# Patient Record
Sex: FEMALE | Race: WHITE | NOT HISPANIC OR LATINO | Employment: FULL TIME | URBAN - METROPOLITAN AREA
[De-identification: names, ages, dates, MRNs, and addresses within clinical notes are randomized per-mention and may not be internally consistent; named-entity substitution may affect disease eponyms.]

---

## 2018-02-15 ENCOUNTER — HOSPITAL ENCOUNTER (OUTPATIENT)
Dept: RADIOLOGY | Facility: HOSPITAL | Age: 58
Discharge: HOME | End: 2018-02-15
Attending: INTERNAL MEDICINE
Payer: COMMERCIAL

## 2018-02-15 ENCOUNTER — OFFICE VISIT (OUTPATIENT)
Dept: INTERNAL MEDICINE | Facility: CLINIC | Age: 58
End: 2018-02-15
Payer: COMMERCIAL

## 2018-02-15 ENCOUNTER — TELEPHONE (OUTPATIENT)
Dept: INTERNAL MEDICINE | Facility: CLINIC | Age: 58
End: 2018-02-15

## 2018-02-15 VITALS
WEIGHT: 202.19 LBS | OXYGEN SATURATION: 98 % | DIASTOLIC BLOOD PRESSURE: 76 MMHG | HEIGHT: 70 IN | SYSTOLIC BLOOD PRESSURE: 122 MMHG | BODY MASS INDEX: 28.95 KG/M2 | HEART RATE: 77 BPM | TEMPERATURE: 98 F

## 2018-02-15 DIAGNOSIS — R51.9 LEFT TEMPORAL HEADACHE: ICD-10-CM

## 2018-02-15 DIAGNOSIS — R51.9 LEFT TEMPORAL HEADACHE: Primary | ICD-10-CM

## 2018-02-15 PROCEDURE — 3008F BODY MASS INDEX DOCD: CPT | Mod: S$GLB,,, | Performed by: INTERNAL MEDICINE

## 2018-02-15 PROCEDURE — 70553 MRI BRAIN STEM W/O & W/DYE: CPT | Mod: 26,,, | Performed by: RADIOLOGY

## 2018-02-15 PROCEDURE — 70553 MRI BRAIN STEM W/O & W/DYE: CPT | Mod: TC

## 2018-02-15 PROCEDURE — 99214 OFFICE O/P EST MOD 30 MIN: CPT | Mod: S$GLB,,, | Performed by: INTERNAL MEDICINE

## 2018-02-15 PROCEDURE — 99999 PR PBB SHADOW E&M-NEW PATIENT-LVL IV: CPT | Mod: PBBFAC,,, | Performed by: INTERNAL MEDICINE

## 2018-02-15 PROCEDURE — A9585 GADOBUTROL INJECTION: HCPCS | Performed by: INTERNAL MEDICINE

## 2018-02-15 PROCEDURE — 25500020 PHARM REV CODE 255: Performed by: INTERNAL MEDICINE

## 2018-02-15 RX ORDER — GADOBUTROL 604.72 MG/ML
10 INJECTION INTRAVENOUS
Status: COMPLETED | OUTPATIENT
Start: 2018-02-15 | End: 2018-02-15

## 2018-02-15 RX ADMIN — GADOBUTROL 10 ML: 604.72 INJECTION INTRAVENOUS at 04:02

## 2018-02-15 NOTE — PROGRESS NOTES
Subjective:       Patient ID: Jenifer Burnett is a 58 y.o. female.    Chief Complaint: Headache    Headache    This is a new problem. The current episode started 1 to 4 weeks ago. Episode frequency: several times a day. The problem has been unchanged. The pain is located in the left unilateral and temporal region. The pain does not radiate. The pain quality is not similar to prior headaches. The quality of the pain is described as shooting and sharp. The pain is at a severity of 7/10. Pertinent negatives include no abdominal pain, coughing, dizziness, ear pain, fever, nausea, sinus pressure, sore throat, vomiting or weakness. Associated symptoms comments: Temporal tenderness. Exacerbated by: touching lateral left eyebrow. She has tried acetaminophen and NSAIDs for the symptoms. The treatment provided no relief. Her past medical history is significant for migraine headaches. (This is not like her migraines)     Review of Systems   Constitutional: Negative for activity change, chills, fatigue and fever.   HENT: Negative for congestion, ear pain, nosebleeds, postnasal drip, sinus pressure and sore throat.    Eyes: Negative.  Negative for visual disturbance.   Respiratory: Negative for cough, chest tightness, shortness of breath and wheezing.    Cardiovascular: Negative for chest pain.   Gastrointestinal: Negative for abdominal pain, diarrhea, nausea and vomiting.   Genitourinary: Negative for difficulty urinating, dysuria, frequency and urgency.   Musculoskeletal: Negative for arthralgias and neck stiffness.   Skin: Negative for rash.   Neurological: Positive for headaches. Negative for dizziness and weakness.   Psychiatric/Behavioral: Negative for sleep disturbance. The patient is not nervous/anxious.        Objective:      Physical Exam   Constitutional: She is oriented to person, place, and time. She appears well-developed and well-nourished.  Non-toxic appearance. No distress.   HENT:   Head: Normocephalic and  atraumatic.       Right Ear: Tympanic membrane, external ear and ear canal normal.   Left Ear: Tympanic membrane, external ear and ear canal normal.   Eyes: EOM are normal. Pupils are equal, round, and reactive to light. No scleral icterus.   Neck: Normal range of motion. Neck supple. No thyromegaly present.   Cardiovascular: Normal rate, regular rhythm and normal heart sounds.    Pulmonary/Chest: Effort normal and breath sounds normal.   Abdominal: Soft. Bowel sounds are normal. She exhibits no mass. There is no tenderness. There is no rebound.   Musculoskeletal: Normal range of motion.   Lymphadenopathy:     She has no cervical adenopathy.   Neurological: She is alert and oriented to person, place, and time. She has normal reflexes. She displays normal reflexes. No cranial nerve deficit. She exhibits normal muscle tone. Coordination normal.   Skin: Skin is warm and dry.   Psychiatric: She has a normal mood and affect. Her behavior is normal.       Assessment:       1. Left temporal headache        Plan:   Jenifer was seen today for headache.    Diagnoses and all orders for this visit:    Left temporal headache  -     CBC auto differential; Future  -     Sedimentation rate, manual; Future  -     C-reactive protein; Future  -     MRI Brain W WO Contrast; Future  -     Ambulatory consult to Neurology

## 2018-03-05 ENCOUNTER — OFFICE VISIT (OUTPATIENT)
Dept: NEUROLOGY | Facility: CLINIC | Age: 58
End: 2018-03-05
Payer: COMMERCIAL

## 2018-03-05 VITALS
BODY MASS INDEX: 29.29 KG/M2 | SYSTOLIC BLOOD PRESSURE: 109 MMHG | HEART RATE: 94 BPM | WEIGHT: 204.13 LBS | DIASTOLIC BLOOD PRESSURE: 71 MMHG

## 2018-03-05 DIAGNOSIS — G50.0 TRIGEMINAL NEURALGIA OF LEFT SIDE OF FACE: Primary | ICD-10-CM

## 2018-03-05 PROCEDURE — 3078F DIAST BP <80 MM HG: CPT | Mod: S$GLB,,, | Performed by: PSYCHIATRY & NEUROLOGY

## 2018-03-05 PROCEDURE — 99999 PR PBB SHADOW E&M-EST. PATIENT-LVL III: CPT | Mod: PBBFAC,,, | Performed by: PSYCHIATRY & NEUROLOGY

## 2018-03-05 PROCEDURE — 99205 OFFICE O/P NEW HI 60 MIN: CPT | Mod: S$GLB,,, | Performed by: PSYCHIATRY & NEUROLOGY

## 2018-03-05 PROCEDURE — 3074F SYST BP LT 130 MM HG: CPT | Mod: S$GLB,,, | Performed by: PSYCHIATRY & NEUROLOGY

## 2018-03-05 RX ORDER — CARBAMAZEPINE 200 MG/1
TABLET, EXTENDED RELEASE ORAL
Qty: 180 TABLET | Refills: 1 | Status: SHIPPED | OUTPATIENT
Start: 2018-03-05 | End: 2019-10-04

## 2018-03-05 NOTE — PROGRESS NOTES
[facility] - NEUROLOGY  [facility] [place]    Date: March 5, 2018   Patient Name: Jenifer Burnett   MRN: [id number]   PCP: Fab Suero  Referring Provider: Yaneli Landa MD    Assessment:      This is Jenifer Burnett, 58 y.o. female with left trigeminal neuralgia, images of MRI brain reviewed T2 signal abnormality which is not in location which would explain symptoms - suspect this is incidental.  We discussed referral for trigeminal nn block but she preferred medication trial.     Plan:      -  Tegretol 200mg bid, will check CMP on follow up    Return 3 months, sooner if needed       I discussed side effects of the medications. I asked the patient to stop the medication if she notices serious adverse effects as we discussed and to seek immediate medical attention at an ER.     Raymond Snyder MD  [facility]   Department of Neurology    Subjective:      HPI:   Ms. Jenifer Burnett is a 58 y.o. female who presents with a chief complaint of facial pain    Starting January 2018 she began to have burning/electric pain which would run along the left eyebrow with significant allodynia to touch.  This will develop without clear trigger and last for several hours, no associated autonomic symptoms.  No history of stroke or TIA.    PAST MEDICAL HISTORY:  Past Medical History:   Diagnosis Date    Encounter for blood transfusion     GERD (gastroesophageal reflux disease)     Hypertension        PAST SURGICAL HISTORY:  Past Surgical History:   Procedure Laterality Date    CHOLECYSTECTOMY      HYSTERECTOMY         CURRENT MEDS:  Current Outpatient Prescriptions   Medication Sig Dispense Refill    citalopram (CELEXA) 10 MG tablet Take 20 mg by mouth Daily. 1 Tablet Oral Every day      docusate sodium 100 mg capsule Take 1 tablet by mouth Three times daily as needed. 1 Tablet Oral Three times a day.  take 1 pill three time a day as needed for constipation      hydrOXYzine (ATARAX)  25 MG tablet Take 1 tablet (25 mg total) by mouth every 6 (six) hours. 12 tablet 0    lisinopril-hydrochlorothiazide (PRINZIDE,ZESTORETIC) 10-12.5 mg per tablet Take 1 tablet by mouth once daily.      omeprazole (PRILOSEC) 40 MG capsule Take 40 mg by mouth Daily.  Capsule, Delayed Release(E.C.) Oral Every day      oxycodone-acetaminophen (PERCOCET) 5-325 mg per tablet Take 1-2 tablets by mouth every 4 (four) hours as needed for Pain. 1 - 2 Tablet Oral Every 6 hours.  take 1-2 pills by mouth every 6 hours as needed for pain 40 tablet 0    promethazine (PHENERGAN) 25 MG suppository Place 1 suppository rectally every 6 to 8 hours as needed. 1 Suppository Rectal Every 6-8 hours      carBAMazepine (TEGRETOL XR) 200 MG 12 hr tablet Take 1 tab at bed time for 1 week then take 1 tab twice daily 180 tablet 1     No current facility-administered medications for this visit.        ALLERGIES:  Review of patient's allergies indicates:  No Known Allergies    FAMILY HISTORY:  No family history on file.    SOCIAL HISTORY:  Social History   Substance Use Topics    Smoking status: Never Smoker    Smokeless tobacco: Never Used    Alcohol use No       Review of Systems:  12 review of systems is negative except for the symptoms mentioned in HPI.        Objective:     Vitals:    03/05/18 1351   BP: 109/71   Pulse: 94   Weight: 92.6 kg (204 lb 2.3 oz)       General: NAD, well nourished   Eyes: no tearing, discharge, no erythema   ENT: moist mucous membranes of the oral cavity, nares patent    Neck: Supple, full range of motion  Cardiovascular: Warm and well perfused, pulses equal and symmetrical  Lungs: Normal work of breathing, normal chest wall excursions  Skin: No rash, lesions, or breakdown on exposed skin  Psychiatry: Mood and affect are appropriate   Abdomen: soft, non tender, non distended  Extremeties: No cyanosis, clubbing or edema.    Neurological   MENTAL STATUS: Alert and oriented to person, place, and time. Attention  and concentration within normal limits. Speech without dysarthria, able to name and repeat without difficulty. Recent and remote memory within normal limits   CRANIAL NERVES: Visual fields intact. PERRL. EOMI. Facial sensation intact. Face symmetrical. Hearing grossly intact. Full shoulder shrug bilaterally. Tongue protrudes midline   SENSORY: Sensation is intact to light touch throughout.  Negative Romberg.   MOTOR: Normal bulk and tone. No pronator drift.  5/5 deltoid, biceps, triceps, interosseous, hand  bilaterally. 5/5 iliopsoas, knee extension/flexion, foot dorsi/plantarflexion bilaterally.    REFLEXES: Symmetric and 2+ throughout.    CEREBELLAR/COORDINATION/GAIT: Gait steady with normal arm swing and stride length.  Heel to shin intact. Finger to nose intact. Normal rapid alternating movements.

## 2018-03-05 NOTE — LETTER
March 5, 2018      Yaneli Landa MD  [address]  [facility] [address]           [facility] - Neurology  [address]  [facility] [address]  Phone: [phone]  Fax: [phone]          Patient: Jenifer Burnett   MR Number: [id number]   YOB: 1960   Date of Visit: 3/5/2018       Dear Dr. Yaneli Landa:    Thank you for referring Jenifer Burnett to me for evaluation. Attached you will find relevant portions of my assessment and plan of care.    If you have questions, please do not hesitate to call me. I look forward to following Jenifer Burnett along with you.    Sincerely,    Raymond Snyder MD    Enclosure  CC:  No Recipients    If you would like to receive this communication electronically, please contact [email or web address] or [phone] to request more information on [employer] access.    For providers and/or their staff who would like to refer a patient to [facility], please contact us through our one-stop-shop provider referral line, [facility] , at [phone].    If you feel you have received this communication in error or would no longer like to receive these types of communications, please e-mail [email or web address]

## 2018-03-05 NOTE — PATIENT INSTRUCTIONS
Trigeminal Neuralgia  You have trigeminal neuralgia. This is pain caused by irritation of the trigeminal nerve on your face. Symptoms include sudden, sharp pain in your head or face. It may feel like an electric shock. It can last for several seconds or minutes. It usually happens on only 1 side of your face. Pain may be triggered by things like moving your jaw or a touch on the skin of your face. The pain may be caused by something irritating the trigeminal nerve, such as a blood vessel pressing against it. But the exact cause of this problem often isnt known. Although it can be quite painful, the condition isnt dangerous.  Trigeminal neuralgia is often treated with medicines. These include anti-seizure medicines or antidepressants. Certain other treatments may also help. In some cases, you may need surgery.    Home care  Your healthcare provider may prescribe medicines to help relieve and prevent pain. Take all medicines as directed. Please note that it may take several changes in dose and medicines before the right combination is found that controls the pain.  General care:  · Plan to rest at home today.  · Avoid any specific activities that seem to trigger the pain.  · Over the next few weeks, keep a pain diary. Write down when your symptoms happen and how they feel. Certain activities such as touching your face, chewing, talking, or brushing your teeth may bring on the pain. Cold air can also trigger the pain. Make sure you write down any triggers and discuss these with your healthcare provider. This will help guide treatment.  Follow-up care  Follow up with your healthcare provider, or as advised. If you were referred to a neurologist, be sure to make an appointment.  For more information on your condition, visit:  · [employer] www.fpa-support.org  When to seek medical advice  Call your healthcare provider right away if any of these occur:  · Fever of 100.4°F (38°C) or higher, or as  advised  · Headache with very stiff neck  · You arent able to keep liquids down (repeated vomiting)  · Extreme drowsiness or confusion  · Dizziness or fainting  · A new feeling of weakness or numbness or tingling in your arm, leg, or face  · Difficulty speaking or seeing  Date Last Reviewed: 8/1/2016  © 3356-3257 [employer]. [address] [place] [address], [place] [address]. All rights reserved. This information is not intended as a substitute for professional medical care. Always follow your healthcare professional's instructions.

## 2018-12-11 ENCOUNTER — OFFICE VISIT (OUTPATIENT)
Dept: INTERNAL MEDICINE | Facility: CLINIC | Age: 58
End: 2018-12-11
Payer: COMMERCIAL

## 2018-12-11 VITALS
OXYGEN SATURATION: 98 % | HEIGHT: 69 IN | HEART RATE: 80 BPM | SYSTOLIC BLOOD PRESSURE: 132 MMHG | BODY MASS INDEX: 30.4 KG/M2 | DIASTOLIC BLOOD PRESSURE: 84 MMHG | WEIGHT: 205.25 LBS

## 2018-12-11 DIAGNOSIS — G43.009 MIGRAINE WITHOUT AURA AND WITHOUT STATUS MIGRAINOSUS, NOT INTRACTABLE: Primary | ICD-10-CM

## 2018-12-11 PROBLEM — G43.909 MIGRAINE: Status: ACTIVE | Noted: 2018-12-11

## 2018-12-11 PROCEDURE — 99999 PR PBB SHADOW E&M-EST. PATIENT-LVL III: CPT | Mod: PBBFAC,,, | Performed by: PHYSICIAN ASSISTANT

## 2018-12-11 PROCEDURE — 3008F BODY MASS INDEX DOCD: CPT | Mod: CPTII,S$GLB,, | Performed by: PHYSICIAN ASSISTANT

## 2018-12-11 PROCEDURE — 99213 OFFICE O/P EST LOW 20 MIN: CPT | Mod: S$GLB,,, | Performed by: PHYSICIAN ASSISTANT

## 2018-12-11 RX ORDER — TRAMADOL HYDROCHLORIDE 50 MG/1
50 TABLET ORAL EVERY 6 HOURS
COMMUNITY
End: 2019-10-04

## 2018-12-11 RX ORDER — BUTALBITAL, ASPIRIN, AND CAFFEINE 325; 50; 40 MG/1; MG/1; MG/1
1 CAPSULE ORAL EVERY 4 HOURS PRN
COMMUNITY
End: 2019-10-04

## 2018-12-11 RX ORDER — NAPROXEN 500 MG/1
500 TABLET ORAL 2 TIMES DAILY
COMMUNITY
End: 2019-10-04

## 2018-12-11 RX ORDER — ESOMEPRAZOLE MAGNESIUM 40 MG/1
40 CAPSULE, DELAYED RELEASE ORAL
COMMUNITY
End: 2019-10-04

## 2018-12-11 RX ORDER — SUMATRIPTAN 50 MG/1
50 TABLET, FILM COATED ORAL
Qty: 10 TABLET | Refills: 1 | Status: SHIPPED | OUTPATIENT
Start: 2018-12-11 | End: 2020-07-01

## 2018-12-11 NOTE — PATIENT INSTRUCTIONS
What Are Migraine and Tension Headaches?    Although there are several types of headaches, migraine and tension headaches affect the most people. When you have a headache, it isn't your brain that's hurting. Your head aches because nerves in the bones, blood vessels, meninges, and muscles of your head are irritated. These irritated nerves send pain signals to the brain, which identifies where you hurt and how bad the pain is.  Talk with your healthcare provider about a treatment plan that may help relieve pain and prevent future headaches.   What causes your headache?  The actual headache process is not yet understood. Only rarely are headaches a sign of a serious medical problem. Headache pain may be caused by abnormal interaction between the brain and the nerves and blood vessels in the head. Environmental stresses or certain foods and drinks may trigger headache pain.  What is referred pain?  Headache pain can be referred pain, which is pain that has its source in one place but is felt in another. For example, pain behind the eyes may actually be caused by tense muscles in the neck and shoulders. This means that the place that hurts may not be the part of the body that needs treatment.  Is it a migraine?  Migraine is a vascular headache that causes throbbing pain felt on one or both sides of the head. You may feel nauseated or vomit. This headache may also be preceded or associated with changes in sight (like seeing spots or flashes of light), ability to speak, or sensation (aura). There are a wide variety of environmental and food-related triggers for migraines. The pain may last for 4 to 72 hours. Afterward, you may feel shaky for a day or so. If this is the first time you experience these symptoms, you should immediately seek medical attention because you could be having a stroke.  Is it a tension headache?  This type of headache is usually a dull ache or a sensation of pressure on both sides of the head. It  "may be associated with pain or tension in the neck and shoulders. Depression, anxiety, and stress can cause a tension headache. The pain may not have a definite beginning or end. It may come and go, or seem never to go away.  When to call the healthcare provider  Call your healthcare provider for headaches that happen along with any of these symptoms:  · Sudden, severe headache that is different from your usual headache pain  · High fever along with a stiff neck  · Recurring headache in children   · Ongoing numbness or muscle weakness  · Loss of vision  · Pain following a head injury  · Convulsions, or a change in mental awareness  · A headache you would call "the worst headache you've ever had"   Date Last Reviewed: 9/14/2015  © 9521-0914 [employer]. [address] [place] [address], [place] [address]. All rights reserved. This information is not intended as a substitute for professional medical care. Always follow your healthcare professional's instructions.        "

## 2018-12-11 NOTE — PROGRESS NOTES
"Subjective:       Patient ID: Jenifer Burnett is a 58 y.o. female.    Chief Complaint: Migraine (3 days)    HPI      Pt with PMH of chronic migraines and hx of trigeminal neuralgia. Today c/o migraine onset about 3 days ago, starts at back of neck and wraps around bilaterally with associated nausea, photophobia and phonophobia. She tried Fioricet at home without relief. Then tried Tramadol with mild improvement. Had MRI in 2/2018 (see results below). Seen by Neurology as well but lost to follow up. Never tried Carbamezapine prescribed by Neurology    Past Medical History:   Diagnosis Date    Encounter for blood transfusion     GERD (gastroesophageal reflux disease)     Hypertension      Review of patient's allergies indicates:  No Known Allergies    Social History     Tobacco Use    Smoking status: Never Smoker    Smokeless tobacco: Never Used   Substance Use Topics    Alcohol use: No    Drug use: No         Review of Systems   Constitutional: Negative for chills, diaphoresis, fatigue, fever and unexpected weight change.   Eyes: Positive for photophobia. Negative for visual disturbance.   Respiratory: Negative for cough and shortness of breath.    Cardiovascular: Negative for chest pain, palpitations and leg swelling.   Gastrointestinal: Positive for nausea. Negative for abdominal pain and vomiting.   Endocrine: Negative for polydipsia, polyphagia and polyuria.   Skin: Negative for rash.   Neurological: Positive for headaches. Negative for dizziness, facial asymmetry, speech difficulty, weakness and light-headedness.       Objective: /84   Pulse 80   Ht 5' 9" (1.753 m)   Wt 93.1 kg (205 lb 4 oz)   SpO2 98%   BMI 30.31 kg/m²         Physical Exam   Constitutional: She is oriented to person, place, and time. She appears well-developed and well-nourished. No distress.   HENT:   Head: Normocephalic and atraumatic.   Mouth/Throat: Oropharynx is clear and moist.   Eyes: Pupils are equal, round, and " reactive to light.   Cardiovascular: Normal rate and regular rhythm. Exam reveals no friction rub.   No murmur heard.  Pulmonary/Chest: Effort normal and breath sounds normal. She has no wheezes. She has no rales.   Abdominal: Soft. Bowel sounds are normal. There is no tenderness.   Neurological: She is alert and oriented to person, place, and time. No cranial nerve deficit. Coordination normal.   Skin: Skin is warm and dry. Capillary refill takes less than 2 seconds. No rash noted.   Psychiatric: She has a normal mood and affect.   Vitals reviewed.        MRI 2/2018  Impression      Small signal abnormality in the left paramedian deisi most compatible with remote infarction.    Few punctate foci of T2 flair hyperintensity in the supratentorial subcortical white matter which are nonspecific differential to include mild chronic ischemic change.    No evidence for acute infarction or enhancing lesion.       Assessment:       1. Migraine without aura and without status migrainosus, not intractable        Plan:         Jenifer was seen today for migraine.    Diagnoses and all orders for this visit:    Migraine without aura and without status migrainosus, not intractable  -     sumatriptan (IMITREX) 50 MG tablet; Take 1 tablet (50 mg total) by mouth as needed for Migraine (May take additional dose after 2 hours. Do not exceed 2 doses in 24 hrs).    Neuro exam benign  VSS  Trial of Imitrex for abortive therapy  ED Precautions discussed  Neurology follow up     Marielos Aguirre PA-C

## 2019-10-04 ENCOUNTER — OFFICE VISIT (OUTPATIENT)
Dept: INTERNAL MEDICINE | Facility: CLINIC | Age: 59
End: 2019-10-04
Payer: COMMERCIAL

## 2019-10-04 VITALS
BODY MASS INDEX: 30.36 KG/M2 | RESPIRATION RATE: 18 BRPM | SYSTOLIC BLOOD PRESSURE: 112 MMHG | HEIGHT: 69 IN | DIASTOLIC BLOOD PRESSURE: 68 MMHG | WEIGHT: 205 LBS

## 2019-10-04 DIAGNOSIS — R19.04 LLQ ABDOMINAL MASS: ICD-10-CM

## 2019-10-04 DIAGNOSIS — M62.830 MUSCLE SPASM OF BACK: ICD-10-CM

## 2019-10-04 DIAGNOSIS — R68.81 EARLY SATIETY: ICD-10-CM

## 2019-10-04 DIAGNOSIS — R10.9 FLANK PAIN: Primary | ICD-10-CM

## 2019-10-04 DIAGNOSIS — R14.0 ABDOMINAL DISTENSION: ICD-10-CM

## 2019-10-04 LAB
BILIRUB SERPL-MCNC: 1 MG/DL
BLOOD URINE, POC: ABNORMAL
COLOR, POC UA: YELLOW
GLUCOSE UR QL STRIP: NORMAL
KETONES UR QL STRIP: ABNORMAL
LEUKOCYTE ESTERASE URINE, POC: ABNORMAL
NITRITE, POC UA: ABNORMAL
PH, POC UA: 5
PROTEIN, POC: ABNORMAL
SPECIFIC GRAVITY, POC UA: 1.02
UROBILINOGEN, POC UA: NORMAL

## 2019-10-04 PROCEDURE — 99999 PR PBB SHADOW E&M-EST. PATIENT-LVL IV: CPT | Mod: PBBFAC,,, | Performed by: INTERNAL MEDICINE

## 2019-10-04 PROCEDURE — 99999 PR PBB SHADOW E&M-EST. PATIENT-LVL IV: ICD-10-PCS | Mod: PBBFAC,,, | Performed by: INTERNAL MEDICINE

## 2019-10-04 PROCEDURE — 3008F PR BODY MASS INDEX (BMI) DOCUMENTED: ICD-10-PCS | Mod: CPTII,S$GLB,, | Performed by: INTERNAL MEDICINE

## 2019-10-04 PROCEDURE — 3008F BODY MASS INDEX DOCD: CPT | Mod: CPTII,S$GLB,, | Performed by: INTERNAL MEDICINE

## 2019-10-04 PROCEDURE — 81002 POCT URINE DIPSTICK WITHOUT MICROSCOPE: ICD-10-PCS | Mod: S$GLB,,, | Performed by: INTERNAL MEDICINE

## 2019-10-04 PROCEDURE — 99214 OFFICE O/P EST MOD 30 MIN: CPT | Mod: 25,S$GLB,, | Performed by: INTERNAL MEDICINE

## 2019-10-04 PROCEDURE — 81002 URINALYSIS NONAUTO W/O SCOPE: CPT | Mod: S$GLB,,, | Performed by: INTERNAL MEDICINE

## 2019-10-04 PROCEDURE — 99214 PR OFFICE/OUTPT VISIT, EST, LEVL IV, 30-39 MIN: ICD-10-PCS | Mod: 25,S$GLB,, | Performed by: INTERNAL MEDICINE

## 2019-10-04 RX ORDER — MELOXICAM 15 MG/1
15 TABLET ORAL DAILY
Qty: 30 TABLET | Refills: 0 | Status: SHIPPED | OUTPATIENT
Start: 2019-10-04 | End: 2020-07-01

## 2019-10-04 RX ORDER — TIZANIDINE 2 MG/1
4 TABLET ORAL EVERY 6 HOURS PRN
Qty: 40 TABLET | Refills: 1 | Status: SHIPPED | OUTPATIENT
Start: 2019-10-04 | End: 2019-10-14

## 2019-10-04 NOTE — PROGRESS NOTES
Subjective:       Patient ID: Jenifer Burnett is a 59 y.o. female.    Chief Complaint: Flank Pain (bilateral 2 wks) and Mass (lower pelvic area, groin, 3 weeks)    59 year old lady with multiple complaints.  1- Has bilateral back pain especially with movement  No known injury no urinary symptoms.  2 has felt lump in LLQ of abd when she stands.  3 stomach feels very full after small amounts of food.  NO vomiting or diarrhea, no fever or chills    Review of Systems   Constitutional: Negative for activity change, chills, fatigue and fever.   HENT: Negative for congestion, ear pain, nosebleeds, postnasal drip, sinus pressure and sore throat.    Eyes: Negative.  Negative for visual disturbance.   Respiratory: Negative for cough, chest tightness, shortness of breath and wheezing.    Cardiovascular: Negative for chest pain.   Gastrointestinal: Negative for abdominal pain, diarrhea, nausea and vomiting.   Genitourinary: Negative for difficulty urinating, dysuria, frequency and urgency.   Musculoskeletal: Negative for arthralgias and neck stiffness.   Skin: Negative for rash.   Neurological: Negative for dizziness, weakness and headaches.   Psychiatric/Behavioral: Negative for sleep disturbance. The patient is not nervous/anxious.        Objective:      Physical Exam   Constitutional: She is oriented to person, place, and time. She appears well-developed and well-nourished.  Non-toxic appearance. No distress.   HENT:   Head: Normocephalic and atraumatic.   Right Ear: Tympanic membrane, external ear and ear canal normal.   Left Ear: Tympanic membrane, external ear and ear canal normal.   Eyes: Pupils are equal, round, and reactive to light. EOM are normal. No scleral icterus.   Neck: Normal range of motion. Neck supple. No thyromegaly present.   Cardiovascular: Normal rate, regular rhythm and normal heart sounds.   Pulmonary/Chest: Effort normal and breath sounds normal.   Abdominal: Soft. Bowel sounds are normal. She exhibits  no mass. There is no tenderness. There is no rebound.       Musculoskeletal: Normal range of motion.        Arms:  Lymphadenopathy:     She has no cervical adenopathy.   Neurological: She is alert and oriented to person, place, and time. She has normal reflexes. She displays normal reflexes. No cranial nerve deficit. She exhibits normal muscle tone. Coordination normal.   Skin: Skin is warm and dry.   Psychiatric: She has a normal mood and affect. Her behavior is normal.       Assessment:       1. Flank pain    2. Muscle spasm of back    3. Abdominal distension    4. LLQ abdominal mass        Plan:   Jenifer was seen today for flank pain and mass.    Diagnoses and all orders for this visit:    Flank pain  -     POCT urine dipstick without microscope  -     Ambulatory consult to Physical Therapy    Muscle spasm of back  -     Ambulatory consult to Physical Therapy    Abdominal distension  -     CT Abdomen Pelvis With Contrast; Future    LLQ abdominal mass  -     US Abdomen Limited_Hernia; Future    Other orders  -     tiZANidine (ZANAFLEX) 2 MG tablet; Take 2 tablets (4 mg total) by mouth every 6 (six) hours as needed.  -     meloxicam (MOBIC) 15 MG tablet; Take 1 tablet (15 mg total) by mouth once daily.

## 2019-10-10 ENCOUNTER — HOSPITAL ENCOUNTER (OUTPATIENT)
Dept: RADIOLOGY | Facility: HOSPITAL | Age: 59
Discharge: HOME | End: 2019-10-10
Attending: INTERNAL MEDICINE
Payer: COMMERCIAL

## 2019-10-10 ENCOUNTER — TELEPHONE (OUTPATIENT)
Dept: INTERNAL MEDICINE | Facility: CLINIC | Age: 59
End: 2019-10-10

## 2019-10-10 ENCOUNTER — OFFICE VISIT (OUTPATIENT)
Dept: SURGERY | Facility: CLINIC | Age: 59
End: 2019-10-10
Payer: COMMERCIAL

## 2019-10-10 VITALS
BODY MASS INDEX: 30.36 KG/M2 | WEIGHT: 205 LBS | SYSTOLIC BLOOD PRESSURE: 126 MMHG | DIASTOLIC BLOOD PRESSURE: 75 MMHG | TEMPERATURE: 98 F | HEIGHT: 69 IN | HEART RATE: 82 BPM

## 2019-10-10 DIAGNOSIS — K44.9 HERNIA, HIATAL: ICD-10-CM

## 2019-10-10 DIAGNOSIS — K43.9 HERNIA OF ABDOMINAL WALL: Primary | ICD-10-CM

## 2019-10-10 DIAGNOSIS — R19.04 LLQ ABDOMINAL MASS: ICD-10-CM

## 2019-10-10 DIAGNOSIS — R14.0 ABDOMINAL DISTENSION: ICD-10-CM

## 2019-10-10 PROCEDURE — 25500020 PHARM REV CODE 255: Performed by: INTERNAL MEDICINE

## 2019-10-10 PROCEDURE — 3008F PR BODY MASS INDEX (BMI) DOCUMENTED: ICD-10-PCS | Mod: CPTII,S$GLB,, | Performed by: SURGERY

## 2019-10-10 PROCEDURE — 76705 ECHO EXAM OF ABDOMEN: CPT | Mod: 26,,, | Performed by: RADIOLOGY

## 2019-10-10 PROCEDURE — 74177 CT ABD & PELVIS W/CONTRAST: CPT | Mod: 26,,, | Performed by: RADIOLOGY

## 2019-10-10 PROCEDURE — 99204 OFFICE O/P NEW MOD 45 MIN: CPT | Mod: S$GLB,,, | Performed by: SURGERY

## 2019-10-10 PROCEDURE — 99204 PR OFFICE/OUTPT VISIT, NEW, LEVL IV, 45-59 MIN: ICD-10-PCS | Mod: S$GLB,,, | Performed by: SURGERY

## 2019-10-10 PROCEDURE — 76705 US ABDOMEN LIMITED_HERNIA: ICD-10-PCS | Mod: 26,,, | Performed by: RADIOLOGY

## 2019-10-10 PROCEDURE — 76705 ECHO EXAM OF ABDOMEN: CPT | Mod: TC

## 2019-10-10 PROCEDURE — 74177 CT ABDOMEN PELVIS WITH CONTRAST: ICD-10-PCS | Mod: 26,,, | Performed by: RADIOLOGY

## 2019-10-10 PROCEDURE — 3008F BODY MASS INDEX DOCD: CPT | Mod: CPTII,S$GLB,, | Performed by: SURGERY

## 2019-10-10 PROCEDURE — 99999 PR PBB SHADOW E&M-EST. PATIENT-LVL III: ICD-10-PCS | Mod: PBBFAC,,, | Performed by: SURGERY

## 2019-10-10 PROCEDURE — 99999 PR PBB SHADOW E&M-EST. PATIENT-LVL III: CPT | Mod: PBBFAC,,, | Performed by: SURGERY

## 2019-10-10 PROCEDURE — 74177 CT ABD & PELVIS W/CONTRAST: CPT | Mod: TC

## 2019-10-10 RX ADMIN — IOHEXOL 100 ML: 350 INJECTION, SOLUTION INTRAVENOUS at 11:10

## 2019-10-10 RX ADMIN — IOHEXOL 15 ML: 350 INJECTION, SOLUTION INTRAVENOUS at 10:10

## 2019-10-10 RX ADMIN — IOHEXOL 15 ML: 350 INJECTION, SOLUTION INTRAVENOUS at 09:10

## 2019-10-10 NOTE — TELEPHONE ENCOUNTER
----- Message from Laurie Gill sent at 10/10/2019  4:23 PM CDT -----  Contact: Patient [phone]  Patient is returning a phone call.  Who left a message for the patient: Dr. Landa  Does patient know what this is regarding:  Lab results  Comments:

## 2019-10-10 NOTE — TELEPHONE ENCOUNTER
"Please tell partient that she does have a small hernia in the area of her "lump"  I have placed a referral to general surgery for her to follow up  "

## 2019-10-10 NOTE — Clinical Note
October 10, 2019      Yaneli Landa MD  [address]  [place] [address]           [facility]  [address]  [facility] [address]  Phone: [phone]          Patient: Jenifer Burnett   MR Number: [id number]   YOB: 1960   Date of Visit: 10/10/2019       Dear Dr. Yaneli Landa:    Thank you for referring Jenifer Burnett to me for evaluation. Attached you will find relevant portions of my assessment and plan of care.    If you have questions, please do not hesitate to call me. I look forward to following Jenifer Burnett along with you.    Sincerely,    Adriano Godoy MD    Enclosure  CC:  No Recipients    If you would like to receive this communication electronically, please contact [email or web address] or [phone] to request more information on [employer] Link access.    For providers and/or their staff who would like to refer a patient to [facility], please contact us through our one-stop-shop provider referral line, [facility] , at [phone].    If you feel you have received this communication in error or would no longer like to receive these types of communications, please e-mail [email or web address]

## 2019-10-10 NOTE — PROGRESS NOTES
History & Physical  General Surgery Clinic    SUBJECTIVE:     Chief complaint: LLQ pain/lump and epigastric pain w/ early satiety w/ bloating    History of Present Illness:  Patient is a 59 y.o. female w/ a PMHx of GERD and HTN who complains of:  1) LLQ pain and lump that has been present from approx. 3 weeks. The pain presents approximately once a day out of the blue as a sharp stabbing pain and goes away on its own after 30-45 minutes.  2) She also endorses epigastric pain/pressure associated with early satiety and bloating after meals that has been present for approximately the same time frame. She denies changes in bowel function, n/v and dysphagia or regurgitation of food.   She continues to have reflux and takes her medication daily without fail. She notes she will undoubtedly have heartburn if she misses a dose.     Recent CT scan notable for hypogastric hernia and moderate size hiatal hernia.     Review of Systems   Constitutional: Negative for diaphoresis, fever and weight loss.   HENT: Negative for congestion and hearing loss.    Eyes: Negative for photophobia.   Respiratory: Negative for cough and shortness of breath.    Cardiovascular: Negative for chest pain, claudication and leg swelling.   Gastrointestinal: Negative for abdominal pain, nausea and vomiting.   Genitourinary: Negative for dysuria and frequency.   Musculoskeletal: Negative for joint pain and myalgias.   Skin: Negative for rash.   Neurological: Negative for dizziness and headaches.   Endo/Heme/Allergies: Does not bruise/bleed easily.   Psychiatric/Behavioral: Negative for depression.        Past Medical History:  Past Medical History:   Diagnosis Date    Encounter for blood transfusion     GERD (gastroesophageal reflux disease)     Hypertension        Past Surgical History:  Past Surgical History:   Procedure Laterality Date    CHOLECYSTECTOMY      HYSTERECTOMY         Family History:  No family history on file.    Social  History:  Social History     Socioeconomic History    Marital status: Single     Spouse name: Not on file    Number of children: Not on file    Years of education: Not on file    Highest education level: Not on file   Occupational History    Not on file   Social Needs    Financial resource strain: Not on file    Food insecurity:     Worry: Not on file     Inability: Not on file    Transportation needs:     Medical: Not on file     Non-medical: Not on file   Tobacco Use    Smoking status: Never Smoker    Smokeless tobacco: Never Used   Substance and Sexual Activity    Alcohol use: No    Drug use: No    Sexual activity: Yes     Partners: Male   Lifestyle    Physical activity:     Days per week: Not on file     Minutes per session: Not on file    Stress: Not on file   Relationships    Social connections:     Talks on phone: Not on file     Gets together: Not on file     Attends Gnosticist service: Not on file     Active member of club or organization: Not on file     Attends meetings of clubs or organizations: Not on file     Relationship status: Not on file   Other Topics Concern    Not on file   Social History Narrative    Not on file       Medications:  Current Outpatient Medications   Medication Sig Dispense Refill    citalopram (CELEXA) 10 MG tablet Take 20 mg by mouth Daily. 1 Tablet Oral Every day      lisinopril-hydrochlorothiazide (PRINZIDE,ZESTORETIC) 10-12.5 mg per tablet Take 1 tablet by mouth once daily.      meloxicam (MOBIC) 15 MG tablet Take 1 tablet (15 mg total) by mouth once daily. 30 tablet 0    omeprazole (PRILOSEC) 40 MG capsule Take 40 mg by mouth Daily.  Capsule, Delayed Release(E.C.) Oral Every day      sumatriptan (IMITREX) 50 MG tablet Take 1 tablet (50 mg total) by mouth as needed for Migraine (May take additional dose after 2 hours. Do not exceed 2 doses in 24 hrs). 10 tablet 1    tiZANidine (ZANAFLEX) 2 MG tablet Take 2 tablets (4 mg total) by mouth every 6 (six)  "hours as needed. 40 tablet 1     No current facility-administered medications for this visit.        Allergies:  Review of patient's allergies indicates:  No Known Allergies    OBJECTIVE:     /75   Pulse 82   Temp 98.1 °F (36.7 °C)   Ht 5' 9" (1.753 m)   Wt 93 kg (205 lb)   BMI 30.27 kg/m²     Physical Exam   Constitutional: She is oriented to person, place, and time and well-developed, well-nourished, and in no distress.   HENT:   Head: Normocephalic and atraumatic.   Eyes: EOM are normal.   Neck: Neck supple.   Cardiovascular: Normal rate.   Pulmonary/Chest: Effort normal. No respiratory distress.   Abdominal: Soft.   No distention. LLQ hernia noted that is pronounced on valsalva and coughing.   Epigastric tenderness to palpation. No masses noted.   Abdomen soft and non-distended otherwise.    Musculoskeletal: Normal range of motion. She exhibits no edema.   Neurological: She is alert and oriented to person, place, and time.   Skin: Skin is warm and dry.         ASSESSMENT/PLAN:     59 y.o. female with LLQ lump and epigastric pain and pressure.    - EGD and GES to determine surgical intervention.   - Can proceed with LLQ hernia repair at the same time as HHR.    "

## 2019-10-10 NOTE — LETTER
[facility] - General Surgery  [address]  [facility] [address]  Phone: [phone] October 10, 2019      Yaneli Landa MD  [address]  [facility] [address]    Patient: Jenifer Burnett   MR Number: [id number]   YOB: 1960   Date of Visit: 10/10/2019     Dear Dr. Landa:    Thank you for referring Jenifer Burnett to me for evaluation. Attached you will find relevant portions of my assessment and plan of care.    Ms. Burnett presents with severe bloating with no weight loss but symptoms are worsened by both liquids and solids.  The CT shows moderate to large type 3 hiatal hernia.  We will obtain EGD and GES.    If you have questions, please do not hesitate to call me. I look forward to following Jenifer Burnett along with you.    Sincerely,      Adriano Godoy MD  Professor, [employer]  Section Head, General Surgery  [facility]    WSR/afw    CC  Fab Suero MD

## 2019-10-11 NOTE — H&P (VIEW-ONLY)
I have seen the patient, reviewed the Resident's history and physical, assessment and plan. I have personally interviewed and examined the patient at bedside and: agree with the findings.     She has severe bloating with no weight loss but symptoms are worsened by both liquids and solids.  On PE there is also an incisional hernia to the left of the lower part of her lower midline incision.  Ct shows moderate to large type 3 hiatal hernia.  Will obtain egd and ges. Possible hh repair and incisional hernia repair.

## 2019-10-14 ENCOUNTER — ANESTHESIA (OUTPATIENT)
Dept: ENDOSCOPY | Facility: HOSPITAL | Age: 59
End: 2019-10-14
Payer: COMMERCIAL

## 2019-10-14 ENCOUNTER — ANESTHESIA EVENT (OUTPATIENT)
Dept: ENDOSCOPY | Facility: HOSPITAL | Age: 59
End: 2019-10-14
Payer: COMMERCIAL

## 2019-10-14 ENCOUNTER — HOSPITAL ENCOUNTER (OUTPATIENT)
Facility: HOSPITAL | Age: 59
Discharge: HOME | End: 2019-10-14
Attending: INTERNAL MEDICINE | Admitting: INTERNAL MEDICINE
Payer: COMMERCIAL

## 2019-10-14 VITALS
WEIGHT: 201 LBS | OXYGEN SATURATION: 98 % | RESPIRATION RATE: 16 BRPM | SYSTOLIC BLOOD PRESSURE: 140 MMHG | DIASTOLIC BLOOD PRESSURE: 74 MMHG | TEMPERATURE: 98 F | HEART RATE: 77 BPM | HEIGHT: 67 IN | BODY MASS INDEX: 31.55 KG/M2

## 2019-10-14 DIAGNOSIS — K44.9 HERNIA, HIATAL: Primary | ICD-10-CM

## 2019-10-14 DIAGNOSIS — K21.9 GERD (GASTROESOPHAGEAL REFLUX DISEASE): ICD-10-CM

## 2019-10-14 LAB
CREAT SERPL-MCNC: 0.8 MG/DL (ref 0.5–1.4)
SAMPLE: NORMAL

## 2019-10-14 PROCEDURE — 43239 EGD BIOPSY SINGLE/MULTIPLE: CPT | Mod: ,,, | Performed by: INTERNAL MEDICINE

## 2019-10-14 PROCEDURE — 43239 PR EGD, FLEX, W/BIOPSY, SGL/MULTI: ICD-10-PCS | Mod: ,,, | Performed by: INTERNAL MEDICINE

## 2019-10-14 PROCEDURE — 88305 TISSUE EXAM BY PATHOLOGIST: CPT | Mod: 26,,, | Performed by: PATHOLOGY

## 2019-10-14 PROCEDURE — E9220 PRA ENDO ANESTHESIA: HCPCS | Mod: ,,, | Performed by: NURSE ANESTHETIST, CERTIFIED REGISTERED

## 2019-10-14 PROCEDURE — E9220 PRA ENDO ANESTHESIA: ICD-10-PCS | Mod: ,,, | Performed by: NURSE ANESTHETIST, CERTIFIED REGISTERED

## 2019-10-14 PROCEDURE — 37000008 HC ANESTHESIA 1ST 15 MINUTES: Performed by: INTERNAL MEDICINE

## 2019-10-14 PROCEDURE — 88305 TISSUE SPECIMEN TO PATHOLOGY - SURGERY: ICD-10-PCS | Mod: 26,,, | Performed by: PATHOLOGY

## 2019-10-14 PROCEDURE — 27201012 HC FORCEPS, HOT/COLD, DISP: Performed by: INTERNAL MEDICINE

## 2019-10-14 PROCEDURE — 63600175 PHARM REV CODE 636 W HCPCS: Performed by: NURSE ANESTHETIST, CERTIFIED REGISTERED

## 2019-10-14 PROCEDURE — 63600175 PHARM REV CODE 636 W HCPCS: Performed by: INTERNAL MEDICINE

## 2019-10-14 PROCEDURE — 43239 EGD BIOPSY SINGLE/MULTIPLE: CPT | Performed by: INTERNAL MEDICINE

## 2019-10-14 PROCEDURE — 25000003 PHARM REV CODE 250: Performed by: NURSE ANESTHETIST, CERTIFIED REGISTERED

## 2019-10-14 PROCEDURE — 37000009 HC ANESTHESIA EA ADD 15 MINS: Performed by: INTERNAL MEDICINE

## 2019-10-14 PROCEDURE — 88305 TISSUE EXAM BY PATHOLOGIST: CPT | Performed by: PATHOLOGY

## 2019-10-14 RX ORDER — SODIUM CHLORIDE 9 MG/ML
INJECTION, SOLUTION INTRAVENOUS CONTINUOUS
Status: DISCONTINUED | OUTPATIENT
Start: 2019-10-14 | End: 2019-10-14 | Stop reason: HOSPADM

## 2019-10-14 RX ORDER — LIDOCAINE HCL/PF 100 MG/5ML
SYRINGE (ML) INTRAVENOUS
Status: DISCONTINUED | OUTPATIENT
Start: 2019-10-14 | End: 2019-10-14

## 2019-10-14 RX ORDER — GLYCOPYRROLATE 0.2 MG/ML
INJECTION INTRAMUSCULAR; INTRAVENOUS
Status: DISCONTINUED | OUTPATIENT
Start: 2019-10-14 | End: 2019-10-14

## 2019-10-14 RX ORDER — PROPOFOL 10 MG/ML
VIAL (ML) INTRAVENOUS
Status: DISCONTINUED | OUTPATIENT
Start: 2019-10-14 | End: 2019-10-14

## 2019-10-14 RX ADMIN — PROPOFOL 80 MG: 10 INJECTION, EMULSION INTRAVENOUS at 09:10

## 2019-10-14 RX ADMIN — GLYCOPYRROLATE 0.2 MG: 0.2 INJECTION, SOLUTION INTRAMUSCULAR; INTRAVENOUS at 09:10

## 2019-10-14 RX ADMIN — PROPOFOL 20 MG: 10 INJECTION, EMULSION INTRAVENOUS at 09:10

## 2019-10-14 RX ADMIN — LIDOCAINE HYDROCHLORIDE 75 MG: 20 INJECTION, SOLUTION INTRAVENOUS at 09:10

## 2019-10-14 RX ADMIN — PROPOFOL 40 MG: 10 INJECTION, EMULSION INTRAVENOUS at 09:10

## 2019-10-14 RX ADMIN — SODIUM CHLORIDE: 0.9 INJECTION, SOLUTION INTRAVENOUS at 09:10

## 2019-10-14 NOTE — ANESTHESIA PREPROCEDURE EVALUATION
10/14/2019  Jenifer Burnett is a 59 y.o., female.    Anesthesia Evaluation    I have reviewed the Patient Summary Reports.     I have reviewed the Medications.     Review of Systems  Anesthesia Hx:  Denies Family Hx of Anesthesia complications.   Denies Personal Hx of Anesthesia complications.   Hematology/Oncology:  Hematology Normal   Oncology Normal     EENT/Dental:EENT/Dental Normal   Cardiovascular:   Exercise tolerance: good Hypertension    Pulmonary:  Pulmonary Normal    Renal/:  Renal/ Normal     Hepatic/GI:   Hiatal Hernia, GERD    Musculoskeletal:  Musculoskeletal Normal    Neurological:   Headaches    Endocrine:  Endocrine Normal    Dermatological:  Skin Normal        Physical Exam  General:  Well nourished    Airway/Jaw/Neck:       Eyes/Ears/Nose:  EYES/EARS/NOSE FINDINGS: Normal    Chest/Lungs:  Chest/Lungs Clear    Heart/Vascular:  Heart Findings: Normal Heart murmur: negative Vascular Findings: Normal    Abdomen:  Abdomen Findings: Normal    Musculoskeletal:  Musculoskeletal Findings: Normal   Skin:  Skin Findings: Normal    Mental Status:  Mental Status Findings:  Cooperative, Alert and Oriented         Anesthesia Plan  Type of Anesthesia, risks & benefits discussed:  Anesthesia Type:  general  Patient's Preference: General  Intra-op Monitoring Plan: standard ASA monitors  Intra-op Monitoring Plan Comments:   Post Op Pain Control Plan:   Post Op Pain Control Plan Comments:   Induction:   IV  Beta Blocker:  Patient is not currently on a Beta-Blocker (No further documentation required).       Informed Consent: Patient understands risks and agrees with Anesthesia plan.  Questions answered. Anesthesia consent signed with patient.  ASA Score: 2     Day of Surgery Review of History & Physical:    H&P update referred to the surgeon.         Ready For Surgery From Anesthesia Perspective.

## 2019-10-14 NOTE — DISCHARGE INSTRUCTIONS

## 2019-10-14 NOTE — ANESTHESIA POSTPROCEDURE EVALUATION
Anesthesia Post Evaluation    Patient: Jenifer Burnett    Procedure(s) Performed: Procedure(s) (LRB):  EGD (ESOPHAGOGASTRODUODENOSCOPY) (N/A)    Final Anesthesia Type: general  Patient location during evaluation: PACU  Patient participation: Yes- Able to Participate  Level of consciousness: awake and alert and oriented  Post-procedure vital signs: reviewed and stable  Pain management: adequate  Airway patency: patent  PONV status at discharge: No PONV  Anesthetic complications: no      Cardiovascular status: blood pressure returned to baseline, hemodynamically stable and stable  Respiratory status: unassisted, room air and spontaneous ventilation  Hydration status: euvolemic  Follow-up not needed.          Vitals Value Taken Time   /77 10/14/2019  9:40 AM   Temp 36.5 °C (97.7 °F) 10/14/2019  9:40 AM   Pulse 85 10/14/2019  9:40 AM   Resp 16 10/14/2019  9:40 AM   SpO2 95 % 10/14/2019  9:40 AM         No case tracking events are documented in the log.      Pain/Олег Score: Олег Score: 9 (10/14/2019  9:40 AM)

## 2019-10-14 NOTE — TRANSFER OF CARE
"Anesthesia Transfer of Care Note    Patient: Jenifer Burnett    Procedure(s) Performed: Procedure(s) (LRB):  EGD (ESOPHAGOGASTRODUODENOSCOPY) (N/A)    Patient location: PACU    Anesthesia Type: general    Transport from OR: Transported from OR on room air with adequate spontaneous ventilation    Post pain: adequate analgesia    Post assessment: no apparent anesthetic complications and tolerated procedure well    Post vital signs: stable    Level of consciousness: awake, alert and oriented    Nausea/Vomiting: no nausea/vomiting    Complications: none    Transfer of care protocol was followed      Last vitals:   Visit Vitals  /72 (BP Location: Left arm, Patient Position: Lying)   Pulse 82   Temp 36.6 °C (97.9 °F) (Temporal)   Resp 14   Ht 5' 7" (1.702 m)   Wt 91.2 kg (201 lb)   SpO2 97%   BMI 31.48 kg/m²     "

## 2019-10-14 NOTE — [employer] PATIENT INSTRUCTIONS
Discharge Summary/Instructions after an Endoscopic Procedure  Patient Name: Jenifer Burnett  Patient MRN: [id number]  Patient YOB: 1960 Monday, October 14, 2019  Sean Girard MD  RESTRICTIONS:  During your procedure today, you received medications for sedation.  These   medications may affect your judgment, balance and coordination.  Therefore,   for 24 hours, you have the following restrictions:   - DO NOT drive a car, operate machinery, make legal/financial decisions,   sign important papers or drink alcohol.    ACTIVITY:  Today: no heavy lifting, straining or running due to procedural   sedation/anesthesia.  The following day: return to full activity including work.  DIET:  Eat and drink normally unless instructed otherwise.     TREATMENT FOR COMMON SIDE EFFECTS:  - Mild abdominal pain, nausea, belching, bloating or excessive gas:  rest,   eat lightly and use a heating pad.  - Sore Throat: treat with throat lozenges and/or gargle with warm salt   water.  - Because air was used during the procedure, expelling large amounts of air   from your rectum or belching is normal.  - If a bowel prep was taken, you may not have a bowel movement for 1-3 days.    This is normal.  SYMPTOMS TO WATCH FOR AND REPORT TO YOUR PHYSICIAN:  1. Abdominal pain or bloating, other than gas cramps.  2. Chest pain.  3. Back pain.  4. Signs of infection such as: chills or fever occurring within 24 hours   after the procedure.  5. Rectal bleeding, which would show as bright red, maroon, or black stools.   (A tablespoon of blood from the rectum is not serious, especially if   hemorrhoids are present.)  6. Vomiting.  7. Weakness or dizziness.  GO DIRECTLY TO THE NEAREST EMERGENCY ROOM IF YOU HAVE ANY OF THE FOLLOWING:      Difficulty breathing              Chills and/or fever over 101 F   Persistent vomiting and/or vomiting blood   Severe abdominal pain   Severe chest pain   Black, tarry stools   Bleeding- more than one tablespoon   Any  other symptom or condition that you feel may need urgent attention  Your doctor recommends these additional instructions:  If any biopsies were taken, your doctors clinic will contact you in 1 to 2   weeks with any results.  - Patient has a contact number available for emergencies.  The signs and   symptoms of potential delayed complications were discussed with the   patient.  Return to normal activities tomorrow.  Written discharge   instructions were provided to the patient.   - Discharge patient to home.   - Await pathology results.   - The findings and recommendations were discussed with the designated   responsible adult.   - Return to referring physician as previously scheduled.  For questions, problems or results please call your physician - Sean Girard MD at Work:  [phone].  [facility], EMERGENCY ROOM PHONE NUMBER: [phone]  IF A COMPLICATION OR EMERGENCY SITUATION ARISES AND YOU ARE UNABLE TO REACH   YOUR PHYSICIAN - GO DIRECTLY TO THE EMERGENCY ROOM.  Sean Girard MD  10/14/2019 9:40:17 AM  This report has been verified and signed electronically.  [employer]

## 2019-10-14 NOTE — PLAN OF CARE
Plan of care discussed with patient. All questions and concerns addressed and answered. Free of pain or distress. PIV removed without complications. VS stable. Procedure report and discharge instructions gone over and patient aware of restrictions and when to resume medications. Patient in agreement.     1013 Dr. Girard at bedside

## 2019-10-16 ENCOUNTER — TELEPHONE (OUTPATIENT)
Dept: GASTROENTEROLOGY | Facility: CLINIC | Age: 59
End: 2019-10-16

## 2019-10-16 NOTE — TELEPHONE ENCOUNTER
MA contact pt to inform pt per Dr. Girard - Please let her know her esophagus biopsies just showed some reflux and no chronic, precancerous changes to the lining.    Pt didn't answer left detail message to return call       ----- Message from Sean Girard MD sent at 10/16/2019  1:35 PM CDT -----  Please let her know her esophagus biopsies just showed some reflux and no chronic, precancerous changes to the lining

## 2019-10-17 ENCOUNTER — PATIENT OUTREACH (OUTPATIENT)
Dept: ADMINISTRATIVE | Facility: HOSPITAL | Age: 59
End: 2019-10-17

## 2019-10-17 NOTE — PROGRESS NOTES
Spoke with pt, she has not had a recent Colonoscopy or MMG. She has had an UTD Tetanus and will provide the date. LINKS Updated.

## 2019-10-21 ENCOUNTER — TELEPHONE (OUTPATIENT)
Dept: ENDOSCOPY | Facility: HOSPITAL | Age: 59
End: 2019-10-21

## 2019-10-25 ENCOUNTER — TELEPHONE (OUTPATIENT)
Dept: SURGERY | Facility: CLINIC | Age: 59
End: 2019-10-25

## 2019-10-25 DIAGNOSIS — K44.9 HERNIA, HIATAL: Primary | ICD-10-CM

## 2019-10-25 NOTE — TELEPHONE ENCOUNTER
----- Message from Adriano Godoy MD sent at 10/14/2019  2:18 PM CDT -----  Obtain ugi and awaiting ges.  On this hh looks medium but on ct large.  I would like to see what it looks like on ugi to determine if it is causing symptoms.

## 2019-10-25 NOTE — TELEPHONE ENCOUNTER
Informed pt of ordered UGI.  Scheduled imaging for 10/31 @ 0800.  Pt ok with date/time. Pt to call with any additional questions or concerns.

## 2019-10-29 ENCOUNTER — OFFICE VISIT (OUTPATIENT)
Dept: INTERNAL MEDICINE | Facility: CLINIC | Age: 59
End: 2019-10-29
Payer: COMMERCIAL

## 2019-10-29 ENCOUNTER — HOSPITAL ENCOUNTER (OUTPATIENT)
Dept: RADIOLOGY | Facility: HOSPITAL | Age: 59
Discharge: HOME | End: 2019-10-29
Attending: SURGERY
Payer: COMMERCIAL

## 2019-10-29 VITALS
DIASTOLIC BLOOD PRESSURE: 84 MMHG | TEMPERATURE: 98 F | SYSTOLIC BLOOD PRESSURE: 128 MMHG | HEART RATE: 86 BPM | BODY MASS INDEX: 30.27 KG/M2 | WEIGHT: 204.38 LBS | OXYGEN SATURATION: 97 % | HEIGHT: 69 IN

## 2019-10-29 DIAGNOSIS — Z51.81 ENCOUNTER FOR MONITORING LONG-TERM PROTON PUMP INHIBITOR THERAPY: ICD-10-CM

## 2019-10-29 DIAGNOSIS — K44.9 HERNIA, HIATAL: ICD-10-CM

## 2019-10-29 DIAGNOSIS — Z12.31 ENCOUNTER FOR SCREENING MAMMOGRAM FOR MALIGNANT NEOPLASM OF BREAST: ICD-10-CM

## 2019-10-29 DIAGNOSIS — Z78.0 POSTMENOPAUSAL: ICD-10-CM

## 2019-10-29 DIAGNOSIS — Z79.899 ENCOUNTER FOR MONITORING LONG-TERM PROTON PUMP INHIBITOR THERAPY: ICD-10-CM

## 2019-10-29 DIAGNOSIS — Z00.00 ANNUAL PHYSICAL EXAM: Primary | ICD-10-CM

## 2019-10-29 DIAGNOSIS — Z12.83 SCREENING EXAM FOR SKIN CANCER: ICD-10-CM

## 2019-10-29 DIAGNOSIS — S39.012A BACK STRAIN, INITIAL ENCOUNTER: ICD-10-CM

## 2019-10-29 DIAGNOSIS — I10 ESSENTIAL HYPERTENSION: ICD-10-CM

## 2019-10-29 DIAGNOSIS — F41.9 ANXIETY: ICD-10-CM

## 2019-10-29 DIAGNOSIS — G43.809 OTHER MIGRAINE WITHOUT STATUS MIGRAINOSUS, NOT INTRACTABLE: ICD-10-CM

## 2019-10-29 DIAGNOSIS — K21.00 GASTROESOPHAGEAL REFLUX DISEASE WITH ESOPHAGITIS: ICD-10-CM

## 2019-10-29 PROCEDURE — 78264 GASTRIC EMPTYING IMG STUDY: CPT | Mod: 26,,, | Performed by: RADIOLOGY

## 2019-10-29 PROCEDURE — A9541 TC99M SULFUR COLLOID: HCPCS

## 2019-10-29 PROCEDURE — 99999 PR PBB SHADOW E&M-EST. PATIENT-LVL V: CPT | Mod: PBBFAC,,, | Performed by: INTERNAL MEDICINE

## 2019-10-29 PROCEDURE — 78264 GASTRIC EMPTYING IMG STUDY: CPT | Mod: TC

## 2019-10-29 PROCEDURE — 99396 PREV VISIT EST AGE 40-64: CPT | Mod: S$GLB,,, | Performed by: INTERNAL MEDICINE

## 2019-10-29 PROCEDURE — 78264 NM GASTRIC EMPTYING: ICD-10-PCS | Mod: 26,,, | Performed by: RADIOLOGY

## 2019-10-29 PROCEDURE — 99396 PR PREVENTIVE VISIT,EST,40-64: ICD-10-PCS | Mod: S$GLB,,, | Performed by: INTERNAL MEDICINE

## 2019-10-29 PROCEDURE — 99999 PR PBB SHADOW E&M-EST. PATIENT-LVL V: ICD-10-PCS | Mod: PBBFAC,,, | Performed by: INTERNAL MEDICINE

## 2019-10-29 RX ORDER — CHOLECALCIFEROL (VITAMIN D3) 25 MCG
2000 TABLET ORAL DAILY
COMMUNITY

## 2019-10-29 RX ORDER — TIZANIDINE HYDROCHLORIDE 2 MG/1
CAPSULE, GELATIN COATED ORAL
COMMUNITY
Start: 2019-10-03 | End: 2020-07-01

## 2019-10-29 RX ORDER — LOSARTAN POTASSIUM AND HYDROCHLOROTHIAZIDE 12.5; 5 MG/1; MG/1
1 TABLET ORAL DAILY
Qty: 90 TABLET | Refills: 1 | Status: SHIPPED | OUTPATIENT
Start: 2019-10-29 | End: 2020-04-28 | Stop reason: ALTCHOICE

## 2019-10-29 RX ORDER — OMEPRAZOLE 40 MG/1
40 CAPSULE, DELAYED RELEASE ORAL EVERY MORNING
Qty: 90 CAPSULE | Refills: 1 | Status: SHIPPED | OUTPATIENT
Start: 2019-10-29 | End: 2020-07-01

## 2019-10-29 RX ORDER — CITALOPRAM 40 MG/1
40 TABLET, FILM COATED ORAL DAILY
Qty: 90 TABLET | Refills: 1 | Status: SHIPPED | OUTPATIENT
Start: 2019-10-29 | End: 2020-04-28

## 2019-10-29 RX ORDER — CHOLECALCIFEROL (VITAMIN D3) 25 MCG
1 TABLET,CHEWABLE ORAL DAILY
COMMUNITY

## 2019-10-29 NOTE — PROGRESS NOTES
"Subjective:       Patient ID: Jenifer Burnett is a 59 y.o. female.    Chief Complaint: Establish Care    HPI  60 y/o woman with HTN, GERD, migraines here to establish care.   Previously followed with Dr Suero; also saw Dr Landa more recently here. Primary concerns are GERD / hiatal hernia, back pain.    HTN - on lisinopril-HCTZ.    GERD, hiatal hernia - recent EGD on 10/14 for this, noting grade A esophagitis, +hiatal hernia. Does have significant reflux, often feels bloated, +reflux.   On omeprazole x at least 15 yrs, always has symptoms if missing a dose.    Back pain - mid-back bilaterally, not midline. Radiates around flanks but not down legs. No difficulty walking, no numbness or weakness, no bowel/bladder incontinence.   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   First noted this about 3 weeks ago; about the same now as at onset  Brief intermittent "grabbing" / spasm-type pain, set off by particular movements (bending/lifting). Around 3-4 weeks ago was doing more bending / lifting / twisting work than her usual.   Has been taking tizanidine 2mg sometimes  Has been taking mobic, excedrin, tylenol PRN    H/o plantar fasciitis    Migraine, headaches - takes imitrex rarely, maybe 2 in 6 months  Takes excedrin sometimes for this as well    H/o trigeminal neuralgia - saw Dr Snyder for this 3/2018  Not currently taking tegretol    Due for mammogram; mother with h/o breast cancer  Hysterectomy about 25 yrs ago for heavy bleeding  Did take HRT for a short time after this  Colonoscopy done, normal, about 3 yrs ago at [facility]    Review of Systems   Constitutional: Negative for activity change, fever and unexpected weight change.   HENT: Negative.    Eyes: Negative for visual disturbance.   Respiratory: Negative for cough and shortness of breath.    Cardiovascular: Negative for chest pain, palpitations and leg swelling.   Gastrointestinal: Positive for abdominal pain (bloating, GERD). Negative " "for blood in stool, diarrhea, nausea and vomiting.   Endocrine: Negative.    Genitourinary: Negative.    Musculoskeletal: Positive for back pain. Negative for arthralgias and gait problem.   Skin: Negative.  Negative for rash.   Neurological: Positive for headaches. Negative for dizziness.   Psychiatric/Behavioral: Negative for dysphoric mood. The patient is not nervous/anxious.          Past Medical History:   Diagnosis Date    Encounter for blood transfusion     GERD (gastroesophageal reflux disease)     Hypertension     Migraine     Trigeminal neuralgia 03/2018     Past Surgical History:   Procedure Laterality Date    CHOLECYSTECTOMY  07/2012    ESOPHAGOGASTRODUODENOSCOPY N/A 10/14/2019    Procedure: EGD (ESOPHAGOGASTRODUODENOSCOPY);  Surgeon: Sean Girard MD;  Location: [address]);  Service: Endoscopy;  Laterality: N/A;    HYSTERECTOMY       Family History   Problem Relation Age of Onset    Breast cancer Mother        Social History     Tobacco Use    Smoking status: Never Smoker    Smokeless tobacco: Never Used   Substance Use Topics    Alcohol use: No    Drug use: No       Medications and allergies reviewed.     Objective:          Vitals:    10/29/19 0808   BP: 128/84   BP Location: Left arm   Patient Position: Sitting   BP Method: Large (Manual)   Pulse: 86   Temp: 98.1 °F (36.7 °C)   SpO2: 97%   Weight: 92.7 kg (204 lb 5.9 oz)   Height: 5' 9" (1.753 m)     Body mass index is 30.18 kg/m².  Physical Exam   Constitutional: She is oriented to person, place, and time. She appears well-developed and well-nourished. No distress.   HENT:   Head: Normocephalic and atraumatic.   Mouth/Throat: Oropharynx is clear and moist.   Eyes: Pupils are equal, round, and reactive to light. Conjunctivae and EOM are normal. No scleral icterus.   Neck: Neck supple. No thyromegaly present.   Cardiovascular: Normal rate, regular rhythm and normal heart sounds.   No murmur heard.  Pulmonary/Chest: Effort normal and " breath sounds normal. No respiratory distress.   Abdominal: Soft. Bowel sounds are normal. She exhibits no distension. There is tenderness (mild epigastric). There is no guarding.   Musculoskeletal: She exhibits tenderness (TTP and spasm in paraspinal muscles of mid back bilaterally). She exhibits no edema.   Lymphadenopathy:     She has no cervical adenopathy.   Neurological: She is alert and oriented to person, place, and time. No cranial nerve deficit. Gait normal.   Skin: Skin is warm and dry.   Psychiatric: She has a normal mood and affect.   Vitals reviewed.      Lab Results   Component Value Date    WBC 4.35 02/15/2018    HGB 12.4 02/15/2018    HCT 38.3 02/15/2018     02/15/2018    ALT 17 12/01/2014    AST 20 12/01/2014     12/01/2014    K 3.8 12/01/2014     12/01/2014    CREATININE 0.7 12/01/2014    BUN 14 12/01/2014    CO2 27 12/01/2014    INR 1.0 12/01/2014       Assessment:       1. Annual physical exam    2. Essential hypertension    3. Anxiety    4. Hernia, hiatal    5. Gastroesophageal reflux disease with esophagitis    6. Encounter for monitoring long-term proton pump inhibitor therapy    7. Other migraine without status migrainosus, not intractable    8. Postmenopausal    9. Encounter for screening mammogram for malignant neoplasm of breast    10. Screening exam for skin cancer        Plan:   Jenifer was seen today for establish care.    Diagnoses and all orders for this visit:    Annual physical exam - Overall stable, doing well. Reviewed chronic and preventive health concerns.  -     CBC Without Differential; Future  -     Comprehensive metabolic panel; Future  -     Lipid panel; Future  -     Vitamin D; Future  -     Vitamin B12; Future  -     Magnesium; Future  -     TSH; Future  -     Ambulatory Referral to Obstetrics / Gynecology  -     DXA Bone Density Spine And Hip; Future  -     Hepatitis C antibody; Future  -     Ambulatory Referral to Dermatology    Essential hypertension  - discussed recent study re: ACEi risk, she would like to switch to losartan. New rx sent in.  Monitor at home more closely for next 2-3 weeks, RTC if BP not well-controlled  -     losartan-hydrochlorothiazide 50-12.5 mg (HYZAAR) 50-12.5 mg per tablet; Take 1 tablet by mouth once daily.  -     CBC Without Differential; Future  -     Comprehensive metabolic panel; Future  -     Lipid panel; Future    Anxiety - continue celexa  -     citalopram (CELEXA) 40 MG tablet; Take 1 tablet (40 mg total) by mouth once daily. 1 Tablet Oral Every day  -     TSH; Future    Hernia, hiatal - continue PPI; follow up with Surgery and GI as noted  -     omeprazole (PRILOSEC) 40 MG capsule; Take 1 capsule (40 mg total) by mouth every morning.  Capsule, Delayed Release(E.C.) Oral Every day  Gastroesophageal reflux disease with esophagitis  -     omeprazole (PRILOSEC) 40 MG capsule; Take 1 capsule (40 mg total) by mouth every morning.  Capsule, Delayed Release(E.C.) Oral Every day  -     CBC Without Differential; Future  Encounter for monitoring long-term proton pump inhibitor therapy  -     Vitamin D; Future  -     Vitamin B12; Future  -     Magnesium; Future    Other migraine without status migrainosus, not intractable - reviewed, continue current therapies    Postmenopausal  -     DXA Bone Density Spine And Hip; Future    Encounter for screening mammogram for malignant neoplasm of breast  -     Mammo Digital Screening Bilat w/ Milad; Future    Screening exam for skin cancer  -     Ambulatory Referral to Dermatology    Back strain - discussed home care strategies, OTC medications, and role of physical therapy. # for her to call to schedule with PT given.    Health maintenance reviewed with patient.   [facility]  Labs today  Schedule for DEXA, mammogram  Clinic # for Gyn and Dermatology given for ongoing preventive health care.    Follow up in about 3 months (around 1/29/2020) for hypertension.    Babak  MD Rufino  Internal Medicine  [facility]  10/29/2019

## 2019-10-29 NOTE — PATIENT INSTRUCTIONS
For back pain:  Ice if you have been doing any lifting or anything that puts strain on the back  Heat for muscle spasm or tightness  Tylenol 1000mg twice a day for 1 week  Minimize taking excedrin, meloxicam, ibuprofen, naproxen as these can irritate your stomach  Topical medications: Biofreeze or Icy-Hot; Aspercreme or Salonpas; Two Old Goats (with methyl salicylate) or other topical with methyl salicylate or other anti-inflammatory.    Call to schedule with physical therapy!      Back Sprain or Strain    Injury to the muscles (strain) or ligaments (sprain) around the spine can be troubling. Injury may occur after a sudden forceful twisting or bending force such as in a car accident, after a simple awkward movement, or after lifting something heavy with poor body positioning. In any case, muscle spasm is often present and adds to the pain.  Thankfully, most people feel better in 1 to 2 weeks, and most of the rest in 1 to 2 months. Most people can remain active. Unless you had a forceful or traumatic physical injury such as a car accident or fall, X-rays may not be ordered for the first evaluation of a back sprain or strain. If pain continues and does not respond to medical treatment, your healthcare provider may then order X-rays and other tests.  Home care  The following guidelines will help you care for your injury at home:  · When in bed, try to find a comfortable position. A firm mattress is best. Try lying flat on your back with pillows under your knees. You can also try lying on your side with your knees bent up toward your chest and a pillow between your knees.  · Don't sit for long periods. Try not to take long car rides or take other trips that have you sitting for a long time. This puts more stress on the lower back than standing or walking.  · During the first 24 to 72 hours after an injury or flare-up, apply an ice pack to the painful area for 20 minutes. Then remove it for 20 minutes. Do this for 60 to  90 minutes, or several times a day. This will reduce swelling and pain. Be sure to wrap the ice pack in a thin towel or plastic to protect your skin.  · You can start with ice, then switch to heat. Heat from a hot shower, hot bath, or heating pad reduces pain and works well for muscle spasms. Put heat on the painful area for 20 minutes, then remove for 20 minutes. Do this for 60 to 90 minutes, or several times a day. Do not use a heating pad while sleeping. It can burn the skin.  · You can alternate the ice and heat. Talk with your healthcare provider to find out the best treatment or therapy for your back pain.  · Therapeutic massage will help relax the back muscles without stretching them.  · Be aware of safe lifting methods. Do not lift anything over 15 pounds until all of the pain is gone.  Medicines  Talk to your healthcare provider before using medicines, especially if you have other health problems or are taking other medicines.  · You may use acetaminophen or ibuprofen to control pain, unless another pain medicine was prescribed. If you have chronic conditions like diabetes, liver or kidney disease, stomach ulcers, or gastrointestinal bleeding, or are taking blood-thinner medicines, talk with your doctor before taking any medicines.  · Be careful if you are given prescription medicines, narcotics, or medicine for muscle spasm. They can cause drowsiness, and affect your coordination, reflexes, and judgment. Do not drive or operate heavy machinery when taking these types of medicines. Only take pain medicine as prescribed by your healthcare provider.  Follow-up care  Follow up with your healthcare provider, or as advised. You may need physical therapy or more tests if your symptoms get worse.  If you had X-rays your healthcare provider may be checking for any broken bones, breaks, or fractures. Bruises and sprains can sometimes hurt as much as a fracture. These injuries can take time to heal completely. If  your symptoms dont improve or they get worse, talk with your healthcare provider. You may need a repeat X-ray or other tests.  Call 911  Call for emergency care if any of the following occur:  · Trouble breathing  · Confused  · Very drowsy or trouble awakening  · Fainting or loss of consciousness  · Rapid or very slow heart rate  · Loss of bowel or bladder control  When to seek medical advice  Call your healthcare provider right away if any of the following occur:  · Pain gets worse or spreads to your arms or legs  · Weakness or numbness in one or both arms or legs  · Numbness in the groin or genital area  Date Last Reviewed: 6/1/2016  © 9821-2651 [employer]. [address] [place] [address]. All rights reserved. This information is not intended as a substitute for professional medical care. Always follow your healthcare professional's instructions.      Shoulder and Upper Back Stretch  To start, stand tall with your ears, shoulders, and hips in line. Your feet should be slightly apart, positioned just under your hips. Focus your eyes directly in front of you.  this position for a few seconds before starting your exercise. This helps increase your awareness of proper posture.          Reach overhead and slightly back with both arms. Keep your shoulders and neck aligned and your elbows behind your shoulders:  · With your palms facing the ceiling, turn your fingers inward.  · Take a deep breath. Breathe out, and lower your elbows toward your buttocks. Hold for 5 seconds, then return to starting position.  · Repeat 3 times.  Date Last Reviewed: 8/16/2015  © 3661-7286 [employer]. [address] [place] [address]. All rights reserved. This information is not intended as a substitute for professional medical care. Always follow your healthcare professional's instructions.      Back Exercises: Back Release  Do this exercise on your hands and knees. Keep your knees  under your hips and your hands under your shoulders.      · Relax your abdominal and buttocks muscles, lift your head, and let your back sag. Be sure to keep your weight evenly distributed. Dont sit back on your hips.   · Hold for 5 seconds.  · Return to starting position.  · Tuck your head and lift (arch) your back.  · Hold for 5 seconds  · Return to starting position.  · Repeat 5 times.  Date Last Reviewed: 8/16/2015 © 2000-2017 [employer]. [address] [place] [address]. All rights reserved. This information is not intended as a substitute for professional medical care. Always follow your healthcare professional's instructions.        Blood Pressure Measurement:    -- Please record your blood pressure 3-5 times per week. When checking, make sure you have been sitting for about 5 minutes, your legs are uncrossed, and the blood pressure cuff at the level of your heart.     -- Record your blood pressure with the date and time in a log and bring it with you to every doctor's visit. Make sure to bring in your home blood pressure cuff to a visit at least once a year so that it can be checked for accuracy.    -- Goal blood pressure is top number less than 130 and bottom number less than 80.    -- If your blood pressure is >160/>100 on two consecutive occasions, contact the office. If it is >180/>120 and you are having confusion, chest pain or back pain, shortness of breath, or blood in the urine, go to the emergency room immediately.

## 2019-10-30 ENCOUNTER — TELEPHONE (OUTPATIENT)
Dept: RADIOLOGY | Facility: HOSPITAL | Age: 59
End: 2019-10-30

## 2019-10-31 ENCOUNTER — HOSPITAL ENCOUNTER (OUTPATIENT)
Dept: RADIOLOGY | Facility: HOSPITAL | Age: 59
Discharge: HOME | End: 2019-10-31
Attending: SURGERY
Payer: COMMERCIAL

## 2019-10-31 DIAGNOSIS — K44.9 HERNIA, HIATAL: ICD-10-CM

## 2019-10-31 PROCEDURE — 74241 FL UPPER GI W KUB: ICD-10-PCS | Mod: 26,,, | Performed by: RADIOLOGY

## 2019-10-31 PROCEDURE — 74241 FL UPPER GI W KUB: CPT | Mod: TC,FY

## 2019-10-31 PROCEDURE — 74241 FL UPPER GI W KUB: CPT | Mod: 26,,, | Performed by: RADIOLOGY

## 2019-11-04 ENCOUNTER — HOSPITAL ENCOUNTER (OUTPATIENT)
Dept: RADIOLOGY | Facility: HOSPITAL | Age: 59
Discharge: HOME | End: 2019-11-04
Attending: INTERNAL MEDICINE
Payer: COMMERCIAL

## 2019-11-04 ENCOUNTER — TELEPHONE (OUTPATIENT)
Dept: SURGERY | Facility: CLINIC | Age: 59
End: 2019-11-04

## 2019-11-04 DIAGNOSIS — Z12.31 ENCOUNTER FOR SCREENING MAMMOGRAM FOR MALIGNANT NEOPLASM OF BREAST: ICD-10-CM

## 2019-11-04 PROCEDURE — 77063 MAMMO DIGITAL SCREENING BILAT WITH TOMOSYNTHESIS_CAD: ICD-10-PCS | Mod: 26,,, | Performed by: RADIOLOGY

## 2019-11-04 PROCEDURE — 77067 SCR MAMMO BI INCL CAD: CPT | Mod: TC

## 2019-11-04 PROCEDURE — 77067 MAMMO DIGITAL SCREENING BILAT WITH TOMOSYNTHESIS_CAD: ICD-10-PCS | Mod: 26,,, | Performed by: RADIOLOGY

## 2019-11-04 PROCEDURE — 77067 SCR MAMMO BI INCL CAD: CPT | Mod: 26,,, | Performed by: RADIOLOGY

## 2019-11-04 PROCEDURE — 77063 BREAST TOMOSYNTHESIS BI: CPT | Mod: 26,,, | Performed by: RADIOLOGY

## 2019-11-04 NOTE — TELEPHONE ENCOUNTER
Spoke with pt and scheduled her an appt to f/u and disucss test results and surgery details.  Pt ok with appt.

## 2019-11-04 NOTE — TELEPHONE ENCOUNTER
----- Message from Adriano Godoy MD sent at 11/1/2019  3:53 PM CDT -----  If she wants repair we should discuss.

## 2019-11-07 ENCOUNTER — OFFICE VISIT (OUTPATIENT)
Dept: SURGERY | Facility: CLINIC | Age: 59
End: 2019-11-07
Payer: COMMERCIAL

## 2019-11-07 VITALS
WEIGHT: 204 LBS | HEART RATE: 95 BPM | DIASTOLIC BLOOD PRESSURE: 85 MMHG | HEIGHT: 69 IN | BODY MASS INDEX: 30.21 KG/M2 | SYSTOLIC BLOOD PRESSURE: 154 MMHG

## 2019-11-07 DIAGNOSIS — K43.2 INCISIONAL HERNIA, WITHOUT OBSTRUCTION OR GANGRENE: ICD-10-CM

## 2019-11-07 DIAGNOSIS — K44.9 HERNIA, HIATAL: Primary | ICD-10-CM

## 2019-11-07 PROCEDURE — 3008F BODY MASS INDEX DOCD: CPT | Mod: CPTII,S$GLB,, | Performed by: SURGERY

## 2019-11-07 PROCEDURE — 99214 OFFICE O/P EST MOD 30 MIN: CPT | Mod: S$GLB,,, | Performed by: SURGERY

## 2019-11-07 PROCEDURE — 99999 PR PBB SHADOW E&M-EST. PATIENT-LVL III: CPT | Mod: PBBFAC,,, | Performed by: SURGERY

## 2019-11-07 PROCEDURE — 99214 PR OFFICE/OUTPT VISIT, EST, LEVL IV, 30-39 MIN: ICD-10-PCS | Mod: S$GLB,,, | Performed by: SURGERY

## 2019-11-07 PROCEDURE — 3008F PR BODY MASS INDEX (BMI) DOCUMENTED: ICD-10-PCS | Mod: CPTII,S$GLB,, | Performed by: SURGERY

## 2019-11-07 PROCEDURE — 99999 PR PBB SHADOW E&M-EST. PATIENT-LVL III: ICD-10-PCS | Mod: PBBFAC,,, | Performed by: SURGERY

## 2019-11-07 NOTE — H&P (VIEW-ONLY)
History & Physical  General Surgery Clinic    SUBJECTIVE:     Chief complaint: LLQ pain/lump and epigastric pain w/ early satiety w/ bloating    History of Present Illness (Office visit 10/10/2019):  Patient is a 59 y.o. female w/ a PMHx of GERD and HTN who complains of:  1) LLQ pain and lump that has been present from approx. 3 weeks. The pain presents approximately once a day out of the blue as a sharp stabbing pain and goes away on its own after 30-45 minutes.  2) She also endorses epigastric pain/pressure associated with early satiety and bloating after meals that has been present for approximately the same time frame. She denies changes in bowel function, n/v and dysphagia or regurgitation of food.   She continues to have reflux and takes her medication daily without fail. She notes she will undoubtedly have heartburn if she misses a dose.   Recent CT scan notable for hypogastric hernia and moderate size hiatal hernia.     Interval Update: Patient with stable symptoms. Hernia pain slightly more severe but still manageable. Patient revisits today having completed EGD and GES. GES showed 3% retained food at 4 hours. EGD showed 5 cm hiatal hernia and Grade A esophagitis. She comes today to discuss surgical planning. She is overall healthy and feeling well. Can climb a flight of stairs without difficulty.    Review of Systems   Constitutional: Negative for diaphoresis, fever and weight loss.   HENT: Negative for congestion and hearing loss.    Eyes: Negative for photophobia.   Respiratory: Negative for cough, shortness of breath and wheezing.    Cardiovascular: Negative for chest pain, claudication and leg swelling.   Gastrointestinal: Positive for abdominal pain and heartburn. Negative for constipation, diarrhea, nausea and vomiting.   Genitourinary: Negative for dysuria, frequency and urgency.   Musculoskeletal: Negative for joint pain and myalgias.   Skin: Negative for rash.   Neurological: Negative for  dizziness and headaches.   Endo/Heme/Allergies: Does not bruise/bleed easily.   Psychiatric/Behavioral: Negative for depression.        Past Medical History:  Past Medical History:   Diagnosis Date    Encounter for blood transfusion     GERD (gastroesophageal reflux disease)     Hypertension        Past Surgical History:  Past Surgical History:   Procedure Laterality Date    CHOLECYSTECTOMY      ESOPHAGOGASTRODUODENOSCOPY N/A 10/14/2019    Procedure: EGD (ESOPHAGOGASTRODUODENOSCOPY);  Surgeon: Sean Girard MD;  Location: [address];  Service: Endoscopy;  Laterality: N/A;    HYSTERECTOMY         Family History:  Family History   Problem Relation Age of Onset    Breast cancer Mother        Social History:  Social History     Socioeconomic History    Marital status: Single     Spouse name: Not on file    Number of children: Not on file    Years of education: Not on file    Highest education level: Not on file   Occupational History    Not on file   Social Needs    Financial resource strain: Not on file    Food insecurity:     Worry: Not on file     Inability: Not on file    Transportation needs:     Medical: Not on file     Non-medical: Not on file   Tobacco Use    Smoking status: Never Smoker    Smokeless tobacco: Never Used   Substance and Sexual Activity    Alcohol use: No    Drug use: No    Sexual activity: Yes     Partners: Male   Lifestyle    Physical activity:     Days per week: Not on file     Minutes per session: Not on file    Stress: Not on file   Relationships    Social connections:     Talks on phone: Not on file     Gets together: Not on file     Attends Muslim service: Not on file     Active member of club or organization: Not on file     Attends meetings of clubs or organizations: Not on file     Relationship status: Not on file   Other Topics Concern    Not on file   Social History Narrative    Not on file       Medications:  Current Outpatient Medications  "  Medication Sig Dispense Refill    citalopram (CELEXA) 40 MG tablet Take 1 tablet (40 mg total) by mouth once daily. 1 Tablet Oral Every day 90 tablet 1    cyanocobalamin, vitamin B-12, 2,500 mcg Tab Take 1 tablet by mouth once daily.      losartan-hydrochlorothiazide 50-12.5 mg (HYZAAR) 50-12.5 mg per tablet Take 1 tablet by mouth once daily. 90 tablet 1    meloxicam (MOBIC) 15 MG tablet Take 1 tablet (15 mg total) by mouth once daily. 30 tablet 0    omeprazole (PRILOSEC) 40 MG capsule Take 1 capsule (40 mg total) by mouth every morning.  Capsule, Delayed Release(E.C.) Oral Every day 90 capsule 1    sumatriptan (IMITREX) 50 MG tablet Take 1 tablet (50 mg total) by mouth as needed for Migraine (May take additional dose after 2 hours. Do not exceed 2 doses in 24 hrs). (Patient not taking: Reported on 10/29/2019) 10 tablet 1    tiZANidine 2 mg Cap Take by mouth.      vitamin D (VITAMIN D3) 1000 units Tab Take 2,000 Units by mouth once daily.       No current facility-administered medications for this visit.        Allergies:  Review of patient's allergies indicates:  No Known Allergies    OBJECTIVE:     BP (!) 154/85   Pulse 95   Ht 5' 9" (1.753 m)   Wt 92.5 kg (204 lb)   BMI 30.13 kg/m²     Physical Exam   Constitutional: She is oriented to person, place, and time and well-developed, well-nourished, and in no distress.   HENT:   Head: Normocephalic and atraumatic.   Eyes: EOM are normal.   Neck: Neck supple.   Cardiovascular: Normal rate and intact distal pulses.   Pulmonary/Chest: Effort normal. No respiratory distress.   Abdominal: Soft. She exhibits no distension. There is no tenderness.   No distention. LLQ hernia noted that is pronounced on valsalva and coughing.   Epigastric tenderness to palpation. No masses noted.   Abdomen soft and non-distended otherwise.    Musculoskeletal: Normal range of motion. She exhibits no edema or tenderness.   Neurological: She is alert and oriented to person, place, " and time.   Skin: Skin is warm and dry. No rash noted.   Nursing note and vitals reviewed.      ASSESSMENT/PLAN:     59 y.o. female with LLQ lump and epigastric pain and pressure.    - Discussed details of surgery - will be able to complete hiatal hernia and incisional hernia repair  - No need for PCP clearance  - Consent obtained today  - Will schedule for next available date

## 2019-11-07 NOTE — PROGRESS NOTES
I have seen the patient, reviewed the Resident's history and physical, assessment and plan. I have personally interviewed and examined the patient at bedside and: agree with the findings.     Large, symptomatic hh with bloating and gerd.  S/p egd and ges.  Now for lap repair with possible mesh, possible esophageal lengthening and Toupet fundoplication.  Also open incisional hernia repair with possible mesh (for 3 fb from the lowest extent of the lower midline incision and for 3 fb to the left).

## 2019-11-07 NOTE — LETTER
[facility] - General Surgery  [address]  [facility] [address]  Phone: [phone] November 7, 2019      Fab Suero MD  [address] [address]    Patient: Jenifer Burnett   MR Number: [id number]   YOB: 1960   Date of Visit: 11/7/2019     Dear Dr. Krishan Suero:    Thank you for referring Jenifer Burnett to me for evaluation. Attached you will find relevant portions of my assessment and plan of care.    Ms. Burnett presents with a large, symptomatic hiatal hernia with bloating and GERD.  She is status post EGD and GES.  Now here for laparoscopic repair with possible mesh, possible esophageal lengthening and Toupet fundoplication.  Also open incisional hernia repair with possible mesh (for 3 fb from the lowest extent of the lower midline incision and for 3 fb to the left).    If you have questions, please do not hesitate to call me. I look forward to following Jenifer Burnett along with you.    Sincerely,      Adriano Godoy M.D.  Professor, [employer]  Section Head, General Surgery  [facility]    WSR/lennox

## 2019-11-07 NOTE — PROGRESS NOTES
History & Physical  General Surgery Clinic    SUBJECTIVE:     Chief complaint: LLQ pain/lump and epigastric pain w/ early satiety w/ bloating    History of Present Illness (Office visit 10/10/2019):  Patient is a 59 y.o. female w/ a PMHx of GERD and HTN who complains of:  1) LLQ pain and lump that has been present from approx. 3 weeks. The pain presents approximately once a day out of the blue as a sharp stabbing pain and goes away on its own after 30-45 minutes.  2) She also endorses epigastric pain/pressure associated with early satiety and bloating after meals that has been present for approximately the same time frame. She denies changes in bowel function, n/v and dysphagia or regurgitation of food.   She continues to have reflux and takes her medication daily without fail. She notes she will undoubtedly have heartburn if she misses a dose.   Recent CT scan notable for hypogastric hernia and moderate size hiatal hernia.     Interval Update: Patient with stable symptoms. Hernia pain slightly more severe but still manageable. Patient revisits today having completed EGD and GES. GES showed 3% retained food at 4 hours. EGD showed 5 cm hiatal hernia and Grade A esophagitis. She comes today to discuss surgical planning. She is overall healthy and feeling well. Can climb a flight of stairs without difficulty.    Review of Systems   Constitutional: Negative for diaphoresis, fever and weight loss.   HENT: Negative for congestion and hearing loss.    Eyes: Negative for photophobia.   Respiratory: Negative for cough, shortness of breath and wheezing.    Cardiovascular: Negative for chest pain, claudication and leg swelling.   Gastrointestinal: Positive for abdominal pain and heartburn. Negative for constipation, diarrhea, nausea and vomiting.   Genitourinary: Negative for dysuria, frequency and urgency.   Musculoskeletal: Negative for joint pain and myalgias.   Skin: Negative for rash.   Neurological: Negative for  dizziness and headaches.   Endo/Heme/Allergies: Does not bruise/bleed easily.   Psychiatric/Behavioral: Negative for depression.        Past Medical History:  Past Medical History:   Diagnosis Date    Encounter for blood transfusion     GERD (gastroesophageal reflux disease)     Hypertension        Past Surgical History:  Past Surgical History:   Procedure Laterality Date    CHOLECYSTECTOMY      ESOPHAGOGASTRODUODENOSCOPY N/A 10/14/2019    Procedure: EGD (ESOPHAGOGASTRODUODENOSCOPY);  Surgeon: Sean Girard MD;  Location: [address];  Service: Endoscopy;  Laterality: N/A;    HYSTERECTOMY         Family History:  Family History   Problem Relation Age of Onset    Breast cancer Mother        Social History:  Social History     Socioeconomic History    Marital status: Single     Spouse name: Not on file    Number of children: Not on file    Years of education: Not on file    Highest education level: Not on file   Occupational History    Not on file   Social Needs    Financial resource strain: Not on file    Food insecurity:     Worry: Not on file     Inability: Not on file    Transportation needs:     Medical: Not on file     Non-medical: Not on file   Tobacco Use    Smoking status: Never Smoker    Smokeless tobacco: Never Used   Substance and Sexual Activity    Alcohol use: No    Drug use: No    Sexual activity: Yes     Partners: Male   Lifestyle    Physical activity:     Days per week: Not on file     Minutes per session: Not on file    Stress: Not on file   Relationships    Social connections:     Talks on phone: Not on file     Gets together: Not on file     Attends Amish service: Not on file     Active member of club or organization: Not on file     Attends meetings of clubs or organizations: Not on file     Relationship status: Not on file   Other Topics Concern    Not on file   Social History Narrative    Not on file       Medications:  Current Outpatient Medications  "  Medication Sig Dispense Refill    citalopram (CELEXA) 40 MG tablet Take 1 tablet (40 mg total) by mouth once daily. 1 Tablet Oral Every day 90 tablet 1    cyanocobalamin, vitamin B-12, 2,500 mcg Tab Take 1 tablet by mouth once daily.      losartan-hydrochlorothiazide 50-12.5 mg (HYZAAR) 50-12.5 mg per tablet Take 1 tablet by mouth once daily. 90 tablet 1    meloxicam (MOBIC) 15 MG tablet Take 1 tablet (15 mg total) by mouth once daily. 30 tablet 0    omeprazole (PRILOSEC) 40 MG capsule Take 1 capsule (40 mg total) by mouth every morning.  Capsule, Delayed Release(E.C.) Oral Every day 90 capsule 1    sumatriptan (IMITREX) 50 MG tablet Take 1 tablet (50 mg total) by mouth as needed for Migraine (May take additional dose after 2 hours. Do not exceed 2 doses in 24 hrs). (Patient not taking: Reported on 10/29/2019) 10 tablet 1    tiZANidine 2 mg Cap Take by mouth.      vitamin D (VITAMIN D3) 1000 units Tab Take 2,000 Units by mouth once daily.       No current facility-administered medications for this visit.        Allergies:  Review of patient's allergies indicates:  No Known Allergies    OBJECTIVE:     BP (!) 154/85   Pulse 95   Ht 5' 9" (1.753 m)   Wt 92.5 kg (204 lb)   BMI 30.13 kg/m²     Physical Exam   Constitutional: She is oriented to person, place, and time and well-developed, well-nourished, and in no distress.   HENT:   Head: Normocephalic and atraumatic.   Eyes: EOM are normal.   Neck: Neck supple.   Cardiovascular: Normal rate and intact distal pulses.   Pulmonary/Chest: Effort normal. No respiratory distress.   Abdominal: Soft. She exhibits no distension. There is no tenderness.   No distention. LLQ hernia noted that is pronounced on valsalva and coughing.   Epigastric tenderness to palpation. No masses noted.   Abdomen soft and non-distended otherwise.    Musculoskeletal: Normal range of motion. She exhibits no edema or tenderness.   Neurological: She is alert and oriented to person, place, " and time.   Skin: Skin is warm and dry. No rash noted.   Nursing note and vitals reviewed.      ASSESSMENT/PLAN:     59 y.o. female with LLQ lump and epigastric pain and pressure.    - Discussed details of surgery - will be able to complete hiatal hernia and incisional hernia repair  - No need for PCP clearance  - Consent obtained today  - Will schedule for next available date

## 2019-11-13 ENCOUNTER — HOSPITAL ENCOUNTER (OUTPATIENT)
Dept: CARDIOLOGY | Facility: CLINIC | Age: 59
Discharge: HOME | End: 2019-11-13
Payer: COMMERCIAL

## 2019-11-13 DIAGNOSIS — K44.9 HERNIA, HIATAL: ICD-10-CM

## 2019-11-13 PROCEDURE — 93010 EKG 12-LEAD: ICD-10-PCS | Mod: S$GLB,,, | Performed by: INTERNAL MEDICINE

## 2019-11-13 PROCEDURE — 93010 ELECTROCARDIOGRAM REPORT: CPT | Mod: S$GLB,,, | Performed by: INTERNAL MEDICINE

## 2019-11-13 PROCEDURE — 93005 ELECTROCARDIOGRAM TRACING: CPT | Mod: S$GLB,,, | Performed by: SURGERY

## 2019-11-13 PROCEDURE — 93005 EKG 12-LEAD: ICD-10-PCS | Mod: S$GLB,,, | Performed by: SURGERY

## 2019-11-20 ENCOUNTER — TELEPHONE (OUTPATIENT)
Dept: SURGERY | Facility: CLINIC | Age: 59
End: 2019-11-20

## 2019-11-20 NOTE — TELEPHONE ENCOUNTER
Contacted patient in response to message. Notified her that at this time her surgery is scheduled for 0700, but that she will receive call with arrival time tomorrow 11/21/19. Pt verbalized understanding.----- Message from Irma Monae sent at 11/20/2019  4:44 PM CST -----  Contact: pt @[phone]  Pt calling to speak with someone regarding surgery time. Please call

## 2019-11-21 ENCOUNTER — TELEPHONE (OUTPATIENT)
Dept: CARDIOTHORACIC SURGERY | Facility: CLINIC | Age: 59
End: 2019-11-21

## 2019-11-21 NOTE — PRE-PROCEDURE INSTRUCTIONS
Preop instructions:     NPO instructions: NO solids foods,non-clear liquids including milk, milk products, creamers, gum, mints, or hard candy after 2300 the night prior to your surgery.   We encourage a limited consumption of CLEAR LIQUIDS after midnight the night before your surgery up to 0500.     Acceptable Clear Liquids are listed below:    Water,  Gatorade/Powerade sports drinks, Apple juice (no pulp) Black coffee with without sugar/NO milk, cream or creamer or Jello.       If you have received specific instructions from your Surgeon/Surgeon's staff, please follow their instructions.     Showering instructions, directions, leave all valuables at home, medication instructions for PM prior & am of procedure explained. Patient stated an understanding.      Patient denies any side effects or issues with anesthesia or sedation.

## 2019-11-22 ENCOUNTER — ANESTHESIA EVENT (OUTPATIENT)
Dept: SURGERY | Facility: HOSPITAL | Age: 59
DRG: 328 | End: 2019-11-22
Payer: COMMERCIAL

## 2019-11-22 ENCOUNTER — HOSPITAL ENCOUNTER (INPATIENT)
Facility: HOSPITAL | Age: 59
LOS: 1 days | Discharge: HOME | DRG: 328 | End: 2019-11-23
Attending: SURGERY | Admitting: SURGERY
Payer: COMMERCIAL

## 2019-11-22 ENCOUNTER — ANESTHESIA (OUTPATIENT)
Dept: SURGERY | Facility: HOSPITAL | Age: 59
DRG: 328 | End: 2019-11-22
Payer: COMMERCIAL

## 2019-11-22 DIAGNOSIS — K44.9 HERNIA, HIATAL: Primary | ICD-10-CM

## 2019-11-22 DIAGNOSIS — K44.9 HIATAL HERNIA: ICD-10-CM

## 2019-11-22 PROCEDURE — 49560 PR REPAIR INCISIONAL HERNIA,REDUCIBLE: CPT | Mod: 51,,, | Performed by: SURGERY

## 2019-11-22 PROCEDURE — 88302 PR  SURG PATH,LEVEL II: ICD-10-PCS | Mod: 26,,, | Performed by: PATHOLOGY

## 2019-11-22 PROCEDURE — 25000003 PHARM REV CODE 250: Performed by: STUDENT IN AN ORGANIZED HEALTH CARE EDUCATION/TRAINING PROGRAM

## 2019-11-22 PROCEDURE — 27201423 OPTIME MED/SURG SUP & DEVICES STERILE SUPPLY: Performed by: SURGERY

## 2019-11-22 PROCEDURE — D9220A PRA ANESTHESIA: Mod: ANES,,, | Performed by: ANESTHESIOLOGY

## 2019-11-22 PROCEDURE — 96361 HYDRATE IV INFUSION ADD-ON: CPT

## 2019-11-22 PROCEDURE — D9220A PRA ANESTHESIA: Mod: CRNA,,, | Performed by: NURSE ANESTHETIST, CERTIFIED REGISTERED

## 2019-11-22 PROCEDURE — 11000001 HC ACUTE MED/SURG PRIVATE ROOM

## 2019-11-22 PROCEDURE — 88302 TISSUE EXAM BY PATHOLOGIST: CPT | Mod: 59 | Performed by: PATHOLOGY

## 2019-11-22 PROCEDURE — S0028 INJECTION, FAMOTIDINE, 20 MG: HCPCS | Performed by: STUDENT IN AN ORGANIZED HEALTH CARE EDUCATION/TRAINING PROGRAM

## 2019-11-22 PROCEDURE — D9220A PRA ANESTHESIA: ICD-10-PCS | Mod: CRNA,,, | Performed by: NURSE ANESTHETIST, CERTIFIED REGISTERED

## 2019-11-22 PROCEDURE — 63600175 PHARM REV CODE 636 W HCPCS: Performed by: NURSE ANESTHETIST, CERTIFIED REGISTERED

## 2019-11-22 PROCEDURE — S0028 INJECTION, FAMOTIDINE, 20 MG: HCPCS | Performed by: NURSE ANESTHETIST, CERTIFIED REGISTERED

## 2019-11-22 PROCEDURE — C1729 CATH, DRAINAGE: HCPCS | Performed by: SURGERY

## 2019-11-22 PROCEDURE — 25000003 PHARM REV CODE 250: Performed by: ANESTHESIOLOGY

## 2019-11-22 PROCEDURE — 63600175 PHARM REV CODE 636 W HCPCS: Performed by: SURGERY

## 2019-11-22 PROCEDURE — 94761 N-INVAS EAR/PLS OXIMETRY MLT: CPT

## 2019-11-22 PROCEDURE — G0379 DIRECT REFER HOSPITAL OBSERV: HCPCS

## 2019-11-22 PROCEDURE — G0378 HOSPITAL OBSERVATION PER HR: HCPCS

## 2019-11-22 PROCEDURE — 37000008 HC ANESTHESIA 1ST 15 MINUTES: Performed by: SURGERY

## 2019-11-22 PROCEDURE — C1768 GRAFT, VASCULAR: HCPCS | Performed by: SURGERY

## 2019-11-22 PROCEDURE — 43280 LAPAROSCOPY FUNDOPLASTY: CPT | Mod: ,,, | Performed by: SURGERY

## 2019-11-22 PROCEDURE — 43280 PR LAP,ESOPHAGOGAST FUNDOPLASTY: ICD-10-PCS | Mod: ,,, | Performed by: SURGERY

## 2019-11-22 PROCEDURE — 96372 THER/PROPH/DIAG INJ SC/IM: CPT | Mod: 59

## 2019-11-22 PROCEDURE — 71000039 HC RECOVERY, EACH ADD'L HOUR: Performed by: SURGERY

## 2019-11-22 PROCEDURE — 88302 TISSUE EXAM BY PATHOLOGIST: CPT | Mod: 26,,, | Performed by: PATHOLOGY

## 2019-11-22 PROCEDURE — 36000711: Performed by: SURGERY

## 2019-11-22 PROCEDURE — 27000221 HC OXYGEN, UP TO 24 HOURS

## 2019-11-22 PROCEDURE — 63600175 PHARM REV CODE 636 W HCPCS: Performed by: STUDENT IN AN ORGANIZED HEALTH CARE EDUCATION/TRAINING PROGRAM

## 2019-11-22 PROCEDURE — 25000003 PHARM REV CODE 250: Performed by: SURGERY

## 2019-11-22 PROCEDURE — 25000003 PHARM REV CODE 250: Performed by: NURSE ANESTHETIST, CERTIFIED REGISTERED

## 2019-11-22 PROCEDURE — 71000033 HC RECOVERY, INTIAL HOUR: Performed by: SURGERY

## 2019-11-22 PROCEDURE — 36000710: Performed by: SURGERY

## 2019-11-22 PROCEDURE — 37000009 HC ANESTHESIA EA ADD 15 MINS: Performed by: SURGERY

## 2019-11-22 PROCEDURE — 49560 PR REPAIR INCISIONAL HERNIA,REDUCIBLE: ICD-10-PCS | Mod: 51,,, | Performed by: SURGERY

## 2019-11-22 PROCEDURE — D9220A PRA ANESTHESIA: ICD-10-PCS | Mod: ANES,,, | Performed by: ANESTHESIOLOGY

## 2019-11-22 DEVICE — FELT THIN 1INX1IN: Type: IMPLANTABLE DEVICE | Site: ABDOMEN | Status: FUNCTIONAL

## 2019-11-22 RX ORDER — ONDANSETRON 2 MG/ML
INJECTION INTRAMUSCULAR; INTRAVENOUS
Status: DISCONTINUED | OUTPATIENT
Start: 2019-11-22 | End: 2019-11-22

## 2019-11-22 RX ORDER — MEPERIDINE HYDROCHLORIDE 50 MG/ML
12.5 INJECTION INTRAMUSCULAR; INTRAVENOUS; SUBCUTANEOUS ONCE AS NEEDED
Status: DISCONTINUED | OUTPATIENT
Start: 2019-11-22 | End: 2019-11-22 | Stop reason: HOSPADM

## 2019-11-22 RX ORDER — ONDANSETRON 2 MG/ML
4 INJECTION INTRAMUSCULAR; INTRAVENOUS DAILY PRN
Status: DISCONTINUED | OUTPATIENT
Start: 2019-11-22 | End: 2019-11-22 | Stop reason: HOSPADM

## 2019-11-22 RX ORDER — HYDROCODONE BITARTRATE AND ACETAMINOPHEN 7.5; 325 MG/15ML; MG/15ML
15 SOLUTION ORAL EVERY 4 HOURS PRN
Status: DISCONTINUED | OUTPATIENT
Start: 2019-11-22 | End: 2019-11-23 | Stop reason: HOSPADM

## 2019-11-22 RX ORDER — SCOLOPAMINE TRANSDERMAL SYSTEM 1 MG/1
1 PATCH, EXTENDED RELEASE TRANSDERMAL
Status: DISCONTINUED | OUTPATIENT
Start: 2019-11-22 | End: 2019-11-23 | Stop reason: HOSPADM

## 2019-11-22 RX ORDER — CEFAZOLIN SODIUM 1 G/3ML
2 INJECTION, POWDER, FOR SOLUTION INTRAMUSCULAR; INTRAVENOUS
Status: COMPLETED | OUTPATIENT
Start: 2019-11-22 | End: 2019-11-22

## 2019-11-22 RX ORDER — ROCURONIUM BROMIDE 10 MG/ML
INJECTION, SOLUTION INTRAVENOUS
Status: DISCONTINUED | OUTPATIENT
Start: 2019-11-22 | End: 2019-11-22

## 2019-11-22 RX ORDER — KETAMINE HCL IN 0.9 % NACL 50 MG/5 ML
SYRINGE (ML) INTRAVENOUS
Status: DISCONTINUED | OUTPATIENT
Start: 2019-11-22 | End: 2019-11-22

## 2019-11-22 RX ORDER — DEXAMETHASONE SODIUM PHOSPHATE 4 MG/ML
INJECTION, SOLUTION INTRA-ARTICULAR; INTRALESIONAL; INTRAMUSCULAR; INTRAVENOUS; SOFT TISSUE
Status: DISCONTINUED | OUTPATIENT
Start: 2019-11-22 | End: 2019-11-22

## 2019-11-22 RX ORDER — HYDROMORPHONE HCL IN 0.9% NACL 6 MG/30 ML
PATIENT CONTROLLED ANALGESIA SYRINGE INTRAVENOUS CONTINUOUS
Status: DISCONTINUED | OUTPATIENT
Start: 2019-11-22 | End: 2019-11-22

## 2019-11-22 RX ORDER — FAMOTIDINE 10 MG/ML
20 INJECTION INTRAVENOUS EVERY 12 HOURS
Status: DISCONTINUED | OUTPATIENT
Start: 2019-11-22 | End: 2019-11-23 | Stop reason: HOSPADM

## 2019-11-22 RX ORDER — SODIUM CHLORIDE 9 MG/ML
INJECTION, SOLUTION INTRAVENOUS CONTINUOUS PRN
Status: DISCONTINUED | OUTPATIENT
Start: 2019-11-22 | End: 2019-11-22

## 2019-11-22 RX ORDER — ENOXAPARIN SODIUM 100 MG/ML
40 INJECTION SUBCUTANEOUS EVERY 24 HOURS
Status: DISCONTINUED | OUTPATIENT
Start: 2019-11-22 | End: 2019-11-23 | Stop reason: HOSPADM

## 2019-11-22 RX ORDER — BUPIVACAINE HYDROCHLORIDE 2.5 MG/ML
INJECTION, SOLUTION EPIDURAL; INFILTRATION; INTRACAUDAL
Status: DISCONTINUED | OUTPATIENT
Start: 2019-11-22 | End: 2019-11-22 | Stop reason: HOSPADM

## 2019-11-22 RX ORDER — PROPOFOL 10 MG/ML
VIAL (ML) INTRAVENOUS
Status: DISCONTINUED | OUTPATIENT
Start: 2019-11-22 | End: 2019-11-22

## 2019-11-22 RX ORDER — LIDOCAINE HCL/PF 100 MG/5ML
SYRINGE (ML) INTRAVENOUS
Status: DISCONTINUED | OUTPATIENT
Start: 2019-11-22 | End: 2019-11-22

## 2019-11-22 RX ORDER — HYDROMORPHONE HCL IN 0.9% NACL 6 MG/30 ML
PATIENT CONTROLLED ANALGESIA SYRINGE INTRAVENOUS
Status: DISPENSED
Start: 2019-11-22 | End: 2019-11-22

## 2019-11-22 RX ORDER — FAMOTIDINE 10 MG/ML
INJECTION INTRAVENOUS
Status: DISCONTINUED | OUTPATIENT
Start: 2019-11-22 | End: 2019-11-22

## 2019-11-22 RX ORDER — HYDROMORPHONE HYDROCHLORIDE 1 MG/ML
0.2 INJECTION, SOLUTION INTRAMUSCULAR; INTRAVENOUS; SUBCUTANEOUS EVERY 5 MIN PRN
Status: DISCONTINUED | OUTPATIENT
Start: 2019-11-22 | End: 2019-11-22 | Stop reason: HOSPADM

## 2019-11-22 RX ORDER — ACETAMINOPHEN 10 MG/ML
INJECTION, SOLUTION INTRAVENOUS
Status: DISCONTINUED | OUTPATIENT
Start: 2019-11-22 | End: 2019-11-22

## 2019-11-22 RX ORDER — ACETAMINOPHEN 10 MG/ML
1000 INJECTION, SOLUTION INTRAVENOUS ONCE
Status: COMPLETED | OUTPATIENT
Start: 2019-11-22 | End: 2019-11-22

## 2019-11-22 RX ORDER — SODIUM CHLORIDE 9 MG/ML
INJECTION, SOLUTION INTRAVENOUS CONTINUOUS
Status: DISCONTINUED | OUTPATIENT
Start: 2019-11-22 | End: 2019-11-22

## 2019-11-22 RX ORDER — FENTANYL CITRATE 50 UG/ML
INJECTION, SOLUTION INTRAMUSCULAR; INTRAVENOUS
Status: DISCONTINUED | OUTPATIENT
Start: 2019-11-22 | End: 2019-11-22

## 2019-11-22 RX ORDER — ONDANSETRON 2 MG/ML
4 INJECTION INTRAMUSCULAR; INTRAVENOUS EVERY 6 HOURS PRN
Status: DISCONTINUED | OUTPATIENT
Start: 2019-11-22 | End: 2019-11-23

## 2019-11-22 RX ORDER — EPHEDRINE SULFATE 50 MG/ML
INJECTION, SOLUTION INTRAVENOUS
Status: DISCONTINUED | OUTPATIENT
Start: 2019-11-22 | End: 2019-11-22

## 2019-11-22 RX ORDER — SODIUM CHLORIDE 0.9 % (FLUSH) 0.9 %
3 SYRINGE (ML) INJECTION
Status: DISCONTINUED | OUTPATIENT
Start: 2019-11-22 | End: 2019-11-22

## 2019-11-22 RX ORDER — LIDOCAINE HYDROCHLORIDE 10 MG/ML
1 INJECTION, SOLUTION EPIDURAL; INFILTRATION; INTRACAUDAL; PERINEURAL ONCE
Status: DISCONTINUED | OUTPATIENT
Start: 2019-11-22 | End: 2019-11-23 | Stop reason: HOSPADM

## 2019-11-22 RX ORDER — SUCCINYLCHOLINE CHLORIDE 20 MG/ML
INJECTION INTRAMUSCULAR; INTRAVENOUS
Status: DISCONTINUED | OUTPATIENT
Start: 2019-11-22 | End: 2019-11-22

## 2019-11-22 RX ORDER — MIDAZOLAM HYDROCHLORIDE 1 MG/ML
INJECTION, SOLUTION INTRAMUSCULAR; INTRAVENOUS
Status: DISCONTINUED | OUTPATIENT
Start: 2019-11-22 | End: 2019-11-22

## 2019-11-22 RX ORDER — NALOXONE HCL 0.4 MG/ML
0.02 VIAL (ML) INJECTION
Status: DISCONTINUED | OUTPATIENT
Start: 2019-11-22 | End: 2019-11-23 | Stop reason: HOSPADM

## 2019-11-22 RX ADMIN — Medication 20 MG: at 07:11

## 2019-11-22 RX ADMIN — FAMOTIDINE 20 MG: 10 INJECTION, SOLUTION INTRAVENOUS at 07:11

## 2019-11-22 RX ADMIN — SODIUM CHLORIDE: 0.9 INJECTION, SOLUTION INTRAVENOUS at 08:11

## 2019-11-22 RX ADMIN — SODIUM CHLORIDE: 0.9 INJECTION, SOLUTION INTRAVENOUS at 06:11

## 2019-11-22 RX ADMIN — FAMOTIDINE 20 MG: 10 INJECTION, SOLUTION INTRAVENOUS at 09:11

## 2019-11-22 RX ADMIN — DEXAMETHASONE SODIUM PHOSPHATE 8 MG: 4 INJECTION, SOLUTION INTRAMUSCULAR; INTRAVENOUS at 07:11

## 2019-11-22 RX ADMIN — PROPOFOL 50 MG: 10 INJECTION, EMULSION INTRAVENOUS at 07:11

## 2019-11-22 RX ADMIN — LIDOCAINE HYDROCHLORIDE 50 MG: 20 INJECTION, SOLUTION INTRAVENOUS at 07:11

## 2019-11-22 RX ADMIN — SODIUM CHLORIDE: 0.9 INJECTION, SOLUTION INTRAVENOUS at 09:11

## 2019-11-22 RX ADMIN — ROCURONIUM BROMIDE 20 MG: 10 INJECTION, SOLUTION INTRAVENOUS at 08:11

## 2019-11-22 RX ADMIN — EPHEDRINE SULFATE 20 MG: 50 INJECTION, SOLUTION INTRAMUSCULAR; INTRAVENOUS; SUBCUTANEOUS at 08:11

## 2019-11-22 RX ADMIN — HYDROCODONE BITARTRATE AND ACETAMINOPHEN 15 ML: 7.5; 325 SOLUTION ORAL at 09:11

## 2019-11-22 RX ADMIN — ONDANSETRON 4 MG: 2 INJECTION INTRAMUSCULAR; INTRAVENOUS at 01:11

## 2019-11-22 RX ADMIN — ROCURONIUM BROMIDE 20 MG: 10 INJECTION, SOLUTION INTRAVENOUS at 07:11

## 2019-11-22 RX ADMIN — SCOPALAMINE 1 PATCH: 1 PATCH, EXTENDED RELEASE TRANSDERMAL at 06:11

## 2019-11-22 RX ADMIN — SUCCINYLCHOLINE CHLORIDE 120 MG: 20 INJECTION, SOLUTION INTRAMUSCULAR; INTRAVENOUS at 07:11

## 2019-11-22 RX ADMIN — ENOXAPARIN SODIUM 40 MG: 100 INJECTION SUBCUTANEOUS at 04:11

## 2019-11-22 RX ADMIN — MIDAZOLAM HYDROCHLORIDE 2 MG: 1 INJECTION, SOLUTION INTRAMUSCULAR; INTRAVENOUS at 07:11

## 2019-11-22 RX ADMIN — ACETAMINOPHEN 1000 MG: 10 INJECTION, SOLUTION INTRAVENOUS at 07:11

## 2019-11-22 RX ADMIN — SUGAMMADEX 400 MG: 100 INJECTION, SOLUTION INTRAVENOUS at 08:11

## 2019-11-22 RX ADMIN — SODIUM CHLORIDE: 0.9 INJECTION, SOLUTION INTRAVENOUS at 07:11

## 2019-11-22 RX ADMIN — HYDROCODONE BITARTRATE AND ACETAMINOPHEN 15 ML: 7.5; 325 SOLUTION ORAL at 04:11

## 2019-11-22 RX ADMIN — ACETAMINOPHEN 1000 MG: 10 INJECTION, SOLUTION INTRAVENOUS at 02:11

## 2019-11-22 RX ADMIN — ONDANSETRON 4 MG: 2 INJECTION INTRAMUSCULAR; INTRAVENOUS at 07:11

## 2019-11-22 RX ADMIN — CEFAZOLIN 2 G: 330 INJECTION, POWDER, FOR SOLUTION INTRAMUSCULAR; INTRAVENOUS at 07:11

## 2019-11-22 RX ADMIN — ROCURONIUM BROMIDE 30 MG: 10 INJECTION, SOLUTION INTRAVENOUS at 07:11

## 2019-11-22 RX ADMIN — PROPOFOL 150 MG: 10 INJECTION, EMULSION INTRAVENOUS at 07:11

## 2019-11-22 RX ADMIN — Medication: at 09:11

## 2019-11-22 RX ADMIN — FENTANYL CITRATE 50 MCG: 50 INJECTION, SOLUTION INTRAMUSCULAR; INTRAVENOUS at 08:11

## 2019-11-22 RX ADMIN — FENTANYL CITRATE 100 MCG: 50 INJECTION, SOLUTION INTRAMUSCULAR; INTRAVENOUS at 07:11

## 2019-11-22 NOTE — BRIEF OP NOTE
Operative Note       Surgery Date: 11/22/2019     Surgeon(s) and Role:     * Adriano Godoy MD - Primary     * Brendon Wheeler MD - Resident - Assisting    Pre-op Diagnosis:  Hernia, hiatal [K44.9]  Incisional hernia, without obstruction or gangrene [K43.2]    Post-op Diagnosis:  Hernia, hiatal [K44.9]  Incisional hernia, without obstruction or gangrene [K43.2]    Procedure(s) (LRB):  REPAIR, HERNIA, HIATAL, LAPAROSCOPIC (N/A)  FUNDOPLICATION, LAPAROSCOPIC, TOUPET (N/A)  REPAIR, HERNIA, INCISIONAL OR VENTRAL, OPEN (LLQ) (N/A)    Anesthesia: General    Procedure in Detail/Findings:  Moderate hh.  2 suture repair.  Sac sent to path.  Toupet/60 Croat[place] bougie.  Small incisional hernia near pubis, 1 cm, contents sent to path and primary repair.    Estimated Blood Loss: Minimal           Specimens (From admission, onward)    None        Implants:   Implant Name Type Inv. Item Serial No.  Lot No. LRB No. Used   [employer]IN [id number] - [id number]  [employer] [id number]  [employer] [id number] N/A 1              Disposition: PACU - hemodynamically stable.           Condition: Good    Attestation:  I was present and scrubbed for the entire procedure.

## 2019-11-22 NOTE — ANESTHESIA PREPROCEDURE EVALUATION
11/22/2019  Jenifer Burnett is a 59 y.o., female.    Pre-op Assessment    I have reviewed the Patient Summary Reports.      I have reviewed the Medications.     Review of Systems  Anesthesia Hx:  Denies Family Hx of Anesthesia complications.  Personal Hx of Anesthesia complications, Post-Operative Nausea/Vomiting, in the past, but not with recent anesthetics / prophylaxis   Hematology/Oncology:  Hematology Normal   Oncology Normal     EENT/Dental:EENT/Dental Normal   Cardiovascular:   Exercise tolerance: good Hypertension    Pulmonary:  Pulmonary Normal    Renal/:  Renal/ Normal     Hepatic/GI:   Hiatal Hernia, GERD    Musculoskeletal:  Musculoskeletal Normal    Neurological:   Headaches    Endocrine:  Endocrine Normal    Dermatological:  Skin Normal      Patient Active Problem List   Diagnosis    Hypertension    Migraine    Hernia, hiatal    GERD (gastroesophageal reflux disease)    Hiatal hernia         Physical Exam  General:  Well nourished    Airway/Jaw/Neck:       Eyes/Ears/Nose:  EYES/EARS/NOSE FINDINGS: Normal    Chest/Lungs:  Chest/Lungs Findings: Normal Respiratory Rate         Mental Status:  Mental Status Findings:  Cooperative, Alert and Oriented         Anesthesia Plan  Type of Anesthesia, risks & benefits discussed:  Anesthesia Type:  general  Patient's Preference: General  Intra-op Monitoring Plan: standard ASA monitors  Intra-op Monitoring Plan Comments:   Post Op Pain Control Plan:   Post Op Pain Control Plan Comments:   Induction:   IV  Beta Blocker:  Patient is not currently on a Beta-Blocker (No further documentation required).       Informed Consent: Patient understands risks and agrees with Anesthesia plan.  Questions answered. Anesthesia consent signed with patient.  ASA Score: 2     Day of Surgery Review of History & Physical:    H&P update referred to the surgeon.          Ready For Surgery From Anesthesia Perspective.

## 2019-11-22 NOTE — ANESTHESIA POSTPROCEDURE EVALUATION
Anesthesia Post Evaluation    Patient: Jenifer Burnett    Procedure(s) Performed: Procedure(s) (LRB):  REPAIR, HERNIA, HIATAL, LAPAROSCOPIC (N/A)  FUNDOPLICATION, LAPAROSCOPIC, TOUPET (N/A)  REPAIR, HERNIA, INCISIONAL OR VENTRAL, OPEN (LLQ) (N/A)    Final Anesthesia Type: general    Patient location during evaluation: PACU  Patient participation: Yes- Able to Participate  Level of consciousness: awake and alert  Post-procedure vital signs: reviewed and stable  Pain management: adequate  Airway patency: patent    PONV status at discharge: No PONV  Anesthetic complications: no      Cardiovascular status: blood pressure returned to baseline and hemodynamically stable  Respiratory status: unassisted, spontaneous ventilation and room air  Hydration status: euvolemic  Follow-up not needed.          Vitals Value Taken Time   /97 11/22/2019 11:29 AM   Temp 35.8 °C (96.4 °F) 11/22/2019 11:29 AM   Pulse 87 11/22/2019 11:29 AM   Resp 16 11/22/2019 11:29 AM   SpO2 95 % 11/22/2019 11:29 AM         Event Time     Out of Recovery 10:45:00          Pain/Олег Score: Pain Rating Prior to Med Admin: 0 (11/22/2019  9:29 AM)  Олег Score: 9 (11/22/2019 10:45 AM)

## 2019-11-22 NOTE — OP NOTE
Surgery Date: 11/22/2019     Surgeon(s) and Role:     * Adriano Godoy MD - Primary     * Brendon Wheeler MD - Resident - Assisting    Pre-op Diagnosis:  Hernia, hiatal [K44.9]  Incisional hernia, without obstruction or gangrene [K43.2]    Post-op Diagnosis:  Hernia, hiatal [K44.9]  Incisional hernia, without obstruction or gangrene [K43.2]    Procedure(s) (LRB):  REPAIR, HERNIA, HIATAL, LAPAROSCOPIC (N/A)  FUNDOPLICATION, LAPAROSCOPIC, TOUPET (N/A)  REPAIR, HERNIA, INCISIONAL OR VENTRAL, OPEN (LLQ) (N/A)    Code: type 1 54327     Anesthesia: General      PROCEDURE IN DETAIL: The patient was placed under general anesthesia on the   marquet bed. The legs were abducted. The abdomen was prepped and draped in the  usual manner. Access to peritoneum was gained 15 cm below the xiphoid using a 10mm  Optiview trocar under direct vision. Pneumoperitoneum to 15 mmHg with CO2 gas   was obtained. Four 5-mm trocars were placed medially, subcostally at 11 and 20   cm on the left side and 7 and 15 cm on the right side. A liver retractor was   placed exposing the esophageal hiatus.  The hernia was small. Using judicial use of the Harmonic   scalpel and blunt dissection the hernia sac was divided from the crura and brought into the abdomen.  It was resected (from the left of the anterior vagus to the posterior vagus making sure to preserve these strutures and the specimen was removed at the end of the case).  This took 15 minutes longer than the typical hiatal hernia.  The greater curve was taken down with the harmonic scalpel from a third of the way from the LES to the base of the left cornelia and taking all posterior attachements.  After dissection 3 to 4 cm of esophagus laying within the abdominal cavity without   tension. The crural opening was medium and was reapproximated with 2  0 neurolon plegeted sutures.  A bio a mesh was not sutured over the closure.  The fundus of the stomach was brought around the esophagus  posteriorly and we checked for the angle of His and performed the shoe-shine maneuver.  A 36 dilator passed easily and the fundoplication was performed around a 60fr dilator.  The vagus nerves were preserved with the esophagus.  A grasper could just be placed between the fundoplication and esophagus.  The dilator was removed and the abdomen was inspected for hemostasis. The liver retractor was removed. The trocars were removed under direct vision. Prior to removing the last trocar, pneumoperitoneum was allowed to escape. We then proceeded to close the lower midline small incisional hernia. We began by making a skin incision over the area and then dissecting down with electrocautery until the fascia was encountered and the defect noted. It was 1 cm in size and was closed primarily with a 0 prolene suture. Finally, the skin incisions were closed with 4-0 plain catgut and reinforced with dermabond. The patient tolerated the procedurewell and was brought to Recovery Room in stable condition. Sponge and needle counts were correct at the end of the case.    Pathology:  Complication: none  Blood loss: minimal

## 2019-11-22 NOTE — NURSING TRANSFER
Nursing Transfer Note      11/22/2019     Transfer To: 556 A    Transfer via stretcher    Transfer with NC 2L to O2, pca pump    Transported by pct    Medicines sent: dilaudid pca, mivf    Chart send with patient: Yes    Notified: family

## 2019-11-22 NOTE — NURSING
Pt arrived to room via stretcher. Pt rates pain as tolerable controled by PCA. Pt denies nausea. Pt denies numbness and tingling. Pt lap sites dry and intact. Pt oriented to room and call system. Will continue to monitor.

## 2019-11-22 NOTE — TRANSFER OF CARE
Anesthesia Transfer of Care Note    Patient: Jenifer Burnett    Procedure(s) Performed: Procedure(s) (LRB):  REPAIR, HERNIA, HIATAL, LAPAROSCOPIC (N/A)  FUNDOPLICATION, LAPAROSCOPIC, TOUPET (N/A)  REPAIR, HERNIA, INCISIONAL OR VENTRAL, OPEN (LLQ) (N/A)    Patient location: PACU    Anesthesia Type: general    Transport from OR: Transported from OR on 6-10 L/min O2 by face mask with adequate spontaneous ventilation    Post pain: adequate analgesia    Post assessment: no apparent anesthetic complications and tolerated procedure well    Post vital signs: stable    Level of consciousness: awake, oriented and alert    Nausea/Vomiting: no nausea/vomiting    Complications: other (see comments), left small pneumo per surgeon    Transfer of care protocol was followed      Last vitals:   Visit Vitals  BP (!) 195/98   Pulse 93   Temp 36.2 °C (97.2 °F) (Temporal)   Resp 16   Wt 91.2 kg (201 lb)   SpO2 100%   Breastfeeding? No   BMI 29.68 kg/m²

## 2019-11-23 VITALS
BODY MASS INDEX: 29.77 KG/M2 | TEMPERATURE: 98 F | OXYGEN SATURATION: 94 % | DIASTOLIC BLOOD PRESSURE: 78 MMHG | WEIGHT: 201 LBS | HEART RATE: 73 BPM | SYSTOLIC BLOOD PRESSURE: 162 MMHG | RESPIRATION RATE: 18 BRPM | HEIGHT: 69 IN

## 2019-11-23 LAB
ALBUMIN SERPL BCP-MCNC: 3.4 G/DL (ref 3.5–5.2)
ALP SERPL-CCNC: 74 U/L (ref 55–135)
ALT SERPL W/O P-5'-P-CCNC: 57 U/L (ref 10–44)
ANION GAP SERPL CALC-SCNC: 5 MMOL/L (ref 8–16)
AST SERPL-CCNC: 60 U/L (ref 10–40)
BASOPHILS # BLD AUTO: 0.02 K/UL (ref 0–0.2)
BASOPHILS NFR BLD: 0.3 % (ref 0–1.9)
BILIRUB SERPL-MCNC: 0.5 MG/DL (ref 0.1–1)
BUN SERPL-MCNC: 11 MG/DL (ref 6–20)
CALCIUM SERPL-MCNC: 8.9 MG/DL (ref 8.7–10.5)
CHLORIDE SERPL-SCNC: 106 MMOL/L (ref 95–110)
CO2 SERPL-SCNC: 29 MMOL/L (ref 23–29)
CREAT SERPL-MCNC: 0.7 MG/DL (ref 0.5–1.4)
DIFFERENTIAL METHOD: ABNORMAL
EOSINOPHIL # BLD AUTO: 0 K/UL (ref 0–0.5)
EOSINOPHIL NFR BLD: 0 % (ref 0–8)
ERYTHROCYTE [DISTWIDTH] IN BLOOD BY AUTOMATED COUNT: 12.8 % (ref 11.5–14.5)
EST. GFR  (AFRICAN AMERICAN): >60 ML/MIN/1.73 M^2
EST. GFR  (NON AFRICAN AMERICAN): >60 ML/MIN/1.73 M^2
GLUCOSE SERPL-MCNC: 97 MG/DL (ref 70–110)
HCT VFR BLD AUTO: 36.9 % (ref 37–48.5)
HGB BLD-MCNC: 11.5 G/DL (ref 12–16)
IMM GRANULOCYTES # BLD AUTO: 0.02 K/UL (ref 0–0.04)
IMM GRANULOCYTES NFR BLD AUTO: 0.3 % (ref 0–0.5)
LYMPHOCYTES # BLD AUTO: 1.1 K/UL (ref 1–4.8)
LYMPHOCYTES NFR BLD: 14.7 % (ref 18–48)
MAGNESIUM SERPL-MCNC: 2.2 MG/DL (ref 1.6–2.6)
MCH RBC QN AUTO: 29.1 PG (ref 27–31)
MCHC RBC AUTO-ENTMCNC: 31.2 G/DL (ref 32–36)
MCV RBC AUTO: 93 FL (ref 82–98)
MONOCYTES # BLD AUTO: 0.7 K/UL (ref 0.3–1)
MONOCYTES NFR BLD: 9.7 % (ref 4–15)
NEUTROPHILS # BLD AUTO: 5.4 K/UL (ref 1.8–7.7)
NEUTROPHILS NFR BLD: 75 % (ref 38–73)
NRBC BLD-RTO: 0 /100 WBC
PLATELET # BLD AUTO: 200 K/UL (ref 150–350)
PMV BLD AUTO: 9.8 FL (ref 9.2–12.9)
POTASSIUM SERPL-SCNC: 4.2 MMOL/L (ref 3.5–5.1)
POTASSIUM SERPL-SCNC: 4.2 MMOL/L (ref 3.5–5.1)
PROT SERPL-MCNC: 6.9 G/DL (ref 6–8.4)
RBC # BLD AUTO: 3.95 M/UL (ref 4–5.4)
SODIUM SERPL-SCNC: 140 MMOL/L (ref 136–145)
WBC # BLD AUTO: 7.14 K/UL (ref 3.9–12.7)

## 2019-11-23 PROCEDURE — 83735 ASSAY OF MAGNESIUM: CPT

## 2019-11-23 PROCEDURE — 85025 COMPLETE CBC W/AUTO DIFF WBC: CPT

## 2019-11-23 PROCEDURE — 25000003 PHARM REV CODE 250: Performed by: STUDENT IN AN ORGANIZED HEALTH CARE EDUCATION/TRAINING PROGRAM

## 2019-11-23 PROCEDURE — G0378 HOSPITAL OBSERVATION PER HR: HCPCS

## 2019-11-23 PROCEDURE — S0028 INJECTION, FAMOTIDINE, 20 MG: HCPCS | Performed by: STUDENT IN AN ORGANIZED HEALTH CARE EDUCATION/TRAINING PROGRAM

## 2019-11-23 PROCEDURE — 96374 THER/PROPH/DIAG INJ IV PUSH: CPT

## 2019-11-23 PROCEDURE — 36415 COLL VENOUS BLD VENIPUNCTURE: CPT

## 2019-11-23 PROCEDURE — 80053 COMPREHEN METABOLIC PANEL: CPT

## 2019-11-23 RX ORDER — ONDANSETRON 8 MG/1
8 TABLET, ORALLY DISINTEGRATING ORAL EVERY 8 HOURS PRN
Qty: 20 TABLET | Refills: 3 | Status: SHIPPED | OUTPATIENT
Start: 2019-11-23 | End: 2019-11-23

## 2019-11-23 RX ORDER — ONDANSETRON 8 MG/1
8 TABLET, ORALLY DISINTEGRATING ORAL EVERY 8 HOURS PRN
Status: DISCONTINUED | OUTPATIENT
Start: 2019-11-23 | End: 2019-11-23 | Stop reason: HOSPADM

## 2019-11-23 RX ORDER — ONDANSETRON 8 MG/1
8 TABLET, ORALLY DISINTEGRATING ORAL EVERY 8 HOURS PRN
Qty: 20 TABLET | Refills: 3 | Status: SHIPPED | OUTPATIENT
Start: 2019-11-23 | End: 2020-07-01

## 2019-11-23 RX ORDER — HYDROCODONE BITARTRATE AND ACETAMINOPHEN 7.5; 325 MG/15ML; MG/15ML
15 SOLUTION ORAL EVERY 4 HOURS PRN
Qty: 473 ML | Refills: 0 | Status: SHIPPED | OUTPATIENT
Start: 2019-11-23 | End: 2020-07-01

## 2019-11-23 RX ORDER — HYDROCODONE BITARTRATE AND ACETAMINOPHEN 7.5; 325 MG/15ML; MG/15ML
15 SOLUTION ORAL EVERY 4 HOURS PRN
Qty: 473 ML | Refills: 0 | Status: SHIPPED | OUTPATIENT
Start: 2019-11-23 | End: 2019-11-23

## 2019-11-23 RX ADMIN — HYDROCODONE BITARTRATE AND ACETAMINOPHEN 15 ML: 7.5; 325 SOLUTION ORAL at 04:11

## 2019-11-23 RX ADMIN — HYDROCODONE BITARTRATE AND ACETAMINOPHEN 15 ML: 7.5; 325 SOLUTION ORAL at 01:11

## 2019-11-23 RX ADMIN — FAMOTIDINE 20 MG: 10 INJECTION, SOLUTION INTRAVENOUS at 08:11

## 2019-11-23 RX ADMIN — HYDROCODONE BITARTRATE AND ACETAMINOPHEN 15 ML: 7.5; 325 SOLUTION ORAL at 08:11

## 2019-11-23 NOTE — SUBJECTIVE & OBJECTIVE
Interval History: No acute events overnight. Pt seen and examined at the bedside. Vital signs stable. Tolerating clears. No nausea/vomiting. Feeling well.     Medications:  Continuous Infusions:  Scheduled Meds:   enoxaparin  40 mg Subcutaneous Daily    famotidine (PF)  20 mg Intravenous Q12H    lidocaine (PF) 10 mg/ml (1%)  1 mL Intradermal Once    scopolamine  1 patch Transdermal Q3 Days     PRN Meds:hydrocodone-apap 7.5-325 MG/15 ML, naloxone, ondansetron     Review of patient's allergies indicates:  No Known Allergies  Objective:     Vital Signs (Most Recent):  Temp: 96.8 °F (36 °C) (11/23/19 0746)  Pulse: 72 (11/23/19 0746)  Resp: 18 (11/23/19 0521)  BP: (!) 145/83 (11/23/19 0746)  SpO2: (!) 94 % (11/23/19 0746) Vital Signs (24h Range):  Temp:  [96.4 °F (35.8 °C)-98.5 °F (36.9 °C)] 96.8 °F (36 °C)  Pulse:  [69-89] 72  Resp:  [11-19] 18  SpO2:  [93 %-98 %] 94 %  BP: (145-179)/(73-97) 145/83     Weight: 91.2 kg (201 lb)  Body mass index is 29.68 kg/m².    Intake/Output - Last 3 Shifts       11/21 0700 - 11/22 0659 11/22 0700 - 11/23 0659 11/23 0700 - 11/24 0659    P.O.  240     I.V. (mL/kg)  1000 (11)     Total Intake(mL/kg)  1240 (13.6)     Net  +1240            Urine Occurrence  2 x           Physical Exam   Constitutional: She is oriented to person, place, and time. She appears well-developed and well-nourished. No distress.   HENT:   Head: Normocephalic and atraumatic.   Eyes: EOM are normal.   Cardiovascular: Normal rate.   Pulmonary/Chest: Effort normal. No respiratory distress.   Abdominal: Soft. She exhibits no distension.   Neurological: She is alert and oriented to person, place, and time. No cranial nerve deficit.   Skin: She is not diaphoretic.   Psychiatric: She has a normal mood and affect. Her behavior is normal.       Significant Labs:  CBC:   Recent Labs   Lab 11/23/19  0346   WBC 7.14   RBC 3.95*   HGB 11.5*   HCT 36.9*      MCV 93   MCH 29.1   MCHC 31.2*     CMP:   Recent Labs   Lab  11/23/19  0346   GLU 97   CALCIUM 8.9   ALBUMIN 3.4*   PROT 6.9      K 4.2  4.2   CO2 29      BUN 11   CREATININE 0.7   ALKPHOS 74   ALT 57*   AST 60*   BILITOT 0.5       Significant Diagnostics:  I have reviewed all pertinent imaging results/findings within the past 24 hours.

## 2019-11-23 NOTE — CONSULTS
Food & Nutrition  Education    Diet Education:  Post-Nissen diet  Time Spent: 20 minutes  Learners: Patient      Nutrition Education provided with handouts: Post-GI surgery diet      Comments: Pt tolerating clear liquids well. Pt denies wt loss PTA and has a good appetite. Provided education on advancement to full liquid, puree and soft diet with tips for tolerance. Pt acknowledged verbally.      All questions and concerns answered. Dietitian's contact information provided.       Follow-Up: 11/29/19    Please Re-consult as needed        Thanks!

## 2019-11-23 NOTE — ASSESSMENT & PLAN NOTE
S/p HH repair and wrap    Full liquids  DC ivf  Prn pain meds with liquid pain meds  Home later today if tolerating full liquids

## 2019-11-23 NOTE — PROGRESS NOTES
[facility]  General Surgery  Progress Note    Subjective:     History of Present Illness:  No notes on file    Post-Op Info:  Procedure(s) (LRB):  REPAIR, HERNIA, HIATAL, LAPAROSCOPIC (N/A)  FUNDOPLICATION, LAPAROSCOPIC, TOUPET (N/A)  REPAIR, HERNIA, INCISIONAL OR VENTRAL, OPEN (LLQ) (N/A)   1 Day Post-Op     Interval History: No acute events overnight. Pt seen and examined at the bedside. Vital signs stable. Tolerating clears. No nausea/vomiting. Feeling well.     Medications:  Continuous Infusions:  Scheduled Meds:   enoxaparin  40 mg Subcutaneous Daily    famotidine (PF)  20 mg Intravenous Q12H    lidocaine (PF) 10 mg/ml (1%)  1 mL Intradermal Once    scopolamine  1 patch Transdermal Q3 Days     PRN Meds:hydrocodone-apap 7.5-325 MG/15 ML, naloxone, ondansetron     Review of patient's allergies indicates:  No Known Allergies  Objective:     Vital Signs (Most Recent):  Temp: 96.8 °F (36 °C) (11/23/19 0746)  Pulse: 72 (11/23/19 0746)  Resp: 18 (11/23/19 0521)  BP: (!) 145/83 (11/23/19 0746)  SpO2: (!) 94 % (11/23/19 0746) Vital Signs (24h Range):  Temp:  [96.4 °F (35.8 °C)-98.5 °F (36.9 °C)] 96.8 °F (36 °C)  Pulse:  [69-89] 72  Resp:  [11-19] 18  SpO2:  [93 %-98 %] 94 %  BP: (145-179)/(73-97) 145/83     Weight: 91.2 kg (201 lb)  Body mass index is 29.68 kg/m².    Intake/Output - Last 3 Shifts       11/21 0700 - 11/22 0659 11/22 0700 - 11/23 0659 11/23 0700 - 11/24 0659    P.O.  240     I.V. (mL/kg)  1000 (11)     Total Intake(mL/kg)  1240 (13.6)     Net  +1240            Urine Occurrence  2 x           Physical Exam   Constitutional: She is oriented to person, place, and time. She appears well-developed and well-nourished. No distress.   HENT:   Head: Normocephalic and atraumatic.   Eyes: EOM are normal.   Cardiovascular: Normal rate.   Pulmonary/Chest: Effort normal. No respiratory distress.   Abdominal: Soft. She exhibits no distension.   Neurological: She is alert and oriented to person,  place, and time. No cranial nerve deficit.   Skin: She is not diaphoretic.   Psychiatric: She has a normal mood and affect. Her behavior is normal.       Significant Labs:  CBC:   Recent Labs   Lab 11/23/19  0346   WBC 7.14   RBC 3.95*   HGB 11.5*   HCT 36.9*      MCV 93   MCH 29.1   MCHC 31.2*     CMP:   Recent Labs   Lab 11/23/19  0346   GLU 97   CALCIUM 8.9   ALBUMIN 3.4*   PROT 6.9      K 4.2  4.2   CO2 29      BUN 11   CREATININE 0.7   ALKPHOS 74   ALT 57*   AST 60*   BILITOT 0.5       Significant Diagnostics:  I have reviewed all pertinent imaging results/findings within the past 24 hours.    Assessment/Plan:     * Hiatal hernia  S/p HH repair and wrap    Full liquids  DC ivf  Prn pain meds with liquid pain meds  Home later today if tolerating full liquids        Brendon Kim MD  General Surgery  [facility]

## 2019-11-24 NOTE — DISCHARGE SUMMARY
[facility]  General Surgery  Discharge Summary      Patient Name: Jenifer Burnett  MRN: [id number]  Admission Date: 11/22/2019  Hospital Length of Stay: 1 days  Discharge Date and Time: 11/23/2019  3:48 PM  Attending Physician: No att. providers found   Discharging Provider: Marlon Kim MD  Primary Care Provider: Babak Joy MD     HPI: Jenifer Burnett is a 59 y.o. female with a hx of hiatal hernia    Procedure(s) (LRB):  REPAIR, HERNIA, HIATAL, LAPAROSCOPIC (N/A)  FUNDOPLICATION, LAPAROSCOPIC, TOUPET (N/A)  REPAIR, HERNIA, INCISIONAL OR VENTRAL, OPEN (LLQ) (N/A)     Hospital Course: Jenifer Burnett was admitted to the hospital for surgery, specifically lap hiatal hernia repair, the surgery went well and the patient was transferred to the PACU and then to the floor in stable condition. The patient's postoperative course was uncomplicated. The patient was deemed stable and was discharged on 11/24/2019.      Consults:   Consults (From admission, onward)        Status Ordering Provider     Inpatient consult to Registered Dietitian/Nutritionist  Once     Provider:  (Not yet assigned)    Completed MARLON KIM          Significant Diagnostic Studies: Labs:   BMP:   Recent Labs   Lab 11/23/19  0346   GLU 97      K 4.2  4.2      CO2 29   BUN 11   CREATININE 0.7   CALCIUM 8.9   MG 2.2    and CBC   Recent Labs   Lab 11/23/19  0346   WBC 7.14   HGB 11.5*   HCT 36.9*          Pending Diagnostic Studies:     Procedure Component Value Units Date/Time    Specimen to Pathology, Surgery General Surgery [[id number]] Collected:  11/22/19 0902    Order Status:  Sent Lab Status:  In process Updated:  11/22/19 1136        Final Active Diagnoses:    Diagnosis Date Noted POA    PRINCIPAL PROBLEM:  Hiatal hernia [K44.9] 11/22/2019 Yes      Problems Resolved During this Admission:      Discharged Condition: good    Disposition: Home or Self Care    Follow Up:  Follow-up Information     Adriano AMADOR  MD Walt In 2 weeks.    Specialties:  General Surgery, Bariatrics  Why:  For wound re-check, Follow up  Contact information:  [address]  [facility] [id number] [phone]                 Patient Instructions:      Lifting restrictions   Order Comments: No heavy lifting of anything 10 lbs or greater for six weeks.     No driving until:   Order Comments: No driving while on narcotic medications.     Call MD for:  temperature >100.4     Call MD for:  persistent nausea and vomiting or diarrhea     Call MD for:  severe uncontrolled pain     Call MD for:  redness, tenderness, or signs of infection (pain, swelling, redness, odor or green/yellow discharge around incision site)     Call MD for:  difficulty breathing or increased cough     Call MD for:  severe persistent headache     Call MD for:  worsening rash     Call MD for:  persistent dizziness, light-headedness, or visual disturbances     Call MD for:  increased confusion or weakness     No dressing needed     Shower on day dressing removed (No bath)   Order Comments: Do not submerge wound for two weeks, no baths, pools, lakes, etc. Only showers.  OK to shower post-op day one     Medications:  Reconciled Home Medications:      Medication List      START taking these medications    hydrocodone-apap 7.5-325 MG/15 ML oral solution  Commonly known as:  HYCET  Take 15 mLs by mouth every 4 (four) hours as needed for Pain.     ondansetron 8 MG Tbdl  Commonly known as:  ZOFRAN-ODT  Dissolvde 1 tablet (8 mg total) by mouth every 8 (eight) hours as needed.        CONTINUE taking these medications    citalopram 40 MG tablet  Commonly known as:  CeleXA  Take 1 tablet (40 mg total) by mouth once daily. 1 Tablet Oral Every day     cyanocobalamin (vitamin B-12) 2,500 mcg Tab  Take 1 tablet by mouth once daily.     losartan-hydrochlorothiazide 50-12.5 mg 50-12.5 mg per tablet  Commonly known as:  HYZAAR  Take 1 tablet by mouth once daily.     meloxicam 15 MG  tablet  Commonly known as:  MOBIC  Take 1 tablet (15 mg total) by mouth once daily.     omeprazole 40 MG capsule  Commonly known as:  PRILOSEC  Take 1 capsule (40 mg total) by mouth every morning.  Capsule, Delayed Release(E.C.) Oral Every day     sumatriptan 50 MG tablet  Commonly known as:  IMITREX  Take 1 tablet (50 mg total) by mouth as needed for Migraine (May take additional dose after 2 hours. Do not exceed 2 doses in 24 hrs).     tiZANidine 2 mg Cap  Take by mouth.     vitamin D 1000 units Tab  Commonly known as:  VITAMIN D3  Take 2,000 Units by mouth once daily.            Brendon Kim MD  General Surgery  [facility]

## 2019-12-02 ENCOUNTER — TELEPHONE (OUTPATIENT)
Dept: SURGERY | Facility: CLINIC | Age: 59
End: 2019-12-02

## 2019-12-02 NOTE — TELEPHONE ENCOUNTER
Spoke with pt and gave her number to medical records to f/u with paperwork. Understanding verbalized.      ----- Message from Lilian Colon sent at 12/2/2019 10:50 AM CST -----  Contact: Patient  Staff Message     Caller name: Pt    Reason for call: Calling to check the status of her short term disability paperwork that she dropped of at the office.        Communication Preference: [phone]    Additional Information:

## 2019-12-03 ENCOUNTER — TELEPHONE (OUTPATIENT)
Dept: SURGERY | Facility: CLINIC | Age: 59
End: 2019-12-03

## 2019-12-03 NOTE — TELEPHONE ENCOUNTER
R/t call and left msg for pt to call back.    ----- Message from Sonya June sent at 12/3/2019 11:48 AM CST -----  Contact: pt  Reason:Calling pertaining to medical records from her last two visits that alonzo is requesting.    [facility] fax# [phone]  .

## 2019-12-05 LAB
FINAL PATHOLOGIC DIAGNOSIS: NORMAL
GROSS: NORMAL

## 2019-12-08 ENCOUNTER — PATIENT OUTREACH (OUTPATIENT)
Dept: ADMINISTRATIVE | Facility: OTHER | Age: 59
End: 2019-12-08

## 2019-12-10 ENCOUNTER — OFFICE VISIT (OUTPATIENT)
Dept: SURGERY | Facility: CLINIC | Age: 59
End: 2019-12-10
Payer: COMMERCIAL

## 2019-12-10 VITALS
WEIGHT: 217.06 LBS | HEIGHT: 69 IN | HEART RATE: 107 BPM | DIASTOLIC BLOOD PRESSURE: 92 MMHG | TEMPERATURE: 99 F | BODY MASS INDEX: 32.15 KG/M2 | SYSTOLIC BLOOD PRESSURE: 129 MMHG

## 2019-12-10 DIAGNOSIS — Z09 POSTOP CHECK: Primary | ICD-10-CM

## 2019-12-10 PROBLEM — K44.9 HERNIA, HIATAL: Status: RESOLVED | Noted: 2019-10-10 | Resolved: 2019-12-10

## 2019-12-10 PROBLEM — K21.9 GERD (GASTROESOPHAGEAL REFLUX DISEASE): Status: RESOLVED | Noted: 2019-10-14 | Resolved: 2019-12-10

## 2019-12-10 PROBLEM — K44.9 HIATAL HERNIA: Status: RESOLVED | Noted: 2019-11-22 | Resolved: 2019-12-10

## 2019-12-10 PROCEDURE — 99999 PR PBB SHADOW E&M-EST. PATIENT-LVL III: ICD-10-PCS | Mod: PBBFAC,,, | Performed by: SURGERY

## 2019-12-10 PROCEDURE — 99024 POSTOP FOLLOW-UP VISIT: CPT | Mod: S$GLB,,, | Performed by: SURGERY

## 2019-12-10 PROCEDURE — 99999 PR PBB SHADOW E&M-EST. PATIENT-LVL III: CPT | Mod: PBBFAC,,, | Performed by: SURGERY

## 2019-12-10 PROCEDURE — 99024 PR POST-OP FOLLOW-UP VISIT: ICD-10-PCS | Mod: S$GLB,,, | Performed by: SURGERY

## 2019-12-10 NOTE — PROGRESS NOTES
"Subjective:       Jenifer Burnett presents to the clinic 2 weeks following lap HH repair and ventral hernia repair without mesh on 11/22/19. Eating a soft diet without difficulty. Bowel movements are Normal.  Pain is controlled without any medications. She denies significant symptoms of reflux at this time. She does report increased belching and some diaphragmatic tenderness but she is largely feeling well.      Objective:      BP (!) 129/92   Pulse 107   Temp 98.5 °F (36.9 °C)   Ht 5' 9" (1.753 m)   Wt 98.5 kg (217 lb 0.7 oz)   BMI 32.05 kg/m²     General:  alert, appears stated age and cooperative   Abdomen: soft, bowel sounds active, non-tender   Incision:   healing well, no drainage, no erythema, no hernia, no seroma, no swelling, no dehiscence, incision well approximated       Assessment:      Doing well postoperatively.      Plan:      1. Continue any current medications.  2. Wound care discussed.  3. Return to full duty in 4 weeks.  4. Follow up: 4 weeks   "

## 2019-12-10 NOTE — LETTER
December 10, 2019        Babak Joy MD  [address]  [facility] [address]             [facility] General Surgery  [address]  [facility] [address]  Phone: [phone] December 10, 2019      Babak Joy MD  [address]  [facility] [address]    Patient: Jenifer Burnett   MR Number: [id number]   YOB: 1960   Date of Visit: 12/10/2019     Dear Dr. Joy:    Thank you for referring Jenifer Burnett to me for evaluation. Attached you will find relevant portions of my assessment and plan of care.    Ms. Burnett is status post laparoscopic hiatal hernia repair with Toupet for large hiatal hernia and suprapubic incisional hernia repair 11/22/19.  Her preop symptoms of GERD is resolved and bloating is improved.  She had dysphagia requiring regurgitation to pancakes.  Otherwise she has had no dysphagia on a soft diet.  She is to advance diet as tolerated and continue light duty restrictions until Fernando 3.  She says there is no light duty at work so she will start on January 6th.  She is to return to the clinic in one month.    If you have questions, please do not hesitate to call me. I look forward to following Jenifer Burnett along with you.    Sincerely,      Adriano Godoy MD  Professor, [employer]  Section Head, General Surgery  [facility]    WSR/lennox

## 2019-12-10 NOTE — PROGRESS NOTES
I have seen the patient, reviewed the Resident's history and physical, assessment and plan. I have personally interviewed and examined the patient at bedside and: agree with the findings.     S/p lap hh with toupet for large hiatal hernia and suprapubic incisional hernia repair 11/22/19.  Her preop symptoms of gerd is resolved and bloating is improved.  She had dysphagia requiring regurgitation to pancakes.  Otherwise she has had no dysphagia on a soft diet.  Advance diet as tolerated and light duty until Fernando 3.  She says there is no light duty at work so she will start on January 6th.  Rtc one month.

## 2019-12-11 ENCOUNTER — TELEPHONE (OUTPATIENT)
Dept: SURGERY | Facility: CLINIC | Age: 59
End: 2019-12-11

## 2019-12-11 NOTE — TELEPHONE ENCOUNTER
Returned pt phone call and informed her that we have not received any [employer] short term paperwork.  Pt to have them refax paperwork.

## 2019-12-11 NOTE — TELEPHONE ENCOUNTER
----- Message from Shabnam Felipe sent at 12/11/2019 11:26 AM CST -----  Ph; [phone]  [employer] was supposed to send forms over to be signed.   Pt calling to confirm if they have been received yet. Please call   And advise.

## 2019-12-26 ENCOUNTER — TELEPHONE (OUTPATIENT)
Dept: SURGERY | Facility: CLINIC | Age: 59
End: 2019-12-26

## 2019-12-26 NOTE — TELEPHONE ENCOUNTER
----- Message from Shira Tolbert sent at 12/26/2019 10:30 AM CST -----  Contact: pt  Jenifer Burnett calling regarding Patient Advice (message) for pt needing something faxed over stating her return to work day is 1/6 . fax to [phone] human resources office.  Please give pt a call back at [phone] for any questions .

## 2020-03-27 ENCOUNTER — TELEPHONE (OUTPATIENT)
Dept: INTERNAL MEDICINE | Facility: CLINIC | Age: 60
End: 2020-03-27

## 2020-03-27 NOTE — TELEPHONE ENCOUNTER
Patient seen once for initial establish care visit.  Rec'd note from patient's pharmacy noting interaction between omeprazole and celexa; omeprazole inhibits a liver enzyme that metabolizes celexa, so it can lead to effectively too high a dose of the celexa. She has been on these medications for some time, but if possible I would recommend she switch to a different medication for GERD (like pantoprazole) that does not have this interaction. She can check in with her GI specialist about this if she would like.    I see that she canceled a f/u visit in January - please also confirm that she plans to return to see me for primary care, and if so I would recommend she schedule follow up in 3-4 months

## 2020-04-22 DIAGNOSIS — K44.9 HERNIA, HIATAL: ICD-10-CM

## 2020-04-22 DIAGNOSIS — K21.00 GASTROESOPHAGEAL REFLUX DISEASE WITH ESOPHAGITIS: ICD-10-CM

## 2020-04-22 DIAGNOSIS — F41.9 ANXIETY: ICD-10-CM

## 2020-04-22 DIAGNOSIS — I10 ESSENTIAL HYPERTENSION: Primary | ICD-10-CM

## 2020-04-28 RX ORDER — CITALOPRAM 40 MG/1
40 TABLET, FILM COATED ORAL DAILY
Qty: 90 TABLET | Refills: 0 | Status: SHIPPED | OUTPATIENT
Start: 2020-04-28 | End: 2020-07-01

## 2020-04-28 RX ORDER — OMEPRAZOLE 40 MG/1
40 CAPSULE, DELAYED RELEASE ORAL EVERY MORNING
Qty: 90 CAPSULE | Refills: 1 | OUTPATIENT
Start: 2020-04-28

## 2020-04-28 RX ORDER — LOSARTAN POTASSIUM 50 MG/1
TABLET ORAL
Qty: 90 TABLET | Refills: 1 | Status: SHIPPED | OUTPATIENT
Start: 2020-04-28 | End: 2020-07-01

## 2020-04-28 RX ORDER — HYDROCHLOROTHIAZIDE 12.5 MG/1
TABLET ORAL
Qty: 90 TABLET | Refills: 0 | Status: SHIPPED | OUTPATIENT
Start: 2020-04-28 | End: 2020-07-01

## 2020-04-28 NOTE — TELEPHONE ENCOUNTER
Medication issue.    Refill request rec'd for losartan + HCTZ separately, but it looks like her last rx was for a combination pill. Please verify what medication (combination pill or 2 separate meds) she is taking.    Also please let her know that there is a medication interaction between omeprazole and celexa; omeprazole inhibits a liver enzyme that metabolizes celexa, so it can lead to effectively too high a dose of the celexa. She has been on these medications for some time, but if possible I would recommend she switch to a different medication for GERD (like pantoprazole) that does not have this interaction.    Please check on whether she is needing to take the omeprazole, and if so if she is OK with switching to pantoprazole.    Last rx for celexa was 10/2019 for a 90 day supply, and I only see 1 fill on this. Please check in with her to verify whether she is taking this medication and if what dose / how regularly.

## 2020-04-28 NOTE — PROGRESS NOTES
Refill Routing Note   Medication(s) are not appropriate for processing by [facility]:       Medication not active on medication list and Drug-Drug Interaction (Citalopram/Omeprazole)     Medication-related problems identified:   Non-adherence - intentional  Drug-drug interaction    Medication Therapy Plan: Pt was switched to Hyzaar-single meds (Losartan and HCTZ) are not on med list; celexa needs override for high dose; telephone encounter 3/27/20-recommended pt switch to pantoprazole to replace omeprazole due to the interaction with citalopram; defer to you    Medication reconciliation completed: No      Automatic Epic Protocol Generated Data:    Requested Prescriptions   Pending Prescriptions Disp Refills    citalopram (CELEXA) 40 MG tablet [Pharmacy Med Name: CITALOPRAM 40 MG         TAB] 90 tablet 1     Sig: Take 1 tablet (40 mg total) by mouth once daily.       Psychiatry:  Antidepressants - SSRI Passed - 4/22/2020 10:53 AM        Passed - Patient is at least 18 years old        Passed - Office visit in past 6 months or future 90 days.     Recent Outpatient Visits            4 months ago Postop check    [facility] - General Surgery Adriano Godoy MD    5 months ago Hernia, hiatal    [facility] Surgery Adriano Godoy MD    6 months ago Annual physical exam    [facility] - Internal Medicine Babak Joy MD    6 months ago Hernia, hiatal    [facility] Surgery Adriano Godoy MD    6 months ago Flank pain    [facility] - Internal Medicine Yaneli Landa MD          Future Appointments              In 1 month Babak Joy MD [facility] - Internal Medicine [facility]               omeprazole (PRILOSEC) 40 MG capsule [Pharmacy Med Name: OMEPRAZOLE 40 MG         CAP] 90 capsule 1     Sig: TAKE 1 CAPSULE (40 MG TOTAL) BY MOUTH EVERY MORNING. CAPSULE, DELAYED RELEASE(E.C.) ORAL EVERY DAY       Gastroenterology: Proton Pump Inhibitors Failed -  4/22/2020 10:53 AM        Failed - GERD is on problem list        Passed - Patient is at least 18 years old        Passed - Osteoporosis is not on problem list        Passed - Plavix is not on active medication list        Passed - Office visit in past 6 months or future 90 days.     Recent Outpatient Visits            4 months ago Postop check    [facility] Adriano Godoy MD    5 months ago Hernia, hiatal    [facility] Adriano Godoy MD    6 months ago Annual physical exam    [facility] Internal [facility] Babak Joy MD    6 months ago Hernia, hiatal    [facility] Adriano Godoy MD    6 months ago Flank pain    [facility] [place] Yaneli Landa MD          Future Appointments              In 1 month Babak Joy MD [facility] Internal [facility]               losartan (COZAAR) 50 MG tablet [Pharmacy Med Name: LOSARTAN POT 50 MG       TAB] 90 tablet 1     Sig: TAKE 1 TABLET BY MOUTH ONCE A DAY       Cardiovascular:  Angiotensin Receptor Blockers Failed - 4/22/2020 10:53 AM        Failed - Last BP in normal range within 360 days.     BP Readings from Last 3 Encounters:   12/10/19 (!) 148/77   12/10/19 (!) 129/92   11/23/19 (!) 162/78              Passed - Patient is at least 18 years old        Passed - Office visit in past 12 months or future 90 days.     Recent Outpatient Visits            4 months ago Postop check    [facility] Adriano Godoy MD    5 months ago Hernia, hiatal    [facility] Adriano Godoy MD    6 months ago Annual physical exam    [facility] Internal Medicine Babak Joy MD    6 months ago Hernia, hiatal    [facility] Adriano Godoy MD    6 months ago Flank pain    [facility] [place] Yaneli Landa MD          Future Appointments              In 1 month Babak Joy MD [facility]  [facility]                Passed - Cr is 1.3 or below and within 360 days     Creatinine   Date Value Ref Range Status   11/23/2019 0.7 0.5 - 1.4 mg/dL Final   10/29/2019 0.8 0.5 - 1.4 mg/dL Final   12/01/2014 0.7 0.5 - 1.4 mg/dL Final     POC Creatinine   Date Value Ref Range Status   10/10/2019 0.8 0.5 - 1.4 mg/dL Final              Passed - K in normal range and within 360 days     Potassium   Date Value Ref Range Status   11/23/2019 4.2 3.5 - 5.1 mmol/L Final   11/23/2019 4.2 3.5 - 5.1 mmol/L Final   10/29/2019 4.0 3.5 - 5.1 mmol/L Final              Passed - eGFR within 360 days     eGFR if non    Date Value Ref Range Status   11/23/2019 >60.0 >60 mL/min/1.73 m^2 Final     Comment:     Calculation used to obtain the estimated glomerular filtration  rate (eGFR) is the CKD-EPI equation.      10/29/2019 >60 >60 mL/min/1.73 m^2 Final     Comment:     Calculation used to obtain the estimated glomerular filtration  rate (eGFR) is the CKD-EPI equation.      12/01/2014 >60.0 >60 mL/min/1.73 m^2 Final     Comment:     Calculation used to obtain the estimated glomerular filtration  rate (eGFR) is the CKD-EPI equation. Since race is unknown   in our information system, the eGFR values for   -American and Non--American patients are given   for each creatinine result.       eGFR if    Date Value Ref Range Status   11/23/2019 >60.0 >60 mL/min/1.73 m^2 Final   10/29/2019 >60 >60 mL/min/1.73 m^2 Final   12/01/2014 >60.0 >60 mL/min/1.73 m^2 Final              Powered by [employer] - 4/22/2020 10:53 AM        The requested medication is not on the active medication list.       hydroCHLOROthiazide (HYDRODIURIL) 12.5 MG Tab [Pharmacy Med Name: HYDROCHLOROTHIAZIDE 12.5 TAB] 90 tablet 0     Sig: TAKE 1 TABLET BY MOUTH ONCE A DAY       Cardiovascular: Diuretics - Thiazide Failed - 4/22/2020 10:53 AM        Failed - Last BP in normal range within 360 days.     BP Readings  from Last 3 Encounters:   12/10/19 (!) 148/77   12/10/19 (!) 129/92   11/23/19 (!) 162/78              Passed - Patient is at least 18 years old        Passed - Office visit in past 12 months or future 90 days.     Recent Outpatient Visits            4 months ago Postop check    [facility] General Surgery Adriano Godoy MD    5 months ago Hernia, hiatal    [facility] Surgery Adriano Godoy MD    6 months ago Annual physical exam    [facility] - Internal Medicine Babak Joy MD    6 months ago Hernia, hiatal    [facility] Surgery Adriano Godoy MD    6 months ago Flank pain    [facility] - Internal Medicine Yanlei Landa MD          Future Appointments              In 1 month Babak Joy MD [facility] Internal Medicine, [facility]                Passed - Ca in normal range and within 360 days     Calcium   Date Value Ref Range Status   11/23/2019 8.9 8.7 - 10.5 mg/dL Final   10/29/2019 9.1 8.7 - 10.5 mg/dL Final   12/01/2014 9.0 8.7 - 10.5 mg/dL Final              Passed - Cr is 1.3 or below and within 180 days     Creatinine   Date Value Ref Range Status   11/23/2019 0.7 0.5 - 1.4 mg/dL Final   10/29/2019 0.8 0.5 - 1.4 mg/dL Final   12/01/2014 0.7 0.5 - 1.4 mg/dL Final     POC Creatinine   Date Value Ref Range Status   10/10/2019 0.8 0.5 - 1.4 mg/dL Final              Passed - K in normal range and within 180 days     Potassium   Date Value Ref Range Status   11/23/2019 4.2 3.5 - 5.1 mmol/L Final   11/23/2019 4.2 3.5 - 5.1 mmol/L Final   10/29/2019 4.0 3.5 - 5.1 mmol/L Final              Passed - Na is between 130 and 148 and within 180 days     Sodium   Date Value Ref Range Status   11/23/2019 140 136 - 145 mmol/L Final   10/29/2019 140 136 - 145 mmol/L Final   12/01/2014 140 136 - 145 mmol/L Final              Passed - eGFR within 180 days     eGFR if non    Date Value Ref Range Status   11/23/2019 >60.0 >60 mL/min/1.73 m^2 Final     Comment:      Calculation used to obtain the estimated glomerular filtration  rate (eGFR) is the CKD-EPI equation.      10/29/2019 >60 >60 mL/min/1.73 m^2 Final     Comment:     Calculation used to obtain the estimated glomerular filtration  rate (eGFR) is the CKD-EPI equation.      12/01/2014 >60.0 >60 mL/min/1.73 m^2 Final     Comment:     Calculation used to obtain the estimated glomerular filtration  rate (eGFR) is the CKD-EPI equation. Since race is unknown   in our information system, the eGFR values for   -American and Non--American patients are given   for each creatinine result.       eGFR if    Date Value Ref Range Status   11/23/2019 >60.0 >60 mL/min/1.73 m^2 Final   10/29/2019 >60 >60 mL/min/1.73 m^2 Final   12/01/2014 >60.0 >60 mL/min/1.73 m^2 Final              Powered by [email or web address] - 4/22/2020 10:53 AM        The requested medication is not on the active medication list.           Appointments  past 12m or future 3m with PCP    Date Provider   Last Visit   10/29/2019 Babak Joy MD   Next Visit   6/24/2020 Babak Joy MD   ED visits in past 90 days: 0     Note composed:1:32 PM 04/28/2020

## 2020-04-28 NOTE — TELEPHONE ENCOUNTER
I spoke to the pt. With her b/p med combination, the pharmacy could not get the combination so they  it. Since having hernia repair surgery, she does not feel that she needs reflux medication. She will call back should her symptoms return. She is taking Celexa 40 mg once a day.

## 2020-06-10 ENCOUNTER — PATIENT OUTREACH (OUTPATIENT)
Dept: ADMINISTRATIVE | Facility: HOSPITAL | Age: 60
End: 2020-06-10

## 2020-07-01 ENCOUNTER — OFFICE VISIT (OUTPATIENT)
Dept: INTERNAL MEDICINE | Facility: CLINIC | Age: 60
End: 2020-07-01
Payer: COMMERCIAL

## 2020-07-01 DIAGNOSIS — R06.02 EXERTIONAL SHORTNESS OF BREATH: ICD-10-CM

## 2020-07-01 DIAGNOSIS — R10.13 EPIGASTRIC DISCOMFORT: ICD-10-CM

## 2020-07-01 DIAGNOSIS — R06.09 DOE (DYSPNEA ON EXERTION): ICD-10-CM

## 2020-07-01 DIAGNOSIS — I10 ESSENTIAL HYPERTENSION: ICD-10-CM

## 2020-07-01 PROCEDURE — 99213 OFFICE O/P EST LOW 20 MIN: CPT | Mod: S$GLB,,, | Performed by: FAMILY MEDICINE

## 2020-07-01 PROCEDURE — 99999 PR PBB SHADOW E&M-EST. PATIENT-LVL IV: ICD-10-PCS | Mod: PBBFAC,,, | Performed by: FAMILY MEDICINE

## 2020-07-01 PROCEDURE — 99999 PR PBB SHADOW E&M-EST. PATIENT-LVL IV: CPT | Mod: PBBFAC,,, | Performed by: FAMILY MEDICINE

## 2020-07-01 PROCEDURE — 3075F PR MOST RECENT SYSTOLIC BLOOD PRESS GE 130-139MM HG: ICD-10-PCS | Mod: CPTII,S$GLB,, | Performed by: FAMILY MEDICINE

## 2020-07-01 PROCEDURE — 99213 PR OFFICE/OUTPT VISIT, EST, LEVL III, 20-29 MIN: ICD-10-PCS | Mod: S$GLB,,, | Performed by: FAMILY MEDICINE

## 2020-07-01 PROCEDURE — 3079F PR MOST RECENT DIASTOLIC BLOOD PRESSURE 80-89 MM HG: ICD-10-PCS | Mod: CPTII,S$GLB,, | Performed by: FAMILY MEDICINE

## 2020-07-01 PROCEDURE — 3079F DIAST BP 80-89 MM HG: CPT | Mod: CPTII,S$GLB,, | Performed by: FAMILY MEDICINE

## 2020-07-01 PROCEDURE — 3008F PR BODY MASS INDEX (BMI) DOCUMENTED: ICD-10-PCS | Mod: CPTII,S$GLB,, | Performed by: FAMILY MEDICINE

## 2020-07-01 PROCEDURE — 3008F BODY MASS INDEX DOCD: CPT | Mod: CPTII,S$GLB,, | Performed by: FAMILY MEDICINE

## 2020-07-01 PROCEDURE — 3075F SYST BP GE 130 - 139MM HG: CPT | Mod: CPTII,S$GLB,, | Performed by: FAMILY MEDICINE

## 2020-07-01 RX ORDER — LOSARTAN POTASSIUM AND HYDROCHLOROTHIAZIDE 12.5; 5 MG/1; MG/1
0.5 TABLET ORAL DAILY
COMMUNITY
End: 2020-10-30 | Stop reason: ALTCHOICE

## 2020-07-01 NOTE — PATIENT INSTRUCTIONS
I recommend checking your blood pressure values once daily for 1 week and then notifying either your PCP, Dr. Joy, or myself of your daily readings in one week's time.

## 2020-07-01 NOTE — PROGRESS NOTES
Subjective:      Patient ID: Jenifer Burnett is a 60 y.o. female.    Chief Complaint: Hypertension      HPI:  Jenifer Burnett is a 60 year old female with hypertension and migraines who presents to clinic today to address her hypertension.    Patient new to me; PCP is Dr. Babak Joy    HTN:  Presents 2 pill bottles one for losartan-HCTZ 50-12.5 take 1 tablet by mouth daily and HCTZ 12.5 mg 1 tablet by mouth daily.  States she has only been taking half tablets of losartan-HCTZ once daily.  Blood pressure noted to 130/85 today.  States she feels off balance and light headed on full tablet.      States she had a really bad migraine this past Sunday.  BP was 165/120 at that time.  Migraine improved.    Does not monitor BP daily but will check during migraines or if feeling abnormal.      States over the last month she has noticed significant bloating to the epigastric area after eating and will feel smothered like she has difficulty breathing.  Sometimes has associated chest discomfort.  Endorses history of hiatal hernia repaired Nov. 2019.  Symptoms feel similar to when she had hernia previously.  Symptoms can occur when she ambulates a lot or when stressed.      Past Medical History:   Diagnosis Date    Encounter for blood transfusion     GERD (gastroesophageal reflux disease)     Hypertension     Migraine     Trigeminal neuralgia 03/2018       Past Surgical History:   Procedure Laterality Date    CHOLECYSTECTOMY  07/2012    ESOPHAGOGASTRODUODENOSCOPY N/A 10/14/2019    Procedure: EGD (ESOPHAGOGASTRODUODENOSCOPY);  Surgeon: Sean Girard MD;  Location: [address]);  Service: Endoscopy;  Laterality: N/A;    HYSTERECTOMY      LAPAROSCOPIC TOUPET FUNDOPLICATION N/A 11/22/2019    Procedure: FUNDOPLICATION, LAPAROSCOPIC, TOUPET;  Surgeon: Adriano Godoy MD;  Location: [address];  Service: General;  Laterality: N/A;       Family History   Problem Relation Age of Onset    Breast cancer Mother         Social History     Socioeconomic History    Marital status: Single     Spouse name: Not on file    Number of children: Not on file    Years of education: Not on file    Highest education level: Not on file   Occupational History    Not on file   Social Needs    Financial resource strain: Not on file    Food insecurity     Worry: Not on file     Inability: Not on file    Transportation needs     Medical: Not on file     Non-medical: Not on file   Tobacco Use    Smoking status: Never Smoker    Smokeless tobacco: Never Used   Substance and Sexual Activity    Alcohol use: No    Drug use: No    Sexual activity: Yes     Partners: Male   Lifestyle    Physical activity     Days per week: Not on file     Minutes per session: Not on file    Stress: Not on file   Relationships    Social connections     Talks on phone: Not on file     Gets together: Not on file     Attends [employer] service: Not on file     Active member of club or organization: Not on file     Attends meetings of clubs or organizations: Not on file     Relationship status: Not on file   Other Topics Concern    Not on file   Social History Narrative    Working at [employer], does do bending/lifting type work        Review of Systems   Constitutional: Negative for chills, fatigue and fever.   HENT: Negative for congestion, hearing loss, nosebleeds, rhinorrhea, sore throat and trouble swallowing.    Eyes: Negative for pain and visual disturbance.   Respiratory: Negative for cough, shortness of breath and wheezing.         + Intermittent difficulty breathing associated with ABD bloating   Cardiovascular: Negative for chest pain and palpitations.        + Intermittent chest discomfort associated with abdominal bloating   Gastrointestinal: Negative for abdominal distention, abdominal pain, constipation, diarrhea, nausea and vomiting.        + Intermittent bloating to epigastric area   Genitourinary: Negative for decreased urine volume,  "difficulty urinating, dysuria, hematuria and urgency.   Musculoskeletal: Negative for arthralgias, back pain and myalgias.   Skin: Negative for color change and rash.   Neurological: Positive for headaches. Negative for dizziness, tremors, weakness, light-headedness and numbness.   Psychiatric/Behavioral: Negative for agitation, behavioral problems and confusion. The patient is not nervous/anxious.      Objective:     Vitals:    07/01/20 1554 07/13/20 1258   BP: 130/85 134/88   BP Location: Left arm    Patient Position: Sitting    BP Method: Medium (Manual)    Pulse: 65    Weight: 89.8 kg (198 lb)    Height: 5' 9" (1.753 m)        Physical Exam  Vitals signs and nursing note reviewed.   Constitutional:       Appearance: She is well-developed.   HENT:      Head: Normocephalic and atraumatic.      Right Ear: External ear normal.      Left Ear: External ear normal.      Nose: Nose normal.   Eyes:      Conjunctiva/sclera: Conjunctivae normal.   Neck:      Musculoskeletal: Neck supple.      Trachea: No tracheal deviation.   Cardiovascular:      Rate and Rhythm: Normal rate and regular rhythm.      Heart sounds: Normal heart sounds. No murmur. No friction rub. No gallop.    Pulmonary:      Effort: Pulmonary effort is normal. No respiratory distress.      Breath sounds: Normal breath sounds. No wheezing or rales.   Abdominal:      General: Bowel sounds are normal. There is no distension.      Palpations: Abdomen is soft.      Tenderness: There is no abdominal tenderness. There is no guarding or rebound.   Musculoskeletal:         General: No deformity.   Skin:     General: Skin is warm and dry.   Neurological:      Mental Status: She is alert.   Psychiatric:         Behavior: Behavior normal.        Assessment:      1. Essential hypertension    2. Epigastric discomfort    3. Exertional shortness of breath    4. SCHAFFER (dyspnea on exertion)      Plan:   Jenifer was seen today for hypertension.    Diagnoses and all orders for " this visit:    Essential hypertension        -     Discontinue HCTZ 12.5 mg by mouth daily.  Recommended she continue losartan-HCTZ 50-12.5 mg half a tablet by mouth daily.  Counseled patient on the importance of a low sodium diet and daily aerobic exercise/weight loss.  Follow up with PCP.    Epigastric discomfort; Exertional shortness of breath; SCHAFFER (dyspnea on exertion)  -     Exercise Stress - EKG; Future to ensure no cardiac etiology  -     Follow up with PCP for epigastric discomfort

## 2020-07-02 ENCOUNTER — HOSPITAL ENCOUNTER (OUTPATIENT)
Dept: CARDIOLOGY | Facility: HOSPITAL | Age: 60
Discharge: HOME | End: 2020-07-02
Attending: FAMILY MEDICINE
Payer: COMMERCIAL

## 2020-07-02 VITALS — BODY MASS INDEX: 26.96 KG/M2 | WEIGHT: 182 LBS | HEIGHT: 69 IN

## 2020-07-02 DIAGNOSIS — R10.13 EPIGASTRIC DISCOMFORT: ICD-10-CM

## 2020-07-02 DIAGNOSIS — R06.09 DOE (DYSPNEA ON EXERTION): ICD-10-CM

## 2020-07-02 LAB
CV STRESS BASE HR: 77 BPM
DIASTOLIC BLOOD PRESSURE: 86 MMHG
OHS CV CPX 1 MINUTE RECOVERY HEART RATE: 114 BPM
OHS CV CPX 85 PERCENT MAX PREDICTED HEART RATE MALE: 130
OHS CV CPX ESTIMATED METS: 8
OHS CV CPX MAX PREDICTED HEART RATE: 153
OHS CV CPX PATIENT IS FEMALE: 1
OHS CV CPX PATIENT IS MALE: 0
OHS CV CPX PEAK DIASTOLIC BLOOD PRESSURE: 80 MMHG
OHS CV CPX PEAK HEAR RATE: 141 BPM
OHS CV CPX PEAK RATE PRESSURE PRODUCT: NORMAL
OHS CV CPX PEAK SYSTOLIC BLOOD PRESSURE: 180 MMHG
OHS CV CPX PERCENT MAX PREDICTED HEART RATE ACHIEVED: 92
OHS CV CPX RATE PRESSURE PRODUCT PRESENTING: NORMAL
STRESS ECHO POST EXERCISE DUR MIN: 5 MINUTES
STRESS ECHO POST EXERCISE DUR SEC: 0 SECONDS
SYSTOLIC BLOOD PRESSURE: 130 MMHG

## 2020-07-02 PROCEDURE — 93016 CV STRESS TEST SUPVJ ONLY: CPT | Mod: ,,, | Performed by: INTERNAL MEDICINE

## 2020-07-02 PROCEDURE — 93018 EXERCISE STRESS - EKG (CUPID ONLY): ICD-10-PCS | Mod: ,,, | Performed by: INTERNAL MEDICINE

## 2020-07-02 PROCEDURE — 93017 CV STRESS TEST TRACING ONLY: CPT

## 2020-07-02 PROCEDURE — 93016 EXERCISE STRESS - EKG (CUPID ONLY): ICD-10-PCS | Mod: ,,, | Performed by: INTERNAL MEDICINE

## 2020-07-02 PROCEDURE — 93018 CV STRESS TEST I&R ONLY: CPT | Mod: ,,, | Performed by: INTERNAL MEDICINE

## 2020-07-13 ENCOUNTER — TELEPHONE (OUTPATIENT)
Dept: INTERNAL MEDICINE | Facility: CLINIC | Age: 60
End: 2020-07-13

## 2020-07-13 VITALS
BODY MASS INDEX: 29.33 KG/M2 | DIASTOLIC BLOOD PRESSURE: 88 MMHG | SYSTOLIC BLOOD PRESSURE: 134 MMHG | WEIGHT: 198 LBS | HEART RATE: 65 BPM | HEIGHT: 69 IN

## 2020-07-13 NOTE — TELEPHONE ENCOUNTER
----- Message from Ricardo Rizzo MD sent at 7/13/2020  1:01 PM CDT -----  Please notify patient of the following:    Stress test returned negative.

## 2020-09-16 ENCOUNTER — TELEPHONE (OUTPATIENT)
Dept: INTERNAL MEDICINE | Facility: CLINIC | Age: 60
End: 2020-09-16

## 2020-09-16 NOTE — TELEPHONE ENCOUNTER
----- Message from Unique Unger sent at 9/16/2020 12:17 PM CDT -----  Contact: [phone]  Caller is requesting an earlier appt than we can schedule.  Caller declined first available appointment listed below. Caller will not accept being placed on the wait list and is requesting a message be sent to the provider.  When is the next available appointment:  January  Did you offer to schedule the next available appt and put the patient on the wait list?:     What visit type: EP  Symptoms:  Stomach issues  Patient preference of timeframe to be scheduled:    What is the reason the patient is requesting a sooner appointment? (insurance terminating, changing jobs):    Would the patient rather a call back or a response via [facility]?:  Call back  Comments:  Patient would like to come this coming Friday or Monday

## 2020-09-18 ENCOUNTER — LAB VISIT (OUTPATIENT)
Dept: LAB | Facility: HOSPITAL | Age: 60
End: 2020-09-18
Payer: COMMERCIAL

## 2020-09-18 ENCOUNTER — OFFICE VISIT (OUTPATIENT)
Dept: INTERNAL MEDICINE | Facility: CLINIC | Age: 60
End: 2020-09-18
Payer: COMMERCIAL

## 2020-09-18 VITALS
DIASTOLIC BLOOD PRESSURE: 82 MMHG | SYSTOLIC BLOOD PRESSURE: 120 MMHG | BODY MASS INDEX: 29.55 KG/M2 | HEART RATE: 92 BPM | HEIGHT: 69 IN | WEIGHT: 199.5 LBS | OXYGEN SATURATION: 97 %

## 2020-09-18 DIAGNOSIS — R10.13 EPIGASTRIC PAIN: ICD-10-CM

## 2020-09-18 DIAGNOSIS — R10.13 EPIGASTRIC PAIN: Primary | ICD-10-CM

## 2020-09-18 DIAGNOSIS — K21.9 GASTROESOPHAGEAL REFLUX DISEASE WITHOUT ESOPHAGITIS: ICD-10-CM

## 2020-09-18 DIAGNOSIS — E66.3 OVERWEIGHT (BMI 25.0-29.9): ICD-10-CM

## 2020-09-18 DIAGNOSIS — R10.9 BILATERAL FLANK PAIN: ICD-10-CM

## 2020-09-18 LAB
BASOPHILS # BLD AUTO: 0.02 K/UL (ref 0–0.2)
BASOPHILS NFR BLD: 0.4 % (ref 0–1.9)
DIFFERENTIAL METHOD: ABNORMAL
EOSINOPHIL # BLD AUTO: 0.1 K/UL (ref 0–0.5)
EOSINOPHIL NFR BLD: 1.7 % (ref 0–8)
ERYTHROCYTE [DISTWIDTH] IN BLOOD BY AUTOMATED COUNT: 12.6 % (ref 11.5–14.5)
HCT VFR BLD AUTO: 42.8 % (ref 37–48.5)
HGB BLD-MCNC: 13.1 G/DL (ref 12–16)
IMM GRANULOCYTES # BLD AUTO: 0.01 K/UL (ref 0–0.04)
IMM GRANULOCYTES NFR BLD AUTO: 0.2 % (ref 0–0.5)
LYMPHOCYTES # BLD AUTO: 1.2 K/UL (ref 1–4.8)
LYMPHOCYTES NFR BLD: 24.8 % (ref 18–48)
MCH RBC QN AUTO: 29.2 PG (ref 27–31)
MCHC RBC AUTO-ENTMCNC: 30.6 G/DL (ref 32–36)
MCV RBC AUTO: 95 FL (ref 82–98)
MONOCYTES # BLD AUTO: 0.5 K/UL (ref 0.3–1)
MONOCYTES NFR BLD: 9.7 % (ref 4–15)
NEUTROPHILS # BLD AUTO: 3 K/UL (ref 1.8–7.7)
NEUTROPHILS NFR BLD: 63.2 % (ref 38–73)
NRBC BLD-RTO: 0 /100 WBC
PLATELET # BLD AUTO: 216 K/UL (ref 150–350)
PMV BLD AUTO: 10.1 FL (ref 9.2–12.9)
RBC # BLD AUTO: 4.49 M/UL (ref 4–5.4)
WBC # BLD AUTO: 4.76 K/UL (ref 3.9–12.7)

## 2020-09-18 PROCEDURE — 99999 PR PBB SHADOW E&M-EST. PATIENT-LVL IV: CPT | Mod: PBBFAC,,, | Performed by: NURSE PRACTITIONER

## 2020-09-18 PROCEDURE — 83690 ASSAY OF LIPASE: CPT

## 2020-09-18 PROCEDURE — 99999 PR PBB SHADOW E&M-EST. PATIENT-LVL IV: ICD-10-PCS | Mod: PBBFAC,,, | Performed by: NURSE PRACTITIONER

## 2020-09-18 PROCEDURE — 86677 HELICOBACTER PYLORI ANTIBODY: CPT

## 2020-09-18 PROCEDURE — 3079F DIAST BP 80-89 MM HG: CPT | Mod: CPTII,S$GLB,, | Performed by: NURSE PRACTITIONER

## 2020-09-18 PROCEDURE — 82150 ASSAY OF AMYLASE: CPT

## 2020-09-18 PROCEDURE — 3008F PR BODY MASS INDEX (BMI) DOCUMENTED: ICD-10-PCS | Mod: CPTII,S$GLB,, | Performed by: NURSE PRACTITIONER

## 2020-09-18 PROCEDURE — 80053 COMPREHEN METABOLIC PANEL: CPT

## 2020-09-18 PROCEDURE — 85025 COMPLETE CBC W/AUTO DIFF WBC: CPT

## 2020-09-18 PROCEDURE — 3074F PR MOST RECENT SYSTOLIC BLOOD PRESSURE < 130 MM HG: ICD-10-PCS | Mod: CPTII,S$GLB,, | Performed by: NURSE PRACTITIONER

## 2020-09-18 PROCEDURE — 3008F BODY MASS INDEX DOCD: CPT | Mod: CPTII,S$GLB,, | Performed by: NURSE PRACTITIONER

## 2020-09-18 PROCEDURE — 99214 PR OFFICE/OUTPT VISIT, EST, LEVL IV, 30-39 MIN: ICD-10-PCS | Mod: S$GLB,,, | Performed by: NURSE PRACTITIONER

## 2020-09-18 PROCEDURE — 99214 OFFICE O/P EST MOD 30 MIN: CPT | Mod: S$GLB,,, | Performed by: NURSE PRACTITIONER

## 2020-09-18 PROCEDURE — 3079F PR MOST RECENT DIASTOLIC BLOOD PRESSURE 80-89 MM HG: ICD-10-PCS | Mod: CPTII,S$GLB,, | Performed by: NURSE PRACTITIONER

## 2020-09-18 PROCEDURE — 36415 COLL VENOUS BLD VENIPUNCTURE: CPT

## 2020-09-18 PROCEDURE — 3074F SYST BP LT 130 MM HG: CPT | Mod: CPTII,S$GLB,, | Performed by: NURSE PRACTITIONER

## 2020-09-18 RX ORDER — OMEPRAZOLE 40 MG/1
40 CAPSULE, DELAYED RELEASE ORAL DAILY
Qty: 30 CAPSULE | Refills: 2 | Status: SHIPPED | OUTPATIENT
Start: 2020-09-18 | End: 2020-10-30

## 2020-09-18 RX ORDER — LOSARTAN POTASSIUM 50 MG/1
TABLET ORAL
COMMUNITY
Start: 2020-07-22 | End: 2020-09-18

## 2020-09-18 NOTE — PATIENT INSTRUCTIONS
Check labs and urinalysis today, will call with results    US abdomen complete, fast 8 hrs prior to test, will call with results    Start omeprazole 40mg daily    Avoid spicy, gassy foods, carbonation, avoid laying down right after a heavy meal      Epigastric Pain (Uncertain Cause)     Epigastric pain can be a sign of disease in the upper abdomen. Common causes include:  · Acid reflux (stomach acid flowing up into the esophagus)  · Gastritis (irritation of the stomach lining)  · Peptic Ulcer Disease  · Inflammation of the pancreas  · Gallstone  · Infection in the gallbladder  Pain may be dull or burning. It may spread upward to the chest or to the back. There may be other symptoms such as belching, bloating, cramps or hunger pains. There may be weight loss or poor appetite, nausea or vomiting.  Since the diagnosis of your pain is not certain yet, further tests may sometimes be needed. Sometimes the doctor will treat you for the most likely condition to see if there is improvement before doing further tests.  Home care  Medicines  · Antacids help neutralize the normal acids in your stomach. Examples are Maalox, Mylanta, Rolaids, and Tums. If you dont like the liquid, you can also try a chewable one. You may find one works better than another for you. Overuse can cause diarrhea or constipation.  · Acid blockers (H2 blockers) decrease acid production. Examples are cimetidine (Tagamet), famotidine (Pepcid) and ranitidine (Zantac).  · Acid inhibitors (PPIs) decrease acid production in a different way than the blockers. You may find they work better, but can take a little longer to take effect.  Examples are omeprazole (Prilosec), lansoprazole (Prevacid), pantoprazole (Protonix), rabeprazole (Aciphex), and esomeprazole (Nexium).  · Take an antacid 30-60 minutes after eating and at bedtime, but not at the same time as an acid blocker.  · Try not to take NSAIDs. Aspirin may also cause problems, but if taking it for your  heart or other medical reasons, talk to your doctor before stopping it; you do not want to cause a worse problem, like a heart attack or stroke.  Diet  · If certain foods seem to cause your spasm, try to avoid them.   · Eat slowly and chew food well before swallowing. Symptoms of gastritis can be worsened by certain foods. Limit or avoid fatty, fried, and spicy foods, as well as coffee, chocolate, mint, and foods with high acid content such as tomatoes and citrus fruit and juices (orange, grapefruit, lemon).  · Avoid alcohol, caffeine, and tobacco, which can delay healing and worsen your problem.  · Try eating smaller meals with snacks in between  Follow-up care  Follow up with your healthcare provider or as advised.  When to seek medical advice  Call your healthcare provider right away if any of the following occur:  · Stomach pain worsens or moves to the right lower part of the abdomen  · Chest pain appears, or if it worsens or spreads to the chest, back, neck, shoulder, or arm  · Frequent vomiting (cant keep down liquids)  · Blood in the stool or vomit (red or black color)  · Feeling weak or dizzy, fainting, or having trouble breathing  · Fever of 100.4ºF (38ºC) or higher, or as directed by your healthcare provider  · Abdominal swelling  Date Last Reviewed: 9/25/2015  © 5582-9395 [employer]. [address] [place] [address], [place] [address]. All rights reserved. This information is not intended as a substitute for professional medical care. Always follow your healthcare professional's instructions.

## 2020-09-18 NOTE — PROGRESS NOTES
Subjective:       Patient ID: Jenifer Burnett is a 60 y.o. female.    Chief Complaint: Abdominal Pain    Pt of Dr. Joy, here for stomach issues.    Noted had EGD on 10-14-19, hiatal hernia found, Reflux esophagitis, normal stomach and duodenum. Had hernia repair done 11-22-19.    Abdominal Pain  This is a new problem. The current episode started 1 to 4 weeks ago (1 week). The onset quality is gradual. The problem occurs constantly. The problem has been unchanged. The pain is located in the epigastric region. The pain is at a severity of 8/10. The pain is moderate. The quality of the pain is a sensation of fullness and colicky (feels bloated). The abdominal pain radiates to the left flank and right flank. Associated symptoms include arthralgias, belching, flatus, myalgias and nausea. Pertinent negatives include no anorexia, constipation, diarrhea, dysuria, fever, frequency, headaches, hematochezia, hematuria, melena, vomiting or weight loss. The pain is aggravated by eating and movement (bending). The pain is relieved by nothing. Treatments tried: Gas X pills. The treatment provided no relief. Prior diagnostic workup includes upper endoscopy. Her past medical history is significant for abdominal surgery and GERD. There is no history of PUD or ulcerative colitis.     Review of Systems   Constitutional: Negative for activity change, appetite change, chills, diaphoresis, fatigue, fever, unexpected weight change and weight loss.   Respiratory: Negative for choking, chest tightness and shortness of breath.    Cardiovascular: Negative for chest pain, palpitations, leg swelling and claudication.   Gastrointestinal: Positive for abdominal distention, abdominal pain, flatus and nausea. Negative for anal bleeding, anorexia, blood in stool, change in bowel habit, constipation, diarrhea, hematochezia, melena, vomiting and change in bowel habit.   Genitourinary: Negative for dysuria, frequency and hematuria.   Musculoskeletal:  Positive for arthralgias and myalgias.   Allergic/Immunologic: Negative for environmental allergies, food allergies and immunocompromised state.   Neurological: Negative for dizziness, weakness, numbness and headaches.   Hematological: Negative for adenopathy. Does not bruise/bleed easily.   Psychiatric/Behavioral: Negative for suicidal ideas.     Review of patient's allergies indicates:  No Known Allergies    Current Outpatient Medications:     citalopram (CELEXA) 40 MG tablet, TAKE 1 TABLET BY MOUTH ONCE A DAY , Disp: 90 tablet, Rfl: 0    cyanocobalamin, vitamin B-12, 2,500 mcg Tab, Take 1 tablet by mouth once daily., Disp: , Rfl:     losartan-hydrochlorothiazide 50-12.5 mg (HYZAAR) 50-12.5 mg per tablet, Take 0.5 tablets by mouth once daily., Disp: , Rfl:     vitamin D (VITAMIN D3) 1000 units Tab, Take 2,000 Units by mouth once daily., Disp: , Rfl:     Patient Active Problem List   Diagnosis    Hypertension    Migraine     Past Medical History:   Diagnosis Date    Encounter for blood transfusion     GERD (gastroesophageal reflux disease)     Hypertension     Migraine     Trigeminal neuralgia 03/2018     Past Surgical History:   Procedure Laterality Date    APPENDECTOMY      CHOLECYSTECTOMY  07/2012    ESOPHAGOGASTRODUODENOSCOPY N/A 10/14/2019    Procedure: EGD (ESOPHAGOGASTRODUODENOSCOPY);  Surgeon: Sean Girard MD;  Location: [address];  Service: Endoscopy;  Laterality: N/A;    HYSTERECTOMY      LAPAROSCOPIC TOUPET FUNDOPLICATION N/A 11/22/2019    Procedure: FUNDOPLICATION, LAPAROSCOPIC, TOUPET;  Surgeon: Adriano Godoy MD;  Location: [address];  Service: General;  Laterality: N/A;     Social History     Socioeconomic History    Marital status: Single     Spouse name: Not on file    Number of children: Not on file    Years of education: Not on file    Highest education level: Not on file   Occupational History    Not on file   Social Needs    Financial resource strain:  "Not on file    Food insecurity     Worry: Not on file     Inability: Not on file    Transportation needs     Medical: Not on file     Non-medical: Not on file   Tobacco Use    Smoking status: Never Smoker    Smokeless tobacco: Never Used   Substance and Sexual Activity    Alcohol use: No    Drug use: No    Sexual activity: Yes     Partners: Male   Lifestyle    Physical activity     Days per week: Not on file     Minutes per session: Not on file    Stress: Not on file   Relationships    Social connections     Talks on phone: Not on file     Gets together: Not on file     Attends Zoroastrian service: Not on file     Active member of club or organization: Not on file     Attends meetings of clubs or organizations: Not on file     Relationship status: Not on file   Other Topics Concern    Not on file   Social History Narrative    Working at [employer], does do bending/lifting type work      Family History   Problem Relation Age of Onset    Breast cancer Mother        Objective:       Vitals:    09/18/20 1419   BP: 120/82   Pulse: 92   SpO2: 97%   Weight: 90.5 kg (199 lb 8.3 oz)   Height: 5' 9" (1.753 m)   PainSc:   8     Body mass index is 29.46 kg/m².    Physical Exam  Vitals signs and nursing note reviewed.   Constitutional:       Comments: overweight   HENT:      Head: Normocephalic.      Mouth/Throat:      Mouth: Mucous membranes are dry.      Pharynx: Oropharynx is clear.   Eyes:      Extraocular Movements: Extraocular movements intact.      Conjunctiva/sclera: Conjunctivae normal.      Pupils: Pupils are equal, round, and reactive to light.   Neck:      Musculoskeletal: Normal range of motion.   Cardiovascular:      Rate and Rhythm: Normal rate and regular rhythm.      Pulses: Normal pulses.      Heart sounds: Normal heart sounds. No murmur. No friction rub. No gallop.    Abdominal:      General: Abdomen is flat. Bowel sounds are normal. There is no distension.      Palpations: Abdomen is soft. There is " no mass.      Tenderness: There is abdominal tenderness in the epigastric area. There is right CVA tenderness and left CVA tenderness. There is no guarding or rebound.      Hernia: No hernia is present.   Musculoskeletal: Normal range of motion.   Skin:     General: Skin is warm and dry.      Capillary Refill: Capillary refill takes less than 2 seconds.   Neurological:      General: No focal deficit present.      Mental Status: She is alert and oriented to person, place, and time.   Psychiatric:         Mood and Affect: Mood normal.         Behavior: Behavior normal.         Thought Content: Thought content normal.         Judgment: Judgment normal.         Assessment:       1. Epigastric pain    2. Gastroesophageal reflux disease without esophagitis    3. BMI 29.0-29.9,adult    4. Overweight (BMI 25.0-29.9)    5. Bilateral flank pain        Plan:       Jenifer was seen today for abdominal pain.    Diagnoses and all orders for this visit:    Epigastric pain  -     US Abdomen Complete; Future  -     H.Pylori Antibody IgG; Future  -     CBC auto differential; Future  -     Comprehensive metabolic panel; Future  -     Amylase; Future  -     Lipase; Future    Gastroesophageal reflux disease without esophagitis  -     US Abdomen Complete; Future  -     omeprazole (PRILOSEC) 40 MG capsule; Take 1 capsule (40 mg total) by mouth once daily.    BMI 29.0-29.9,adult  BMI reviewed    Overweight (BMI 25.0-29.9)  BMI reviewed.    Diet and exercise to lose weight.    Bilateral flank pain  -     URINALYSIS; Future    Check labs and urinalysis today, will call with results    US abdomen complete, fast 8 hrs prior to test, will call with results    Start omeprazole 40mg daily    Avoid spicy, gassy foods, carbonation, avoid laying down right after a heavy meal    Self care instructions provided in AVS    Follow up if symptoms worsen or fail to improve.

## 2020-09-19 LAB
ALBUMIN SERPL BCP-MCNC: 3.8 G/DL (ref 3.5–5.2)
ALP SERPL-CCNC: 79 U/L (ref 55–135)
ALT SERPL W/O P-5'-P-CCNC: 17 U/L (ref 10–44)
AMYLASE SERPL-CCNC: 44 U/L (ref 20–110)
ANION GAP SERPL CALC-SCNC: 8 MMOL/L (ref 8–16)
AST SERPL-CCNC: 19 U/L (ref 10–40)
BILIRUB SERPL-MCNC: 0.5 MG/DL (ref 0.1–1)
BUN SERPL-MCNC: 14 MG/DL (ref 6–20)
CALCIUM SERPL-MCNC: 9.3 MG/DL (ref 8.7–10.5)
CHLORIDE SERPL-SCNC: 104 MMOL/L (ref 95–110)
CO2 SERPL-SCNC: 29 MMOL/L (ref 23–29)
CREAT SERPL-MCNC: 0.8 MG/DL (ref 0.5–1.4)
EST. GFR  (AFRICAN AMERICAN): >60 ML/MIN/1.73 M^2
EST. GFR  (NON AFRICAN AMERICAN): >60 ML/MIN/1.73 M^2
GLUCOSE SERPL-MCNC: 139 MG/DL (ref 70–110)
LIPASE SERPL-CCNC: 30 U/L (ref 4–60)
POTASSIUM SERPL-SCNC: 4.4 MMOL/L (ref 3.5–5.1)
PROT SERPL-MCNC: 7.5 G/DL (ref 6–8.4)
SODIUM SERPL-SCNC: 141 MMOL/L (ref 136–145)

## 2020-09-21 ENCOUNTER — TELEPHONE (OUTPATIENT)
Dept: INTERNAL MEDICINE | Facility: CLINIC | Age: 60
End: 2020-09-21

## 2020-09-21 ENCOUNTER — HOSPITAL ENCOUNTER (OUTPATIENT)
Dept: RADIOLOGY | Facility: HOSPITAL | Age: 60
Discharge: HOME | End: 2020-09-21
Attending: NURSE PRACTITIONER
Payer: COMMERCIAL

## 2020-09-21 DIAGNOSIS — R10.13 EPIGASTRIC PAIN: ICD-10-CM

## 2020-09-21 DIAGNOSIS — K21.9 GASTROESOPHAGEAL REFLUX DISEASE WITHOUT ESOPHAGITIS: ICD-10-CM

## 2020-09-21 PROCEDURE — 76700 US EXAM ABDOM COMPLETE: CPT | Mod: 26,,, | Performed by: RADIOLOGY

## 2020-09-21 PROCEDURE — 76700 US ABDOMEN COMPLETE: ICD-10-PCS | Mod: 26,,, | Performed by: RADIOLOGY

## 2020-09-21 PROCEDURE — 76700 US EXAM ABDOM COMPLETE: CPT | Mod: TC

## 2020-09-21 NOTE — PROGRESS NOTES
Call pt with US results. Only showed some small renal cysts. This is benign. I doubt this is the cause of her pain, and her lab work thus far is normal. Still waiting on the ulcer blood test. I think her pain is likely the reflux as we discussed during her visit so continue with the omeprazole daily and diet suggestions given.

## 2020-09-21 NOTE — TELEPHONE ENCOUNTER
----- Message from Carolyn Adkins DNP sent at 9/21/2020 10:40 AM CDT -----  Call pt with US results. Only showed some small renal cysts. This is benign. I doubt this is the cause of her pain, and her lab work thus far is normal. Still waiting on the ulcer blood test. I think her pain is likely the reflux as we discussed during her visit so continue with the omeprazole daily and diet suggestions given.

## 2020-09-21 NOTE — TELEPHONE ENCOUNTER
----- Message from Carolyn Adkins DNP sent at 9/21/2020 10:41 AM CDT -----  Lab work thus far is normal, still waiting for the ulcer blood test to come back.

## 2020-09-22 ENCOUNTER — TELEPHONE (OUTPATIENT)
Dept: INTERNAL MEDICINE | Facility: CLINIC | Age: 60
End: 2020-09-22

## 2020-09-22 DIAGNOSIS — R10.13 EPIGASTRIC PAIN: ICD-10-CM

## 2020-09-22 DIAGNOSIS — K21.9 GASTROESOPHAGEAL REFLUX DISEASE WITHOUT ESOPHAGITIS: Primary | ICD-10-CM

## 2020-09-22 LAB — H PYLORI IGG SERPL QL IA: NEGATIVE

## 2020-09-22 NOTE — TELEPHONE ENCOUNTER
"Pt informed. She continues to have "bloating pain that shoots to her side".  Any recommendations?  "

## 2020-09-22 NOTE — TELEPHONE ENCOUNTER
----- Message from Carolyn Adkins DNP sent at 9/22/2020  9:06 AM CDT -----  H-pylori ulcer blood test is negative.

## 2020-12-14 RX ORDER — LOSARTAN POTASSIUM 50 MG/1
TABLET ORAL
Qty: 90 TABLET | Refills: 0 | Status: SHIPPED | OUTPATIENT
Start: 2020-12-14 | End: 2021-01-07 | Stop reason: SDUPTHER

## 2020-12-14 NOTE — TELEPHONE ENCOUNTER
No new care gaps identified.  Powered by [employer]. Reference number: [id number]. 12/14/2020 12:01:22 PM   CST

## 2020-12-15 NOTE — PROGRESS NOTES
Refill Authorization Note   Jenifer Burnett  is requesting a refill authorization.  Brief Assessment and Rationale for Refill:  Approve     Medication Therapy Plan:  JEREMÍAS. FOVS    Medication Reconciliation Completed: No   Comments:   Orders Placed This Encounter    losartan (COZAAR) 50 MG tablet      Requested Prescriptions   Signed Prescriptions Disp Refills    losartan (COZAAR) 50 MG tablet 90 tablet 0     Sig: TAKE 1 TABLET BY MOUTH ONCE A DAY       Cardiovascular:  Angiotensin Receptor Blockers Passed - 12/14/2020 12:01 PM        Passed - Patient is at least 18 years old        Passed - Last BP in normal range within 360 days.     BP Readings from Last 3 Encounters:   09/18/20 120/82   07/13/20 134/88   12/10/19 (!) 148/77              Passed - Office visit in past 12 months or future 90 days     Recent Outpatient Visits            2 months ago Epigastric pain    [facility] Carolyn Adkins DNP    5 months ago Essential hypertension    [facility] Ricardo Rizzo MD    1 year ago Postop check    [facility] Adriano Godoy MD    1 year ago Hernia, hiatal    [facility] Adriano Godoy MD    1 year ago Annual physical exam    [facility] Babak Joy MD          Future Appointments              In 2 weeks LAB, SAME DAY [facility]    In 3 weeks [facility]                Passed - Cr is 1.4 or below and within 360 days     Creatinine   Date Value Ref Range Status   09/18/2020 0.8 0.5 - 1.4 mg/dL Final   11/23/2019 0.7 0.5 - 1.4 mg/dL Final   10/29/2019 0.8 0.5 - 1.4 mg/dL Final     POC Creatinine   Date Value Ref Range Status   10/10/2019 0.8 0.5 - 1.4 mg/dL Final              Passed - K in normal range and within 360 days     Potassium   Date Value Ref Range Status   09/18/2020  4.4 3.5 - 5.1 mmol/L Final   11/23/2019 4.2 3.5 - 5.1 mmol/L Final   11/23/2019 4.2 3.5 - 5.1 mmol/L Final              Passed - eGFR within 360 days     eGFR if non    Date Value Ref Range Status   09/18/2020 >60.0 >60 mL/min/1.73 m^2 Final     Comment:     Calculation used to obtain the estimated glomerular filtration  rate (eGFR) is the CKD-EPI equation.      11/23/2019 >60.0 >60 mL/min/1.73 m^2 Final     Comment:     Calculation used to obtain the estimated glomerular filtration  rate (eGFR) is the CKD-EPI equation.      10/29/2019 >60 >60 mL/min/1.73 m^2 Final     Comment:     Calculation used to obtain the estimated glomerular filtration  rate (eGFR) is the CKD-EPI equation.        eGFR if    Date Value Ref Range Status   09/18/2020 >60.0 >60 mL/min/1.73 m^2 Final   11/23/2019 >60.0 >60 mL/min/1.73 m^2 Final   10/29/2019 >60 >60 mL/min/1.73 m^2 Final                  Appointments  past 12m or future 3m with PCP    Date Provider   Last Visit   10/29/2019 Babak Joy MD   Next Visit   1/7/2021 Babak Joy MD   ED visits in past 90 days: 0     Note composed:9:19 PM 12/14/2020

## 2021-01-02 ENCOUNTER — LAB VISIT (OUTPATIENT)
Dept: LAB | Facility: HOSPITAL | Age: 61
End: 2021-01-02
Attending: INTERNAL MEDICINE
Payer: COMMERCIAL

## 2021-01-02 DIAGNOSIS — Z51.81 ENCOUNTER FOR MONITORING LONG-TERM PROTON PUMP INHIBITOR THERAPY: ICD-10-CM

## 2021-01-02 DIAGNOSIS — Z00.00 ANNUAL PHYSICAL EXAM: ICD-10-CM

## 2021-01-02 DIAGNOSIS — Z79.899 ENCOUNTER FOR MONITORING LONG-TERM PROTON PUMP INHIBITOR THERAPY: ICD-10-CM

## 2021-01-02 DIAGNOSIS — I10 ESSENTIAL HYPERTENSION: ICD-10-CM

## 2021-01-02 LAB
25(OH)D3+25(OH)D2 SERPL-MCNC: 44 NG/ML (ref 30–96)
ALBUMIN SERPL BCP-MCNC: 3.6 G/DL (ref 3.5–5.2)
ALP SERPL-CCNC: 89 U/L (ref 55–135)
ALT SERPL W/O P-5'-P-CCNC: 30 U/L (ref 10–44)
ANION GAP SERPL CALC-SCNC: 8 MMOL/L (ref 8–16)
AST SERPL-CCNC: 27 U/L (ref 10–40)
BILIRUB SERPL-MCNC: 0.4 MG/DL (ref 0.1–1)
BUN SERPL-MCNC: 12 MG/DL (ref 6–20)
CALCIUM SERPL-MCNC: 8.9 MG/DL (ref 8.7–10.5)
CHLORIDE SERPL-SCNC: 104 MMOL/L (ref 95–110)
CHOLEST SERPL-MCNC: 188 MG/DL (ref 120–199)
CHOLEST/HDLC SERPL: 3.2 {RATIO} (ref 2–5)
CO2 SERPL-SCNC: 28 MMOL/L (ref 23–29)
CREAT SERPL-MCNC: 0.7 MG/DL (ref 0.5–1.4)
ERYTHROCYTE [DISTWIDTH] IN BLOOD BY AUTOMATED COUNT: 12.7 % (ref 11.5–14.5)
EST. GFR  (AFRICAN AMERICAN): >60 ML/MIN/1.73 M^2
EST. GFR  (NON AFRICAN AMERICAN): >60 ML/MIN/1.73 M^2
GLUCOSE SERPL-MCNC: 91 MG/DL (ref 70–110)
HCT VFR BLD AUTO: 42.3 % (ref 37–48.5)
HDLC SERPL-MCNC: 59 MG/DL (ref 40–75)
HDLC SERPL: 31.4 % (ref 20–50)
HGB BLD-MCNC: 13.2 G/DL (ref 12–16)
LDLC SERPL CALC-MCNC: 103.2 MG/DL (ref 63–159)
MAGNESIUM SERPL-MCNC: 2 MG/DL (ref 1.6–2.6)
MCH RBC QN AUTO: 29.2 PG (ref 27–31)
MCHC RBC AUTO-ENTMCNC: 31.2 G/DL (ref 32–36)
MCV RBC AUTO: 94 FL (ref 82–98)
NONHDLC SERPL-MCNC: 129 MG/DL
PLATELET # BLD AUTO: 204 K/UL (ref 150–350)
PMV BLD AUTO: 9.7 FL (ref 9.2–12.9)
POTASSIUM SERPL-SCNC: 3.9 MMOL/L (ref 3.5–5.1)
PROT SERPL-MCNC: 7.5 G/DL (ref 6–8.4)
RBC # BLD AUTO: 4.52 M/UL (ref 4–5.4)
SODIUM SERPL-SCNC: 140 MMOL/L (ref 136–145)
TRIGL SERPL-MCNC: 129 MG/DL (ref 30–150)
VIT B12 SERPL-MCNC: 1835 PG/ML (ref 210–950)
WBC # BLD AUTO: 3.74 K/UL (ref 3.9–12.7)

## 2021-01-02 PROCEDURE — 82306 VITAMIN D 25 HYDROXY: CPT

## 2021-01-02 PROCEDURE — 85027 COMPLETE CBC AUTOMATED: CPT

## 2021-01-02 PROCEDURE — 36415 COLL VENOUS BLD VENIPUNCTURE: CPT

## 2021-01-02 PROCEDURE — 80061 LIPID PANEL: CPT

## 2021-01-02 PROCEDURE — 80053 COMPREHEN METABOLIC PANEL: CPT

## 2021-01-02 PROCEDURE — 82607 VITAMIN B-12: CPT

## 2021-01-02 PROCEDURE — 83735 ASSAY OF MAGNESIUM: CPT

## 2021-01-07 ENCOUNTER — LAB VISIT (OUTPATIENT)
Dept: INTERNAL MEDICINE | Facility: CLINIC | Age: 61
End: 2021-01-07
Payer: COMMERCIAL

## 2021-01-07 ENCOUNTER — OFFICE VISIT (OUTPATIENT)
Dept: INTERNAL MEDICINE | Facility: CLINIC | Age: 61
End: 2021-01-07
Payer: COMMERCIAL

## 2021-01-07 DIAGNOSIS — Z00.00 ANNUAL PHYSICAL EXAM: Primary | ICD-10-CM

## 2021-01-07 DIAGNOSIS — I10 ESSENTIAL HYPERTENSION: ICD-10-CM

## 2021-01-07 DIAGNOSIS — Z12.31 ENCOUNTER FOR SCREENING MAMMOGRAM FOR MALIGNANT NEOPLASM OF BREAST: ICD-10-CM

## 2021-01-07 DIAGNOSIS — R05.9 COUGH: ICD-10-CM

## 2021-01-07 DIAGNOSIS — Z87.19 HISTORY OF REPAIR OF HIATAL HERNIA: ICD-10-CM

## 2021-01-07 DIAGNOSIS — Z98.890 HISTORY OF REPAIR OF HIATAL HERNIA: ICD-10-CM

## 2021-01-07 DIAGNOSIS — F41.9 ANXIETY: ICD-10-CM

## 2021-01-07 DIAGNOSIS — G43.809 OTHER MIGRAINE WITHOUT STATUS MIGRAINOSUS, NOT INTRACTABLE: ICD-10-CM

## 2021-01-07 DIAGNOSIS — K21.9 GASTROESOPHAGEAL REFLUX DISEASE WITHOUT ESOPHAGITIS: ICD-10-CM

## 2021-01-07 PROCEDURE — 99396 PR PREVENTIVE VISIT,EST,40-64: ICD-10-PCS | Mod: S$GLB,,, | Performed by: INTERNAL MEDICINE

## 2021-01-07 PROCEDURE — 1126F AMNT PAIN NOTED NONE PRSNT: CPT | Mod: S$GLB,,, | Performed by: INTERNAL MEDICINE

## 2021-01-07 PROCEDURE — 3080F PR MOST RECENT DIASTOLIC BLOOD PRESSURE >= 90 MM HG: ICD-10-PCS | Mod: CPTII,S$GLB,, | Performed by: INTERNAL MEDICINE

## 2021-01-07 PROCEDURE — 3008F BODY MASS INDEX DOCD: CPT | Mod: CPTII,S$GLB,, | Performed by: INTERNAL MEDICINE

## 2021-01-07 PROCEDURE — 3080F DIAST BP >= 90 MM HG: CPT | Mod: CPTII,S$GLB,, | Performed by: INTERNAL MEDICINE

## 2021-01-07 PROCEDURE — 99999 PR PBB SHADOW E&M-EST. PATIENT-LVL IV: ICD-10-PCS | Mod: PBBFAC,,, | Performed by: INTERNAL MEDICINE

## 2021-01-07 PROCEDURE — 1126F PR PAIN SEVERITY QUANTIFIED, NO PAIN PRESENT: ICD-10-PCS | Mod: S$GLB,,, | Performed by: INTERNAL MEDICINE

## 2021-01-07 PROCEDURE — U0003 INFECTIOUS AGENT DETECTION BY NUCLEIC ACID (DNA OR RNA); SEVERE ACUTE RESPIRATORY SYNDROME CORONAVIRUS 2 (SARS-COV-2) (CORONAVIRUS DISEASE [COVID-19]), AMPLIFIED PROBE TECHNIQUE, MAKING USE OF HIGH THROUGHPUT TECHNOLOGIES AS DESCRIBED BY CMS-2020-01-R: HCPCS

## 2021-01-07 PROCEDURE — 99999 PR PBB SHADOW E&M-EST. PATIENT-LVL IV: CPT | Mod: PBBFAC,,, | Performed by: INTERNAL MEDICINE

## 2021-01-07 PROCEDURE — 99396 PREV VISIT EST AGE 40-64: CPT | Mod: S$GLB,,, | Performed by: INTERNAL MEDICINE

## 2021-01-07 PROCEDURE — 3077F PR MOST RECENT SYSTOLIC BLOOD PRESSURE >= 140 MM HG: ICD-10-PCS | Mod: CPTII,S$GLB,, | Performed by: INTERNAL MEDICINE

## 2021-01-07 PROCEDURE — 3008F PR BODY MASS INDEX (BMI) DOCUMENTED: ICD-10-PCS | Mod: CPTII,S$GLB,, | Performed by: INTERNAL MEDICINE

## 2021-01-07 PROCEDURE — 3077F SYST BP >= 140 MM HG: CPT | Mod: CPTII,S$GLB,, | Performed by: INTERNAL MEDICINE

## 2021-01-07 RX ORDER — HYDROCHLOROTHIAZIDE 12.5 MG/1
12.5 TABLET ORAL DAILY
Qty: 90 TABLET | Refills: 2 | Status: SHIPPED | OUTPATIENT
Start: 2021-01-07 | End: 2022-01-28

## 2021-01-07 RX ORDER — LOSARTAN POTASSIUM 50 MG/1
50 TABLET ORAL DAILY
Qty: 90 TABLET | Refills: 2 | Status: SHIPPED | OUTPATIENT
Start: 2021-01-07 | End: 2022-01-28 | Stop reason: SDUPTHER

## 2021-01-07 RX ORDER — OMEPRAZOLE 20 MG/1
20 CAPSULE, DELAYED RELEASE ORAL DAILY
Qty: 90 CAPSULE | Refills: 1 | Status: SHIPPED | OUTPATIENT
Start: 2021-01-07 | End: 2022-01-28

## 2021-01-07 RX ORDER — BENZONATATE 200 MG/1
200 CAPSULE ORAL 3 TIMES DAILY PRN
Qty: 30 CAPSULE | Refills: 1 | Status: SHIPPED | OUTPATIENT
Start: 2021-01-07 | End: 2021-01-14

## 2021-01-07 RX ORDER — CITALOPRAM 40 MG/1
TABLET, FILM COATED ORAL
Qty: 90 TABLET | Refills: 1 | Status: SHIPPED | OUTPATIENT
Start: 2021-01-07 | End: 2021-07-19

## 2021-01-08 LAB — SARS-COV-2 RNA RESP QL NAA+PROBE: NOT DETECTED

## 2021-01-12 ENCOUNTER — PATIENT OUTREACH (OUTPATIENT)
Dept: ADMINISTRATIVE | Facility: HOSPITAL | Age: 61
End: 2021-01-12

## 2021-01-20 RX ORDER — OMEPRAZOLE 40 MG/1
CAPSULE, DELAYED RELEASE ORAL
Qty: 90 CAPSULE | Refills: 0 | OUTPATIENT
Start: 2021-01-20

## 2021-02-03 VITALS
HEART RATE: 78 BPM | HEIGHT: 69 IN | SYSTOLIC BLOOD PRESSURE: 142 MMHG | BODY MASS INDEX: 30.23 KG/M2 | DIASTOLIC BLOOD PRESSURE: 90 MMHG | WEIGHT: 204.13 LBS

## 2021-02-03 PROBLEM — F41.9 ANXIETY: Status: ACTIVE | Noted: 2021-02-03

## 2021-02-03 PROBLEM — Z98.890 HISTORY OF REPAIR OF HIATAL HERNIA: Status: ACTIVE | Noted: 2021-02-03

## 2021-02-03 PROBLEM — Z87.19 HISTORY OF REPAIR OF HIATAL HERNIA: Status: ACTIVE | Noted: 2021-02-03

## 2021-02-04 ENCOUNTER — HOSPITAL ENCOUNTER (OUTPATIENT)
Dept: RADIOLOGY | Facility: HOSPITAL | Age: 61
Discharge: HOME | End: 2021-02-04
Attending: INTERNAL MEDICINE
Payer: COMMERCIAL

## 2021-02-04 DIAGNOSIS — Z12.31 ENCOUNTER FOR SCREENING MAMMOGRAM FOR MALIGNANT NEOPLASM OF BREAST: ICD-10-CM

## 2021-02-04 PROCEDURE — 77063 BREAST TOMOSYNTHESIS BI: CPT | Mod: 26,,, | Performed by: RADIOLOGY

## 2021-02-04 PROCEDURE — 77067 SCR MAMMO BI INCL CAD: CPT | Mod: 26,,, | Performed by: RADIOLOGY

## 2021-02-04 PROCEDURE — 77067 MAMMO DIGITAL SCREENING BILAT WITH TOMO: ICD-10-PCS | Mod: 26,,, | Performed by: RADIOLOGY

## 2021-02-04 PROCEDURE — 77063 MAMMO DIGITAL SCREENING BILAT WITH TOMO: ICD-10-PCS | Mod: 26,,, | Performed by: RADIOLOGY

## 2021-02-04 PROCEDURE — 77067 SCR MAMMO BI INCL CAD: CPT | Mod: TC

## 2021-02-05 ENCOUNTER — PATIENT OUTREACH (OUTPATIENT)
Dept: ADMINISTRATIVE | Facility: HOSPITAL | Age: 61
End: 2021-02-05

## 2021-02-10 ENCOUNTER — PATIENT OUTREACH (OUTPATIENT)
Dept: ADMINISTRATIVE | Facility: HOSPITAL | Age: 61
End: 2021-02-10

## 2021-03-02 ENCOUNTER — TELEPHONE (OUTPATIENT)
Dept: INTERNAL MEDICINE | Facility: CLINIC | Age: 61
End: 2021-03-02

## 2021-04-20 RX ORDER — OMEPRAZOLE 40 MG/1
CAPSULE, DELAYED RELEASE ORAL
Qty: 90 CAPSULE | Refills: 0 | OUTPATIENT
Start: 2021-04-20

## 2021-05-21 ENCOUNTER — TELEPHONE (OUTPATIENT)
Dept: INTERNAL MEDICINE | Facility: CLINIC | Age: 61
End: 2021-05-21

## 2021-05-31 ENCOUNTER — PATIENT OUTREACH (OUTPATIENT)
Dept: ADMINISTRATIVE | Facility: HOSPITAL | Age: 61
End: 2021-05-31

## 2021-07-16 DIAGNOSIS — F41.9 ANXIETY: ICD-10-CM

## 2021-07-19 RX ORDER — CITALOPRAM 40 MG/1
TABLET, FILM COATED ORAL
Qty: 90 TABLET | Refills: 1 | Status: SHIPPED | OUTPATIENT
Start: 2021-07-19 | End: 2022-01-28 | Stop reason: SDUPTHER

## 2021-07-19 RX ORDER — OMEPRAZOLE 40 MG/1
CAPSULE, DELAYED RELEASE ORAL
Qty: 90 CAPSULE | Refills: 0 | Status: SHIPPED | OUTPATIENT
Start: 2021-07-19 | End: 2021-10-13

## 2021-10-13 ENCOUNTER — LAB VISIT (OUTPATIENT)
Dept: LAB | Facility: HOSPITAL | Age: 61
End: 2021-10-13
Payer: COMMERCIAL

## 2021-10-13 ENCOUNTER — OFFICE VISIT (OUTPATIENT)
Dept: INTERNAL MEDICINE | Facility: CLINIC | Age: 61
End: 2021-10-13
Payer: COMMERCIAL

## 2021-10-13 VITALS
SYSTOLIC BLOOD PRESSURE: 144 MMHG | WEIGHT: 211.63 LBS | BODY MASS INDEX: 31.34 KG/M2 | HEIGHT: 69 IN | DIASTOLIC BLOOD PRESSURE: 96 MMHG | HEART RATE: 92 BPM | OXYGEN SATURATION: 97 %

## 2021-10-13 DIAGNOSIS — R51.9 LEFT-SIDED HEADACHE: ICD-10-CM

## 2021-10-13 DIAGNOSIS — G44.52 NEW DAILY PERSISTENT HEADACHE: ICD-10-CM

## 2021-10-13 DIAGNOSIS — Z86.69 HISTORY OF TRIGEMINAL NEURALGIA: ICD-10-CM

## 2021-10-13 DIAGNOSIS — R51.9 LEFT-SIDED HEADACHE: Primary | ICD-10-CM

## 2021-10-13 LAB
BASOPHILS # BLD AUTO: 0.02 K/UL (ref 0–0.2)
BASOPHILS NFR BLD: 0.4 % (ref 0–1.9)
CRP SERPL-MCNC: 2.2 MG/L (ref 0–8.2)
DIFFERENTIAL METHOD: NORMAL
EOSINOPHIL # BLD AUTO: 0.1 K/UL (ref 0–0.5)
EOSINOPHIL NFR BLD: 1.7 % (ref 0–8)
ERYTHROCYTE [DISTWIDTH] IN BLOOD BY AUTOMATED COUNT: 13.1 % (ref 11.5–14.5)
ERYTHROCYTE [SEDIMENTATION RATE] IN BLOOD BY WESTERGREN METHOD: 13 MM/HR (ref 0–36)
HCT VFR BLD AUTO: 38.6 % (ref 37–48.5)
HGB BLD-MCNC: 12.6 G/DL (ref 12–16)
IMM GRANULOCYTES # BLD AUTO: 0.01 K/UL (ref 0–0.04)
IMM GRANULOCYTES NFR BLD AUTO: 0.2 % (ref 0–0.5)
LYMPHOCYTES # BLD AUTO: 1.4 K/UL (ref 1–4.8)
LYMPHOCYTES NFR BLD: 26.9 % (ref 18–48)
MCH RBC QN AUTO: 30.1 PG (ref 27–31)
MCHC RBC AUTO-ENTMCNC: 32.6 G/DL (ref 32–36)
MCV RBC AUTO: 92 FL (ref 82–98)
MONOCYTES # BLD AUTO: 0.6 K/UL (ref 0.3–1)
MONOCYTES NFR BLD: 12.2 % (ref 4–15)
NEUTROPHILS # BLD AUTO: 3 K/UL (ref 1.8–7.7)
NEUTROPHILS NFR BLD: 58.6 % (ref 38–73)
NRBC BLD-RTO: 0 /100 WBC
PLATELET # BLD AUTO: 237 K/UL (ref 150–450)
PMV BLD AUTO: 9.9 FL (ref 9.2–12.9)
RBC # BLD AUTO: 4.19 M/UL (ref 4–5.4)
WBC # BLD AUTO: 5.17 K/UL (ref 3.9–12.7)

## 2021-10-13 PROCEDURE — 85025 COMPLETE CBC W/AUTO DIFF WBC: CPT | Performed by: NURSE PRACTITIONER

## 2021-10-13 PROCEDURE — 99214 OFFICE O/P EST MOD 30 MIN: CPT | Mod: S$GLB,,, | Performed by: NURSE PRACTITIONER

## 2021-10-13 PROCEDURE — 1160F PR REVIEW ALL MEDS BY PRESCRIBER/CLIN PHARMACIST DOCUMENTED: ICD-10-PCS | Mod: CPTII,S$GLB,, | Performed by: NURSE PRACTITIONER

## 2021-10-13 PROCEDURE — 3080F PR MOST RECENT DIASTOLIC BLOOD PRESSURE >= 90 MM HG: ICD-10-PCS | Mod: CPTII,S$GLB,, | Performed by: NURSE PRACTITIONER

## 2021-10-13 PROCEDURE — 3080F DIAST BP >= 90 MM HG: CPT | Mod: CPTII,S$GLB,, | Performed by: NURSE PRACTITIONER

## 2021-10-13 PROCEDURE — 86140 C-REACTIVE PROTEIN: CPT | Performed by: NURSE PRACTITIONER

## 2021-10-13 PROCEDURE — 3077F SYST BP >= 140 MM HG: CPT | Mod: CPTII,S$GLB,, | Performed by: NURSE PRACTITIONER

## 2021-10-13 PROCEDURE — 4010F PR ACE/ARB THEARPY RXD/TAKEN: ICD-10-PCS | Mod: CPTII,S$GLB,, | Performed by: NURSE PRACTITIONER

## 2021-10-13 PROCEDURE — 99214 PR OFFICE/OUTPT VISIT, EST, LEVL IV, 30-39 MIN: ICD-10-PCS | Mod: S$GLB,,, | Performed by: NURSE PRACTITIONER

## 2021-10-13 PROCEDURE — 99999 PR PBB SHADOW E&M-EST. PATIENT-LVL IV: ICD-10-PCS | Mod: PBBFAC,,, | Performed by: NURSE PRACTITIONER

## 2021-10-13 PROCEDURE — 1160F RVW MEDS BY RX/DR IN RCRD: CPT | Mod: CPTII,S$GLB,, | Performed by: NURSE PRACTITIONER

## 2021-10-13 PROCEDURE — 3008F PR BODY MASS INDEX (BMI) DOCUMENTED: ICD-10-PCS | Mod: CPTII,S$GLB,, | Performed by: NURSE PRACTITIONER

## 2021-10-13 PROCEDURE — 3077F PR MOST RECENT SYSTOLIC BLOOD PRESSURE >= 140 MM HG: ICD-10-PCS | Mod: CPTII,S$GLB,, | Performed by: NURSE PRACTITIONER

## 2021-10-13 PROCEDURE — 1159F MED LIST DOCD IN RCRD: CPT | Mod: CPTII,S$GLB,, | Performed by: NURSE PRACTITIONER

## 2021-10-13 PROCEDURE — 4010F ACE/ARB THERAPY RXD/TAKEN: CPT | Mod: CPTII,S$GLB,, | Performed by: NURSE PRACTITIONER

## 2021-10-13 PROCEDURE — 85652 RBC SED RATE AUTOMATED: CPT | Performed by: NURSE PRACTITIONER

## 2021-10-13 PROCEDURE — 1159F PR MEDICATION LIST DOCUMENTED IN MEDICAL RECORD: ICD-10-PCS | Mod: CPTII,S$GLB,, | Performed by: NURSE PRACTITIONER

## 2021-10-13 PROCEDURE — 99999 PR PBB SHADOW E&M-EST. PATIENT-LVL IV: CPT | Mod: PBBFAC,,, | Performed by: NURSE PRACTITIONER

## 2021-10-13 PROCEDURE — 3008F BODY MASS INDEX DOCD: CPT | Mod: CPTII,S$GLB,, | Performed by: NURSE PRACTITIONER

## 2021-10-13 PROCEDURE — 36415 COLL VENOUS BLD VENIPUNCTURE: CPT | Performed by: NURSE PRACTITIONER

## 2021-10-14 ENCOUNTER — TELEPHONE (OUTPATIENT)
Dept: NEUROLOGY | Facility: CLINIC | Age: 61
End: 2021-10-14

## 2021-10-14 ENCOUNTER — TELEPHONE (OUTPATIENT)
Dept: INTERNAL MEDICINE | Facility: CLINIC | Age: 61
End: 2021-10-14

## 2021-10-15 ENCOUNTER — HOSPITAL ENCOUNTER (OUTPATIENT)
Dept: RADIOLOGY | Facility: HOSPITAL | Age: 61
Discharge: HOME | End: 2021-10-15
Attending: NURSE PRACTITIONER
Payer: COMMERCIAL

## 2021-10-15 DIAGNOSIS — G44.52 NEW DAILY PERSISTENT HEADACHE: ICD-10-CM

## 2021-10-15 DIAGNOSIS — R51.9 LEFT-SIDED HEADACHE: ICD-10-CM

## 2021-10-15 PROCEDURE — 70450 CT HEAD WITHOUT CONTRAST: ICD-10-PCS | Mod: 26,,, | Performed by: RADIOLOGY

## 2021-10-15 PROCEDURE — 70450 CT HEAD/BRAIN W/O DYE: CPT | Mod: 26,,, | Performed by: RADIOLOGY

## 2021-10-15 PROCEDURE — 70450 CT HEAD/BRAIN W/O DYE: CPT | Mod: TC

## 2021-10-18 ENCOUNTER — TELEPHONE (OUTPATIENT)
Dept: INTERNAL MEDICINE | Facility: CLINIC | Age: 61
End: 2021-10-18

## 2021-10-18 DIAGNOSIS — R93.0 ABNORMAL CT SCAN, SINUS: ICD-10-CM

## 2021-10-18 DIAGNOSIS — J01.30 ACUTE NON-RECURRENT SPHENOIDAL SINUSITIS: Primary | ICD-10-CM

## 2021-10-18 RX ORDER — AMOXICILLIN AND CLAVULANATE POTASSIUM 875; 125 MG/1; MG/1
1 TABLET, FILM COATED ORAL EVERY 12 HOURS
Qty: 14 TABLET | Refills: 0 | Status: SHIPPED | OUTPATIENT
Start: 2021-10-18 | End: 2021-10-18 | Stop reason: SDUPTHER

## 2021-10-18 RX ORDER — AMOXICILLIN AND CLAVULANATE POTASSIUM 875; 125 MG/1; MG/1
1 TABLET, FILM COATED ORAL EVERY 12 HOURS
Qty: 14 TABLET | Refills: 0 | Status: SHIPPED | OUTPATIENT
Start: 2021-10-18 | End: 2021-10-25

## 2021-10-22 ENCOUNTER — OFFICE VISIT (OUTPATIENT)
Dept: OTOLARYNGOLOGY | Facility: CLINIC | Age: 61
End: 2021-10-22
Payer: COMMERCIAL

## 2021-10-22 VITALS
SYSTOLIC BLOOD PRESSURE: 145 MMHG | HEART RATE: 91 BPM | DIASTOLIC BLOOD PRESSURE: 93 MMHG | BODY MASS INDEX: 30.73 KG/M2 | WEIGHT: 208.13 LBS

## 2021-10-22 DIAGNOSIS — J01.30 ACUTE NON-RECURRENT SPHENOIDAL SINUSITIS: ICD-10-CM

## 2021-10-22 DIAGNOSIS — R93.0 ABNORMAL CT SCAN, SINUS: Primary | ICD-10-CM

## 2021-10-22 PROCEDURE — 3080F DIAST BP >= 90 MM HG: CPT | Mod: CPTII,S$GLB,, | Performed by: OTOLARYNGOLOGY

## 2021-10-22 PROCEDURE — 1159F PR MEDICATION LIST DOCUMENTED IN MEDICAL RECORD: ICD-10-PCS | Mod: CPTII,S$GLB,, | Performed by: OTOLARYNGOLOGY

## 2021-10-22 PROCEDURE — 1159F MED LIST DOCD IN RCRD: CPT | Mod: CPTII,S$GLB,, | Performed by: OTOLARYNGOLOGY

## 2021-10-22 PROCEDURE — 3008F PR BODY MASS INDEX (BMI) DOCUMENTED: ICD-10-PCS | Mod: CPTII,S$GLB,, | Performed by: OTOLARYNGOLOGY

## 2021-10-22 PROCEDURE — 99999 PR PBB SHADOW E&M-EST. PATIENT-LVL IV: CPT | Mod: PBBFAC,,, | Performed by: OTOLARYNGOLOGY

## 2021-10-22 PROCEDURE — 99204 OFFICE O/P NEW MOD 45 MIN: CPT | Mod: S$GLB,,, | Performed by: OTOLARYNGOLOGY

## 2021-10-22 PROCEDURE — 4010F PR ACE/ARB THEARPY RXD/TAKEN: ICD-10-PCS | Mod: CPTII,S$GLB,, | Performed by: OTOLARYNGOLOGY

## 2021-10-22 PROCEDURE — 3080F PR MOST RECENT DIASTOLIC BLOOD PRESSURE >= 90 MM HG: ICD-10-PCS | Mod: CPTII,S$GLB,, | Performed by: OTOLARYNGOLOGY

## 2021-10-22 PROCEDURE — 3077F PR MOST RECENT SYSTOLIC BLOOD PRESSURE >= 140 MM HG: ICD-10-PCS | Mod: CPTII,S$GLB,, | Performed by: OTOLARYNGOLOGY

## 2021-10-22 PROCEDURE — 4010F ACE/ARB THERAPY RXD/TAKEN: CPT | Mod: CPTII,S$GLB,, | Performed by: OTOLARYNGOLOGY

## 2021-10-22 PROCEDURE — 99204 PR OFFICE/OUTPT VISIT, NEW, LEVL IV, 45-59 MIN: ICD-10-PCS | Mod: S$GLB,,, | Performed by: OTOLARYNGOLOGY

## 2021-10-22 PROCEDURE — 3008F BODY MASS INDEX DOCD: CPT | Mod: CPTII,S$GLB,, | Performed by: OTOLARYNGOLOGY

## 2021-10-22 PROCEDURE — 1160F RVW MEDS BY RX/DR IN RCRD: CPT | Mod: CPTII,S$GLB,, | Performed by: OTOLARYNGOLOGY

## 2021-10-22 PROCEDURE — 1160F PR REVIEW ALL MEDS BY PRESCRIBER/CLIN PHARMACIST DOCUMENTED: ICD-10-PCS | Mod: CPTII,S$GLB,, | Performed by: OTOLARYNGOLOGY

## 2021-10-22 PROCEDURE — 99999 PR PBB SHADOW E&M-EST. PATIENT-LVL IV: ICD-10-PCS | Mod: PBBFAC,,, | Performed by: OTOLARYNGOLOGY

## 2021-10-22 PROCEDURE — 3077F SYST BP >= 140 MM HG: CPT | Mod: CPTII,S$GLB,, | Performed by: OTOLARYNGOLOGY

## 2021-12-22 ENCOUNTER — LAB VISIT (OUTPATIENT)
Dept: LAB | Facility: HOSPITAL | Age: 61
End: 2021-12-22
Attending: FAMILY MEDICINE
Payer: COMMERCIAL

## 2021-12-22 ENCOUNTER — OFFICE VISIT (OUTPATIENT)
Dept: INTERNAL MEDICINE | Facility: CLINIC | Age: 61
End: 2021-12-22
Payer: COMMERCIAL

## 2021-12-22 VITALS
HEIGHT: 69 IN | BODY MASS INDEX: 30.97 KG/M2 | DIASTOLIC BLOOD PRESSURE: 94 MMHG | WEIGHT: 209.13 LBS | SYSTOLIC BLOOD PRESSURE: 130 MMHG | HEART RATE: 72 BPM

## 2021-12-22 DIAGNOSIS — R42 LIGHTHEADEDNESS: ICD-10-CM

## 2021-12-22 DIAGNOSIS — R55 SYNCOPE, UNSPECIFIED SYNCOPE TYPE: Primary | ICD-10-CM

## 2021-12-22 DIAGNOSIS — I65.23 BILATERAL CAROTID ARTERY STENOSIS: ICD-10-CM

## 2021-12-22 DIAGNOSIS — R55 SYNCOPE, UNSPECIFIED SYNCOPE TYPE: ICD-10-CM

## 2021-12-22 LAB
ALBUMIN SERPL BCP-MCNC: 3.7 G/DL (ref 3.5–5.2)
ALP SERPL-CCNC: 74 U/L (ref 55–135)
ALT SERPL W/O P-5'-P-CCNC: 12 U/L (ref 10–44)
ANION GAP SERPL CALC-SCNC: 6 MMOL/L (ref 8–16)
AST SERPL-CCNC: 15 U/L (ref 10–40)
BILIRUB SERPL-MCNC: 0.7 MG/DL (ref 0.1–1)
BUN SERPL-MCNC: 13 MG/DL (ref 8–23)
CALCIUM SERPL-MCNC: 9.1 MG/DL (ref 8.7–10.5)
CHLORIDE SERPL-SCNC: 104 MMOL/L (ref 95–110)
CO2 SERPL-SCNC: 29 MMOL/L (ref 23–29)
CREAT SERPL-MCNC: 0.7 MG/DL (ref 0.5–1.4)
EST. GFR  (AFRICAN AMERICAN): >60 ML/MIN/1.73 M^2
EST. GFR  (NON AFRICAN AMERICAN): >60 ML/MIN/1.73 M^2
ESTIMATED AVG GLUCOSE: 105 MG/DL (ref 68–131)
GLUCOSE SERPL-MCNC: 89 MG/DL (ref 70–110)
HBA1C MFR BLD: 5.3 % (ref 4–5.6)
POTASSIUM SERPL-SCNC: 4.7 MMOL/L (ref 3.5–5.1)
PROT SERPL-MCNC: 7.5 G/DL (ref 6–8.4)
SODIUM SERPL-SCNC: 139 MMOL/L (ref 136–145)
TSH SERPL DL<=0.005 MIU/L-ACNC: 1.83 UIU/ML (ref 0.4–4)

## 2021-12-22 PROCEDURE — 3008F PR BODY MASS INDEX (BMI) DOCUMENTED: ICD-10-PCS | Mod: CPTII,S$GLB,, | Performed by: FAMILY MEDICINE

## 2021-12-22 PROCEDURE — 3080F PR MOST RECENT DIASTOLIC BLOOD PRESSURE >= 90 MM HG: ICD-10-PCS | Mod: CPTII,S$GLB,, | Performed by: FAMILY MEDICINE

## 2021-12-22 PROCEDURE — 3008F BODY MASS INDEX DOCD: CPT | Mod: CPTII,S$GLB,, | Performed by: FAMILY MEDICINE

## 2021-12-22 PROCEDURE — 3075F SYST BP GE 130 - 139MM HG: CPT | Mod: CPTII,S$GLB,, | Performed by: FAMILY MEDICINE

## 2021-12-22 PROCEDURE — 3075F PR MOST RECENT SYSTOLIC BLOOD PRESS GE 130-139MM HG: ICD-10-PCS | Mod: CPTII,S$GLB,, | Performed by: FAMILY MEDICINE

## 2021-12-22 PROCEDURE — 80053 COMPREHEN METABOLIC PANEL: CPT | Performed by: FAMILY MEDICINE

## 2021-12-22 PROCEDURE — 84443 ASSAY THYROID STIM HORMONE: CPT | Performed by: FAMILY MEDICINE

## 2021-12-22 PROCEDURE — 83036 HEMOGLOBIN GLYCOSYLATED A1C: CPT | Performed by: FAMILY MEDICINE

## 2021-12-22 PROCEDURE — 3080F DIAST BP >= 90 MM HG: CPT | Mod: CPTII,S$GLB,, | Performed by: FAMILY MEDICINE

## 2021-12-22 PROCEDURE — 4010F PR ACE/ARB THEARPY RXD/TAKEN: ICD-10-PCS | Mod: CPTII,S$GLB,, | Performed by: FAMILY MEDICINE

## 2021-12-22 PROCEDURE — 99213 PR OFFICE/OUTPT VISIT, EST, LEVL III, 20-29 MIN: ICD-10-PCS | Mod: S$GLB,,, | Performed by: FAMILY MEDICINE

## 2021-12-22 PROCEDURE — 99999 PR PBB SHADOW E&M-EST. PATIENT-LVL III: ICD-10-PCS | Mod: PBBFAC,,, | Performed by: FAMILY MEDICINE

## 2021-12-22 PROCEDURE — 99213 OFFICE O/P EST LOW 20 MIN: CPT | Mod: S$GLB,,, | Performed by: FAMILY MEDICINE

## 2021-12-22 PROCEDURE — 1159F MED LIST DOCD IN RCRD: CPT | Mod: CPTII,S$GLB,, | Performed by: FAMILY MEDICINE

## 2021-12-22 PROCEDURE — 36415 COLL VENOUS BLD VENIPUNCTURE: CPT | Performed by: FAMILY MEDICINE

## 2021-12-22 PROCEDURE — 99999 PR PBB SHADOW E&M-EST. PATIENT-LVL III: CPT | Mod: PBBFAC,,, | Performed by: FAMILY MEDICINE

## 2021-12-22 PROCEDURE — 4010F ACE/ARB THERAPY RXD/TAKEN: CPT | Mod: CPTII,S$GLB,, | Performed by: FAMILY MEDICINE

## 2021-12-22 PROCEDURE — 1159F PR MEDICATION LIST DOCUMENTED IN MEDICAL RECORD: ICD-10-PCS | Mod: CPTII,S$GLB,, | Performed by: FAMILY MEDICINE

## 2021-12-22 RX ORDER — IBUPROFEN 800 MG/1
TABLET ORAL
COMMUNITY
End: 2022-10-18

## 2021-12-22 RX ORDER — OMEPRAZOLE 40 MG/1
40 CAPSULE, DELAYED RELEASE ORAL DAILY
COMMUNITY
Start: 2021-12-13 | End: 2022-01-28 | Stop reason: SDUPTHER

## 2021-12-27 ENCOUNTER — TELEPHONE (OUTPATIENT)
Dept: INTERNAL MEDICINE | Facility: CLINIC | Age: 61
End: 2021-12-27
Payer: COMMERCIAL

## 2021-12-28 ENCOUNTER — TELEPHONE (OUTPATIENT)
Dept: INTERNAL MEDICINE | Facility: CLINIC | Age: 61
End: 2021-12-28
Payer: COMMERCIAL

## 2021-12-28 DIAGNOSIS — R55 SYNCOPE, UNSPECIFIED SYNCOPE TYPE: Primary | ICD-10-CM

## 2021-12-29 ENCOUNTER — TELEPHONE (OUTPATIENT)
Dept: INTERNAL MEDICINE | Facility: CLINIC | Age: 61
End: 2021-12-29
Payer: COMMERCIAL

## 2022-01-10 ENCOUNTER — HOSPITAL ENCOUNTER (OUTPATIENT)
Dept: RADIOLOGY | Facility: HOSPITAL | Age: 62
Discharge: HOME | End: 2022-01-10
Attending: FAMILY MEDICINE
Payer: COMMERCIAL

## 2022-01-10 DIAGNOSIS — R55 SYNCOPE, UNSPECIFIED SYNCOPE TYPE: ICD-10-CM

## 2022-01-10 PROBLEM — I65.23 BILATERAL CAROTID ARTERY STENOSIS: Status: ACTIVE | Noted: 2022-01-10

## 2022-01-10 PROCEDURE — 93880 EXTRACRANIAL BILAT STUDY: CPT | Mod: 26,,, | Performed by: RADIOLOGY

## 2022-01-10 PROCEDURE — 93880 EXTRACRANIAL BILAT STUDY: CPT | Mod: TC

## 2022-01-10 PROCEDURE — 93880 US CAROTID BILATERAL: ICD-10-PCS | Mod: 26,,, | Performed by: RADIOLOGY

## 2022-01-10 RX ORDER — ATORVASTATIN CALCIUM 10 MG/1
10 TABLET, FILM COATED ORAL DAILY
Qty: 90 TABLET | Refills: 3 | Status: SHIPPED | OUTPATIENT
Start: 2022-01-10 | End: 2023-03-28

## 2022-01-28 ENCOUNTER — OFFICE VISIT (OUTPATIENT)
Dept: INTERNAL MEDICINE | Facility: CLINIC | Age: 62
End: 2022-01-28
Payer: COMMERCIAL

## 2022-01-28 VITALS
SYSTOLIC BLOOD PRESSURE: 130 MMHG | OXYGEN SATURATION: 100 % | TEMPERATURE: 98 F | RESPIRATION RATE: 16 BRPM | BODY MASS INDEX: 31.28 KG/M2 | HEIGHT: 69 IN | WEIGHT: 211.19 LBS | DIASTOLIC BLOOD PRESSURE: 92 MMHG | HEART RATE: 83 BPM

## 2022-01-28 DIAGNOSIS — R55 SYNCOPE, UNSPECIFIED SYNCOPE TYPE: ICD-10-CM

## 2022-01-28 DIAGNOSIS — Z76.89 ENCOUNTER TO ESTABLISH CARE WITH NEW DOCTOR: Primary | ICD-10-CM

## 2022-01-28 DIAGNOSIS — Z12.31 ENCOUNTER FOR SCREENING MAMMOGRAM FOR MALIGNANT NEOPLASM OF BREAST: ICD-10-CM

## 2022-01-28 DIAGNOSIS — K21.9 GASTROESOPHAGEAL REFLUX DISEASE WITHOUT ESOPHAGITIS: ICD-10-CM

## 2022-01-28 DIAGNOSIS — I10 ESSENTIAL HYPERTENSION: ICD-10-CM

## 2022-01-28 DIAGNOSIS — F41.9 ANXIETY: ICD-10-CM

## 2022-01-28 DIAGNOSIS — G50.0 TRIGEMINAL NEURALGIA OF LEFT SIDE OF FACE: ICD-10-CM

## 2022-01-28 PROCEDURE — 3008F BODY MASS INDEX DOCD: CPT | Mod: CPTII,S$GLB,, | Performed by: STUDENT IN AN ORGANIZED HEALTH CARE EDUCATION/TRAINING PROGRAM

## 2022-01-28 PROCEDURE — 1159F PR MEDICATION LIST DOCUMENTED IN MEDICAL RECORD: ICD-10-PCS | Mod: CPTII,S$GLB,, | Performed by: STUDENT IN AN ORGANIZED HEALTH CARE EDUCATION/TRAINING PROGRAM

## 2022-01-28 PROCEDURE — 3008F PR BODY MASS INDEX (BMI) DOCUMENTED: ICD-10-PCS | Mod: CPTII,S$GLB,, | Performed by: STUDENT IN AN ORGANIZED HEALTH CARE EDUCATION/TRAINING PROGRAM

## 2022-01-28 PROCEDURE — 1160F RVW MEDS BY RX/DR IN RCRD: CPT | Mod: CPTII,S$GLB,, | Performed by: STUDENT IN AN ORGANIZED HEALTH CARE EDUCATION/TRAINING PROGRAM

## 2022-01-28 PROCEDURE — 3080F PR MOST RECENT DIASTOLIC BLOOD PRESSURE >= 90 MM HG: ICD-10-PCS | Mod: CPTII,S$GLB,, | Performed by: STUDENT IN AN ORGANIZED HEALTH CARE EDUCATION/TRAINING PROGRAM

## 2022-01-28 PROCEDURE — 1160F PR REVIEW ALL MEDS BY PRESCRIBER/CLIN PHARMACIST DOCUMENTED: ICD-10-PCS | Mod: CPTII,S$GLB,, | Performed by: STUDENT IN AN ORGANIZED HEALTH CARE EDUCATION/TRAINING PROGRAM

## 2022-01-28 PROCEDURE — 99214 OFFICE O/P EST MOD 30 MIN: CPT | Mod: S$GLB,,, | Performed by: STUDENT IN AN ORGANIZED HEALTH CARE EDUCATION/TRAINING PROGRAM

## 2022-01-28 PROCEDURE — 3075F SYST BP GE 130 - 139MM HG: CPT | Mod: CPTII,S$GLB,, | Performed by: STUDENT IN AN ORGANIZED HEALTH CARE EDUCATION/TRAINING PROGRAM

## 2022-01-28 PROCEDURE — 1159F MED LIST DOCD IN RCRD: CPT | Mod: CPTII,S$GLB,, | Performed by: STUDENT IN AN ORGANIZED HEALTH CARE EDUCATION/TRAINING PROGRAM

## 2022-01-28 PROCEDURE — 4010F PR ACE/ARB THEARPY RXD/TAKEN: ICD-10-PCS | Mod: CPTII,S$GLB,, | Performed by: STUDENT IN AN ORGANIZED HEALTH CARE EDUCATION/TRAINING PROGRAM

## 2022-01-28 PROCEDURE — 99999 PR PBB SHADOW E&M-EST. PATIENT-LVL V: CPT | Mod: PBBFAC,,, | Performed by: STUDENT IN AN ORGANIZED HEALTH CARE EDUCATION/TRAINING PROGRAM

## 2022-01-28 PROCEDURE — 3080F DIAST BP >= 90 MM HG: CPT | Mod: CPTII,S$GLB,, | Performed by: STUDENT IN AN ORGANIZED HEALTH CARE EDUCATION/TRAINING PROGRAM

## 2022-01-28 PROCEDURE — 3075F PR MOST RECENT SYSTOLIC BLOOD PRESS GE 130-139MM HG: ICD-10-PCS | Mod: CPTII,S$GLB,, | Performed by: STUDENT IN AN ORGANIZED HEALTH CARE EDUCATION/TRAINING PROGRAM

## 2022-01-28 PROCEDURE — 99999 PR PBB SHADOW E&M-EST. PATIENT-LVL V: ICD-10-PCS | Mod: PBBFAC,,, | Performed by: STUDENT IN AN ORGANIZED HEALTH CARE EDUCATION/TRAINING PROGRAM

## 2022-01-28 PROCEDURE — 99214 PR OFFICE/OUTPT VISIT, EST, LEVL IV, 30-39 MIN: ICD-10-PCS | Mod: S$GLB,,, | Performed by: STUDENT IN AN ORGANIZED HEALTH CARE EDUCATION/TRAINING PROGRAM

## 2022-01-28 PROCEDURE — 4010F ACE/ARB THERAPY RXD/TAKEN: CPT | Mod: CPTII,S$GLB,, | Performed by: STUDENT IN AN ORGANIZED HEALTH CARE EDUCATION/TRAINING PROGRAM

## 2022-01-28 RX ORDER — AMLODIPINE BESYLATE 5 MG/1
5 TABLET ORAL
COMMUNITY
Start: 2022-01-13 | End: 2023-03-28

## 2022-01-28 RX ORDER — CITALOPRAM 40 MG/1
40 TABLET, FILM COATED ORAL DAILY
Qty: 90 TABLET | Refills: 3 | Status: SHIPPED | OUTPATIENT
Start: 2022-01-28 | End: 2023-02-13

## 2022-01-28 RX ORDER — CARBAMAZEPINE 200 MG/1
200 CAPSULE, EXTENDED RELEASE ORAL 2 TIMES DAILY
Qty: 60 CAPSULE | Refills: 11 | Status: SHIPPED | OUTPATIENT
Start: 2022-01-28 | End: 2024-01-31

## 2022-01-28 RX ORDER — LOSARTAN POTASSIUM 50 MG/1
50 TABLET ORAL DAILY
Qty: 90 TABLET | Refills: 3 | Status: SHIPPED | OUTPATIENT
Start: 2022-01-28 | End: 2023-03-28 | Stop reason: SDUPTHER

## 2022-01-28 RX ORDER — OMEPRAZOLE 40 MG/1
40 CAPSULE, DELAYED RELEASE ORAL EVERY MORNING
Qty: 90 CAPSULE | Refills: 3 | Status: SHIPPED | OUTPATIENT
Start: 2022-01-28 | End: 2023-02-13

## 2022-01-28 NOTE — PATIENT INSTRUCTIONS
--start carbamazepine 200 mg every morning x 1 week, then increase to 1 pill morning and night   --start taking 2 pills of losartan, 100 mg daily  --call our office in 2 weeks for a wellness check     --schedule covid booster in early April   --schedule mammogram in at least 1 month   --call and schedule appt with cardiologist on/around Feb 22, 22

## 2022-01-28 NOTE — PROGRESS NOTES
INTERNAL MEDICINE INITIAL VISIT NOTE      CHIEF COMPLAINT     Chief Complaint   Patient presents with    Establish Care       ASSESSMENT/PLAN     Jenifer Burnett is a 61 y.o. female who presents with hx trigeminal neuralgia, anxiety, HTN, GERD, hx hiatal hernia repair, here today to establish care.    1. Encounter to establish care with new doctor  All previous labs, imaging, and notes reviewed up to the time point of this note.     2. Trigeminal neuralgia of left side of face  Recurrent of sxs, will start therapy. Is establishing with neurology in just over 1 month  - carBAMazepine (CARBATROL) 200 MG CM12; Take 1 capsule (200 mg total) by mouth 2 (two) times daily. Take 1 pill daily x 1 week, then increase to 1 pill morning and evening  Dispense: 60 capsule; Refill: 11    3. Anxiety  - citalopram (CELEXA) 40 MG tablet; Take 1 tablet (40 mg total) by mouth once daily.  Dispense: 90 tablet; Refill: 3    4. Essential hypertension  Elevated, associated with worsening symptoms. Advised to increase losartan to 100 mg daily and monitor BP and sxs for improvement  - losartan (COZAAR) 50 MG tablet; Take 1 tablet (50 mg total) by mouth once daily. For blood pressure  Dispense: 90 tablet; Refill: 3    5. Gastroesophageal reflux disease without esophagitis  - omeprazole (PRILOSEC) 40 MG capsule; Take 1 capsule (40 mg total) by mouth every morning.  Dispense: 90 capsule; Refill: 3    6. Encounter for screening mammogram for malignant neoplasm of breast  - Mammo Digital Screening Bilat; Future    7. Syncope, unspecified syncope type  Reviewed current symptoms, unclear if neurogenic or cardiac in origin. Has cardiology appt to make in 1 month and awaiting ECHO results.         HM reviewed and discussed in detail with the patient.     RTC in 6 months, sooner if needed and depending on labs.    HPI     Jenifer Burnett is a 61 y.o. female who presents with hx trigeminal neuralgia, anxiety, HTN, GERD, hx hiatal hernia repair, here today  to establish care.    Patient was previously seen by Dr. Joy.   Was seen by Dr. Fontaine on 12/22/21 for dizzy smells and periorbital pain on the left. Thought to be 2/2 to her hx of trigeminal neuralgia.       Trigeminal neuralgia - has appt to establish with neurology on 3/2/22. Previously dx about 6 months ago, never started on medication, lasted 1.5 months and went away on its own. Recently had symptoms start to come back about 1 month ago, sensitivity to touch, pain is 10/10 with any touch, burning sensation/shock from her eye to her inner ear.  We discussed all drug SE in detail and to start very slowly.     HTN - on amlodipine 5 mg, losartan 50 mg. Recently had amlodipine started. Instead of SBP 160s, is now 140s. Hx of HCTZ causing dizziness.   HLD - lipitor 10 mg  Anxiety - celexa 40 mg, fairly well tolerated     We reviewed the recent carotid U/S done last month (<40% blockage), and she is still awaiting ECHO results from 1/24.     Recent clinic visit for syncopal episodes - carotid U/S negative, ECHO results not back, and needs to schedule Holter monitor. Seen by cardiology on 1/11/21:   Patient is a pleasant female with h/o Hypertension. She has intermittent episodes of dizziness on and off not sustained.Has occasionally noticed her BP is elevated around that time.  Had one episode of syncope about 4 weeks ago. The patient was in her kitchen and went to  something from the counter when she bent over, became dizzy and passed out for a few seconds. Grand son was in the room and did not endorse any seizure like activity.   She overall is functionally active. She is able to carry out her ADL's and her work actvities with no limitations. No CP on exertion.    --since that time, she reports her BP has been high and correlates this with her dizziness.   --awaiting ECHO results           Past Medical History:  Past Medical History:   Diagnosis Date    Encounter for blood transfusion     GERD  (gastroesophageal reflux disease)     Hypertension     Migraine     Trigeminal neuralgia 03/2018       Past Surgical History:  Past Surgical History:   Procedure Laterality Date    APPENDECTOMY      CHOLECYSTECTOMY  07/2012    ESOPHAGOGASTRODUODENOSCOPY N/A 10/14/2019    Procedure: EGD (ESOPHAGOGASTRODUODENOSCOPY);  Surgeon: Sean Girard MD;  Location: [facility] (4TH FLR);  Service: Endoscopy;  Laterality: N/A;    HYSTERECTOMY      LAPAROSCOPIC TOUPET FUNDOPLICATION N/A 11/22/2019    Procedure: FUNDOPLICATION, LAPAROSCOPIC, TOUPET;  Surgeon: Adriano Godoy MD;  [address];  Service: General;  Laterality: N/A;       Home Medications:  Prior to Admission medications    Medication Sig Start Date End Date Taking? Authorizing Provider   atorvastatin (LIPITOR) 10 MG tablet Take 1 tablet (10 mg total) by mouth once daily. 1/10/22 1/10/23  Kale Fontaine MD   citalopram (CELEXA) 40 MG tablet TAKE 1 TABLET BY MOUTH ONCE A DAY 7/19/21   Carolyn Adkins DNP   cyanocobalamin, vitamin B-12, 2,500 mcg Tab Take 1 tablet by mouth once daily.    Historical Provider   hydroCHLOROthiazide (HYDRODIURIL) 12.5 MG Tab Take 1 tablet (12.5 mg total) by mouth once daily. For blood pressure 1/7/21   Babak Joy MD   ibuprofen (ADVIL,MOTRIN) 800 MG tablet ibuprofen 800 mg tablet    Historical Provider   lisinopriL 1 mg/mL Soln lisinopril Take No date recorded No form recorded No frequency recorded No route recorded No set duration recorded No set duration amount recorded active No dosage strength recorded No dosage strength units of measure recorded    Historical Provider   losartan (COZAAR) 50 MG tablet Take 1 tablet (50 mg total) by mouth once daily. For blood pressure 1/7/21   Babak Joy MD   omeprazole (PRILOSEC) 20 MG capsule Take 1 capsule (20 mg total) by mouth once daily. 1/7/21   Babak Joy MD   omeprazole (PRILOSEC) 40 MG capsule Take 40 mg by mouth once daily. 12/13/21    "Historical Provider   vitamin D (VITAMIN D3) 1000 units Tab Take 2,000 Units by mouth once daily.    Historical Provider       Allergies:  Review of patient's allergies indicates:  No Known Allergies    Family History:  Family History   Problem Relation Age of Onset    Breast cancer Mother        Social History:  Social History     Tobacco Use    Smoking status: Never Smoker    Smokeless tobacco: Never Used   Substance Use Topics    Alcohol use: No    Drug use: No       Review of Systems:  Review of Systems   Constitutional: Negative for fever and unexpected weight change.   HENT: Positive for sinus pain. Negative for sinus pressure and sore throat.    Eyes: Negative for visual disturbance.   Respiratory: Negative for cough and shortness of breath.    Cardiovascular: Negative for chest pain and palpitations.   Gastrointestinal: Negative for constipation, diarrhea, nausea and vomiting.   Endocrine: Negative for polyuria.   Genitourinary: Negative for dysuria and urgency.   Musculoskeletal: Negative for arthralgias and myalgias.   Skin: Negative for rash.   Allergic/Immunologic: Negative for environmental allergies.   Neurological: Positive for headaches. Negative for dizziness and light-headedness.   Hematological: Does not bruise/bleed easily.   Psychiatric/Behavioral: Negative for agitation and decreased concentration. The patient is not nervous/anxious.        Health Maintenance:   reviewed      PHYSICAL EXAM     BP (!) 130/92 (BP Location: Right arm, Patient Position: Sitting, BP Method: Medium (Manual))   Pulse 83   Temp 98 °F (36.7 °C) (Oral)   Resp 16   Ht 5' 9" (1.753 m)   Wt 95.8 kg (211 lb 3.2 oz)   SpO2 100%   BMI 31.19 kg/m²     GEN - A+OX4, NAD healthy, well appearing older woman   HEENT - PERRL, EOMI, OP clear, pain to light touch to V1 distrubution on left side  Neck - No thyromegaly or thyroid masses felt.  No cervical lymphadenopathy appreciated.  CV - RRR, soft systolic murmur heard at " RUSB  Chest - CTAB, no wheezing, crackles, or rhonchi   Abd - S/NT/ND/+BS.   Ext - 2+BDP. No C/C/E.  LN - No LAD appreciated.  Skin - Normal color and texture, no rash, no skin lesions.      LABS     Lab Results   Component Value Date    WBC 5.17 10/13/2021    HGB 12.6 10/13/2021    HCT 38.6 10/13/2021    MCV 92 10/13/2021     10/13/2021     Sodium   Date Value Ref Range Status   12/22/2021 139 136 - 145 mmol/L Final     Potassium   Date Value Ref Range Status   12/22/2021 4.7 3.5 - 5.1 mmol/L Final     Chloride   Date Value Ref Range Status   12/22/2021 104 95 - 110 mmol/L Final     CO2   Date Value Ref Range Status   12/22/2021 29 23 - 29 mmol/L Final     Glucose   Date Value Ref Range Status   12/22/2021 89 70 - 110 mg/dL Final     BUN   Date Value Ref Range Status   12/22/2021 13 8 - 23 mg/dL Final     Creatinine   Date Value Ref Range Status   12/22/2021 0.7 0.5 - 1.4 mg/dL Final     Calcium   Date Value Ref Range Status   12/22/2021 9.1 8.7 - 10.5 mg/dL Final     Total Protein   Date Value Ref Range Status   12/22/2021 7.5 6.0 - 8.4 g/dL Final     Albumin   Date Value Ref Range Status   12/22/2021 3.7 3.5 - 5.2 g/dL Final     Total Bilirubin   Date Value Ref Range Status   12/22/2021 0.7 0.1 - 1.0 mg/dL Final     Comment:     For infants and newborns, interpretation of results should be based  on gestational age, weight and in agreement with clinical  observations.    Premature Infant recommended reference ranges:  Up to 24 hours.............<8.0 mg/dL  Up to 48 hours............<12.0 mg/dL  3-5 days..................<15.0 mg/dL  6-29 days.................<15.0 mg/dL       Alkaline Phosphatase   Date Value Ref Range Status   12/22/2021 74 55 - 135 U/L Final     AST   Date Value Ref Range Status   12/22/2021 15 10 - 40 U/L Final     ALT   Date Value Ref Range Status   12/22/2021 12 10 - 44 U/L Final     Anion Gap   Date Value Ref Range Status   12/22/2021 6 (L) 8 - 16 mmol/L Final     eGFR if African  American   Date Value Ref Range Status   12/22/2021 >60.0 >60 mL/min/1.73 m^2 Final     eGFR if non    Date Value Ref Range Status   12/22/2021 >60.0 >60 mL/min/1.73 m^2 Final     Comment:     Calculation used to obtain the estimated glomerular filtration  rate (eGFR) is the CKD-EPI equation.        Lab Results   Component Value Date    HGBA1C 5.3 12/22/2021     Lab Results   Component Value Date    LDLCALC 103.2 01/02/2021     Lab Results   Component Value Date    TSH 1.834 12/22/2021           Ellen Early MD   [facility]   Department of Internal Medicine   01/28/2022

## 2022-02-14 ENCOUNTER — HOSPITAL ENCOUNTER (OUTPATIENT)
Dept: RADIOLOGY | Facility: HOSPITAL | Age: 62
Discharge: HOME | End: 2022-02-14
Attending: STUDENT IN AN ORGANIZED HEALTH CARE EDUCATION/TRAINING PROGRAM
Payer: COMMERCIAL

## 2022-02-14 VITALS — HEIGHT: 69 IN | WEIGHT: 203 LBS | BODY MASS INDEX: 30.07 KG/M2

## 2022-02-14 DIAGNOSIS — Z12.31 ENCOUNTER FOR SCREENING MAMMOGRAM FOR MALIGNANT NEOPLASM OF BREAST: ICD-10-CM

## 2022-02-14 PROCEDURE — 77067 SCR MAMMO BI INCL CAD: CPT | Mod: TC

## 2022-02-14 PROCEDURE — 77063 BREAST TOMOSYNTHESIS BI: CPT | Mod: 26,,, | Performed by: INTERNAL MEDICINE

## 2022-02-14 PROCEDURE — 77067 SCR MAMMO BI INCL CAD: CPT | Mod: 26,,, | Performed by: INTERNAL MEDICINE

## 2022-02-14 PROCEDURE — 77067 MAMMO DIGITAL SCREENING BILAT WITH TOMO: ICD-10-PCS | Mod: 26,,, | Performed by: INTERNAL MEDICINE

## 2022-02-14 PROCEDURE — 77063 MAMMO DIGITAL SCREENING BILAT WITH TOMO: ICD-10-PCS | Mod: 26,,, | Performed by: INTERNAL MEDICINE

## 2022-02-14 PROCEDURE — 77063 BREAST TOMOSYNTHESIS BI: CPT | Mod: TC

## 2022-03-04 ENCOUNTER — PATIENT OUTREACH (OUTPATIENT)
Dept: ADMINISTRATIVE | Facility: HOSPITAL | Age: 62
End: 2022-03-04
Payer: COMMERCIAL

## 2022-03-04 ENCOUNTER — PATIENT MESSAGE (OUTPATIENT)
Dept: ADMINISTRATIVE | Facility: HOSPITAL | Age: 62
End: 2022-03-04
Payer: COMMERCIAL

## 2022-05-26 ENCOUNTER — TELEPHONE (OUTPATIENT)
Dept: INTERNAL MEDICINE | Facility: CLINIC | Age: 62
End: 2022-05-26
Payer: COMMERCIAL

## 2022-10-18 ENCOUNTER — OFFICE VISIT (OUTPATIENT)
Dept: INTERNAL MEDICINE | Facility: CLINIC | Age: 62
End: 2022-10-18
Payer: COMMERCIAL

## 2022-10-18 ENCOUNTER — HOSPITAL ENCOUNTER (OUTPATIENT)
Dept: RADIOLOGY | Facility: HOSPITAL | Age: 62
Discharge: HOME | End: 2022-10-18
Attending: PHYSICIAN ASSISTANT
Payer: COMMERCIAL

## 2022-10-18 VITALS
WEIGHT: 210.75 LBS | HEIGHT: 69 IN | BODY MASS INDEX: 31.21 KG/M2 | SYSTOLIC BLOOD PRESSURE: 128 MMHG | OXYGEN SATURATION: 98 % | DIASTOLIC BLOOD PRESSURE: 84 MMHG | HEART RATE: 97 BPM

## 2022-10-18 DIAGNOSIS — M79.671 RIGHT FOOT PAIN: ICD-10-CM

## 2022-10-18 DIAGNOSIS — M54.2 NECK PAIN: ICD-10-CM
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
## 2022-10-18 PROCEDURE — 1160F RVW MEDS BY RX/DR IN RCRD: CPT | Mod: CPTII,S$GLB,, | Performed by: PHYSICIAN ASSISTANT

## 2022-10-18 PROCEDURE — 1159F MED LIST DOCD IN RCRD: CPT | Mod: CPTII,S$GLB,, | Performed by: PHYSICIAN ASSISTANT

## 2022-10-18 PROCEDURE — 99214 OFFICE O/P EST MOD 30 MIN: CPT | Mod: S$GLB,,, | Performed by: PHYSICIAN ASSISTANT

## 2022-10-18 PROCEDURE — 73630 X-RAY EXAM OF FOOT: CPT | Mod: TC,RT

## 2022-10-18 PROCEDURE — 3079F PR MOST RECENT DIASTOLIC BLOOD PRESSURE 80-89 MM HG: ICD-10-PCS | Mod: CPTII,S$GLB,, | Performed by: PHYSICIAN ASSISTANT

## 2022-10-18 PROCEDURE — 4010F ACE/ARB THERAPY RXD/TAKEN: CPT | Mod: CPTII,S$GLB,, | Performed by: PHYSICIAN ASSISTANT

## 2022-10-18 PROCEDURE — 99214 PR OFFICE/OUTPT VISIT, EST, LEVL IV, 30-39 MIN: ICD-10-PCS | Mod: S$GLB,,, | Performed by: PHYSICIAN ASSISTANT

## 2022-10-18 PROCEDURE — 99999 PR PBB SHADOW E&M-EST. PATIENT-LVL V: ICD-10-PCS | Mod: PBBFAC,,, | Performed by: PHYSICIAN ASSISTANT

## 2022-10-18 PROCEDURE — 3074F PR MOST RECENT SYSTOLIC BLOOD PRESSURE < 130 MM HG: ICD-10-PCS | Mod: CPTII,S$GLB,, | Performed by: PHYSICIAN ASSISTANT

## 2022-10-18 PROCEDURE — 4010F PR ACE/ARB THEARPY RXD/TAKEN: ICD-10-PCS | Mod: CPTII,S$GLB,, | Performed by: PHYSICIAN ASSISTANT

## 2022-10-18 PROCEDURE — 73630 XR FOOT COMPLETE 3 VIEW RIGHT: ICD-10-PCS | Mod: 26,RT,, | Performed by: RADIOLOGY

## 2022-10-18 PROCEDURE — 1159F PR MEDICATION LIST DOCUMENTED IN MEDICAL RECORD: ICD-10-PCS | Mod: CPTII,S$GLB,, | Performed by: PHYSICIAN ASSISTANT

## 2022-10-18 PROCEDURE — 73630 X-RAY EXAM OF FOOT: CPT | Mod: 26,RT,, | Performed by: RADIOLOGY

## 2022-10-18 PROCEDURE — 3079F DIAST BP 80-89 MM HG: CPT | Mod: CPTII,S$GLB,, | Performed by: PHYSICIAN ASSISTANT

## 2022-10-18 PROCEDURE — 99999 PR PBB SHADOW E&M-EST. PATIENT-LVL V: CPT | Mod: PBBFAC,,, | Performed by: PHYSICIAN ASSISTANT

## 2022-10-18 PROCEDURE — 3074F SYST BP LT 130 MM HG: CPT | Mod: CPTII,S$GLB,, | Performed by: PHYSICIAN ASSISTANT

## 2022-10-18 PROCEDURE — 1160F PR REVIEW ALL MEDS BY PRESCRIBER/CLIN PHARMACIST DOCUMENTED: ICD-10-PCS | Mod: CPTII,S$GLB,, | Performed by: PHYSICIAN ASSISTANT

## 2022-10-18 RX ORDER — DICLOFENAC SODIUM 75 MG/1
75 TABLET, DELAYED RELEASE ORAL 2 TIMES DAILY PRN
Qty: 30 TABLET | Refills: 0 | Status: SHIPPED | OUTPATIENT
Start: 2022-10-18 | End: 2023-08-24 | Stop reason: SDUPTHER

## 2022-10-18 RX ORDER — CYCLOBENZAPRINE HCL 10 MG
10 TABLET ORAL NIGHTLY PRN
Qty: 20 TABLET | Refills: 0 | Status: SHIPPED | OUTPATIENT
Start: 2022-10-18 | End: 2022-10-28

## 2022-10-18 NOTE — PROGRESS NOTES
"Subjective:       Patient ID: Jenifer Burnett is a 62 y.o. female.    Chief Complaint: Foot Pain    HPI    Established pt of Ellen Early MD (new to me)    Here with concerns of right foot pain, onset about 2 weeks ago after a fall, standing in a folding chair, the chair bars closed on her dorsal foot. Had swelling and bruising. Seen in outside urgent care on 10/2, had xray that was unremarkable. Today she reports pain persist hurts to put on shoe, worse with prolonged walking. She is able to bear weight. Taking tylenol and using ice with mild improvement    She also notes neck pain and shoulders since the fall, feels she strained her neck. No n/t or extremities weakness. Tried a heating paid.     Also with pain of right lower leg, throbbing and shooting pian, occurred prior to the fall. For the last 1.5 month.     Past Medical History:   Diagnosis Date    Encounter for blood transfusion     GERD (gastroesophageal reflux disease)     Hypertension     Migraine     Trigeminal neuralgia 03/2018     Social History     Tobacco Use    Smoking status: Never    Smokeless tobacco: Never   Substance Use Topics    Alcohol use: No    Drug use: No     Review of patient's allergies indicates:  No Known Allergies        Review of Systems   Constitutional:  Negative for chills, fever and unexpected weight change.   Respiratory:  Negative for cough and shortness of breath.    Cardiovascular:  Negative for chest pain and leg swelling.   Gastrointestinal:  Negative for abdominal pain, nausea and vomiting.   Musculoskeletal:  Positive for arthralgias, joint swelling, leg pain and neck pain.   Integumentary:  Negative for rash.   Neurological:  Negative for weakness and headaches.       Objective: /84 (BP Location: Left arm, Patient Position: Sitting, BP Method: Large (Manual))   Pulse 97   Ht 5' 9" (1.753 m)   Wt 95.6 kg (210 lb 12.2 oz)   SpO2 98%   BMI 31.12 kg/m²         Physical Exam  Vitals reviewed.   Constitutional: "       General: She is not in acute distress.     Appearance: She is well-developed.   HENT:      Head: Normocephalic and atraumatic.   Cardiovascular:      Rate and Rhythm: Normal rate and regular rhythm.      Heart sounds: No murmur heard.  Pulmonary:      Effort: Pulmonary effort is normal.      Breath sounds: Normal breath sounds. No wheezing or rales.   Abdominal:      General: Bowel sounds are normal.      Palpations: Abdomen is soft.      Tenderness: There is no abdominal tenderness.   Musculoskeletal:      Cervical back: Pain with movement and muscular tenderness present. Normal range of motion.      Right lower leg: No edema.      Left lower leg: No edema.      Right foot: Decreased range of motion. Normal capillary refill. Swelling (mild), tenderness and bony tenderness present. Normal pulse.        Feet:       Comments: Antalgic gait   Skin:     General: Skin is warm and dry.      Findings: No rash.   Neurological:      Mental Status: She is alert.       Assessment:       Problem List Items Addressed This Visit    None  Visit Diagnoses       Fall, initial encounter    -  Primary    Right foot pain        Relevant Medications    diclofenac (VOLTAREN) 75 MG EC tablet    Other Relevant Orders    X-Ray Foot Complete 3 view Right    Ambulatory referral/consult to Podiatry    Neck pain        Relevant Medications    cyclobenzaprine (FLEXERIL) 10 MG tablet    diclofenac (VOLTAREN) 75 MG EC tablet            Plan:       Jenifer was seen today for foot pain.    Diagnoses and all orders for this visit:    Fall, initial encounter    Right foot pain  -     X-Ray Foot Complete 3 view Right; Future  -     diclofenac (VOLTAREN) 75 MG EC tablet; Take 1 tablet (75 mg total) by mouth 2 (two) times daily as needed.  -     Ambulatory referral/consult to Podiatry; Future    Neck pain  -     cyclobenzaprine (FLEXERIL) 10 MG tablet; Take 1 tablet (10 mg total) by mouth nightly as needed for Muscle spasms (neck pain).  -      diclofenac (VOLTAREN) 75 MG EC tablet; Take 1 tablet (75 mg total) by mouth 2 (two) times daily as needed.    Advised on continuing ice and leg elevation  Pt reports she has a walking boot at home that she will start wearing  Meds as above  Further recs to follow pending xray results.     ELISEO CostaC

## 2022-10-18 NOTE — PATIENT INSTRUCTIONS
Central Scheduling [phone]    [facility] ([address] [place] [id number]):  - Devon Hernandez M.D.  - Mehnaz Portillo M.D  - Toby Johns M.D.  - Kadi Carlin M.D.

## 2022-12-28 NOTE — PROGRESS NOTES
INTERNAL MEDICINE VISIT NOTE     PRESENTING HISTORY     Reason for Visit:  INTERNAL MEDICINE visit.    No chief complaint on file.      History of Present Illness & ROS: Ms. Jenifer Burnett is a 62 y.o. female.  Pleasant lady.   Was reportedly here for annual, and to [facility] medical care, but wants to address a couple of concerns and will return for annual with new primary care physician, given that [facility] does not have a primary care panel of patients.   New to me.   ` Experiencing pain to right leg for the past 2 months, no injury, trauma or recent travel. Described as a 'stinging pain'.   Better: rest  Worse: walking  ` also with right heel pain, no injury or trauma.   Better: nothing  Worse: walking    Review of Systems:  Eyes: denies visual changes at this time denies floaters   ENT: no nasal congestion or sore throat  Respiratory: no cough or shorness of breath  Cardiovascular: no chest pain or palpitations  Gastrointestinal: no nausea or vomiting, no abdominal pain or change in bowel habits  Genitourinary: no hematuria or dysuria; denies frequency  Hematologic/Lymphatic: no easy bruising or lymphadenopathy  Musculoskeletal: no arthralgias or myalgias  Neurological: no seizures or tremors  Endocrine: no heat or cold intolerance    PAST HISTORY:     Past Medical History:   Diagnosis Date    Encounter for blood transfusion     GERD (gastroesophageal reflux disease)     Hypertension     Migraine     Trigeminal neuralgia 03/2018       Past Surgical History:   Procedure Laterality Date    APPENDECTOMY      CHOLECYSTECTOMY  07/2012    ESOPHAGOGASTRODUODENOSCOPY N/A 10/14/2019    Procedure: EGD (ESOPHAGOGASTRODUODENOSCOPY);  Surgeon: Sean Girard MD;  Location: [facility] (4TH FLR);  Service: Endoscopy;  Laterality: N/A;    HYSTERECTOMY      LAPAROSCOPIC TOUPET FUNDOPLICATION N/A 11/22/2019    Procedure: FUNDOPLICATION, LAPAROSCOPIC, TOUPET;  Surgeon: Adriano Godoy MD;  Location: [address];  Service: General;   Laterality: N/A;       Family History   Problem Relation Age of Onset    Breast cancer Mother        Social History     Socioeconomic History    Marital status: Single   Tobacco Use    Smoking status: Never    Smokeless tobacco: Never   Substance and Sexual Activity    Alcohol use: No    Drug use: No    Sexual activity: Yes     Partners: Male   Social History Narrative    Working at [employer], does do bending/lifting type work        MEDICATIONS & ALLERGIES:     Current Outpatient Medications on File Prior to Visit   Medication Sig Dispense Refill    amLODIPine (NORVASC) 5 MG tablet Take 5 mg by mouth.      atorvastatin (LIPITOR) 10 MG tablet Take 1 tablet (10 mg total) by mouth once daily. (Patient not taking: Reported on 10/18/2022) 90 tablet 3    carBAMazepine (CARBATROL) 200 MG CM12 Take 1 capsule (200 mg total) by mouth 2 (two) times daily. Take 1 pill daily x 1 week, then increase to 1 pill morning and evening (Patient not taking: Reported on 10/18/2022) 60 capsule 11    citalopram (CELEXA) 40 MG tablet Take 1 tablet (40 mg total) by mouth once daily. 90 tablet 3    cyanocobalamin, vitamin B-12, 2,500 mcg Tab Take 1 tablet by mouth once daily.      diclofenac (VOLTAREN) 75 MG EC tablet Take 1 tablet (75 mg total) by mouth 2 (two) times daily as needed. 30 tablet 0    losartan (COZAAR) 50 MG tablet Take 1 tablet (50 mg total) by mouth once daily. For blood pressure 90 tablet 3    omeprazole (PRILOSEC) 40 MG capsule Take 1 capsule (40 mg total) by mouth every morning. 90 capsule 3    vitamin D (VITAMIN D3) 1000 units Tab Take 2,000 Units by mouth once daily.       No current facility-administered medications on file prior to visit.        Review of patient's allergies indicates:  No Known Allergies    Medications Reconciliation:   I have reconciled the patient's home medications and discharge medications with the patient/family. I have updated all changes.  Refer to After-Visit Medication List.    OBJECTIVE:      Vital Signs:  There were no vitals filed for this visit.  Wt Readings from Last 3 Encounters:   10/18/22 1309 95.6 kg (210 lb 12.2 oz)   02/14/22 1539 92.1 kg (203 lb)   01/28/22 1507 95.8 kg (211 lb 3.2 oz)     There is no height or weight on file to calculate BMI.   Wt Readings from Last 3 Encounters:   12/29/22 96.6 kg (212 lb 15.4 oz)   10/18/22 95.6 kg (210 lb 12.2 oz)   02/14/22 92.1 kg (203 lb)     Temp Readings from Last 3 Encounters:   01/28/22 98 °F (36.7 °C) (Oral)   12/10/19 98 °F (36.7 °C) (Oral)   12/10/19 98.5 °F (36.9 °C)     BP Readings from Last 3 Encounters:   12/29/22 126/82   10/18/22 128/84   01/28/22 (!) 130/92     Pulse Readings from Last 3 Encounters:   12/29/22 84   10/18/22 97   01/28/22 83       Physical Exam:  General: Well developed, well nourished. No distress.  HEENT: Head is normocephalic, atraumatic  Eyes: Clear conjunctiva.  Neck: Supple, symmetrical neck; trachea midline.  Cardiovascular: Heart with regular rate and rhythm. No murmurs, gallops or rubs  Extremities: No LE edema. Pulses 2+ and symmetric.   (See photos below\)  Skin: Skin color, texture, turgor normal. No rashes.  Musculoskeletal: Normal gait.   Lymph Nodes: No cervical or supraclavicular adenopathy.  Neurologic: . No focal numbness or weakness.   Psychiatric: Not depressed.    Laboratory  Lab Results   Component Value Date    WBC 5.17 10/13/2021    HGB 12.6 10/13/2021    HCT 38.6 10/13/2021     10/13/2021    CHOL 188 01/02/2021    TRIG 129 01/02/2021    HDL 59 01/02/2021    ALT 12 12/22/2021    AST 15 12/22/2021     12/22/2021    K 4.7 12/22/2021     12/22/2021    CREATININE 0.7 12/22/2021    BUN 13 12/22/2021    CO2 29 12/22/2021    TSH 1.834 12/22/2021    INR 1.0 12/01/2014    HGBA1C 5.3 12/22/2021         ASSESSMENT & PLAN:                 ? Underlying Vascular  Issues in the setting of Varicose Veins  ? Underlying Thrombo-embolic event; needs to have US prior to ANIBAL study.     Right leg  pain  -     US Lower Extremity Veins Right; Future; Expected date: 12/29/2022  -     Ankle Brachial Indices (ANIBAL); Future  -     X-Ray Calcaneus 2 View Right; Future; Expected date: 12/29/2022  -     X-Ray Foot Complete 3 view Right; Future; Expected date: 12/29/2022    Varicose veins of both lower extremities, unspecified whether complicated  -     US Lower Extremity Veins Right; Future; Expected date: 12/29/2022  -     Ankle Brachial Indices (ANIBAL); Future  -     X-Ray Calcaneus 2 View Right; Future; Expected date: 12/29/2022  -     X-Ray Foot Complete 3 view Right; Future; Expected date: 12/29/2022    Pain of right heel  -     US Lower Extremity Veins Right; Future; Expected date: 12/29/2022  -     Ankle Brachial Indices (ANIBAL); Future  -     X-Ray Calcaneus 2 View Right; Future; Expected date: 12/29/2022  -     X-Ray Foot Complete 3 view Right; Future; Expected date: 12/29/2022    Essential hypertension  Today: 126/82 (controlled)  ` Norvasc (not taking)  ` Losartan        Medication List            Accurate as of December 29, 2022  2:05 PM. If you have any questions, ask your nurse or doctor.                CONTINUE taking these medications      amLODIPine 5 MG tablet  Commonly known as: NORVASC     atorvastatin 10 MG tablet  Commonly known as: LIPITOR  Take 1 tablet (10 mg total) by mouth once daily.     carBAMazepine 200 MG Cm12  Commonly known as: CARBATROL  Take 1 capsule (200 mg total) by mouth 2 (two) times daily. Take 1 pill daily x 1 week, then increase to 1 pill morning and evening     citalopram 40 MG tablet  Commonly known as: CeleXA  Take 1 tablet (40 mg total) by mouth once daily.     cyanocobalamin (vitamin B-12) 2,500 mcg Tab     diclofenac 75 MG EC tablet  Commonly known as: VOLTAREN  Take 1 tablet (75 mg total) by mouth 2 (two) times daily as needed.     losartan 50 MG tablet  Commonly known as: COZAAR  Take 1 tablet (50 mg total) by mouth once daily. For blood pressure     omeprazole 40 MG  capsule  Commonly known as: PRILOSEC  Take 1 capsule (40 mg total) by mouth every morning.     vitamin D 1000 units Tab  Commonly known as: VITAMIN D3              Signing Physician:  JEROD Ortiz

## 2022-12-29 ENCOUNTER — TELEPHONE (OUTPATIENT)
Dept: INTERNAL MEDICINE | Facility: CLINIC | Age: 62
End: 2022-12-29

## 2022-12-29 ENCOUNTER — OFFICE VISIT (OUTPATIENT)
Dept: INTERNAL MEDICINE | Facility: CLINIC | Age: 62
End: 2022-12-29
Payer: COMMERCIAL

## 2022-12-29 ENCOUNTER — HOSPITAL ENCOUNTER (OUTPATIENT)
Dept: RADIOLOGY | Facility: HOSPITAL | Age: 62
Discharge: HOME | End: 2022-12-29
Attending: NURSE PRACTITIONER
Payer: COMMERCIAL

## 2022-12-29 VITALS
HEART RATE: 84 BPM | BODY MASS INDEX: 31.54 KG/M2 | HEIGHT: 69 IN | DIASTOLIC BLOOD PRESSURE: 82 MMHG | SYSTOLIC BLOOD PRESSURE: 126 MMHG | WEIGHT: 212.94 LBS | OXYGEN SATURATION: 97 %

## 2022-12-29 DIAGNOSIS — M79.671 PAIN OF RIGHT HEEL: ICD-10-CM

## 2022-12-29 DIAGNOSIS — I10 ESSENTIAL HYPERTENSION: ICD-10-CM

## 2022-12-29 DIAGNOSIS — I83.93 VARICOSE VEINS OF BOTH LOWER EXTREMITIES, UNSPECIFIED WHETHER COMPLICATED: ICD-10-CM

## 2022-12-29 DIAGNOSIS — M77.30 CALCANEAL SPUR, UNSPECIFIED LATERALITY: Primary | ICD-10-CM

## 2022-12-29 DIAGNOSIS — M79.604 RIGHT LEG PAIN: Primary | ICD-10-CM

## 2022-12-29 DIAGNOSIS — M79.604 RIGHT LEG PAIN: ICD-10-CM

## 2022-12-29 PROCEDURE — 4010F PR ACE/ARB THEARPY RXD/TAKEN: ICD-10-PCS | Mod: CPTII,S$GLB,, | Performed by: NURSE PRACTITIONER

## 2022-12-29 PROCEDURE — 3079F PR MOST RECENT DIASTOLIC BLOOD PRESSURE 80-89 MM HG: ICD-10-PCS | Mod: CPTII,S$GLB,, | Performed by: NURSE PRACTITIONER

## 2022-12-29 PROCEDURE — 99214 PR OFFICE/OUTPT VISIT, EST, LEVL IV, 30-39 MIN: ICD-10-PCS | Mod: S$GLB,,, | Performed by: NURSE PRACTITIONER

## 2022-12-29 PROCEDURE — 99999 PR PBB SHADOW E&M-EST. PATIENT-LVL IV: ICD-10-PCS | Mod: PBBFAC,,, | Performed by: NURSE PRACTITIONER

## 2022-12-29 PROCEDURE — 4010F ACE/ARB THERAPY RXD/TAKEN: CPT | Mod: CPTII,S$GLB,, | Performed by: NURSE PRACTITIONER

## 2022-12-29 PROCEDURE — 73650 XR CALCANEUS 2 VIEW RIGHT: ICD-10-PCS | Mod: 26,59,RT, | Performed by: RADIOLOGY

## 2022-12-29 PROCEDURE — 73630 X-RAY EXAM OF FOOT: CPT | Mod: 26,RT,, | Performed by: RADIOLOGY

## 2022-12-29 PROCEDURE — 3008F BODY MASS INDEX DOCD: CPT | Mod: CPTII,S$GLB,, | Performed by: NURSE PRACTITIONER

## 2022-12-29 PROCEDURE — 3074F SYST BP LT 130 MM HG: CPT | Mod: CPTII,S$GLB,, | Performed by: NURSE PRACTITIONER

## 2022-12-29 PROCEDURE — 73650 X-RAY EXAM OF HEEL: CPT | Mod: 26,59,RT, | Performed by: RADIOLOGY

## 2022-12-29 PROCEDURE — 73650 X-RAY EXAM OF HEEL: CPT | Mod: TC,RT

## 2022-12-29 PROCEDURE — 3074F PR MOST RECENT SYSTOLIC BLOOD PRESSURE < 130 MM HG: ICD-10-PCS | Mod: CPTII,S$GLB,, | Performed by: NURSE PRACTITIONER

## 2022-12-29 PROCEDURE — 99999 PR PBB SHADOW E&M-EST. PATIENT-LVL IV: CPT | Mod: PBBFAC,,, | Performed by: NURSE PRACTITIONER

## 2022-12-29 PROCEDURE — 73630 XR FOOT COMPLETE 3 VIEW RIGHT: ICD-10-PCS | Mod: 26,RT,, | Performed by: RADIOLOGY

## 2022-12-29 PROCEDURE — 73630 X-RAY EXAM OF FOOT: CPT | Mod: TC,RT

## 2022-12-29 PROCEDURE — 99214 OFFICE O/P EST MOD 30 MIN: CPT | Mod: S$GLB,,, | Performed by: NURSE PRACTITIONER

## 2022-12-29 PROCEDURE — 3008F PR BODY MASS INDEX (BMI) DOCUMENTED: ICD-10-PCS | Mod: CPTII,S$GLB,, | Performed by: NURSE PRACTITIONER

## 2022-12-29 PROCEDURE — 3079F DIAST BP 80-89 MM HG: CPT | Mod: CPTII,S$GLB,, | Performed by: NURSE PRACTITIONER

## 2022-12-29 NOTE — TELEPHONE ENCOUNTER
Called and spoke w/ pt, informed her of results. Pt expressed understanding w/ no further questions. Podiatry referral scheduled.

## 2022-12-29 NOTE — TELEPHONE ENCOUNTER
----- Message from JEROD Zuñiga sent at 12/29/2022  2:44 PM CST -----  Please let her know that she does have a bone spur to the heel on the right and have placed referral to Podiatry.   Remainder of foot xrays are normal and will update her when the Ultrasound and ANIBAL studies are received.     Thanks

## 2023-01-05 ENCOUNTER — HOSPITAL ENCOUNTER (OUTPATIENT)
Dept: CARDIOLOGY | Facility: HOSPITAL | Age: 63
Discharge: HOME | End: 2023-01-05
Attending: NURSE PRACTITIONER
Payer: COMMERCIAL

## 2023-01-05 ENCOUNTER — HOSPITAL ENCOUNTER (OUTPATIENT)
Dept: RADIOLOGY | Facility: HOSPITAL | Age: 63
Discharge: HOME | End: 2023-01-05
Attending: NURSE PRACTITIONER
Payer: COMMERCIAL

## 2023-01-05 DIAGNOSIS — M79.604 RIGHT LEG PAIN: ICD-10-CM

## 2023-01-05 DIAGNOSIS — I83.93 VARICOSE VEINS OF BOTH LOWER EXTREMITIES, UNSPECIFIED WHETHER COMPLICATED: ICD-10-CM

## 2023-01-05 DIAGNOSIS — I10 ESSENTIAL HYPERTENSION: ICD-10-CM

## 2023-01-05 DIAGNOSIS — M79.671 PAIN OF RIGHT HEEL: ICD-10-CM

## 2023-01-05 LAB
LEFT ABI: 1.17
LEFT ARM BP: 142 MMHG
LEFT DORSALIS PEDIS: 155 MMHG
LEFT POSTERIOR TIBIAL: 167 MMHG
RIGHT ABI: 1.15
RIGHT ARM BP: 143 MMHG
RIGHT DORSALIS PEDIS: 158 MMHG
RIGHT POSTERIOR TIBIAL: 165 MMHG

## 2023-01-05 PROCEDURE — 93971 EXTREMITY STUDY: CPT | Mod: TC,RT

## 2023-01-05 PROCEDURE — 93971 EXTREMITY STUDY: CPT | Mod: 26,RT,, | Performed by: RADIOLOGY

## 2023-01-05 PROCEDURE — 93971 US LOWER EXTREMITY VEINS RIGHT: ICD-10-PCS | Mod: 26,RT,, | Performed by: RADIOLOGY

## 2023-01-05 PROCEDURE — 93922 UPR/L XTREMITY ART 2 LEVELS: CPT | Mod: 26,,, | Performed by: INTERNAL MEDICINE

## 2023-01-05 PROCEDURE — 93922 ANKLE BRACHIAL INDICES (ABI): ICD-10-PCS | Mod: 26,,, | Performed by: INTERNAL MEDICINE

## 2023-01-05 PROCEDURE — 93922 UPR/L XTREMITY ART 2 LEVELS: CPT

## 2023-01-06 ENCOUNTER — TELEPHONE (OUTPATIENT)
Dept: PODIATRY | Facility: CLINIC | Age: 63
End: 2023-01-06
Payer: COMMERCIAL

## 2023-01-06 NOTE — TELEPHONE ENCOUNTER
----- Message from Jazmin Rosario sent at 1/6/2023  1:14 PM CST -----  Type:  Patient Returning Call    Who Called: pt   Who Left Message for Patient: pt  Does the patient know what this is regarding?: pt need a call she want to know is the location [address] [place] [place] [address]   Would the patient rather a call back or a response via [facility]?  Call   Best Call Back Number:[phone]  Additional Information:  call  she have an appt

## 2023-01-11 ENCOUNTER — OFFICE VISIT (OUTPATIENT)
Dept: PODIATRY | Facility: CLINIC | Age: 63
End: 2023-01-11
Payer: COMMERCIAL

## 2023-01-11 VITALS
SYSTOLIC BLOOD PRESSURE: 146 MMHG | BODY MASS INDEX: 31.54 KG/M2 | DIASTOLIC BLOOD PRESSURE: 83 MMHG | HEIGHT: 69 IN | WEIGHT: 212.94 LBS | HEART RATE: 90 BPM

## 2023-01-11 DIAGNOSIS — M79.671 CHRONIC FOOT PAIN, RIGHT: Primary | ICD-10-CM

## 2023-01-11 DIAGNOSIS — S99.921S FOOT TRAUMA, RIGHT, SEQUELA: ICD-10-CM

## 2023-01-11 DIAGNOSIS — M77.30 CALCANEAL SPUR, UNSPECIFIED LATERALITY: ICD-10-CM

## 2023-01-11 DIAGNOSIS — G89.29 CHRONIC FOOT PAIN, RIGHT: Primary | ICD-10-CM

## 2023-01-11 DIAGNOSIS — M79.671 RIGHT FOOT PAIN: ICD-10-CM

## 2023-01-11 PROCEDURE — 3008F PR BODY MASS INDEX (BMI) DOCUMENTED: ICD-10-PCS | Mod: CPTII,S$GLB,, | Performed by: PODIATRIST

## 2023-01-11 PROCEDURE — 99203 OFFICE O/P NEW LOW 30 MIN: CPT | Mod: S$GLB,,, | Performed by: PODIATRIST

## 2023-01-11 PROCEDURE — 3077F SYST BP >= 140 MM HG: CPT | Mod: CPTII,S$GLB,, | Performed by: PODIATRIST

## 2023-01-11 PROCEDURE — 99999 PR PBB SHADOW E&M-EST. PATIENT-LVL IV: CPT | Mod: PBBFAC,,, | Performed by: PODIATRIST

## 2023-01-11 PROCEDURE — 3008F BODY MASS INDEX DOCD: CPT | Mod: CPTII,S$GLB,, | Performed by: PODIATRIST

## 2023-01-11 PROCEDURE — 3079F PR MOST RECENT DIASTOLIC BLOOD PRESSURE 80-89 MM HG: ICD-10-PCS | Mod: CPTII,S$GLB,, | Performed by: PODIATRIST

## 2023-01-11 PROCEDURE — 4010F ACE/ARB THERAPY RXD/TAKEN: CPT | Mod: CPTII,S$GLB,, | Performed by: PODIATRIST

## 2023-01-11 PROCEDURE — 3077F PR MOST RECENT SYSTOLIC BLOOD PRESSURE >= 140 MM HG: ICD-10-PCS | Mod: CPTII,S$GLB,, | Performed by: PODIATRIST

## 2023-01-11 PROCEDURE — 4010F PR ACE/ARB THEARPY RXD/TAKEN: ICD-10-PCS | Mod: CPTII,S$GLB,, | Performed by: PODIATRIST

## 2023-01-11 PROCEDURE — 1159F MED LIST DOCD IN RCRD: CPT | Mod: CPTII,S$GLB,, | Performed by: PODIATRIST

## 2023-01-11 PROCEDURE — 1159F PR MEDICATION LIST DOCUMENTED IN MEDICAL RECORD: ICD-10-PCS | Mod: CPTII,S$GLB,, | Performed by: PODIATRIST

## 2023-01-11 PROCEDURE — 99203 PR OFFICE/OUTPT VISIT, NEW, LEVL III, 30-44 MIN: ICD-10-PCS | Mod: S$GLB,,, | Performed by: PODIATRIST

## 2023-01-11 PROCEDURE — 3079F DIAST BP 80-89 MM HG: CPT | Mod: CPTII,S$GLB,, | Performed by: PODIATRIST

## 2023-01-11 PROCEDURE — 1160F PR REVIEW ALL MEDS BY PRESCRIBER/CLIN PHARMACIST DOCUMENTED: ICD-10-PCS | Mod: CPTII,S$GLB,, | Performed by: PODIATRIST

## 2023-01-11 PROCEDURE — 1160F RVW MEDS BY RX/DR IN RCRD: CPT | Mod: CPTII,S$GLB,, | Performed by: PODIATRIST

## 2023-01-11 PROCEDURE — 99999 PR PBB SHADOW E&M-EST. PATIENT-LVL IV: ICD-10-PCS | Mod: PBBFAC,,, | Performed by: PODIATRIST

## 2023-01-11 RX ORDER — METHYLPREDNISOLONE 4 MG/1
TABLET ORAL
Qty: 1 EACH | Refills: 0 | Status: SHIPPED | OUTPATIENT
Start: 2023-01-11 | End: 2023-03-28

## 2023-01-11 NOTE — PROGRESS NOTES
"Subjective:      Patient ID: Jenifer Burnett is a 62 y.o. female.    Chief Complaint:   Heel Pain and Toe Pain (Alexi feet )    Jenifer is a 62 y.o. female who presents to the podiatry clinic  with complaint of  right foot pain.     Patient relates she is having significant pain and making it difficult to work.  This all started 2 months ago when she had an injury at home.  She was outside standing on a foot her trying to remove a cover from the pool when the folding chair close on her foot.  She relates at 1st the midfoot area "points to the midfoot" was really hurting and swollen however now that is okay.  Now she relates she is having a stabbing heel pain is difficult to walk.  She is using a walking but at home.  She is wondering if she had plantar fasciitis about 6 months ago she is not sure.  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 female.    Pt c/o right foot x-ray after falling while standing in a chair yesterday    Foot Injury/Fracture   The incident occurred 6 to 12 hours ago. The incident occurred at the gym. The injury mechanism was a fall. The pain is present in the right foot. The pain is at a severity of 8/10. The pain is moderate. The pain has been constant since onset. Pertinent negatives include no muscle weakness, numbness or tingling. She reports no foreign bodies present. The symptoms are aggravated by weight bearing and movement.     Neurological: Negative for tingling and numbness.    Right foot pain  - XR Foot 3+ VW Right  X-rays indicate no acute fracture. Rest. Ice. Elevation.    X-rays indicate no acute fractures. Rest, Ice. Elevation.    Pcp 10/18/22:  Right foot pain  -     X-Ray Foot Complete 3 view Right; Future  -     diclofenac (VOLTAREN) 75 MG EC tablet; Take 1 tablet (75 mg total) by mouth 2 (two) times " daily as needed.  -     Ambulatory referral/consult to Podiatry; Future    Advised on continuing ice and leg elevation  Pt reports she has a walking boot at home that she will start wearing  Meds as above  Further recs to follow pending xray results.     PCP workup:  ` ANIBAL is normal which indicates no underlying 'vascular' compromise to the leg on this study.   ` Also, the ultrasound is negative for a 'blood clot', which is good news.   *There is a 'cyst' to the back of the knee (popliteal cyst, or some times called Baker's Cyst), which may potentially cause some discomfort, but these are benign (fluid filled).      Please schedule to be seen by Podiatry for the Calcaneal spurring noted on the xrays. The referral was placed.   Past Medical History:   Diagnosis Date    Encounter for blood transfusion     GERD (gastroesophageal reflux disease)     Hypertension     Migraine     Trigeminal neuralgia 03/2018     Past Surgical History:   Procedure Laterality Date    APPENDECTOMY      CHOLECYSTECTOMY  07/2012    ESOPHAGOGASTRODUODENOSCOPY N/A 10/14/2019    Procedure: EGD (ESOPHAGOGASTRODUODENOSCOPY);  Surgeon: Sean Girard MD;  Location: [facility] (4TH FLR);  Service: Endoscopy;  Laterality: N/A;    HYSTERECTOMY      LAPAROSCOPIC TOUPET FUNDOPLICATION N/A 11/22/2019    Procedure: FUNDOPLICATION, LAPAROSCOPIC, TOUPET;  Surgeon: Adriano Godoy MD;  Location: [facility] OR [address] [place] [address];  Service: General;  Laterality: N/A;     Current Outpatient Medications on File Prior to Visit   Medication Sig Dispense Refill    amLODIPine (NORVASC) 5 MG tablet Take 5 mg by mouth.      carBAMazepine (CARBATROL) 200 MG CM12 Take 1 capsule (200 mg total) by mouth 2 (two) times daily. Take 1 pill daily x 1 week, then increase to 1 pill morning and evening 60 capsule 11    citalopram (CELEXA) 40 MG tablet Take 1 tablet (40 mg total) by mouth once daily. 90 tablet 3    cyanocobalamin, vitamin B-12, 2,500 mcg Tab Take 1 tablet by mouth once daily.       diclofenac (VOLTAREN) 75 MG EC tablet Take 1 tablet (75 mg total) by mouth 2 (two) times daily as needed. 30 tablet 0    losartan (COZAAR) 50 MG tablet Take 1 tablet (50 mg total) by mouth once daily. For blood pressure 90 tablet 3    omeprazole (PRILOSEC) 40 MG capsule Take 1 capsule (40 mg total) by mouth every morning. 90 capsule 3    vitamin D (VITAMIN D3) 1000 units Tab Take 2,000 Units by mouth once daily.      atorvastatin (LIPITOR) 10 MG tablet Take 1 tablet (10 mg total) by mouth once daily. 90 tablet 3     No current facility-administered medications on file prior to visit.     Review of patient's allergies indicates:  No Known Allergies    Review of Systems   Constitutional: Negative for chills, decreased appetite, fever, malaise/fatigue, night sweats, weight gain and weight loss.   Cardiovascular:  Negative for chest pain, claudication, dyspnea on exertion, leg swelling, palpitations and syncope.   Respiratory:  Negative for cough and shortness of breath.    Endocrine: Negative for cold intolerance and heat intolerance.   Hematologic/Lymphatic: Negative for bleeding problem. Does not bruise/bleed easily.   Skin:  Negative for color change, dry skin, flushing, itching, nail changes, poor wound healing, rash, skin cancer, suspicious lesions and unusual hair distribution.   Musculoskeletal:  Negative for arthritis, back pain, falls, gout, joint pain, joint swelling, muscle cramps, muscle weakness, myalgias, neck pain and stiffness.        Foot pain   Gastrointestinal:  Negative for diarrhea, nausea and vomiting.   Neurological:  Negative for dizziness, focal weakness, light-headedness, numbness, paresthesias, tremors, vertigo and weakness.   Psychiatric/Behavioral:  Negative for altered mental status and depression. The patient does not have insomnia.    Allergic/Immunologic: Negative.          Objective:       Vitals:    01/11/23 1405   BP: (!) 146/83   Pulse: 90   Weight: 96.6 kg (212 lb 15.4 oz)  "  Height: 5' 9" (1.753 m)   PainSc:   5   PainLoc: Foot   96.6 kg (212 lb 15.4 oz)     Physical Exam  Vitals reviewed.   Constitutional:       General: She is not in acute distress.     Appearance: She is well-developed. She is not ill-appearing, toxic-appearing or diaphoretic.      Comments: Proper supportive shoegear      Cardiovascular:      Pulses:           Dorsalis pedis pulses are 2+ on the right side and 2+ on the left side.        Posterior tibial pulses are 2+ on the right side and 2+ on the left side.   Musculoskeletal:         General: No deformity.      Right lower leg: No edema.      Left lower leg: No edema.      Right ankle: Normal. No swelling.      Right Achilles Tendon: Normal.      Left ankle: Normal. No swelling.      Left Achilles Tendon: Normal.      Right foot: Decreased range of motion. No deformity, tenderness or bony tenderness.      Left foot: Decreased range of motion. Tenderness and bony tenderness present. No deformity.      Comments: + pain on palpation to left midfoot with vibratory paid to base of 2nd and 3rd cuneiform areas    Positive pain to plantar fascia areas well until mid arch    No pain on side-to-side compression of calcaneus no pain to Achilles         Feet:      Right foot:      Protective Sensation: 10 sites tested.  10 sites sensed.      Skin integrity: No ulcer, blister, skin breakdown, erythema, warmth, callus or dry skin.      Toenail Condition: Right toenails are normal.      Left foot:      Protective Sensation: 10 sites tested.  10 sites sensed.      Skin integrity: No ulcer, blister, skin breakdown, erythema, warmth, callus or dry skin.      Toenail Condition: Left toenails are normal.      Comments: No open lesion or signs of infection  Skin:     General: Skin is warm.      Capillary Refill: Capillary refill takes 2 to 3 seconds.      Coloration: Skin is not pale.      Findings: No erythema or rash.   Neurological:      Mental Status: She is alert and oriented " to person, place, and time.      Sensory: No sensory deficit.      Gait: Gait abnormal.   Psychiatric:         Attention and Perception: Attention normal.         Mood and Affect: Mood normal.         Speech: Speech normal.         Behavior: Behavior normal.         Thought Content: Thought content normal.         Cognition and Memory: Cognition normal.         Judgment: Judgment normal.     Ankle Brachial Indices (ANIBAL)  Normal ANIBAL and PVR waveforms bilaterally.  US Lower Extremity Veins Right  Narrative: EXAMINATION:  US LOWER EXTREMITY VEINS RIGHT    CLINICAL HISTORY:  Pain in right leg    TECHNIQUE:  Duplex and color flow Doppler evaluation and graded compression of the right lower extremity veins was performed.    COMPARISON:  None    FINDINGS:  Right thigh veins: The common femoral, femoral, popliteal, and upper greater saphenous are patent and free of thrombus. The veins are normally compressible and have normal phasic flow and augmentation response.    Right calf veins: The visualized calf veins are patent.    Contralateral CFV: The contralateral (left) common femoral vein is patent and free of thrombus.    Miscellaneous: Popliteal fossa cyst measuring 5 5.6 x 1.7 x 1.0 cm.  Impression: No evidence of deep venous thrombosis in the right lower extremity.    Right popliteal fossa cyst.    Electronically signed by resident: Ivory Edwards  Date:    01/05/2023  Time:    16:46    Electronically signed by: Rome Avina MD  Date:    01/05/2023  Time:    16:49           Assessment:       Encounter Diagnoses   Name Primary?    Right foot pain     Calcaneal spur, unspecified laterality     Chronic foot pain, right Yes    Foot trauma, right, sequela          Plan:       Jenifer was seen today for heel pain and toe pain.    Diagnoses and all orders for this visit:    Chronic foot pain, right  -     MRI Foot (Forefoot) Right Without Contrast; Future  -     MRI Foot (Hindfoot) Right Without Contrast; Future    Right  foot pain  -     Ambulatory referral/consult to Podiatry    Calcaneal spur, unspecified laterality  -     Ambulatory referral/consult to Podiatry    Foot trauma, right, sequela  -     MRI Foot (Forefoot) Right Without Contrast; Future  -     MRI Foot (Hindfoot) Right Without Contrast; Future    Other orders  -     methylPREDNISolone (MEDROL DOSEPACK) 4 mg tablet; use as directed      I counseled the patient on her conditions, their implications and medical management.    Recommend MRI as patient is still having significant pain  Reviewed x-ray results from October and December all negative for fracture    Medrol dose lv prescribed, advised patient to take meds as directed. Patient should complete medication. If problems occur notify the clinic       Recommend Re-apply cam boot as patient had better pain control.  Patient has short cam boot.  Recommend tall cam boot.  Currently in clinic we do not have the selection recommend patient get 1 from our DME department or return to clinic at a future date    Follow-up 2-3 weeks sooner if needed  will send for surgical consult if needed    Follow up if symptoms worsen or fail to improve.

## 2023-02-02 ENCOUNTER — HOSPITAL ENCOUNTER (OUTPATIENT)
Dept: RADIOLOGY | Facility: HOSPITAL | Age: 63
Discharge: HOME | End: 2023-02-02
Attending: PODIATRIST
Payer: COMMERCIAL

## 2023-02-02 DIAGNOSIS — S99.921S FOOT TRAUMA, RIGHT, SEQUELA: ICD-10-CM

## 2023-02-02 DIAGNOSIS — G89.29 CHRONIC FOOT PAIN, RIGHT: ICD-10-CM

## 2023-02-02 DIAGNOSIS — M79.671 CHRONIC FOOT PAIN, RIGHT: ICD-10-CM

## 2023-02-02 PROCEDURE — 73718 MRI LOWER EXTREMITY W/O DYE: CPT | Mod: TC,RT

## 2023-02-02 PROCEDURE — 73718 MRI FOOT (FOREFOOT) RIGHT WITHOUT CONTRAST: ICD-10-PCS | Mod: 26,RT,, | Performed by: RADIOLOGY

## 2023-02-02 PROCEDURE — 73718 MRI LOWER EXTREMITY W/O DYE: CPT | Mod: 26,RT,, | Performed by: RADIOLOGY

## 2023-02-02 PROCEDURE — 73718 MRI FOOT (HINDFOOT) RIGHT WITHOUT CONTRAST: ICD-10-PCS | Mod: 26,RT,, | Performed by: RADIOLOGY

## 2023-02-03 ENCOUNTER — TELEPHONE (OUTPATIENT)
Dept: PODIATRY | Facility: CLINIC | Age: 63
End: 2023-02-03
Payer: COMMERCIAL

## 2023-02-03 NOTE — TELEPHONE ENCOUNTER
I spoke with patient about MRI results and also she is inquiring about results from second MRI on her toe and patient is wanting to know if surgery is necessary or is conservative care the better choice staff message sent     ----- Message from Beatriz Payne DPM sent at 2/3/2023  7:38 AM CST -----  Please let patient know great news  on her MRI there is no tears or fractures.  There is inflammation in her heel which is consistent with plantar fasciitis.    If she would like to pursue an injection please help her set up an appointment with me next few weeks.  If she would like a surgical consult please set her up an appointment with 1 of our surgical podiatrist thank you

## 2023-02-06 ENCOUNTER — TELEPHONE (OUTPATIENT)
Dept: PODIATRY | Facility: CLINIC | Age: 63
End: 2023-02-06
Payer: COMMERCIAL

## 2023-02-06 DIAGNOSIS — M72.2 PLANTAR FASCIITIS: ICD-10-CM

## 2023-02-06 DIAGNOSIS — M79.671 RIGHT FOOT PAIN: Primary | ICD-10-CM

## 2023-02-06 NOTE — TELEPHONE ENCOUNTER
----- Message from Charo Pablo MA sent at 2/3/2023  4:27 PM CST -----  I spoke with patient about MRI results and also she is inquiring about results from second MRI on her toe and patient is wanting to know if surgery is necessary or is conservative care the better choice; please advise     Thank you,   Charo Pablo MA  [facility]        Called pt.  Reviewed MRI. Negative for any fractures.  Tylenol is not working.   Pt relates she is in tennis shoes.   Offered pt to come in for injection. She is scared of needles.     Agrees to try p.therapy first.     Pt to call after a few p.therapy sessions with update of symptoms

## 2023-02-22 ENCOUNTER — CLINICAL SUPPORT (OUTPATIENT)
Dept: REHABILITATION | Facility: HOSPITAL | Age: 63
End: 2023-02-22
Payer: COMMERCIAL

## 2023-02-22 DIAGNOSIS — Z78.9 POOR TOLERANCE FOR AMBULATION: ICD-10-CM

## 2023-02-22 DIAGNOSIS — R26.9 ABNORMAL GAIT: ICD-10-CM

## 2023-02-22 DIAGNOSIS — M25.671 DECREASED RANGE OF MOTION OF RIGHT ANKLE: ICD-10-CM

## 2023-02-22 DIAGNOSIS — M79.671 RIGHT FOOT PAIN: ICD-10-CM

## 2023-02-22 DIAGNOSIS — M72.2 PLANTAR FASCIITIS: ICD-10-CM

## 2023-02-22 DIAGNOSIS — R29.898 DECREASED STRENGTH OF LOWER EXTREMITY: ICD-10-CM

## 2023-02-22 PROCEDURE — 97162 PT EVAL MOD COMPLEX 30 MIN: CPT

## 2023-02-22 PROCEDURE — 97140 MANUAL THERAPY 1/> REGIONS: CPT

## 2023-02-23 NOTE — PLAN OF CARE
"[facility]   Physical Therapy Initial Evaluation       Name: Jenifer Burnett  [facility] Number: [id number]    Therapy Diagnosis:   Encounter Diagnoses   Name Primary?    Right foot pain     Plantar fasciitis     Abnormal gait     Decreased range of motion of right ankle     Poor tolerance for ambulation     Decreased strength of lower extremity         Physician: Beatriz Payne DPM    Physician Orders: PT Eval and Treat right foot  Medical Diagnosis from Referral: M79.671 (ICD-10-CM) - Right foot pain M72.2 (ICD-10-CM) - Plantar fasciitis   Evaluation Date: 2/22/2023  Authorization Period Expiration: 2/6/2024  Plan of Care Expiration: 5/5/2023  Progress Note Due: 10th visit  Visit # / Visits authorized: 1/ 1   FOTO: 1/3    Precautions: Standard and Fall     Time In: 2:01 PM  Time Out: 2:58 PM  Total Appointment Time (timed & untimed codes): 57 minutes    SUBJECTIVE     Date of onset: About 7-8 months    History of current condition - Jenifer reports right foot pain that began about 7-8 months ago without mechanism of injury. She describes her symptoms as shooting, sharp, and "like stepping on nails". The morning is when the pain is at its worst or when getting up from a seated position after a prolonged period of time but she does report improvements in symptoms throughout the day. Patient also reported right knee pain and limitations, possibly requiring surgical interventions in the future, which affects her pain and function. She stated her knee pain began about 1 year ago.     Falls: Denies falls within the last year.     Imaging: MRI studies: Impression: Findings consistent with plantar fasciitis.    Prior Therapy: None.   Social History: lives with their family (mother)  Occupation: [employer] (walking/standing)  Prior Level of Function: Patient was independent in all functional mobility and activities of daily living.  Current Level of Function: Patient is moderately impaired in " ambulation, balance, stair climbing, and standing secondary to decreased right ankle dorsiflexion range of motion, gross lower extremity strength, foot intrinsic motor control, and increased pain levels.    Pain:  Current 3/10, worst 10/10, best 0/10   Location: right feet   Description: Burning, Sharp, and Shooting  Aggravating Factors: Standing, Walking, Morning, and Ankle dorsiflexion   Easing Factors: ice, rest, and Vicks    Patients goals: Be able to walk without pain     Medical History:   Past Medical History:   Diagnosis Date    Encounter for blood transfusion     GERD (gastroesophageal reflux disease)     Hypertension     Migraine     Trigeminal neuralgia 03/2018     Surgical History:   Jenifer Burnett  has a past surgical history that includes Hysterectomy; Esophagogastroduodenoscopy (N/A, 10/14/2019); Cholecystectomy (07/2012); Laparoscopic Toupet fundoplication (N/A, 11/22/2019); and Appendectomy.    Medications:   Jenifer has a current medication list which includes the following prescription(s): amlodipine, atorvastatin, carbamazepine, citalopram, cyanocobalamin (vitamin b-12), diclofenac, losartan, methylprednisolone, omeprazole, and vitamin d.    Allergies:   Review of patient's allergies indicates:  No Known Allergies       OBJECTIVE     Observation: Patient presents to the clinic with antalgic gait pattern secondary to increased pain in right plantar foot. Decreased stance time, decreased stride and step length on right lower extremity.     Active Range of Motion:   Ankle Right Left   DF (knee extended) 6 deg 11 deg   Plantarflexion 45 deg 45 deg   Inversion 49 deg 52 deg   Eversion 24 deg 26 deg      Strength:  Ankle Right Left   Dorsiflexion 4/5 4/5   Plantarflexion 4/5 4/5   Inversion 4/5 4/5   Eversion 4-/5 4-/5     Special Tests:  Anterior Drawer Neg   Talar tilt Neg   Squeeze test Neg   [place] Neg     Joint Mobility: slightly limited into dorsiflexion     Palpation: tenderness to palpation to  medial plantar calcaneous     Sensation: Intact.     Functional Tests:   - SLS EO: Right - 38 sec; Left - 32 sec  - SL Heel Raise Test: Right - 16x with 25% upper extremity assist; Left - 22x with 50% upper extremity assist  - 2 Minute Walk Test: 334 feet with antalgic gait pattern; 5/10 (somewhat difficult) on RPE scale  - 30 Second Sit to Stand: 6x with bilateral upper extremity assist    Limitation/Restriction for FOTO Foot Survey    Therapist reviewed FOTO scores for Jenifer Burnett on 2/22/2023.   FOTO documents entered into [employer] - see Media section.    Limitation Score: 57%         TREATMENT     Total Treatment time (time-based codes) separate from Evaluation: 11 minutes      Jenifer received the treatments listed below:      manual therapy techniques: Joint mobilizations, Manual traction, and Soft tissue Mobilization were applied to the right ankle/foot for 11 minutes, including:  Grade II-IV talocrural joint mobilizations for dorsiflexion and eversion  Manual talocrural joint distraction  Soft tissue mobilizations into plantar surface of right foot  Soft tissue mobilizations into gastroc/soleus complex    PATIENT EDUCATION AND HOME EXERCISES     Education provided:   - Activity Modification  - Plan of care  - Home exercise program    Written Home Exercises Provided: yes. Exercises were reviewed and Jenifer was able to demonstrate them prior to the end of the session.  Jenifer demonstrated good  understanding of the education provided. See EMR under Patient Instructions for exercises provided during therapy sessions.    ASSESSMENT     Jenifer is a 63 y.o. female referred to outpatient Physical Therapy with a medical diagnosis of right foot pain and plantar fasciitis. Upon evaluation, patient presents with decreased right ankle dorsiflexion range of motion, gross lower extremity strength, intrinsic foot motor control, single limb balance and muscular endurance, and increased pain. She is functionally limited in  ambulation, stair climbing, standing, and transfer tasks secondary to limitations stated above. Jenifer is additionally limited in functional performance due to pain, range of motion, and strength limitations in the right knee, requiring possible surgical interventions in the future. She was educated on plan of care and consented to treatment. Standard and fall precautions noted and no contraindications to therapy were identified. She will benefit from skilled PT intervention to address functional deficits identified and return to prior level of function. Patient prognosis is Fair due to limited ability to attend therapy sessions secondary to financial responsibility. Patient will benefit from skilled outpatient Physical Therapy to address the deficits stated above and in the chart below, provide patient /family education, and to maximize patientt's level of independence.     Plan of care discussed with patient: Yes  Patient's spiritual, cultural and educational needs considered and patient is agreeable to the plan of care and goals as stated below:     Anticipated Barriers for therapy: Limited therapy frequency due to financial responsibility.     Medical Necessity is demonstrated by the following  History  Co-morbidities and personal factors that may impact the plan of care Co-morbidities:   See past medical history    Personal Factors:   lifestyle     moderate   Examination  Body Structures and Functions, activity limitations and participation restrictions that may impact the plan of care Body Regions:   Knee, ankle, and foot    Body Systems:    ROM  strength  balance  gait  transfers  transitions  motor control    Participation Restrictions:   Limited therapy frequency    Activity limitations:   Learning and applying knowledge  no deficits    General Tasks and Commands  no deficits    Communication  no deficits    Mobility  walking    Self care  no deficits    Domestic Life  no  deficits    Interactions/Relationships  no deficits    Life Areas  no deficits    Community and Social Life  no deficits         high   Clinical Presentation evolving clinical presentation with changing clinical characteristics moderate   Decision Making/ Complexity Score: moderate     Goals:  Short Term Goals: 4 weeks   1. Patient will reduce maximal pain rating to < 3/10 pain to facilitate ability to sleep through the night and recover from PT interventions.  2. Patient will improve single limb balance to at least 45 seconds to improve performance with balance tasks.   3. Patient will improve dorsiflexion range of motion to at least 10 degrees to improve gait mechanics.     Long Term Goals: 8-10 weeks  1. Patient will demonstrate > 4/5 lower quarter strength to facilitate transfers from sit to stand from various surfaces without restriction.   2. Patient will improve single limb heel raise test to 25x bilaterally with <25% upper extremity assist to assist with ambulation tasks.   3. Patient will improve 2 minute walk test to at least 500 feet to improve walking tolerance.   4. Patient will improve 30 second sit to stand to at least 12x to improve performance with functional squatting tasks.     PLAN   Plan of care Certification: 2/22/2023 to 5/5/2023.    Outpatient Physical Therapy 1 times weekly for 8-10 weeks to include the following interventions: Gait Training, Manual Therapy, Moist Heat/ Ice, Neuromuscular Re-ed, Patient Education, Therapeutic Activities, and Therapeutic Exercise.     Rima Roblero, PT      I CERTIFY THE NEED FOR THESE SERVICES FURNISHED UNDER THIS PLAN OF TREATMENT AND WHILE UNDER MY CARE   Physician's comments:     Physician's Signature: ___________________________________________________

## 2023-03-01 ENCOUNTER — DOCUMENTATION ONLY (OUTPATIENT)
Dept: REHABILITATION | Facility: HOSPITAL | Age: 63
End: 2023-03-01

## 2023-03-01 NOTE — PROGRESS NOTES
Physical Therapy: No show/Cancellation of Visit  Date: 03/01/2023    Patient cancelled today's PT appointment. Patient's next scheduled appointment is 3/8/2023.     Initial Evaluation: 2/22/2023  Plan of Care Explanation: 5/5/2023  Cancel: 1  No show: 0    Therapist: Mindy Emery PTA

## 2023-03-28 ENCOUNTER — OFFICE VISIT (OUTPATIENT)
Dept: INTERNAL MEDICINE | Facility: CLINIC | Age: 63
End: 2023-03-28
Payer: COMMERCIAL

## 2023-03-28 ENCOUNTER — LAB VISIT (OUTPATIENT)
Dept: LAB | Facility: HOSPITAL | Age: 63
End: 2023-03-28
Payer: COMMERCIAL

## 2023-03-28 VITALS
BODY MASS INDEX: 30.98 KG/M2 | WEIGHT: 209.19 LBS | DIASTOLIC BLOOD PRESSURE: 90 MMHG | OXYGEN SATURATION: 96 % | HEART RATE: 95 BPM | SYSTOLIC BLOOD PRESSURE: 146 MMHG | HEIGHT: 69 IN

## 2023-03-28 DIAGNOSIS — R68.89 HEAT INTOLERANCE: ICD-10-CM

## 2023-03-28 DIAGNOSIS — M54.2 CERVICAL PAIN (NECK): ICD-10-CM

## 2023-03-28 DIAGNOSIS — I10 ESSENTIAL HYPERTENSION: ICD-10-CM

## 2023-03-28 DIAGNOSIS — G43.809 OTHER MIGRAINE WITHOUT STATUS MIGRAINOSUS, NOT INTRACTABLE: ICD-10-CM

## 2023-03-28 DIAGNOSIS — Z12.31 ENCOUNTER FOR SCREENING MAMMOGRAM FOR MALIGNANT NEOPLASM OF BREAST: ICD-10-CM

## 2023-03-28 DIAGNOSIS — F41.9 ANXIETY: ICD-10-CM

## 2023-03-28 DIAGNOSIS — Z76.89 ENCOUNTER TO ESTABLISH CARE: Primary | ICD-10-CM

## 2023-03-28 DIAGNOSIS — K21.9 GASTROESOPHAGEAL REFLUX DISEASE WITHOUT ESOPHAGITIS: ICD-10-CM

## 2023-03-28 LAB
ALBUMIN SERPL BCP-MCNC: 3.8 G/DL (ref 3.5–5.2)
ALP SERPL-CCNC: 72 U/L (ref 55–135)
ALT SERPL W/O P-5'-P-CCNC: 15 U/L (ref 10–44)
ANION GAP SERPL CALC-SCNC: 9 MMOL/L (ref 8–16)
AST SERPL-CCNC: 17 U/L (ref 10–40)
BASOPHILS # BLD AUTO: 0.04 K/UL (ref 0–0.2)
BASOPHILS NFR BLD: 1.1 % (ref 0–1.9)
BILIRUB SERPL-MCNC: 0.5 MG/DL (ref 0.1–1)
BUN SERPL-MCNC: 10 MG/DL (ref 8–23)
CALCIUM SERPL-MCNC: 9.5 MG/DL (ref 8.7–10.5)
CHLORIDE SERPL-SCNC: 104 MMOL/L (ref 95–110)
CHOLEST SERPL-MCNC: 191 MG/DL (ref 120–199)
CHOLEST/HDLC SERPL: 3.2 {RATIO} (ref 2–5)
CO2 SERPL-SCNC: 28 MMOL/L (ref 23–29)
CREAT SERPL-MCNC: 0.8 MG/DL (ref 0.5–1.4)
DIFFERENTIAL METHOD: ABNORMAL
EOSINOPHIL # BLD AUTO: 0.1 K/UL (ref 0–0.5)
EOSINOPHIL NFR BLD: 2.7 % (ref 0–8)
ERYTHROCYTE [DISTWIDTH] IN BLOOD BY AUTOMATED COUNT: 12.5 % (ref 11.5–14.5)
EST. GFR  (NO RACE VARIABLE): >60 ML/MIN/1.73 M^2
ESTIMATED AVG GLUCOSE: 105 MG/DL (ref 68–131)
GLUCOSE SERPL-MCNC: 87 MG/DL (ref 70–110)
HBA1C MFR BLD: 5.3 % (ref 4–5.6)
HCT VFR BLD AUTO: 42.2 % (ref 37–48.5)
HDLC SERPL-MCNC: 59 MG/DL (ref 40–75)
HDLC SERPL: 30.9 % (ref 20–50)
HGB BLD-MCNC: 13.4 G/DL (ref 12–16)
IMM GRANULOCYTES # BLD AUTO: 0.01 K/UL (ref 0–0.04)
IMM GRANULOCYTES NFR BLD AUTO: 0.3 % (ref 0–0.5)
LDLC SERPL CALC-MCNC: 104.2 MG/DL (ref 63–159)
LYMPHOCYTES # BLD AUTO: 1 K/UL (ref 1–4.8)
LYMPHOCYTES NFR BLD: 25.8 % (ref 18–48)
MCH RBC QN AUTO: 29 PG (ref 27–31)
MCHC RBC AUTO-ENTMCNC: 31.8 G/DL (ref 32–36)
MCV RBC AUTO: 91 FL (ref 82–98)
MONOCYTES # BLD AUTO: 0.4 K/UL (ref 0.3–1)
MONOCYTES NFR BLD: 10.9 % (ref 4–15)
NEUTROPHILS # BLD AUTO: 2.2 K/UL (ref 1.8–7.7)
NEUTROPHILS NFR BLD: 59.2 % (ref 38–73)
NONHDLC SERPL-MCNC: 132 MG/DL
NRBC BLD-RTO: 0 /100 WBC
PLATELET # BLD AUTO: 224 K/UL (ref 150–450)
PMV BLD AUTO: 9.7 FL (ref 9.2–12.9)
POTASSIUM SERPL-SCNC: 4.1 MMOL/L (ref 3.5–5.1)
PROT SERPL-MCNC: 7.4 G/DL (ref 6–8.4)
RBC # BLD AUTO: 4.62 M/UL (ref 4–5.4)
SODIUM SERPL-SCNC: 141 MMOL/L (ref 136–145)
T4 FREE SERPL-MCNC: 0.83 NG/DL (ref 0.71–1.51)
TRIGL SERPL-MCNC: 139 MG/DL (ref 30–150)
TSH SERPL DL<=0.005 MIU/L-ACNC: 1.61 UIU/ML (ref 0.4–4)
WBC # BLD AUTO: 3.68 K/UL (ref 3.9–12.7)

## 2023-03-28 PROCEDURE — 4010F ACE/ARB THERAPY RXD/TAKEN: CPT | Mod: CPTII,S$GLB,, | Performed by: INTERNAL MEDICINE

## 2023-03-28 PROCEDURE — 99214 OFFICE O/P EST MOD 30 MIN: CPT | Mod: S$GLB,,, | Performed by: INTERNAL MEDICINE

## 2023-03-28 PROCEDURE — 3008F PR BODY MASS INDEX (BMI) DOCUMENTED: ICD-10-PCS | Mod: CPTII,S$GLB,, | Performed by: INTERNAL MEDICINE

## 2023-03-28 PROCEDURE — 84443 ASSAY THYROID STIM HORMONE: CPT | Performed by: INTERNAL MEDICINE

## 2023-03-28 PROCEDURE — 36415 COLL VENOUS BLD VENIPUNCTURE: CPT | Performed by: INTERNAL MEDICINE

## 2023-03-28 PROCEDURE — 80053 COMPREHEN METABOLIC PANEL: CPT | Performed by: INTERNAL MEDICINE

## 2023-03-28 PROCEDURE — 80061 LIPID PANEL: CPT | Performed by: INTERNAL MEDICINE

## 2023-03-28 PROCEDURE — 99999 PR PBB SHADOW E&M-EST. PATIENT-LVL III: ICD-10-PCS | Mod: PBBFAC,,, | Performed by: INTERNAL MEDICINE

## 2023-03-28 PROCEDURE — 85025 COMPLETE CBC W/AUTO DIFF WBC: CPT | Performed by: INTERNAL MEDICINE

## 2023-03-28 PROCEDURE — 99214 PR OFFICE/OUTPT VISIT, EST, LEVL IV, 30-39 MIN: ICD-10-PCS | Mod: S$GLB,,, | Performed by: INTERNAL MEDICINE

## 2023-03-28 PROCEDURE — 3077F SYST BP >= 140 MM HG: CPT | Mod: CPTII,S$GLB,, | Performed by: INTERNAL MEDICINE

## 2023-03-28 PROCEDURE — 99999 PR PBB SHADOW E&M-EST. PATIENT-LVL III: CPT | Mod: PBBFAC,,, | Performed by: INTERNAL MEDICINE

## 2023-03-28 PROCEDURE — 84439 ASSAY OF FREE THYROXINE: CPT | Performed by: INTERNAL MEDICINE

## 2023-03-28 PROCEDURE — 3008F BODY MASS INDEX DOCD: CPT | Mod: CPTII,S$GLB,, | Performed by: INTERNAL MEDICINE

## 2023-03-28 PROCEDURE — 3080F DIAST BP >= 90 MM HG: CPT | Mod: CPTII,S$GLB,, | Performed by: INTERNAL MEDICINE

## 2023-03-28 PROCEDURE — 3077F PR MOST RECENT SYSTOLIC BLOOD PRESSURE >= 140 MM HG: ICD-10-PCS | Mod: CPTII,S$GLB,, | Performed by: INTERNAL MEDICINE

## 2023-03-28 PROCEDURE — 83036 HEMOGLOBIN GLYCOSYLATED A1C: CPT | Performed by: INTERNAL MEDICINE

## 2023-03-28 PROCEDURE — 3080F PR MOST RECENT DIASTOLIC BLOOD PRESSURE >= 90 MM HG: ICD-10-PCS | Mod: CPTII,S$GLB,, | Performed by: INTERNAL MEDICINE

## 2023-03-28 PROCEDURE — 4010F PR ACE/ARB THEARPY RXD/TAKEN: ICD-10-PCS | Mod: CPTII,S$GLB,, | Performed by: INTERNAL MEDICINE

## 2023-03-28 RX ORDER — CITALOPRAM 40 MG/1
40 TABLET, FILM COATED ORAL DAILY
Qty: 90 TABLET | Refills: 3 | Status: SHIPPED | OUTPATIENT
Start: 2023-03-28 | End: 2024-01-17 | Stop reason: SDUPTHER

## 2023-03-28 RX ORDER — SUMATRIPTAN 50 MG/1
50 TABLET, FILM COATED ORAL ONCE
Qty: 1 TABLET | Refills: 0 | Status: CANCELLED | OUTPATIENT
Start: 2023-03-28 | End: 2023-03-28

## 2023-03-28 RX ORDER — TIZANIDINE 2 MG/1
2 TABLET ORAL EVERY 8 HOURS PRN
Qty: 30 TABLET | Refills: 1 | Status: SHIPPED | OUTPATIENT
Start: 2023-03-28 | End: 2023-04-17

## 2023-03-28 RX ORDER — LOSARTAN POTASSIUM 50 MG/1
50 TABLET ORAL DAILY
Qty: 90 TABLET | Refills: 3 | Status: SHIPPED | OUTPATIENT
Start: 2023-03-28 | End: 2024-01-17 | Stop reason: SDUPTHER

## 2023-03-28 RX ORDER — OMEPRAZOLE 40 MG/1
40 CAPSULE, DELAYED RELEASE ORAL EVERY MORNING
Qty: 90 CAPSULE | Refills: 0 | Status: SHIPPED | OUTPATIENT
Start: 2023-03-28 | End: 2023-04-07

## 2023-03-28 NOTE — PROGRESS NOTES
Subjective:       Patient ID: Jenifer Burnett is a 63 y.o. female.    Chief Complaint: Establish Care    HPI    Mrs. Burnett is a 62 yo female who presents to establish care.     Has been having heat intolerance. Feels she cannot cool down.     HTN: initially elevated to 170s systolic but repeat was down to 146/90.   Headaches: possibly due to HTN, was previously taking Imitrex as needed.   neck pain radiates into back of skull. Heat therapy has helped.   Depression/Anxiety: well controlled on Celexa   GERD: well controlled on omeprazole.     Surgeries: Hysterectomy, Cholesystectomy     Health Maintenance:  Colon Cancer Screening: colonoscopy last done Last done 2021 with polyps removed. Due in 2026.    Mammogram: order today. Last done 2/2022   HIV: negative on 9/27/21   Hep C: negative 2019   Lipids: Order today   Vaccines: needs shingles and tdap     Review of Systems   Constitutional:  Negative for activity change, appetite change and chills.   HENT:  Negative for ear pain, sinus pressure/congestion and sneezing.    Respiratory:  Negative for cough and shortness of breath.    Cardiovascular:  Negative for chest pain, palpitations and leg swelling.   Gastrointestinal:  Negative for abdominal distention, abdominal pain, constipation, diarrhea, nausea and vomiting.   Endocrine: Positive for heat intolerance.   Genitourinary:  Negative for dysuria and hematuria.   Musculoskeletal:  Positive for neck pain. Negative for arthralgias, back pain and myalgias.   Neurological:  Positive for headaches. Negative for dizziness.   Psychiatric/Behavioral:  Negative for agitation. The patient is not nervous/anxious.          Past Medical History:   Diagnosis Date    Encounter for blood transfusion     GERD (gastroesophageal reflux disease)     Hypertension     Migraine     Trigeminal neuralgia 03/2018     Past Surgical History:   Procedure Laterality Date    APPENDECTOMY      CHOLECYSTECTOMY  07/2012     ESOPHAGOGASTRODUODENOSCOPY N/A 10/14/2019    Procedure: EGD (ESOPHAGOGASTRODUODENOSCOPY);  Surgeon: Sean Girard MD;  Location: [facility] (4TH FLR);  Service: Endoscopy;  Laterality: N/A;    HYSTERECTOMY      LAPAROSCOPIC TOUPET FUNDOPLICATION N/A 11/22/2019    Procedure: FUNDOPLICATION, LAPAROSCOPIC, TOUPET;  Surgeon: Adriano Godoy MD;  [facility];  Service: General;  Laterality: N/A;      Patient Active Problem List   Diagnosis    Essential hypertension    Migraine    Gastroesophageal reflux disease without esophagitis    History of repair of hiatal hernia    Anxiety    History of trigeminal neuralgia    Bilateral carotid artery stenosis    Syncope    Abnormal gait    Decreased range of motion of right ankle    Poor tolerance for ambulation    Decreased strength of lower extremity        Objective:      Physical Exam  Constitutional:       Appearance: Normal appearance.   HENT:      Head: Normocephalic.      Right Ear: Tympanic membrane normal.      Left Ear: Tympanic membrane normal.      Nose: Nose normal.   Cardiovascular:      Rate and Rhythm: Normal rate and regular rhythm.      Pulses: Normal pulses.      Heart sounds: Normal heart sounds.   Pulmonary:      Effort: Pulmonary effort is normal.      Breath sounds: Normal breath sounds.   Abdominal:      General: Abdomen is flat. Bowel sounds are normal.      Palpations: Abdomen is soft.   Musculoskeletal:         General: Normal range of motion.      Cervical back: Normal range of motion and neck supple.   Skin:     General: Skin is warm and dry.   Neurological:      General: No focal deficit present.      Mental Status: She is alert and oriented to person, place, and time.   Psychiatric:         Mood and Affect: Mood normal.       Assessment:       Problem List Items Addressed This Visit          Neuro    Migraine    Relevant Orders    CBC Auto Differential       Psychiatric    Anxiety    Relevant Medications    citalopram (CELEXA) 40 MG  tablet       Cardiac/Vascular    Essential hypertension    Relevant Medications    losartan (COZAAR) 50 MG tablet    Other Relevant Orders    Comprehensive Metabolic Panel    Lipid Panel    Hemoglobin A1C       GI    Gastroesophageal reflux disease without esophagitis    Relevant Medications    omeprazole (PRILOSEC) 40 MG capsule     Other Visit Diagnoses       Encounter to establish care    -  Primary    Heat intolerance        Relevant Orders    TSH    T4, FREE    Cervical pain (neck)        Relevant Orders    Ambulatory referral/consult to Physical/Occupational Therapy    Encounter for screening mammogram for malignant neoplasm of breast        Relevant Orders    Mammo Digital Screening Bilat            Plan:         Jenifer was seen today for establish care.    Diagnoses and all orders for this visit:    Encounter to establish care  Colon Cancer Screening: colonoscopy last done Last done 2021 with polyps removed. Due in 2026.    Mammogram: order today. Last done 2/2022   HIV: negative on 9/27/21   Hep C: negative 2019   Lipids: Order today   Vaccines: needs shingles and tdap     Essential hypertension  Elevated today but came down to 146/90. Will monitor at home.     Other migraine without status migrainosus, not intractable  -     CBC Auto Differential; Future    Heat intolerance  Check TFTs today     Cervical pain (neck)  -     Ambulatory referral/consult to Physical/Occupational Therapy; Future  Start tizanidine nightly.   If no improvement will refer to pain management     Anxiety  -     citalopram (CELEXA) 40 MG tablet; Take 1 tablet (40 mg total) by mouth once daily.    Gastroesophageal reflux disease without esophagitis  -     omeprazole (PRILOSEC) 40 MG capsule; Take 1 capsule (40 mg total) by mouth every morning.    Encounter for screening mammogram for malignant neoplasm of breast  -     Mammo Digital Screening Bilat; Future    Follow up in 3 months                  Kadi Carlin MD   Internal  Medicine   Primary Care

## 2023-03-29 ENCOUNTER — PATIENT MESSAGE (OUTPATIENT)
Dept: INTERNAL MEDICINE | Facility: CLINIC | Age: 63
End: 2023-03-29
Payer: COMMERCIAL

## 2023-04-07 DIAGNOSIS — K21.9 GASTROESOPHAGEAL REFLUX DISEASE WITHOUT ESOPHAGITIS: ICD-10-CM

## 2023-04-07 RX ORDER — OMEPRAZOLE 40 MG/1
CAPSULE, DELAYED RELEASE ORAL
Qty: 90 CAPSULE | Refills: 3 | Status: SHIPPED | OUTPATIENT
Start: 2023-04-07 | End: 2024-01-17 | Stop reason: SDUPTHER

## 2023-04-07 NOTE — TELEPHONE ENCOUNTER
Refill Decision Note      Refill Decision Note   Jenifer Burnett  is requesting a refill authorization.  Brief Assessment and Rationale for Refill:  Approve     Medication Therapy Plan:         Comments:     Note composed:2:31 PM 04/07/2023             Appointments     Last Visit   3/28/2023 Kadi Carlin MD   Next Visit   Visit date not found Kadi Carlin MD

## 2023-04-07 NOTE — TELEPHONE ENCOUNTER
No new care gaps identified.  [facility] Embedded Care Gaps. Reference number: [id number]. 4/07/2023   10:13:07 AM JIAN

## 2023-04-27 ENCOUNTER — HOSPITAL ENCOUNTER (OUTPATIENT)
Dept: RADIOLOGY | Facility: HOSPITAL | Age: 63
Discharge: HOME | End: 2023-04-27
Attending: INTERNAL MEDICINE
Payer: COMMERCIAL

## 2023-04-27 DIAGNOSIS — Z12.31 ENCOUNTER FOR SCREENING MAMMOGRAM FOR MALIGNANT NEOPLASM OF BREAST: ICD-10-CM

## 2023-04-27 PROCEDURE — 77063 MAMMO DIGITAL SCREENING BILAT WITH TOMO: ICD-10-PCS | Mod: 26,,, | Performed by: RADIOLOGY

## 2023-04-27 PROCEDURE — 77067 SCR MAMMO BI INCL CAD: CPT | Mod: 26,,, | Performed by: RADIOLOGY

## 2023-04-27 PROCEDURE — 77063 BREAST TOMOSYNTHESIS BI: CPT | Mod: 26,,, | Performed by: RADIOLOGY

## 2023-04-27 PROCEDURE — 77067 SCR MAMMO BI INCL CAD: CPT | Mod: TC

## 2023-04-27 PROCEDURE — 77067 MAMMO DIGITAL SCREENING BILAT WITH TOMO: ICD-10-PCS | Mod: 26,,, | Performed by: RADIOLOGY

## 2023-05-08 ENCOUNTER — PATIENT MESSAGE (OUTPATIENT)
Dept: INTERNAL MEDICINE | Facility: CLINIC | Age: 63
End: 2023-05-08
Payer: COMMERCIAL

## 2023-05-10 ENCOUNTER — OFFICE VISIT (OUTPATIENT)
Dept: INTERNAL MEDICINE | Facility: CLINIC | Age: 63
End: 2023-05-10
Payer: COMMERCIAL

## 2023-05-10 VITALS
HEART RATE: 104 BPM | BODY MASS INDEX: 31.09 KG/M2 | HEIGHT: 69 IN | OXYGEN SATURATION: 97 % | WEIGHT: 209.88 LBS | SYSTOLIC BLOOD PRESSURE: 128 MMHG | DIASTOLIC BLOOD PRESSURE: 90 MMHG

## 2023-05-10 DIAGNOSIS — J06.9 UPPER RESPIRATORY TRACT INFECTION, UNSPECIFIED TYPE: ICD-10-CM

## 2023-05-10 DIAGNOSIS — H66.90 OTITIS MEDIA, UNSPECIFIED LATERALITY, UNSPECIFIED OTITIS MEDIA TYPE: Primary | ICD-10-CM

## 2023-05-10 DIAGNOSIS — R05.9 COUGH, UNSPECIFIED TYPE: ICD-10-CM

## 2023-05-10 DIAGNOSIS — H92.02 LEFT EAR PAIN: ICD-10-CM

## 2023-05-10 LAB
CTP QC/QA: YES
MOLECULAR STREP A: NEGATIVE
POC MOLECULAR INFLUENZA A AGN: NEGATIVE
POC MOLECULAR INFLUENZA B AGN: NEGATIVE
SARS-COV-2 RDRP RESP QL NAA+PROBE: NEGATIVE

## 2023-05-10 PROCEDURE — 4010F ACE/ARB THERAPY RXD/TAKEN: CPT | Mod: CPTII,S$GLB,, | Performed by: NURSE PRACTITIONER

## 2023-05-10 PROCEDURE — 1159F MED LIST DOCD IN RCRD: CPT | Mod: CPTII,S$GLB,, | Performed by: NURSE PRACTITIONER

## 2023-05-10 PROCEDURE — 99999 PR PBB SHADOW E&M-EST. PATIENT-LVL IV: ICD-10-PCS | Mod: PBBFAC,,, | Performed by: NURSE PRACTITIONER

## 2023-05-10 PROCEDURE — 87635 SARS-COV-2 COVID-19 AMP PRB: CPT | Mod: QW,S$GLB,, | Performed by: NURSE PRACTITIONER

## 2023-05-10 PROCEDURE — 3008F BODY MASS INDEX DOCD: CPT | Mod: CPTII,S$GLB,, | Performed by: NURSE PRACTITIONER

## 2023-05-10 PROCEDURE — 3074F PR MOST RECENT SYSTOLIC BLOOD PRESSURE < 130 MM HG: ICD-10-PCS | Mod: CPTII,S$GLB,, | Performed by: NURSE PRACTITIONER

## 2023-05-10 PROCEDURE — 87635: ICD-10-PCS | Mod: QW,S$GLB,, | Performed by: NURSE PRACTITIONER

## 2023-05-10 PROCEDURE — 87651 STREP A DNA AMP PROBE: CPT | Mod: QW,S$GLB,, | Performed by: NURSE PRACTITIONER

## 2023-05-10 PROCEDURE — 3008F PR BODY MASS INDEX (BMI) DOCUMENTED: ICD-10-PCS | Mod: CPTII,S$GLB,, | Performed by: NURSE PRACTITIONER

## 2023-05-10 PROCEDURE — 3080F PR MOST RECENT DIASTOLIC BLOOD PRESSURE >= 90 MM HG: ICD-10-PCS | Mod: CPTII,S$GLB,, | Performed by: NURSE PRACTITIONER

## 2023-05-10 PROCEDURE — 1159F PR MEDICATION LIST DOCUMENTED IN MEDICAL RECORD: ICD-10-PCS | Mod: CPTII,S$GLB,, | Performed by: NURSE PRACTITIONER

## 2023-05-10 PROCEDURE — 87651 POCT STREP A MOLECULAR: ICD-10-PCS | Mod: QW,S$GLB,, | Performed by: NURSE PRACTITIONER

## 2023-05-10 PROCEDURE — 99214 PR OFFICE/OUTPT VISIT, EST, LEVL IV, 30-39 MIN: ICD-10-PCS | Mod: S$GLB,,, | Performed by: NURSE PRACTITIONER

## 2023-05-10 PROCEDURE — 3074F SYST BP LT 130 MM HG: CPT | Mod: CPTII,S$GLB,, | Performed by: NURSE PRACTITIONER

## 2023-05-10 PROCEDURE — 87502 POCT INFLUENZA A/B MOLECULAR: ICD-10-PCS | Mod: QW,S$GLB,, | Performed by: NURSE PRACTITIONER

## 2023-05-10 PROCEDURE — 99999 PR PBB SHADOW E&M-EST. PATIENT-LVL IV: CPT | Mod: PBBFAC,,, | Performed by: NURSE PRACTITIONER

## 2023-05-10 PROCEDURE — 4010F PR ACE/ARB THEARPY RXD/TAKEN: ICD-10-PCS | Mod: CPTII,S$GLB,, | Performed by: NURSE PRACTITIONER

## 2023-05-10 PROCEDURE — 87502 INFLUENZA DNA AMP PROBE: CPT | Mod: QW,S$GLB,, | Performed by: NURSE PRACTITIONER

## 2023-05-10 PROCEDURE — 3080F DIAST BP >= 90 MM HG: CPT | Mod: CPTII,S$GLB,, | Performed by: NURSE PRACTITIONER

## 2023-05-10 PROCEDURE — 3044F PR MOST RECENT HEMOGLOBIN A1C LEVEL <7.0%: ICD-10-PCS | Mod: CPTII,S$GLB,, | Performed by: NURSE PRACTITIONER

## 2023-05-10 PROCEDURE — 3044F HG A1C LEVEL LT 7.0%: CPT | Mod: CPTII,S$GLB,, | Performed by: NURSE PRACTITIONER

## 2023-05-10 PROCEDURE — 99214 OFFICE O/P EST MOD 30 MIN: CPT | Mod: S$GLB,,, | Performed by: NURSE PRACTITIONER

## 2023-05-10 RX ORDER — AMOXICILLIN AND CLAVULANATE POTASSIUM 500; 125 MG/1; MG/1
1 TABLET, FILM COATED ORAL 2 TIMES DAILY
Qty: 14 TABLET | Refills: 0 | Status: SHIPPED | OUTPATIENT
Start: 2023-05-10 | End: 2023-08-24

## 2023-05-10 RX ORDER — BENZONATATE 100 MG/1
100 CAPSULE ORAL 3 TIMES DAILY PRN
Qty: 30 CAPSULE | Refills: 0 | Status: SHIPPED | OUTPATIENT
Start: 2023-05-10 | End: 2023-05-20

## 2023-05-10 NOTE — PROGRESS NOTES
"  INTERNAL MEDICINE CLINIC - SAME DAY APPOINTMENT  Progress Note    PRESENTING HISTORY     PCP: Kadi Carlin MD    Chief Complaint/Reason for Visit:   No chief complaint on file.    History of Present Illness & ROS : Ms. Jenifer Burnett is a 63 y.o. female.    Same day apt.   Est'd with Dr. Carlin.  Very pleasant lady.   Reports that for the past 3 days has been having " severe ear pain, a cough and sore throat". Grandmartha tested positive of RSV last week and she was caring for him. Symptoms started afterwards   Taking mucinex.   No fever, body aches or chills.     Review of Systems:   Eyes: denies visual changes at this time denies floaters   Cardiovascular: no chest pain or palpitations  Gastrointestinal: no nausea or vomiting, no abdominal pain or change in bowel habits  Genitourinary: no hematuria or dysuria; denies frequency  Hematologic/Lymphatic: no easy bruising or lymphadenopathy  Musculoskeletal: no arthralgias or myalgias  Neurological: no seizures or tremors  Endocrine: no heat or cold intolerance      PAST HISTORY:     Past Medical History:   Diagnosis Date    Encounter for blood transfusion     GERD (gastroesophageal reflux disease)     Hypertension     Migraine     Trigeminal neuralgia 03/2018       Past Surgical History:   Procedure Laterality Date    APPENDECTOMY      CHOLECYSTECTOMY  07/2012    ESOPHAGOGASTRODUODENOSCOPY N/A 10/14/2019    Procedure: EGD (ESOPHAGOGASTRODUODENOSCOPY);  Surgeon: Sean Girard MD;  Location: [address];  Service: Endoscopy;  Laterality: N/A;    HYSTERECTOMY      LAPAROSCOPIC TOUPET FUNDOPLICATION N/A 11/22/2019    Procedure: FUNDOPLICATION, LAPAROSCOPIC, TOUPET;  Surgeon: Adriano Godoy MD;  Location: [address];  Service: General;  Laterality: N/A;       Family History   Problem Relation Age of Onset    Breast cancer Mother        Social History     Socioeconomic History    Marital status: Single   Tobacco Use    Smoking status: Never    Smokeless " tobacco: Never   Substance and Sexual Activity    Alcohol use: No    Drug use: No    Sexual activity: Yes     Partners: Male   Social History Narrative    Working at [email or web address], does do bending/lifting type work        MEDICATIONS & ALLERGIES:     Current Outpatient Medications on File Prior to Visit   Medication Sig Dispense Refill    carBAMazepine (CARBATROL) 200 MG CM12 Take 1 capsule (200 mg total) by mouth 2 (two) times daily. Take 1 pill daily x 1 week, then increase to 1 pill morning and evening 60 capsule 11    citalopram (CELEXA) 40 MG tablet Take 1 tablet (40 mg total) by mouth once daily. 90 tablet 3    cyanocobalamin, vitamin B-12, 2,500 mcg Tab Take 1 tablet by mouth once daily.      diclofenac (VOLTAREN) 75 MG EC tablet Take 1 tablet (75 mg total) by mouth 2 (two) times daily as needed. 30 tablet 0    losartan (COZAAR) 50 MG tablet Take 1 tablet (50 mg total) by mouth once daily. For blood pressure 90 tablet 3    omeprazole (PRILOSEC) 40 MG capsule TAKE ONE CAPSULE BY MOUTH EVERY MORNING 90 capsule 3    vitamin D (VITAMIN D3) 1000 units Tab Take 2,000 Units by mouth once daily.       No current facility-administered medications on file prior to visit.        Review of patient's allergies indicates:  No Known Allergies    Medications Reconciliation:   I have reconciled the patient's home medications with the patient/family. I have updated all changes.  Refer to After-Visit Medication List.    OBJECTIVE:     Vital Signs:  There were no vitals filed for this visit.  Wt Readings from Last 3 Encounters:   03/28/23 1024 94.9 kg (209 lb 3.5 oz)   01/11/23 1405 96.6 kg (212 lb 15.4 oz)   12/29/22 1331 96.6 kg (212 lb 15.4 oz)     There is no height or weight on file to calculate BMI.   Wt Readings from Last 3 Encounters:   05/10/23 95.2 kg (209 lb 14.1 oz)   03/28/23 94.9 kg (209 lb 3.5 oz)   01/11/23 96.6 kg (212 lb 15.4 oz)     Temp Readings from Last 3 Encounters:   01/28/22 98 °F (36.7 °C) (Oral)    12/10/19 98 °F (36.7 °C) (Oral)   12/10/19 98.5 °F (36.9 °C)     BP Readings from Last 3 Encounters:   05/10/23 (!) 128/90   03/28/23 (!) 146/90   01/11/23 (!) 146/83     Pulse Readings from Last 3 Encounters:   05/10/23 104   03/28/23 95   01/11/23 90       Physical Exam:  (Focused Exam)  General: Well developed, well nourished. No distress.  HEENT: Head is normocephalic, atraumatic  Right TM: unremarkable   Left TM: + serous fluid and erythema   Posterior Oral Pharynx: + gross erythema, no exudate or purulence   Eyes: Clear conjunctiva.  Neck: Supple, symmetrical neck; trachea midline.  Lungs: Clear to auscultation bilaterally and normal respiratory effort.  Cardiovascular: Heart with regular rate and rhythm. No murmurs, gallops or rubs  Extremities: No LE edema. Pulses 2+ and symmetric.   Skin: Skin color, texture, turgor normal. No rashes.  Musculoskeletal: Normal gait.   Neurologic: Normal strength and tone. No focal numbness or weakness.       Laboratory  Lab Results   Component Value Date    WBC 3.68 (L) 03/28/2023    HGB 13.4 03/28/2023    HCT 42.2 03/28/2023     03/28/2023    CHOL 191 03/28/2023    TRIG 139 03/28/2023    HDL 59 03/28/2023    ALT 15 03/28/2023    AST 17 03/28/2023     03/28/2023    K 4.1 03/28/2023     03/28/2023    CREATININE 0.8 03/28/2023    BUN 10 03/28/2023    CO2 28 03/28/2023    TSH 1.614 03/28/2023    INR 1.0 12/01/2014    HGBA1C 5.3 03/28/2023       ASSESSMENT & PLAN:     Same Day appt.       Otitis media, unspecified laterality, unspecified otitis media type / Left ear pain    Upper Respiratory Infection    Cough, unspecified type  -     POCT COVID-19 Rapid Screening (negative)  -     POCT Influenza A/B Molecular (negative)  -     POCT Strep A, Molecular (negative)  -     amoxicillin-clavulanate 500-125mg (AUGMENTIN) 500-125 mg Tab; Take 1 tablet (500 mg total) by mouth 2 (two) times daily.  Dispense: 14 tablet; Refill: 0  -     benzonatate (TESSALON) 100 MG  capsule; Take 1 capsule (100 mg total) by mouth 3 (three) times daily as needed for Cough.  Dispense: 30 capsule; Refill: 0         Medication List            Accurate as of May 10, 2023 11:38 AM. If you have any questions, ask your nurse or doctor.                START taking these medications      amoxicillin-clavulanate 500-125mg 500-125 mg Tab  Commonly known as: AUGMENTIN  Take 1 tablet (500 mg total) by mouth 2 (two) times daily.  Started by: JEROD Ortiz     benzonatate 100 MG capsule  Commonly known as: TESSALON  Take 1 capsule (100 mg total) by mouth 3 (three) times daily as needed for Cough.  Started by: JEROD Ortiz            CONTINUE taking these medications      carBAMazepine 200 MG Cm12  Commonly known as: CARBATROL  Take 1 capsule (200 mg total) by mouth 2 (two) times daily. Take 1 pill daily x 1 week, then increase to 1 pill morning and evening     citalopram 40 MG tablet  Commonly known as: CeleXA  Take 1 tablet (40 mg total) by mouth once daily.     cyanocobalamin (vitamin B-12) 2,500 mcg Tab     diclofenac 75 MG EC tablet  Commonly known as: VOLTAREN  Take 1 tablet (75 mg total) by mouth 2 (two) times daily as needed.     losartan 50 MG tablet  Commonly known as: COZAAR  Take 1 tablet (50 mg total) by mouth once daily. For blood pressure     omeprazole 40 MG capsule  Commonly known as: PRILOSEC  TAKE ONE CAPSULE BY MOUTH EVERY MORNING     vitamin D 1000 units Tab  Commonly known as: VITAMIN D3               Where to Get Your Medications        These medications were sent to [facility]/pharmacy #[id number] - [place] - [address]  [address], [facility] [address]      Phone: [phone]   amoxicillin-clavulanate 500-125mg 500-125 mg Tab  benzonatate 100 MG capsule       Signing Physician:  JEROD Ortiz

## 2023-06-02 ENCOUNTER — PATIENT OUTREACH (OUTPATIENT)
Dept: ADMINISTRATIVE | Facility: HOSPITAL | Age: 63
End: 2023-06-02
Payer: COMMERCIAL

## 2023-06-02 NOTE — PROGRESS NOTES
Health Maintenance Due   Topic Date Due    TETANUS VACCINE  Never done    High Dose Statin  Never done    Shingles Vaccine (1 of 2) Never done    COVID-19 Vaccine (3 - Moderna series) 06/15/2021     Triggered LINKS. Updated Care Everywhere. Chart review completed as part of May Statin report.

## 2023-08-24 ENCOUNTER — OFFICE VISIT (OUTPATIENT)
Dept: INTERNAL MEDICINE | Facility: CLINIC | Age: 63
End: 2023-08-24
Payer: COMMERCIAL

## 2023-08-24 VITALS
SYSTOLIC BLOOD PRESSURE: 126 MMHG | HEIGHT: 69 IN | DIASTOLIC BLOOD PRESSURE: 82 MMHG | HEART RATE: 83 BPM | OXYGEN SATURATION: 98 % | WEIGHT: 210.56 LBS | BODY MASS INDEX: 31.19 KG/M2

## 2023-08-24 DIAGNOSIS — M79.661 PAIN OF RIGHT LOWER LEG: Primary | ICD-10-CM

## 2023-08-24 DIAGNOSIS — I83.813 VARICOSE VEINS OF BILATERAL LOWER EXTREMITIES WITH PAIN: ICD-10-CM

## 2023-08-24 PROCEDURE — 4010F ACE/ARB THERAPY RXD/TAKEN: CPT | Mod: CPTII,S$GLB,, | Performed by: INTERNAL MEDICINE

## 2023-08-24 PROCEDURE — 99999 PR PBB SHADOW E&M-EST. PATIENT-LVL IV: CPT | Mod: PBBFAC,,, | Performed by: INTERNAL MEDICINE

## 2023-08-24 PROCEDURE — 3008F PR BODY MASS INDEX (BMI) DOCUMENTED: ICD-10-PCS | Mod: CPTII,S$GLB,, | Performed by: INTERNAL MEDICINE

## 2023-08-24 PROCEDURE — 3074F SYST BP LT 130 MM HG: CPT | Mod: CPTII,S$GLB,, | Performed by: INTERNAL MEDICINE

## 2023-08-24 PROCEDURE — 3079F DIAST BP 80-89 MM HG: CPT | Mod: CPTII,S$GLB,, | Performed by: INTERNAL MEDICINE

## 2023-08-24 PROCEDURE — 99214 PR OFFICE/OUTPT VISIT, EST, LEVL IV, 30-39 MIN: ICD-10-PCS | Mod: S$GLB,,, | Performed by: INTERNAL MEDICINE

## 2023-08-24 PROCEDURE — 1159F PR MEDICATION LIST DOCUMENTED IN MEDICAL RECORD: ICD-10-PCS | Mod: CPTII,S$GLB,, | Performed by: INTERNAL MEDICINE

## 2023-08-24 PROCEDURE — 3074F PR MOST RECENT SYSTOLIC BLOOD PRESSURE < 130 MM HG: ICD-10-PCS | Mod: CPTII,S$GLB,, | Performed by: INTERNAL MEDICINE

## 2023-08-24 PROCEDURE — 3079F PR MOST RECENT DIASTOLIC BLOOD PRESSURE 80-89 MM HG: ICD-10-PCS | Mod: CPTII,S$GLB,, | Performed by: INTERNAL MEDICINE

## 2023-08-24 PROCEDURE — 4010F PR ACE/ARB THEARPY RXD/TAKEN: ICD-10-PCS | Mod: CPTII,S$GLB,, | Performed by: INTERNAL MEDICINE

## 2023-08-24 PROCEDURE — 3008F BODY MASS INDEX DOCD: CPT | Mod: CPTII,S$GLB,, | Performed by: INTERNAL MEDICINE

## 2023-08-24 PROCEDURE — 1159F MED LIST DOCD IN RCRD: CPT | Mod: CPTII,S$GLB,, | Performed by: INTERNAL MEDICINE

## 2023-08-24 PROCEDURE — 3044F HG A1C LEVEL LT 7.0%: CPT | Mod: CPTII,S$GLB,, | Performed by: INTERNAL MEDICINE

## 2023-08-24 PROCEDURE — 99214 OFFICE O/P EST MOD 30 MIN: CPT | Mod: S$GLB,,, | Performed by: INTERNAL MEDICINE

## 2023-08-24 PROCEDURE — 3044F PR MOST RECENT HEMOGLOBIN A1C LEVEL <7.0%: ICD-10-PCS | Mod: CPTII,S$GLB,, | Performed by: INTERNAL MEDICINE

## 2023-08-24 PROCEDURE — 99999 PR PBB SHADOW E&M-EST. PATIENT-LVL IV: ICD-10-PCS | Mod: PBBFAC,,, | Performed by: INTERNAL MEDICINE

## 2023-08-24 RX ORDER — DICLOFENAC SODIUM 75 MG/1
75 TABLET, DELAYED RELEASE ORAL 2 TIMES DAILY PRN
Qty: 30 TABLET | Refills: 0 | Status: SHIPPED | OUTPATIENT
Start: 2023-08-24 | End: 2023-09-08

## 2023-08-24 NOTE — PROGRESS NOTES
INTERNAL MEDICINE CLINIC - SAME DAY APPOINTMENT  Progress Note    PRESENTING HISTORY     PCP: Kadi Carlin MD    Chief Complaint/Reason for Visit:     Chief Complaint   Patient presents with    Leg Pain     History of Present Illness & ROS : Ms. Jenifer Burnett is a 63 y.o. female.      Pain from right knee to foot for one month.  Pain when seating or walking.  She has knee pain    No chest pain or SOB.    PAST HISTORY:     Past Medical History:   Diagnosis Date    Encounter for blood transfusion     GERD (gastroesophageal reflux disease)     Hypertension     Migraine     Trigeminal neuralgia 03/2018       Past Surgical History:   Procedure Laterality Date    APPENDECTOMY      CHOLECYSTECTOMY  07/2012    ESOPHAGOGASTRODUODENOSCOPY N/A 10/14/2019    Procedure: EGD (ESOPHAGOGASTRODUODENOSCOPY);  Surgeon: Sean Girard MD;  Location: [facility] (4[facility]);  Service: Endoscopy;  Laterality: N/A;    HYSTERECTOMY      LAPAROSCOPIC TOUPET FUNDOPLICATION N/A 11/22/2019    Procedure: FUNDOPLICATION, LAPAROSCOPIC, TOUPET;  Surgeon: Adriano Godoy MD;  Location: [facility] OR [address] [place] [address];  Service: General;  Laterality: N/A;       Family History   Problem Relation Age of Onset    Breast cancer Mother        Social History     Socioeconomic History    Marital status: Single   Tobacco Use    Smoking status: Never    Smokeless tobacco: Never   Substance and Sexual Activity    Alcohol use: No    Drug use: No    Sexual activity: Yes     Partners: Male   Social History Narrative    Working at [employer], does do bending/lifting type work        MEDICATIONS & ALLERGIES:     Current Outpatient Medications on File Prior to Visit   Medication Sig Dispense Refill    citalopram (CELEXA) 40 MG tablet Take 1 tablet (40 mg total) by mouth once daily. 90 tablet 3    cyanocobalamin, vitamin B-12, 2,500 mcg Tab Take 1 tablet by mouth once daily.      losartan (COZAAR) 50 MG tablet Take 1 tablet (50 mg total) by mouth once daily. For blood  pressure 90 tablet 3    omeprazole (PRILOSEC) 40 MG capsule TAKE ONE CAPSULE BY MOUTH EVERY MORNING 90 capsule 3    vitamin D (VITAMIN D3) 1000 units Tab Take 2,000 Units by mouth once daily.      carBAMazepine (CARBATROL) 200 MG CM12 Take 1 capsule (200 mg total) by mouth 2 (two) times daily. Take 1 pill daily x 1 week, then increase to 1 pill morning and evening 60 capsule 11    [DISCONTINUED] amoxicillin-clavulanate 500-125mg (AUGMENTIN) 500-125 mg Tab Take 1 tablet (500 mg total) by mouth 2 (two) times daily. (Patient not taking: Reported on 8/24/2023) 14 tablet 0    [DISCONTINUED] diclofenac (VOLTAREN) 75 MG EC tablet Take 1 tablet (75 mg total) by mouth 2 (two) times daily as needed. (Patient not taking: Reported on 8/24/2023) 30 tablet 0     No current facility-administered medications on file prior to visit.        Review of patient's allergies indicates:  No Known Allergies    Medications Reconciliation:   I have reconciled the patient's home medications with the patient/family. I have updated all changes.  Refer to After-Visit Medication List.    OBJECTIVE:     Vital Signs:  Vitals:    08/24/23 1738   BP: 126/82   Pulse: 83     Wt Readings from Last 3 Encounters:   08/24/23 1738 95.5 kg (210 lb 8.6 oz)   05/10/23 1049 95.2 kg (209 lb 14.1 oz)   03/28/23 1024 94.9 kg (209 lb 3.5 oz)     Body mass index is 31.09 kg/m².     Physical Exam:  General: Well developed, well nourished. No distress.  HEENT: Head is normocephalic, atraumatic  Eyes: Clear conjunctiva.  Neck: Supple, symmetrical neck; trachea midline.  Lungs:normal respiratory effort.  Cardiovascular: Heart with regular rate and rhythm.    Abdomen: Abdomen is soft, non-tender non-distended with normal bowel sounds.  Psychiatric: Normal affect. Alert.      Tender to right lateral knee area. A large resolving bruise noted.  No calf tenderness.    Laboratory  Lab Results   Component Value Date    WBC 3.68 (L) 03/28/2023    HGB 13.4 03/28/2023    HCT 42.2  03/28/2023     03/28/2023    CHOL 191 03/28/2023    TRIG 139 03/28/2023    HDL 59 03/28/2023    ALT 15 03/28/2023    AST 17 03/28/2023     03/28/2023    K 4.1 03/28/2023     03/28/2023    CREATININE 0.8 03/28/2023    BUN 10 03/28/2023    CO2 28 03/28/2023    TSH 1.614 03/28/2023    INR 1.0 12/01/2014    HGBA1C 5.3 03/28/2023       ASSESSMENT & PLAN:     Pain of right lower leg  Varicose veins of bilateral lower extremities with pain  - Rule out knee issues vs ruptured Baker's cyst vs thrombophlebitis  -     Ambulatory referral/consult to Orthopedics; Future; Expected date: 08/31/2023  -     CV Ultrasound doppler venous DVT leg right; Future    Rx:  -     diclofenac (VOLTAREN) 75 MG EC tablet; Take 1 tablet (75 mg total) by mouth 2 (two) times daily as needed (pain). Take with food  Dispense: 30 tablet; Refill: 0    After Visit Medication List :     Medication List            Accurate as of August 24, 2023  6:10 PM. If you have any questions, ask your nurse or doctor.                CHANGE how you take these medications      diclofenac 75 MG EC tablet  Commonly known as: VOLTAREN  Take 1 tablet (75 mg total) by mouth 2 (two) times daily as needed (pain). Take with food  What changed:   reasons to take this  additional instructions  Changed by: Brendon Charles MD            CONTINUE taking these medications      carBAMazepine 200 MG Cm12  Commonly known as: CARBATROL  Take 1 capsule (200 mg total) by mouth 2 (two) times daily. Take 1 pill daily x 1 week, then increase to 1 pill morning and evening     citalopram 40 MG tablet  Commonly known as: CeleXA  Take 1 tablet (40 mg total) by mouth once daily.     cyanocobalamin (vitamin B-12) 2,500 mcg Tab     losartan 50 MG tablet  Commonly known as: COZAAR  Take 1 tablet (50 mg total) by mouth once daily. For blood pressure     omeprazole 40 MG capsule  Commonly known as: PRILOSEC  TAKE ONE CAPSULE BY MOUTH EVERY MORNING     vitamin D 1000 units  Tab  Commonly known as: VITAMIN D3            STOP taking these medications      amoxicillin-clavulanate 500-125mg 500-125 mg Tab  Commonly known as: AUGMENTIN  Stopped by: Brendon Charles MD               Where to Get Your Medications        These medications were sent to [facility] #[id number] [address] [address]      Phone: [phone]   diclofenac 75 MG EC tablet         Signing Physician:  Brendon Charles MD

## 2023-08-25 ENCOUNTER — PATIENT MESSAGE (OUTPATIENT)
Dept: SPORTS MEDICINE | Facility: CLINIC | Age: 63
End: 2023-08-25
Payer: COMMERCIAL

## 2023-08-25 ENCOUNTER — HOSPITAL ENCOUNTER (OUTPATIENT)
Dept: CARDIOLOGY | Facility: HOSPITAL | Age: 63
Discharge: HOME | End: 2023-08-25
Attending: INTERNAL MEDICINE
Payer: COMMERCIAL

## 2023-08-25 ENCOUNTER — TELEPHONE (OUTPATIENT)
Dept: SPORTS MEDICINE | Facility: CLINIC | Age: 63
End: 2023-08-25
Payer: COMMERCIAL

## 2023-08-25 DIAGNOSIS — I83.813 VARICOSE VEINS OF BILATERAL LOWER EXTREMITIES WITH PAIN: ICD-10-CM

## 2023-08-25 DIAGNOSIS — M79.661 PAIN OF RIGHT LOWER LEG: ICD-10-CM

## 2023-08-25 PROCEDURE — 93971 CV US DOPPLER VENOUS LEG RIGHT (CUPID ONLY): ICD-10-PCS | Mod: 26,RT,, | Performed by: INTERNAL MEDICINE

## 2023-08-25 PROCEDURE — 93971 EXTREMITY STUDY: CPT | Mod: RT

## 2023-08-25 PROCEDURE — 93971 EXTREMITY STUDY: CPT | Mod: 26,RT,, | Performed by: INTERNAL MEDICINE

## 2023-08-25 NOTE — TELEPHONE ENCOUNTER
LVM with patient for more specifics about right leg pain.     Mare Hall ATC, OTC  Sports Medicine Assistant - Dr. Rome Lezama   [facility] [place]

## 2023-08-28 NOTE — PROGRESS NOTES
CC: Right lower leg and knee pain; back pain    63 y.o. Female who presents as a new patient to me.  Chief complaint of right knee and lower leg pain.  She is had this now for years.  The knee pain has been seen and managed by Dr. Armand Kearns.  She had a previous arthroscopic debridement with partial meniscectomy years ago.  The knee has continued to bother her.  She describes painful mechanical symptoms with subjective stiffness and pain on prolonged standing and walking.  No prior injections.  She reports a fear of needles.  Additional treatment has included formal therapy, oral medication.  This is a daily issue for her.  More recently she also has had some lower leg numbness and tingling with achiness that extends into her toes.  Localized anterolaterally.  She also has some chronic low back pain.  This is bother her for at least 6 weeks despite initial conservative treatment to include oral medication such as diclofenac, activity modifications, self-directed therapy.    BMI 31    REVIEW OF SYSTEMS:   Constitution: Negative. Negative for chills, fever and night sweats.    Hematologic/Lymphatic: Negative for bleeding problem. Does not bruise/bleed easily.   Skin: Negative for dry skin, itching and rash.   Musculoskeletal: Negative for falls. Positive for right lower leg and knee pain and muscle weakness.     All other review of symptoms were reviewed and found to be noncontributory.     PAST MEDICAL HISTORY:   Past Medical History:   Diagnosis Date    Encounter for blood transfusion     GERD (gastroesophageal reflux disease)     Hypertension     Migraine     Trigeminal neuralgia 03/2018     PAST SURGICAL HISTORY:   Past Surgical History:   Procedure Laterality Date    APPENDECTOMY      CHOLECYSTECTOMY  07/2012    ESOPHAGOGASTRODUODENOSCOPY N/A 10/14/2019    Procedure: EGD (ESOPHAGOGASTRODUODENOSCOPY);  Surgeon: Sean Girard MD;  Location: [address]);  Service: Endoscopy;  Laterality: N/A;     "HYSTERECTOMY      LAPAROSCOPIC TOUPET FUNDOPLICATION N/A 11/22/2019    Procedure: FUNDOPLICATION, LAPAROSCOPIC, TOUPET;  Surgeon: Adriano Godoy MD;  Location: [facility] OR [address] [place] [address];  Service: General;  Laterality: N/A;     FAMILY HISTORY:   Family History   Problem Relation Age of Onset    Breast cancer Mother      SOCIAL HISTORY:   Social History     Socioeconomic History    Marital status: Single   Tobacco Use    Smoking status: Never    Smokeless tobacco: Never   Substance and Sexual Activity    Alcohol use: No    Drug use: No    Sexual activity: Yes     Partners: Male   Social History Narrative    Working at [employer], does do bending/lifting type work      MEDICATIONS:     Current Outpatient Medications:     citalopram (CELEXA) 40 MG tablet, Take 1 tablet (40 mg total) by mouth once daily., Disp: 90 tablet, Rfl: 3    cyanocobalamin, vitamin B-12, 2,500 mcg Tab, Take 1 tablet by mouth once daily., Disp: , Rfl:     diclofenac (VOLTAREN) 75 MG EC tablet, Take 1 tablet (75 mg total) by mouth 2 (two) times daily as needed (pain). Take with food, Disp: 30 tablet, Rfl: 0    losartan (COZAAR) 50 MG tablet, Take 1 tablet (50 mg total) by mouth once daily. For blood pressure, Disp: 90 tablet, Rfl: 3    omeprazole (PRILOSEC) 40 MG capsule, TAKE ONE CAPSULE BY MOUTH EVERY MORNING, Disp: 90 capsule, Rfl: 3    vitamin D (VITAMIN D3) 1000 units Tab, Take 2,000 Units by mouth once daily., Disp: , Rfl:     carBAMazepine (CARBATROL) 200 MG CM12, Take 1 capsule (200 mg total) by mouth 2 (two) times daily. Take 1 pill daily x 1 week, then increase to 1 pill morning and evening, Disp: 60 capsule, Rfl: 11    ALLERGIES:   Review of patient's allergies indicates:  No Known Allergies     PHYSICAL EXAMINATION:  /84   Pulse 87   Ht 5' 9" (1.753 m)   Wt 95 kg (209 lb 7 oz)   BMI 30.93 kg/m²   General: Well-developed well-nourished 63 y.o. femalein no acute distress   Cardiovascular: Regular rhythm by palpation of distal " pulse, normal color and temperature, no concerning varicosities on symptomatic side   Lungs: No labored breathing or wheezing appreciated   Neuro: Alert and oriented ×3   Psychiatric: well oriented to person, place and time, demonstrates normal mood and affect   Skin: No rashes, lesions or ulcers, normal temperature, turgor, and texture on involved extremity    Ortho/SPM Exam  Examination of the right knee demonstrates some chronic soft tissue swelling, no significant effusion.  Intact extensor mechanism.  Less than 5 degree flexion contracture.  Pain to palpation over the medial and lateral joint lines.  Pain medially with forced flexion and extension.  Positive patellar crepitus and grind.  Stable to varus and valgus stress.  No pain with passive internal and external rotation of the right hip.  She does have a positive straight leg raise for typical right lower leg complaints.  Midline lumbar spine tenderness.    IMAGING:  X-rays including standing, weight bearing AP and flexion bilateral knees, RIGHT knee lateral and sunrise views ordered and images reviewed by me show:    Advanced DJD, tricompartmental with bone-on-bone articulation medially. KL4.     X-rays including 2 views of RIGHT tib-fib ordered and images reviewed by me show:  Negative    Lumbar spine x-rays to include AP and lateral views were ordered and reviewed by me showing:    CV Ultrasound doppler venous DVT left right 8/25/23:    There is no evidence of a right lower extremity DVT.    A Baker's cyst measuring 4.2 cm x 1.2 cm  was seen in the right extremity.    ASSESSMENT:      ICD-10-CM ICD-9-CM   1. Primary osteoarthritis of right knee  M17.11 715.16   2. Chronic pain of right knee  M25.561 719.46    G89.29 338.29   3. Pain of right lower leg  M79.661 729.5   4. Dorsalgia, unspecified  M54.9 724.5   5. Chronic right-sided low back pain, unspecified whether sciatica present  M54.50 724.2    G89.29 338.29       PLAN:     Findings discussed with  the patient.  She has advanced DJD of the knee.  This has been a chronic ongoing issue for her and she would like to discuss treatment options.  The details of a total knee arthroplasty were reviewed.  We discussed the expected postop rehab and recovery course.  Outlined risks of surgery include but are not limited to continued or recurrent pain and up to 30% of patients, stiffness, fracture, aseptic loosening, infection, DVT/PE among others.  I do expect her to do well and thinks she is a good candidate for the knee replacement procedure.  After patient's specific factors were taken into account, I do think the [employer] system would be best fitting in this case.  She does have some numbness and tingling down her right lower leg with associated pain which I think is a separate issue and likely lumbar spine related.  This has been going on now for 6 weeks despite initial conservative treatment.  I would recommend some additional workup of that before we proceed with any surgery on her right knee.  MRI of the lumbar spine was ordered.  Virtual visit afterwards to discuss results and treatment options.  I will place her on a Medrol Dosepak in the meantime to see what kind of relief she can get from that.  Hold on diclofenac.  May require referral to the spine team.    Procedures

## 2023-08-29 ENCOUNTER — HOSPITAL ENCOUNTER (OUTPATIENT)
Dept: RADIOLOGY | Facility: HOSPITAL | Age: 63
Discharge: HOME | End: 2023-08-29
Attending: ORTHOPAEDIC SURGERY
Payer: COMMERCIAL

## 2023-08-29 ENCOUNTER — OFFICE VISIT (OUTPATIENT)
Dept: SPORTS MEDICINE | Facility: CLINIC | Age: 63
End: 2023-08-29
Payer: COMMERCIAL

## 2023-08-29 VITALS
BODY MASS INDEX: 31.02 KG/M2 | HEART RATE: 87 BPM | DIASTOLIC BLOOD PRESSURE: 84 MMHG | HEIGHT: 69 IN | SYSTOLIC BLOOD PRESSURE: 128 MMHG | WEIGHT: 209.44 LBS

## 2023-08-29 DIAGNOSIS — G89.29 CHRONIC RIGHT-SIDED LOW BACK PAIN, UNSPECIFIED WHETHER SCIATICA PRESENT: ICD-10-CM

## 2023-08-29 DIAGNOSIS — M54.50 CHRONIC RIGHT-SIDED LOW BACK PAIN, UNSPECIFIED WHETHER SCIATICA PRESENT: ICD-10-CM

## 2023-08-29 DIAGNOSIS — M79.661 PAIN OF RIGHT LOWER LEG: ICD-10-CM

## 2023-08-29 DIAGNOSIS — M17.11 PRIMARY OSTEOARTHRITIS OF RIGHT KNEE: Primary | ICD-10-CM

## 2023-08-29 DIAGNOSIS — M25.561 RIGHT KNEE PAIN, UNSPECIFIED CHRONICITY: ICD-10-CM

## 2023-08-29 DIAGNOSIS — M54.9 DORSALGIA, UNSPECIFIED: ICD-10-CM

## 2023-08-29 DIAGNOSIS — M25.561 CHRONIC PAIN OF RIGHT KNEE: ICD-10-CM

## 2023-08-29 DIAGNOSIS — G89.29 CHRONIC PAIN OF RIGHT KNEE: ICD-10-CM

## 2023-08-29 PROCEDURE — 73564 XR KNEE ORTHO RIGHT WITH FLEXION: ICD-10-PCS | Mod: 26,RT,, | Performed by: RADIOLOGY

## 2023-08-29 PROCEDURE — 3008F PR BODY MASS INDEX (BMI) DOCUMENTED: ICD-10-PCS | Mod: CPTII,S$GLB,, | Performed by: ORTHOPAEDIC SURGERY

## 2023-08-29 PROCEDURE — 99999 PR PBB SHADOW E&M-EST. PATIENT-LVL IV: CPT | Mod: PBBFAC,,, | Performed by: ORTHOPAEDIC SURGERY

## 2023-08-29 PROCEDURE — 3008F BODY MASS INDEX DOCD: CPT | Mod: CPTII,S$GLB,, | Performed by: ORTHOPAEDIC SURGERY

## 2023-08-29 PROCEDURE — 3044F PR MOST RECENT HEMOGLOBIN A1C LEVEL <7.0%: ICD-10-PCS | Mod: CPTII,S$GLB,, | Performed by: ORTHOPAEDIC SURGERY

## 2023-08-29 PROCEDURE — 73590 X-RAY EXAM OF LOWER LEG: CPT | Mod: 26,RT,, | Performed by: RADIOLOGY

## 2023-08-29 PROCEDURE — 3079F DIAST BP 80-89 MM HG: CPT | Mod: CPTII,S$GLB,, | Performed by: ORTHOPAEDIC SURGERY

## 2023-08-29 PROCEDURE — 99204 OFFICE O/P NEW MOD 45 MIN: CPT | Mod: S$GLB,,, | Performed by: ORTHOPAEDIC SURGERY

## 2023-08-29 PROCEDURE — 72100 XR LUMBAR SPINE AP AND LATERAL: ICD-10-PCS | Mod: 26,,, | Performed by: INTERNAL MEDICINE

## 2023-08-29 PROCEDURE — 99999 PR PBB SHADOW E&M-EST. PATIENT-LVL IV: ICD-10-PCS | Mod: PBBFAC,,, | Performed by: ORTHOPAEDIC SURGERY

## 2023-08-29 PROCEDURE — 99204 PR OFFICE/OUTPT VISIT, NEW, LEVL IV, 45-59 MIN: ICD-10-PCS | Mod: S$GLB,,, | Performed by: ORTHOPAEDIC SURGERY

## 2023-08-29 PROCEDURE — 73590 XR TIBIA FIBULA 2 VIEW RIGHT: ICD-10-PCS | Mod: 26,RT,, | Performed by: RADIOLOGY

## 2023-08-29 PROCEDURE — 1159F PR MEDICATION LIST DOCUMENTED IN MEDICAL RECORD: ICD-10-PCS | Mod: CPTII,S$GLB,, | Performed by: ORTHOPAEDIC SURGERY

## 2023-08-29 PROCEDURE — 3074F PR MOST RECENT SYSTOLIC BLOOD PRESSURE < 130 MM HG: ICD-10-PCS | Mod: CPTII,S$GLB,, | Performed by: ORTHOPAEDIC SURGERY

## 2023-08-29 PROCEDURE — 3074F SYST BP LT 130 MM HG: CPT | Mod: CPTII,S$GLB,, | Performed by: ORTHOPAEDIC SURGERY

## 2023-08-29 PROCEDURE — 72100 X-RAY EXAM L-S SPINE 2/3 VWS: CPT | Mod: 26,,, | Performed by: INTERNAL MEDICINE

## 2023-08-29 PROCEDURE — 73590 X-RAY EXAM OF LOWER LEG: CPT | Mod: TC,RT

## 2023-08-29 PROCEDURE — 73564 X-RAY EXAM KNEE 4 OR MORE: CPT | Mod: TC,RT

## 2023-08-29 PROCEDURE — 3079F PR MOST RECENT DIASTOLIC BLOOD PRESSURE 80-89 MM HG: ICD-10-PCS | Mod: CPTII,S$GLB,, | Performed by: ORTHOPAEDIC SURGERY

## 2023-08-29 PROCEDURE — 72100 X-RAY EXAM L-S SPINE 2/3 VWS: CPT | Mod: TC

## 2023-08-29 PROCEDURE — 4010F PR ACE/ARB THEARPY RXD/TAKEN: ICD-10-PCS | Mod: CPTII,S$GLB,, | Performed by: ORTHOPAEDIC SURGERY

## 2023-08-29 PROCEDURE — 4010F ACE/ARB THERAPY RXD/TAKEN: CPT | Mod: CPTII,S$GLB,, | Performed by: ORTHOPAEDIC SURGERY

## 2023-08-29 PROCEDURE — 1159F MED LIST DOCD IN RCRD: CPT | Mod: CPTII,S$GLB,, | Performed by: ORTHOPAEDIC SURGERY

## 2023-08-29 PROCEDURE — 73562 XR KNEE ORTHO RIGHT WITH FLEXION: ICD-10-PCS | Mod: 26,LT,, | Performed by: RADIOLOGY

## 2023-08-29 PROCEDURE — 73562 X-RAY EXAM OF KNEE 3: CPT | Mod: 26,LT,, | Performed by: RADIOLOGY

## 2023-08-29 PROCEDURE — 73564 X-RAY EXAM KNEE 4 OR MORE: CPT | Mod: 26,RT,, | Performed by: RADIOLOGY

## 2023-08-29 PROCEDURE — 3044F HG A1C LEVEL LT 7.0%: CPT | Mod: CPTII,S$GLB,, | Performed by: ORTHOPAEDIC SURGERY

## 2023-08-29 RX ORDER — METHYLPREDNISOLONE 4 MG/1
TABLET ORAL
Qty: 21 EACH | Refills: 0 | Status: SHIPPED | OUTPATIENT
Start: 2023-08-29 | End: 2023-09-19

## 2023-08-29 NOTE — TELEPHONE ENCOUNTER
----- Message from Nadiya Ellis sent at 8/29/2023  1:55 PM CDT -----  Regarding: RX  Contact: pt  Pt calling to have her new script called in to         [id number] - [address] [address]  Phone: [phone] Fax: [phone]        Confirmed patient's contact info below:  Contact Name: Jenifer Burnett  Phone Number: [phone]

## 2023-08-30 NOTE — PROGRESS NOTES
Telemedicine/Virtual Visit Documentation:     The patient location is: home    The chief complaint leading to consultation is: see HPI    VISIT TYPE X   Virtual visit with synchronous audio and video x   Telephone E/M service      Total time spent with patient: see X gabriela on chart below.   More than half of the time was spent counseling or coordinating care including prognosis, differential diagnosis, risks and benefits of treatment, instructions, compliance risk reductions     EST MINUTES X   63463 5    38493 10    04191 15 x   99214 25    52958 40    NEW     07824 10    25937 20    28621 30    56438 45    83413 60    PHONE      5-10    08999 11-20    26297 21-30      H&P  Orthopaedics      SUBJECTIVE:     History of Present Illness:  Patient is a 63 y.o. female who presents for follow-up of her lumbar spine.  She is had some numbness and tingling down her right leg in the context of low back pain.  This is a separate issue from her right knee DJD related pain.  MRI of the lumbar spine was ordered for further assessment.  She does note that the Medrol Dosepak has been significantly helpful.  It sounds like the numbness and tingling issue has gone away and really is something that comes and goes and she considers fairly minor.  The right knee still bothers her.    Review of patient's allergies indicates:  No Known Allergies    Past Medical History:   Diagnosis Date    Encounter for blood transfusion     GERD (gastroesophageal reflux disease)     Hypertension     Migraine     Trigeminal neuralgia 03/2018     Past Surgical History:   Procedure Laterality Date    APPENDECTOMY      CHOLECYSTECTOMY  07/2012    ESOPHAGOGASTRODUODENOSCOPY N/A 10/14/2019    Procedure: EGD (ESOPHAGOGASTRODUODENOSCOPY);  Surgeon: Sean Girard MD;  Location: [address];  Service: Endoscopy;  Laterality: N/A;    HYSTERECTOMY      LAPAROSCOPIC TOUPET FUNDOPLICATION N/A 11/22/2019    Procedure: FUNDOPLICATION, LAPAROSCOPIC, TOUPET;   Surgeon: Adriano Godoy MD;  Location: [facility] OR [address] [place] [address];  Service: General;  Laterality: N/A;     Family History   Problem Relation Age of Onset    Breast cancer Mother      Social History     Tobacco Use    Smoking status: Never    Smokeless tobacco: Never   Substance Use Topics    Alcohol use: No    Drug use: No      Review of Systems:  Patient denies constitutional symptoms, cardiac symptoms, respiratory symptoms, GI symptoms.  The remainder of the musculoskeletal ROS is included in the HPI.    OBJECTIVE:     Physical Exam:  Gen:  No acute distress    MRI Lumbar spine 9/6/23:  1. Multilevel degenerative changes of the lumbar spine detailed above.  Mild spinal canal stenosis and mild left neural foraminal narrowing noted at L3-L4.    ASSESSMENT/PLAN:     A/P: Jenifer Burnett is a 63 y.o. with bilateral knee advanced DJD, chronic low back pain and intermittent right lower extremity numbness and tingling    Plan:  Clinically improved since I last saw her and she describes her low back issue is being fairly mild.  Lumbar spine imaging does not show any significant central canal or neural foraminal stenosis that would correlate with her recent symptoms.  As before we have discussed the details of total knee arthroplasty for her right knee DJD which is her primary issue at this time.  She would like to proceed sometime in February of next year.  She will let us know when she wants to schedule the surgery.  Previously discussed the details and the expected postop rehab and recovery course.  She would need preop clearance per usual before proceeding.

## 2023-09-05 ENCOUNTER — HOSPITAL ENCOUNTER (OUTPATIENT)
Dept: RADIOLOGY | Facility: HOSPITAL | Age: 63
Discharge: HOME | End: 2023-09-05
Attending: ORTHOPAEDIC SURGERY
Payer: COMMERCIAL

## 2023-09-05 DIAGNOSIS — G89.29 CHRONIC PAIN OF RIGHT KNEE: ICD-10-CM

## 2023-09-05 DIAGNOSIS — M54.50 CHRONIC RIGHT-SIDED LOW BACK PAIN, UNSPECIFIED WHETHER SCIATICA PRESENT: ICD-10-CM

## 2023-09-05 DIAGNOSIS — M79.661 PAIN OF RIGHT LOWER LEG: ICD-10-CM

## 2023-09-05 DIAGNOSIS — G89.29 CHRONIC RIGHT-SIDED LOW BACK PAIN, UNSPECIFIED WHETHER SCIATICA PRESENT: ICD-10-CM

## 2023-09-05 DIAGNOSIS — M25.561 CHRONIC PAIN OF RIGHT KNEE: ICD-10-CM

## 2023-09-05 PROCEDURE — 72148 MRI LUMBAR SPINE W/O DYE: CPT | Mod: TC

## 2023-09-05 PROCEDURE — 72148 MRI LUMBAR SPINE W/O DYE: CPT | Mod: 26,,, | Performed by: RADIOLOGY

## 2023-09-05 PROCEDURE — 72148 MRI LUMBAR SPINE WITHOUT CONTRAST: ICD-10-PCS | Mod: 26,,, | Performed by: RADIOLOGY

## 2023-09-07 ENCOUNTER — OFFICE VISIT (OUTPATIENT)
Dept: SPORTS MEDICINE | Facility: CLINIC | Age: 63
End: 2023-09-07
Payer: COMMERCIAL

## 2023-09-07 DIAGNOSIS — M54.50 CHRONIC MIDLINE LOW BACK PAIN, UNSPECIFIED WHETHER SCIATICA PRESENT: ICD-10-CM

## 2023-09-07 DIAGNOSIS — M17.11 PRIMARY OSTEOARTHRITIS OF RIGHT KNEE: Primary | ICD-10-CM

## 2023-09-07 DIAGNOSIS — M79.661 PAIN OF RIGHT LOWER LEG: ICD-10-CM

## 2023-09-07 DIAGNOSIS — G89.29 CHRONIC MIDLINE LOW BACK PAIN, UNSPECIFIED WHETHER SCIATICA PRESENT: ICD-10-CM

## 2023-09-07 PROCEDURE — 3044F HG A1C LEVEL LT 7.0%: CPT | Mod: CPTII,95,, | Performed by: ORTHOPAEDIC SURGERY

## 2023-09-07 PROCEDURE — 4010F PR ACE/ARB THEARPY RXD/TAKEN: ICD-10-PCS | Mod: CPTII,95,, | Performed by: ORTHOPAEDIC SURGERY

## 2023-09-07 PROCEDURE — 99213 PR OFFICE/OUTPT VISIT, EST, LEVL III, 20-29 MIN: ICD-10-PCS | Mod: 95,,, | Performed by: ORTHOPAEDIC SURGERY

## 2023-09-07 PROCEDURE — 3044F PR MOST RECENT HEMOGLOBIN A1C LEVEL <7.0%: ICD-10-PCS | Mod: CPTII,95,, | Performed by: ORTHOPAEDIC SURGERY

## 2023-09-07 PROCEDURE — 4010F ACE/ARB THERAPY RXD/TAKEN: CPT | Mod: CPTII,95,, | Performed by: ORTHOPAEDIC SURGERY

## 2023-09-07 PROCEDURE — 99213 OFFICE O/P EST LOW 20 MIN: CPT | Mod: 95,,, | Performed by: ORTHOPAEDIC SURGERY

## 2023-10-09 ENCOUNTER — OFFICE VISIT (OUTPATIENT)
Dept: INTERNAL MEDICINE | Facility: CLINIC | Age: 63
End: 2023-10-09
Payer: COMMERCIAL

## 2023-10-09 VITALS
HEIGHT: 69 IN | WEIGHT: 207.44 LBS | HEART RATE: 77 BPM | OXYGEN SATURATION: 96 % | DIASTOLIC BLOOD PRESSURE: 80 MMHG | TEMPERATURE: 98 F | BODY MASS INDEX: 30.72 KG/M2 | SYSTOLIC BLOOD PRESSURE: 133 MMHG

## 2023-10-09 DIAGNOSIS — Z86.69 HISTORY OF TRIGEMINAL NEURALGIA: ICD-10-CM

## 2023-10-09 DIAGNOSIS — G89.29 CHRONIC RIGHT-SIDED LOW BACK PAIN WITH RIGHT-SIDED SCIATICA: Primary | ICD-10-CM

## 2023-10-09 DIAGNOSIS — M54.41 CHRONIC RIGHT-SIDED LOW BACK PAIN WITH RIGHT-SIDED SCIATICA: Primary | ICD-10-CM

## 2023-10-09 PROCEDURE — 99214 PR OFFICE/OUTPT VISIT, EST, LEVL IV, 30-39 MIN: ICD-10-PCS | Mod: S$GLB,,, | Performed by: STUDENT IN AN ORGANIZED HEALTH CARE EDUCATION/TRAINING PROGRAM

## 2023-10-09 PROCEDURE — 3079F PR MOST RECENT DIASTOLIC BLOOD PRESSURE 80-89 MM HG: ICD-10-PCS | Mod: CPTII,S$GLB,, | Performed by: STUDENT IN AN ORGANIZED HEALTH CARE EDUCATION/TRAINING PROGRAM

## 2023-10-09 PROCEDURE — 99999 PR PBB SHADOW E&M-EST. PATIENT-LVL III: CPT | Mod: PBBFAC,,, | Performed by: STUDENT IN AN ORGANIZED HEALTH CARE EDUCATION/TRAINING PROGRAM

## 2023-10-09 PROCEDURE — 3075F SYST BP GE 130 - 139MM HG: CPT | Mod: CPTII,S$GLB,, | Performed by: STUDENT IN AN ORGANIZED HEALTH CARE EDUCATION/TRAINING PROGRAM

## 2023-10-09 PROCEDURE — 1159F MED LIST DOCD IN RCRD: CPT | Mod: CPTII,S$GLB,, | Performed by: STUDENT IN AN ORGANIZED HEALTH CARE EDUCATION/TRAINING PROGRAM

## 2023-10-09 PROCEDURE — 3008F BODY MASS INDEX DOCD: CPT | Mod: CPTII,S$GLB,, | Performed by: STUDENT IN AN ORGANIZED HEALTH CARE EDUCATION/TRAINING PROGRAM

## 2023-10-09 PROCEDURE — 1160F PR REVIEW ALL MEDS BY PRESCRIBER/CLIN PHARMACIST DOCUMENTED: ICD-10-PCS | Mod: CPTII,S$GLB,, | Performed by: STUDENT IN AN ORGANIZED HEALTH CARE EDUCATION/TRAINING PROGRAM

## 2023-10-09 PROCEDURE — 3044F PR MOST RECENT HEMOGLOBIN A1C LEVEL <7.0%: ICD-10-PCS | Mod: CPTII,S$GLB,, | Performed by: STUDENT IN AN ORGANIZED HEALTH CARE EDUCATION/TRAINING PROGRAM

## 2023-10-09 PROCEDURE — 3044F HG A1C LEVEL LT 7.0%: CPT | Mod: CPTII,S$GLB,, | Performed by: STUDENT IN AN ORGANIZED HEALTH CARE EDUCATION/TRAINING PROGRAM

## 2023-10-09 PROCEDURE — 3075F PR MOST RECENT SYSTOLIC BLOOD PRESS GE 130-139MM HG: ICD-10-PCS | Mod: CPTII,S$GLB,, | Performed by: STUDENT IN AN ORGANIZED HEALTH CARE EDUCATION/TRAINING PROGRAM

## 2023-10-09 PROCEDURE — 3008F PR BODY MASS INDEX (BMI) DOCUMENTED: ICD-10-PCS | Mod: CPTII,S$GLB,, | Performed by: STUDENT IN AN ORGANIZED HEALTH CARE EDUCATION/TRAINING PROGRAM

## 2023-10-09 PROCEDURE — 4010F PR ACE/ARB THEARPY RXD/TAKEN: ICD-10-PCS | Mod: CPTII,S$GLB,, | Performed by: STUDENT IN AN ORGANIZED HEALTH CARE EDUCATION/TRAINING PROGRAM

## 2023-10-09 PROCEDURE — 1159F PR MEDICATION LIST DOCUMENTED IN MEDICAL RECORD: ICD-10-PCS | Mod: CPTII,S$GLB,, | Performed by: STUDENT IN AN ORGANIZED HEALTH CARE EDUCATION/TRAINING PROGRAM

## 2023-10-09 PROCEDURE — 3079F DIAST BP 80-89 MM HG: CPT | Mod: CPTII,S$GLB,, | Performed by: STUDENT IN AN ORGANIZED HEALTH CARE EDUCATION/TRAINING PROGRAM

## 2023-10-09 PROCEDURE — 99999 PR PBB SHADOW E&M-EST. PATIENT-LVL III: ICD-10-PCS | Mod: PBBFAC,,, | Performed by: STUDENT IN AN ORGANIZED HEALTH CARE EDUCATION/TRAINING PROGRAM

## 2023-10-09 PROCEDURE — 4010F ACE/ARB THERAPY RXD/TAKEN: CPT | Mod: CPTII,S$GLB,, | Performed by: STUDENT IN AN ORGANIZED HEALTH CARE EDUCATION/TRAINING PROGRAM

## 2023-10-09 PROCEDURE — 1160F RVW MEDS BY RX/DR IN RCRD: CPT | Mod: CPTII,S$GLB,, | Performed by: STUDENT IN AN ORGANIZED HEALTH CARE EDUCATION/TRAINING PROGRAM

## 2023-10-09 PROCEDURE — 99214 OFFICE O/P EST MOD 30 MIN: CPT | Mod: S$GLB,,, | Performed by: STUDENT IN AN ORGANIZED HEALTH CARE EDUCATION/TRAINING PROGRAM

## 2023-10-09 RX ORDER — METHOCARBAMOL 500 MG/1
500 TABLET, FILM COATED ORAL 4 TIMES DAILY
Qty: 40 TABLET | Refills: 0 | Status: SHIPPED | OUTPATIENT
Start: 2023-10-09 | End: 2023-10-19

## 2023-10-09 RX ORDER — METHYLPREDNISOLONE 4 MG/1
TABLET ORAL
Qty: 21 EACH | Refills: 0 | Status: SHIPPED | OUTPATIENT
Start: 2023-10-09 | End: 2023-10-30

## 2023-10-09 RX ORDER — KETOROLAC TROMETHAMINE 30 MG/ML
30 INJECTION, SOLUTION INTRAMUSCULAR; INTRAVENOUS
Status: CANCELLED | OUTPATIENT
Start: 2023-10-09 | End: 2023-10-09

## 2023-10-09 NOTE — PROGRESS NOTES
SUBJECTIVE     Chief Complaint   Patient presents with    Back Pain     Lower back pain       HPI  Jenifer Burnett is a 63 y.o. female with  HNT, b/l carotid artery stenosis, GERD, h/o hiatal hernia s/p repair, anxiety, migraine, h/o trigeminal neuralgia  that presents for same-day urgent care evaluation of back pain.     This is a new patient to me but established to [facility]. PCP is Kadi Carlin MD.     Acute on chronic worsening of lower back pain with sciatica.  Right sided, stinging/burning pain down right buttock radiating down back of leg.   Worse with movement, mainly turning body.   Alternating between heat, ice, Advil with minimal relief.   Triggered recently by picking up a box at work 4 days ago.   Denies weakness, numbness in the lower extremity. No ataxia. No saddle anesthesia. No bowel/bladder incontinence.   Reports having done physical therapy in the past with some benefit.     Established with sports medicine. LOV 9/7/23 fo complaint of numbness and tingling down RLE in context of lower back pain. MRI of L-spine ordered and showed multilevel degenerative changes with mild spinal canal stenosis and left neural foraminal narrowing at L3-L4.         PAST MEDICAL HISTORY:  Past Medical History:   Diagnosis Date    Encounter for blood transfusion     GERD (gastroesophageal reflux disease)     Hypertension     Migraine     Trigeminal neuralgia 03/2018       PAST SURGICAL HISTORY:  Past Surgical History:   Procedure Laterality Date    APPENDECTOMY      CHOLECYSTECTOMY  07/2012    ESOPHAGOGASTRODUODENOSCOPY N/A 10/14/2019    Procedure: EGD (ESOPHAGOGASTRODUODENOSCOPY);  Surgeon: Sean Girard MD;  Location: [facility] (4TH FLR);  Service: Endoscopy;  Laterality: N/A;    HYSTERECTOMY      LAPAROSCOPIC TOUPET FUNDOPLICATION N/A 11/22/2019    Procedure: FUNDOPLICATION, LAPAROSCOPIC, TOUPET;  Surgeon: Adriano Godoy MD;  Location: [facility] OR 2ND FLR;  Service: General;  Laterality: N/A;       FAMILY  HISTORY:  Family History   Problem Relation Age of Onset    Breast cancer Mother        ALLERGIES AND MEDICATIONS: updated and reviewed.  Review of patient's allergies indicates:  No Known Allergies  Current Outpatient Medications   Medication Sig Dispense Refill    carBAMazepine (CARBATROL) 200 MG CM12 Take 1 capsule (200 mg total) by mouth 2 (two) times daily. Take 1 pill daily x 1 week, then increase to 1 pill morning and evening 60 capsule 11    citalopram (CELEXA) 40 MG tablet Take 1 tablet (40 mg total) by mouth once daily. 90 tablet 3    cyanocobalamin, vitamin B-12, 2,500 mcg Tab Take 1 tablet by mouth once daily.      losartan (COZAAR) 50 MG tablet Take 1 tablet (50 mg total) by mouth once daily. For blood pressure 90 tablet 3    omeprazole (PRILOSEC) 40 MG capsule TAKE ONE CAPSULE BY MOUTH EVERY MORNING 90 capsule 3    vitamin D (VITAMIN D3) 1000 units Tab Take 2,000 Units by mouth once daily.       No current facility-administered medications for this visit.       ROS  Review of Systems   Constitutional:  Negative for activity change, chills and fever.   HENT:  Negative for congestion and hearing loss.    Eyes:  Negative for pain and visual disturbance.   Respiratory:  Negative for cough and shortness of breath.    Cardiovascular:  Negative for chest pain and palpitations.   Gastrointestinal:  Negative for abdominal pain, constipation, diarrhea, nausea and vomiting.   Endocrine: Negative.    Genitourinary: Negative.    Musculoskeletal:  Positive for back pain. Negative for arthralgias and myalgias.   Skin: Negative.    Allergic/Immunologic: Negative.    Neurological:  Negative for dizziness, light-headedness and headaches.   Hematological: Negative.          OBJECTIVE     Physical Exam  Vitals:    10/09/23 0928   BP: 133/80   Pulse: 77   Temp: 98 °F (36.7 °C)    Body mass index is 30.64 kg/m².            Physical Exam  Vitals reviewed.   Constitutional:       General: She is not in acute distress.      Appearance: Normal appearance. She is obese.   HENT:      Head: Normocephalic and atraumatic.      Mouth/Throat:      Mouth: Mucous membranes are moist.      Pharynx: Oropharynx is clear.   Eyes:      Extraocular Movements: Extraocular movements intact.      Conjunctiva/sclera: Conjunctivae normal.      Pupils: Pupils are equal, round, and reactive to light.   Cardiovascular:      Rate and Rhythm: Normal rate and regular rhythm.      Pulses: Normal pulses.      Heart sounds: Normal heart sounds.   Pulmonary:      Effort: Pulmonary effort is normal.      Breath sounds: Normal breath sounds.   Abdominal:      General: There is no distension.   Musculoskeletal:         General: Normal range of motion.      Cervical back: Normal range of motion and neck supple.      Lumbar back: No spasms. Normal range of motion. Negative right straight leg raise test and negative left straight leg raise test.        Back:       Comments: Positive piriformis test on the right.    Skin:     General: Skin is warm and dry.   Neurological:      General: No focal deficit present.      Mental Status: She is alert.      Motor: No weakness (5/5 strength in BLE.).           Health Maintenance         Date Due Completion Date    TETANUS VACCINE Never done ---    High Dose Statin Never done ---    Shingles Vaccine (1 of 2) Never done ---    Influenza Vaccine (1) 09/01/2023 12/10/2019    COVID-19 Vaccine (3 - 2023-24 season) 09/01/2023 4/20/2021    Mammogram 04/27/2024 4/27/2023    Colorectal Cancer Screening 01/12/2026 1/12/2021    Hemoglobin A1c (Diabetic Prevention Screening) 03/28/2026 3/28/2023    Lipid Panel 03/28/2028 3/28/2023              ASSESSMENT     63 y.o. female with     1. Chronic right-sided low back pain with right-sided sciatica    2. History of trigeminal neuralgia        PLAN:     1. Chronic right-sided low back pain with right-sided sciatica  - Acute on chronic worsening following lifting heavy box. Physical exam findings  suggestive of some piriformis dysfunction. Treat with muscle relaxers, short course of steroids. Patient on Carbamazepine for trigeminal neuralgia, which should also help with symptoms; can continue.   - Given stretches/exercises targeting piriformis muscle.   - Return precautions given.   - methocarbamoL (ROBAXIN) 500 MG Tab; Take 1 tablet (500 mg total) by mouth 4 (four) times daily. for 10 days  Dispense: 40 tablet; Refill: 0  - methylPREDNISolone (MEDROL DOSEPACK) 4 mg tablet; use as directed  Dispense: 21 each; Refill: 0    2. History of trigeminal neuralgia  - Controlled on Carbamazepine. Continue.     RTC PRN       Francis Ceballos MD  Family Medicine  [facility]  10/09/2023          No follow-ups on file.

## 2023-10-09 NOTE — LETTER
October 9, 2023      [facility]  [address]  [facility] [address]  Phone: [phone]  Fax: [phone]       Patient: Jenifer Burnett   YOB: 1960  Date of Visit: 10/09/2023    To Whom It May Concern:    Jorden Burnett  was at [facility] on 10/09/2023. She may return to work/school on 10/12/23 with the following restrictions: No lifting objects heavier than 10 lbs until 10/19/23. If you have any questions or concerns, or if I can be of further assistance, please do not hesitate to contact me.    Sincerely,    Francis Ceballos MD  Family Medicine  [facility]  10/09/2023

## 2023-11-21 ENCOUNTER — OFFICE VISIT (OUTPATIENT)
Dept: INTERNAL MEDICINE | Facility: CLINIC | Age: 63
End: 2023-11-21
Payer: COMMERCIAL

## 2023-11-21 VITALS
BODY MASS INDEX: 31.54 KG/M2 | SYSTOLIC BLOOD PRESSURE: 134 MMHG | WEIGHT: 212.94 LBS | DIASTOLIC BLOOD PRESSURE: 86 MMHG | HEIGHT: 69 IN | OXYGEN SATURATION: 99 % | HEART RATE: 89 BPM

## 2023-11-21 DIAGNOSIS — M25.561 CHRONIC PAIN OF RIGHT KNEE: Primary | ICD-10-CM

## 2023-11-21 DIAGNOSIS — G89.29 CHRONIC PAIN OF RIGHT KNEE: Primary | ICD-10-CM

## 2023-11-21 DIAGNOSIS — M79.604 RIGHT LEG PAIN: ICD-10-CM

## 2023-11-21 PROCEDURE — 3044F PR MOST RECENT HEMOGLOBIN A1C LEVEL <7.0%: ICD-10-PCS | Mod: CPTII,S$GLB,, | Performed by: INTERNAL MEDICINE

## 2023-11-21 PROCEDURE — 1159F PR MEDICATION LIST DOCUMENTED IN MEDICAL RECORD: ICD-10-PCS | Mod: CPTII,S$GLB,, | Performed by: INTERNAL MEDICINE

## 2023-11-21 PROCEDURE — 1160F RVW MEDS BY RX/DR IN RCRD: CPT | Mod: CPTII,S$GLB,, | Performed by: INTERNAL MEDICINE

## 2023-11-21 PROCEDURE — 3079F DIAST BP 80-89 MM HG: CPT | Mod: CPTII,S$GLB,, | Performed by: INTERNAL MEDICINE

## 2023-11-21 PROCEDURE — 3075F SYST BP GE 130 - 139MM HG: CPT | Mod: CPTII,S$GLB,, | Performed by: INTERNAL MEDICINE

## 2023-11-21 PROCEDURE — 1159F MED LIST DOCD IN RCRD: CPT | Mod: CPTII,S$GLB,, | Performed by: INTERNAL MEDICINE

## 2023-11-21 PROCEDURE — 3075F PR MOST RECENT SYSTOLIC BLOOD PRESS GE 130-139MM HG: ICD-10-PCS | Mod: CPTII,S$GLB,, | Performed by: INTERNAL MEDICINE

## 2023-11-21 PROCEDURE — 4010F ACE/ARB THERAPY RXD/TAKEN: CPT | Mod: CPTII,S$GLB,, | Performed by: INTERNAL MEDICINE

## 2023-11-21 PROCEDURE — 4010F PR ACE/ARB THEARPY RXD/TAKEN: ICD-10-PCS | Mod: CPTII,S$GLB,, | Performed by: INTERNAL MEDICINE

## 2023-11-21 PROCEDURE — 3079F PR MOST RECENT DIASTOLIC BLOOD PRESSURE 80-89 MM HG: ICD-10-PCS | Mod: CPTII,S$GLB,, | Performed by: INTERNAL MEDICINE

## 2023-11-21 PROCEDURE — 1160F PR REVIEW ALL MEDS BY PRESCRIBER/CLIN PHARMACIST DOCUMENTED: ICD-10-PCS | Mod: CPTII,S$GLB,, | Performed by: INTERNAL MEDICINE

## 2023-11-21 PROCEDURE — 3008F BODY MASS INDEX DOCD: CPT | Mod: CPTII,S$GLB,, | Performed by: INTERNAL MEDICINE

## 2023-11-21 PROCEDURE — 99999 PR PBB SHADOW E&M-EST. PATIENT-LVL IV: ICD-10-PCS | Mod: PBBFAC,,, | Performed by: INTERNAL MEDICINE

## 2023-11-21 PROCEDURE — 99999 PR PBB SHADOW E&M-EST. PATIENT-LVL IV: CPT | Mod: PBBFAC,,, | Performed by: INTERNAL MEDICINE

## 2023-11-21 PROCEDURE — 99213 OFFICE O/P EST LOW 20 MIN: CPT | Mod: S$GLB,,, | Performed by: INTERNAL MEDICINE

## 2023-11-21 PROCEDURE — 3044F HG A1C LEVEL LT 7.0%: CPT | Mod: CPTII,S$GLB,, | Performed by: INTERNAL MEDICINE

## 2023-11-21 PROCEDURE — 3008F PR BODY MASS INDEX (BMI) DOCUMENTED: ICD-10-PCS | Mod: CPTII,S$GLB,, | Performed by: INTERNAL MEDICINE

## 2023-11-21 PROCEDURE — 99213 PR OFFICE/OUTPT VISIT, EST, LEVL III, 20-29 MIN: ICD-10-PCS | Mod: S$GLB,,, | Performed by: INTERNAL MEDICINE

## 2023-11-21 RX ORDER — CELECOXIB 200 MG/1
200 CAPSULE ORAL 2 TIMES DAILY
Qty: 60 CAPSULE | Refills: 0 | Status: SHIPPED | OUTPATIENT
Start: 2023-11-21 | End: 2024-01-17

## 2023-11-21 RX ORDER — HYDROCODONE BITARTRATE AND ACETAMINOPHEN 5; 325 MG/1; MG/1
1 TABLET ORAL EVERY 12 HOURS PRN
Qty: 14 TABLET | Refills: 0 | Status: SHIPPED | OUTPATIENT
Start: 2023-11-21

## 2023-11-21 NOTE — PATIENT INSTRUCTIONS
Take celebrex 200 mg by mouth 2x/day.  Stop all other over the counter NSAIDS such as aleve.  Take norco 1 tablet 2x/day as needed for breakthrough pain.  Norco can make you sleep so do not drive when taking medication.  Ice leg for 20 minute followed by calf stretched followed by warm compress.

## 2023-11-21 NOTE — PROGRESS NOTES
Subjective:       Patient ID: Jenifer Burnett is a 63 y.o. female.    Chief Complaint: Leg Pain      Jenifer Burnett is 63 y.o. female who presents for right leg pain from knee to foot 10/10 constant and worse X 2 weeks.   Pt has hx of similar pain X 3 1/2 months.  Pt was seen by sports medicine in Sept 2023 and told she needs a knee replacement.  Pt also has hx of sciatica with right buttocks pain but this pain is different. Pt taking aleve 600 mg po every 6 hours without significant change in pain.  Pt has been also using icy hot and aspercream with lidocaine.          Review of Systems   Constitutional:  Negative for chills and fever.   Respiratory:  Negative for cough and shortness of breath.    Cardiovascular:  Positive for leg swelling.   Gastrointestinal:  Negative for abdominal pain, constipation, diarrhea, nausea and vomiting.         Objective:      Physical Exam  Vitals reviewed.   Constitutional:       General: She is awake.      Appearance: Normal appearance.   HENT:      Head: Normocephalic and atraumatic.      Mouth/Throat:      Mouth: Mucous membranes are moist.      Pharynx: Oropharynx is clear.   Eyes:      Extraocular Movements: Extraocular movements intact.      Conjunctiva/sclera: Conjunctivae normal.      Pupils: Pupils are equal, round, and reactive to light.   Cardiovascular:      Rate and Rhythm: Normal rate and regular rhythm.      Heart sounds: No murmur heard.     No friction rub. No gallop.   Pulmonary:      Effort: Pulmonary effort is normal.      Breath sounds: Normal breath sounds.   Abdominal:      General: Bowel sounds are normal. There is no distension.      Tenderness: There is no abdominal tenderness. There is no guarding or rebound.   Musculoskeletal:      Right knee: No swelling, erythema or ecchymosis. Normal range of motion. Tenderness (posterior knee) present.      Left knee: Normal.      Right lower leg: Normal. No swelling or tenderness. No edema.      Left lower leg: Normal.  No edema.      Right ankle: Normal.      Left ankle: Normal.      Comments: Calves equal size with negative Karishma's sign.   Lymphadenopathy:      Cervical: No cervical adenopathy.   Neurological:      Mental Status: She is alert and oriented to person, place, and time.   Psychiatric:         Mood and Affect: Mood normal.         Behavior: Behavior is cooperative.         Assessment:       1. Chronic pain of right knee    2. Right leg pain        Plan:     Pt with chronic right knee pain withy right calf pain.  No risk factor for DVT.  Pt needs knee replacement but cannot get time off from work until after holidays to have the procedure done.  Will tx with celebrex 200 mg by mouth 2x/day.  Pt counseled to stop all other over the counter NSAIDS such as aleve.  Pt can take norco 1 tablet 2x/day as needed for breakthrough pain.  Pt counseled that Norco can make you sleep so do not drive when taking medication.  Pt counseled to ice leg for 20 minute followed by calf stretched followed by warm compress daily.  Patient was advised to follow up if symptom are not improving or worsened.    Jenifer was seen today for leg pain.    Diagnoses and all orders for this visit:    Chronic pain of right knee    Right leg pain  -     celecoxib (CELEBREX) 200 MG capsule; Take 1 capsule (200 mg total) by mouth 2 (two) times daily.  -     HYDROcodone-acetaminophen (NORCO) 5-325 mg per tablet; Take 1 tablet by mouth every 12 (twelve) hours as needed for Pain.

## 2024-01-17 ENCOUNTER — OFFICE VISIT (OUTPATIENT)
Dept: INTERNAL MEDICINE | Facility: CLINIC | Age: 64
End: 2024-01-17
Payer: COMMERCIAL

## 2024-01-17 ENCOUNTER — LAB VISIT (OUTPATIENT)
Dept: LAB | Facility: HOSPITAL | Age: 64
End: 2024-01-17
Payer: COMMERCIAL

## 2024-01-17 VITALS
DIASTOLIC BLOOD PRESSURE: 84 MMHG | WEIGHT: 211 LBS | SYSTOLIC BLOOD PRESSURE: 128 MMHG | OXYGEN SATURATION: 99 % | HEART RATE: 91 BPM | BODY MASS INDEX: 31.25 KG/M2 | HEIGHT: 69 IN

## 2024-01-17 DIAGNOSIS — M79.604 RIGHT LEG PAIN: ICD-10-CM

## 2024-01-17 DIAGNOSIS — M25.561 CHRONIC PAIN OF RIGHT KNEE: ICD-10-CM

## 2024-01-17 DIAGNOSIS — Z00.00 ENCOUNTER FOR ANNUAL PHYSICAL EXAM: Primary | ICD-10-CM

## 2024-01-17 DIAGNOSIS — K21.9 GASTROESOPHAGEAL REFLUX DISEASE WITHOUT ESOPHAGITIS: ICD-10-CM

## 2024-01-17 DIAGNOSIS — Z00.00 ENCOUNTER FOR ANNUAL PHYSICAL EXAM: ICD-10-CM

## 2024-01-17 DIAGNOSIS — I10 ESSENTIAL HYPERTENSION: ICD-10-CM

## 2024-01-17 DIAGNOSIS — G89.29 CHRONIC PAIN OF RIGHT KNEE: ICD-10-CM

## 2024-01-17 DIAGNOSIS — F41.9 ANXIETY: ICD-10-CM

## 2024-01-17 LAB
ALBUMIN SERPL BCP-MCNC: 3.8 G/DL (ref 3.5–5.2)
ALP SERPL-CCNC: 67 U/L (ref 55–135)
ALT SERPL W/O P-5'-P-CCNC: 12 U/L (ref 10–44)
ANION GAP SERPL CALC-SCNC: 12 MMOL/L (ref 8–16)
AST SERPL-CCNC: 28 U/L (ref 10–40)
BASOPHILS # BLD AUTO: 0.03 K/UL (ref 0–0.2)
BASOPHILS NFR BLD: 0.6 % (ref 0–1.9)
BILIRUB SERPL-MCNC: 0.3 MG/DL (ref 0.1–1)
BUN SERPL-MCNC: 18 MG/DL (ref 8–23)
CALCIUM SERPL-MCNC: 9.2 MG/DL (ref 8.7–10.5)
CHLORIDE SERPL-SCNC: 104 MMOL/L (ref 95–110)
CHOLEST SERPL-MCNC: 195 MG/DL (ref 120–199)
CHOLEST/HDLC SERPL: 3.8 {RATIO} (ref 2–5)
CO2 SERPL-SCNC: 25 MMOL/L (ref 23–29)
CREAT SERPL-MCNC: 0.7 MG/DL (ref 0.5–1.4)
DIFFERENTIAL METHOD BLD: ABNORMAL
EOSINOPHIL # BLD AUTO: 0.1 K/UL (ref 0–0.5)
EOSINOPHIL NFR BLD: 1.3 % (ref 0–8)
ERYTHROCYTE [DISTWIDTH] IN BLOOD BY AUTOMATED COUNT: 12.5 % (ref 11.5–14.5)
EST. GFR  (NO RACE VARIABLE): >60 ML/MIN/1.73 M^2
ESTIMATED AVG GLUCOSE: 105 MG/DL (ref 68–131)
GLUCOSE SERPL-MCNC: 76 MG/DL (ref 70–110)
HBA1C MFR BLD: 5.3 % (ref 4–5.6)
HCT VFR BLD AUTO: 43.8 % (ref 37–48.5)
HDLC SERPL-MCNC: 52 MG/DL (ref 40–75)
HDLC SERPL: 26.7 % (ref 20–50)
HGB BLD-MCNC: 13.7 G/DL (ref 12–16)
IMM GRANULOCYTES # BLD AUTO: 0.01 K/UL (ref 0–0.04)
IMM GRANULOCYTES NFR BLD AUTO: 0.2 % (ref 0–0.5)
LDLC SERPL CALC-MCNC: ABNORMAL MG/DL (ref 63–159)
LYMPHOCYTES # BLD AUTO: 1.5 K/UL (ref 1–4.8)
LYMPHOCYTES NFR BLD: 28.1 % (ref 18–48)
MCH RBC QN AUTO: 29.5 PG (ref 27–31)
MCHC RBC AUTO-ENTMCNC: 31.3 G/DL (ref 32–36)
MCV RBC AUTO: 94 FL (ref 82–98)
MONOCYTES # BLD AUTO: 0.6 K/UL (ref 0.3–1)
MONOCYTES NFR BLD: 11.2 % (ref 4–15)
NEUTROPHILS # BLD AUTO: 3 K/UL (ref 1.8–7.7)
NEUTROPHILS NFR BLD: 58.6 % (ref 38–73)
NONHDLC SERPL-MCNC: 143 MG/DL
NRBC BLD-RTO: 0 /100 WBC
PLATELET # BLD AUTO: 231 K/UL (ref 150–450)
PMV BLD AUTO: 9.8 FL (ref 9.2–12.9)
POTASSIUM SERPL-SCNC: 4.1 MMOL/L (ref 3.5–5.1)
PROT SERPL-MCNC: 7.8 G/DL (ref 6–8.4)
RBC # BLD AUTO: 4.64 M/UL (ref 4–5.4)
SODIUM SERPL-SCNC: 141 MMOL/L (ref 136–145)
TRIGL SERPL-MCNC: 453 MG/DL (ref 30–150)
TSH SERPL DL<=0.005 MIU/L-ACNC: 2.33 UIU/ML (ref 0.4–4)
WBC # BLD AUTO: 5.19 K/UL (ref 3.9–12.7)

## 2024-01-17 PROCEDURE — 84443 ASSAY THYROID STIM HORMONE: CPT | Performed by: INTERNAL MEDICINE

## 2024-01-17 PROCEDURE — 3079F DIAST BP 80-89 MM HG: CPT | Mod: CPTII,S$GLB,, | Performed by: INTERNAL MEDICINE

## 2024-01-17 PROCEDURE — 85025 COMPLETE CBC W/AUTO DIFF WBC: CPT | Performed by: INTERNAL MEDICINE

## 2024-01-17 PROCEDURE — 3074F SYST BP LT 130 MM HG: CPT | Mod: CPTII,S$GLB,, | Performed by: INTERNAL MEDICINE

## 2024-01-17 PROCEDURE — 3008F BODY MASS INDEX DOCD: CPT | Mod: CPTII,S$GLB,, | Performed by: INTERNAL MEDICINE

## 2024-01-17 PROCEDURE — 4010F ACE/ARB THERAPY RXD/TAKEN: CPT | Mod: CPTII,S$GLB,, | Performed by: INTERNAL MEDICINE

## 2024-01-17 PROCEDURE — 83036 HEMOGLOBIN GLYCOSYLATED A1C: CPT | Performed by: INTERNAL MEDICINE

## 2024-01-17 PROCEDURE — 36415 COLL VENOUS BLD VENIPUNCTURE: CPT | Performed by: INTERNAL MEDICINE

## 2024-01-17 PROCEDURE — 99396 PREV VISIT EST AGE 40-64: CPT | Mod: S$GLB,,, | Performed by: INTERNAL MEDICINE

## 2024-01-17 PROCEDURE — 99999 PR PBB SHADOW E&M-EST. PATIENT-LVL IV: CPT | Mod: PBBFAC,,, | Performed by: INTERNAL MEDICINE

## 2024-01-17 PROCEDURE — 80053 COMPREHEN METABOLIC PANEL: CPT | Performed by: INTERNAL MEDICINE

## 2024-01-17 PROCEDURE — 80061 LIPID PANEL: CPT | Performed by: INTERNAL MEDICINE

## 2024-01-17 RX ORDER — LOSARTAN POTASSIUM 50 MG/1
50 TABLET ORAL DAILY
Qty: 90 TABLET | Refills: 3 | Status: SHIPPED | OUTPATIENT
Start: 2024-01-17 | End: 2024-05-29

## 2024-01-17 RX ORDER — OMEPRAZOLE 40 MG/1
40 CAPSULE, DELAYED RELEASE ORAL EVERY MORNING
Qty: 90 CAPSULE | Refills: 1 | Status: SHIPPED | OUTPATIENT
Start: 2023-04-07 | End: 2024-05-29 | Stop reason: SDUPTHER

## 2024-01-17 RX ORDER — CITALOPRAM 40 MG/1
40 TABLET, FILM COATED ORAL DAILY
Qty: 90 TABLET | Refills: 3 | Status: SHIPPED | OUTPATIENT
Start: 2024-01-17 | End: 2024-05-29

## 2024-01-17 RX ORDER — MELOXICAM 7.5 MG/1
7.5 TABLET ORAL DAILY
Qty: 30 TABLET | Refills: 0 | Status: SHIPPED | OUTPATIENT
Start: 2024-01-17 | End: 2024-02-15

## 2024-01-17 NOTE — PROGRESS NOTES
Subjective:       Patient ID: Jenifer Burnett is a 63 y.o. female.    Chief Complaint: annual exam     Presents for annual exam.   \  Has been having worsening right knee pain. Medications are not helping. Feels pain in back of the knee that radiates down her calf.       HTN: well controlled on losartan   Headaches: taking Imitrex as needed.   neck pain radiates into back of skull. Heat therapy has helped.   Depression/Anxiety: well controlled on Celexa   GERD: well controlled on omeprazole.      Lumbar back pain: well controlled now. Has MRI with multi level degenerative cahnges. And some mild spinal stenosis.     Surgeries: Hysterectomy, Cholesystectomy      Health Maintenance:  Colon Cancer Screening: colonoscopy last done Last done 2021 with polyps removed. Due in 2026.    Mammogram: normal April 2023    HIV: negative on 9/27/21   Hep C: negative 2019   Lipids: Order today   Vaccines: needs shingles and tdap          Medication Refill  Pertinent negatives include no abdominal pain, arthralgias, chest pain, chills, coughing, headaches, myalgias, nausea or vomiting.     Review of Systems   Constitutional:  Negative for activity change, appetite change and chills.   HENT:  Negative for ear pain, sinus pressure/congestion and sneezing.    Respiratory:  Negative for cough and shortness of breath.    Cardiovascular:  Negative for chest pain, palpitations and leg swelling.   Gastrointestinal:  Negative for abdominal distention, abdominal pain, constipation, diarrhea, nausea and vomiting.   Genitourinary:  Negative for dysuria and hematuria.   Musculoskeletal:  Negative for arthralgias, back pain and myalgias.   Neurological:  Negative for dizziness and headaches.   Psychiatric/Behavioral:  Negative for agitation. The patient is not nervous/anxious.            Past Medical History:   Diagnosis Date    Encounter for blood transfusion     GERD (gastroesophageal reflux disease)     Hypertension     Migraine     Trigeminal  neuralgia 03/2018     Past Surgical History:   Procedure Laterality Date    APPENDECTOMY      CHOLECYSTECTOMY  07/2012    ESOPHAGOGASTRODUODENOSCOPY N/A 10/14/2019    Procedure: EGD (ESOPHAGOGASTRODUODENOSCOPY);  Surgeon: Sean Girard MD;  Location: [facility] (4TH FLR);  Service: Endoscopy;  Laterality: N/A;    HYSTERECTOMY      LAPAROSCOPIC TOUPET FUNDOPLICATION N/A 11/22/2019    Procedure: FUNDOPLICATION, LAPAROSCOPIC, TOUPET;  Surgeon: Adriano Godoy MD;  Location: [facility] OR [address] [place] [address];  Service: General;  Laterality: N/A;      Patient Active Problem List   Diagnosis    Essential hypertension    Migraine    Gastroesophageal reflux disease without esophagitis    History of repair of hiatal hernia    Anxiety    History of trigeminal neuralgia    Bilateral carotid artery stenosis    Syncope    Abnormal gait    Decreased range of motion of right ankle    Poor tolerance for ambulation    Decreased strength of lower extremity        Objective:      Physical Exam  Constitutional:       Appearance: Normal appearance.   HENT:      Head: Normocephalic.   Cardiovascular:      Rate and Rhythm: Normal rate and regular rhythm.      Pulses: Normal pulses.      Heart sounds: Normal heart sounds.   Pulmonary:      Effort: Pulmonary effort is normal.      Breath sounds: Normal breath sounds.   Abdominal:      General: Abdomen is flat. Bowel sounds are normal.      Palpations: Abdomen is soft.   Musculoskeletal:         General: Normal range of motion.      Cervical back: Normal range of motion and neck supple.   Skin:     General: Skin is warm and dry.   Neurological:      General: No focal deficit present.      Mental Status: She is alert and oriented to person, place, and time.   Psychiatric:         Mood and Affect: Mood normal.         Assessment:       Problem List Items Addressed This Visit          Psychiatric    Anxiety    Relevant Medications    citalopram (CELEXA) 40 MG tablet       Cardiac/Vascular    Essential  hypertension    Relevant Medications    losartan (COZAAR) 50 MG tablet       GI    Gastroesophageal reflux disease without esophagitis    Relevant Medications    omeprazole (PRILOSEC) 40 MG capsule     Other Visit Diagnoses       Encounter for annual physical exam    -  Primary    Relevant Orders    Comprehensive Metabolic Panel    CBC Auto Differential    Lipid Panel    Hemoglobin A1C    TSH    Chronic pain of right knee        Relevant Orders    Ambulatory referral/consult to Orthopedics    MRI Knee Without Contrast Right    Right leg pain                Plan:         Jenifer was seen today for medication refill.    Diagnoses and all orders for this visit:    Encounter for annual physical exam  Colon Cancer Screening: colonoscopy last done Last done 2021 with polyps removed. Due in 2026.    Mammogram: normal April 2023    HIV: negative on 9/27/21   Hep C: negative 2019   Lipids: Order today   Vaccines: needs shingles and tdap     Annual wellness exam completed.     All medications, histories, and concerns reviewed, reconciled, and addressed.     Appropriate Screenings per pt's sex and age have been reviewed and discussed with pt.       Chronic pain of right knee  -     Ambulatory referral/consult to Orthopedics; Future  -     MRI Knee Without Contrast Right; Future  -     meloxicam (MOBIC) 7.5 MG tablet; Take 1 tablet (7.5 mg total) by mouth once daily.    Essential hypertension  -     losartan (COZAAR) 50 MG tablet; Take 1 tablet (50 mg total) by mouth once daily. For blood pressure  Well controlled     Anxiety  -     citalopram (CELEXA) 40 MG tablet; Take 1 tablet (40 mg total) by mouth once daily.  Well controlled     Gastroesophageal reflux disease without esophagitis  -     omeprazole (PRILOSEC) 40 MG capsule; Take 1 capsule (40 mg total) by mouth every morning.  Well controlled.                  Kadi Carlin MD   Internal Medicine   Primary Care

## 2024-01-21 ENCOUNTER — HOSPITAL ENCOUNTER (OUTPATIENT)
Dept: RADIOLOGY | Facility: HOSPITAL | Age: 64
Discharge: HOME | End: 2024-01-21
Attending: INTERNAL MEDICINE
Payer: COMMERCIAL

## 2024-01-21 DIAGNOSIS — M25.561 CHRONIC PAIN OF RIGHT KNEE: ICD-10-CM

## 2024-01-21 DIAGNOSIS — G89.29 CHRONIC PAIN OF RIGHT KNEE: ICD-10-CM

## 2024-01-21 PROCEDURE — 73721 MRI JNT OF LWR EXTRE W/O DYE: CPT | Mod: 26,RT,, | Performed by: RADIOLOGY

## 2024-01-21 PROCEDURE — 73721 MRI JNT OF LWR EXTRE W/O DYE: CPT | Mod: TC,RT

## 2024-01-22 ENCOUNTER — PATIENT MESSAGE (OUTPATIENT)
Dept: INTERNAL MEDICINE | Facility: CLINIC | Age: 64
End: 2024-01-22
Payer: COMMERCIAL

## 2024-01-22 ENCOUNTER — TELEPHONE (OUTPATIENT)
Dept: INTERNAL MEDICINE | Facility: CLINIC | Age: 64
End: 2024-01-22
Payer: COMMERCIAL

## 2024-01-22 DIAGNOSIS — E78.1 HYPERTRIGLYCERIDEMIA: Primary | ICD-10-CM

## 2024-01-22 NOTE — TELEPHONE ENCOUNTER
----- Message from Kadi Carlin MD sent at 1/22/2024 12:26 PM CST -----  Please let her know that her triglycerides were elevated and we should repeat the lab while she is fasting to get a better idea of  what it really is. I placed the orders for labs.     The rest of her labs look great.     Thanks!

## 2024-01-22 NOTE — TELEPHONE ENCOUNTER
Spoke w/ pt, lab results given, pt verbalized understanding. Will schedule to have repeat labs done

## 2024-01-23 NOTE — PROGRESS NOTES
CC: Right knee pain    Jenifer Burnett, presents today for follow up appointment of her right knee.  Reports ongoing significant pain which unfortunately has worsened with time.  She localizes over the posterior popliteal fossa for radiation along the lateral aspect of the lower leg.  No radicular complaints.  No lumbar spine or low back pain.  Pain is quite severe and sometimes brings her to tears at work.  Limits activity.  Worse with prolonged standing and walking.  Better with rest.  She would like to discuss definitive management.  I have seen her for this in the past and she has not interested in corticosteroid injection.  Prior conservative treatment has included activity modifications, therapy, oral medication.  No ipsilateral groin or hip pain.    She was seen recently by her primary care physician and MRI was ordered for further assessment.    Prior Hx 9/7/2023:  Patient is a 63 y.o. female who presents for follow-up of her lumbar spine.  She is had some numbness and tingling down her right leg in the context of low back pain.  This is a separate issue from her right knee DJD related pain.  MRI of the lumbar spine was ordered for further assessment.  She does note that the Medrol Dosepak has been significantly helpful.  It sounds like the numbness and tingling issue has gone away and really is something that comes and goes and she considers fairly minor.  The right knee still bothers her.    Prior Hx 8/29/2023:  63 y.o. Female who presents as a new patient to me.  Chief complaint of right knee and lower leg pain.  She is had this now for years.  The knee pain has been seen and managed by Dr. Armand Kearns.  She had a previous arthroscopic debridement with partial meniscectomy years ago.  The knee has continued to bother her.  She describes painful mechanical symptoms with subjective stiffness and pain on prolonged standing and walking.  No prior injections.  She reports a fear of needles.  Additional  treatment has included formal therapy, oral medication.  This is a daily issue for her.  More recently she also has had some lower leg numbness and tingling with achiness that extends into her toes.  Localized anterolaterally.  She also has some chronic low back pain.  This is bother her for at least 6 weeks despite initial conservative treatment to include oral medication such as diclofenac, activity modifications, self-directed therapy.    BMI 31    REVIEW OF SYSTEMS:   Constitution: Negative. Negative for chills, fever and night sweats.    Hematologic/Lymphatic: Negative for bleeding problem. Does not bruise/bleed easily.   Skin: Negative for dry skin, itching and rash.   Musculoskeletal: Negative for falls. Positive for right knee pain and muscle weakness.     All other review of symptoms were reviewed and found to be noncontributory.     PAST MEDICAL HISTORY:   Past Medical History:   Diagnosis Date    Encounter for blood transfusion     GERD (gastroesophageal reflux disease)     Hypertension     Migraine     Trigeminal neuralgia 03/2018     PAST SURGICAL HISTORY:   Past Surgical History:   Procedure Laterality Date    APPENDECTOMY      CHOLECYSTECTOMY  07/2012    ESOPHAGOGASTRODUODENOSCOPY N/A 10/14/2019    Procedure: EGD (ESOPHAGOGASTRODUODENOSCOPY);  Surgeon: Sean Girard MD;  Location: [facility] ([address] [place] [address]);  Service: Endoscopy;  Laterality: N/A;    HYSTERECTOMY      LAPAROSCOPIC TOUPET FUNDOPLICATION N/A 11/22/2019    Procedure: FUNDOPLICATION, LAPAROSCOPIC, TOUPET;  Surgeon: Adriano Godoy MD;  Location: [address];  Service: General;  Laterality: N/A;     FAMILY HISTORY:   Family History   Problem Relation Age of Onset    Breast cancer Mother      SOCIAL HISTORY:   Social History     Socioeconomic History    Marital status: Single   Tobacco Use    Smoking status: Never    Smokeless tobacco: Never   Substance and Sexual Activity    Alcohol use: No    Drug use: No    Sexual activity: Yes      "Partners: Male   Social History Narrative    Working at [employer], does do bending/lifting type work      MEDICATIONS:     Current Outpatient Medications:     citalopram (CELEXA) 40 MG tablet, Take 1 tablet (40 mg total) by mouth once daily., Disp: 90 tablet, Rfl: 3    cyanocobalamin, vitamin B-12, 2,500 mcg Tab, Take 1 tablet by mouth once daily., Disp: , Rfl:     losartan (COZAAR) 50 MG tablet, Take 1 tablet (50 mg total) by mouth once daily. For blood pressure, Disp: 90 tablet, Rfl: 3    meloxicam (MOBIC) 7.5 MG tablet, Take 1 tablet (7.5 mg total) by mouth once daily., Disp: 30 tablet, Rfl: 0    omeprazole (PRILOSEC) 40 MG capsule, Take 1 capsule (40 mg total) by mouth every morning., Disp: 90 capsule, Rfl: 3    vitamin D (VITAMIN D3) 1000 units Tab, Take 2,000 Units by mouth once daily., Disp: , Rfl:     carBAMazepine (CARBATROL) 200 MG CM12, Take 1 capsule (200 mg total) by mouth 2 (two) times daily. Take 1 pill daily x 1 week, then increase to 1 pill morning and evening, Disp: 60 capsule, Rfl: 11    HYDROcodone-acetaminophen (NORCO) 5-325 mg per tablet, Take 1 tablet by mouth every 12 (twelve) hours as needed for Pain. (Patient not taking: Reported on 1/25/2024), Disp: 14 tablet, Rfl: 0    ALLERGIES:   Review of patient's allergies indicates:  No Known Allergies     PHYSICAL EXAMINATION:  BP (!) 143/93   Pulse 92   Ht 5' 9" (1.753 m)   Wt 93.2 kg (205 lb 9.3 oz)   BMI 30.36 kg/m²   General: Well-developed well-nourished 63 y.o. femalein no acute distress   Cardiovascular: Regular rhythm by palpation of distal pulse, normal color and temperature, no concerning varicosities on symptomatic side   Lungs: No labored breathing or wheezing appreciated   Neuro: Alert and oriented ×3   Psychiatric: well oriented to person, place and time, demonstrates normal mood and affect   Skin: No rashes, lesions or ulcers, normal temperature, turgor, and texture on involved extremity    Ortho/SPM Exam  Examination of the " right knee demonstrates chronic soft tissue swelling, no significant effusion.  Fullness over the popliteal fossa consistent with Baker's cyst.  Intact extensor mechanism.  Less than 5 degree flexion contracture.  Flexion to 110° with pain.  Pain to palpation over the medial and lateral joint lines.  Pain medially with forced flexion and extension.  Positive patellar crepitus and grind.  Stable to varus and valgus stress.  No pain with passive internal and external rotation of the right hip.  Negative straight leg raise on repeat testing today.  No significant lumbar spine tenderness.  No significant peripheral vascular disease.  She does have a small healing abrasion over the anterior aspect of her mid lower leg without signs of surrounding infection.  She states she ran into a ariel bush about 3 weeks ago and she is healing from that.  2+ dorsalis pedis pulse.    IMAGING:  Prior  X-rays including standing, weight bearing AP and flexion bilateral knees, RIGHT knee lateral and sunrise views ordered and images reviewed by me show:    Advanced DJD, tricompartmental with bone-on-bone articulation medially. KL4.     CV Ultrasound doppler venous DVT left right 8/25/23:    There is no evidence of a right lower extremity DVT.    A Baker's cyst measuring 4.2 cm x 1.2 cm  was seen in the right extremity.    Prior Lumbar Spine MRI 09/05/23 reviewed by me and discussed with patient. Study shows:   Multilevel degenerative changes of the lumbar spine detailed above.  Mild spinal canal stenosis and mild left neural foraminal narrowing noted at L3-L4.     Right knee MRI 01/21/24  reviewed by me and discussed with patient. Study shows:   Advanced tricompartmental degenerative change, most evident level of the medial compartment, severe     ASSESSMENT:      ICD-10-CM ICD-9-CM   1. Primary osteoarthritis of right knee  M17.11 715.16   2. Chronic pain of right knee  M25.561 719.46    G89.29 338.29     PLAN:     Findings again discussed  with the patient.  She has advanced DJD of the knee.  This has been a chronic ongoing issue for her and she would like to discuss treatment options.  The details of a total knee arthroplasty were reviewed.  We discussed the expected postop rehab and recovery course.  Outlined risks of surgery include but are not limited to continued or recurrent pain and up to 30% of patients, stiffness, fracture, aseptic loosening, infection, DVT/PE among others.  I do expect her to do well and think she is a good candidate for the knee replacement procedure.  After patient's specific factors were taken into account, I do think the [employer] triathlon system would be best fitting in this case.  No longer reports a numbness and tingling down the right lower leg and she has had recent workup of the lumbar spine.  Patient denies any active issues at this time.  She would like to proceed with a total knee arthroplasty.    Plan is Right total knee arthroplasty with [employer] triathlon system.  Universal tibial base plate.  Regular cement.    Preop clearance per preop center.  Patient denies any dental issues.    Full-length standing x-rays at the orthopedic preop appointment.    Date of surgery is February 26.  That gives her time for some healing of the lower leg abrasion.    Informed Consent:    The details of the surgical procedure were explained, including the location of probable incisions and a description of possible hardware and/or grafts to be used. Alternatives to both operative and non-operative options with associated risks and benefits were discussed. The patient understands the likely convalescence after surgery and, in particular, the expected postop rehab and recovery course. The outlined risks and potential complications of the proposed procedure include but are not limited to: infection, poor wound healing, scarring, deformity, stiffness, swelling, continued or recurrent pain, instability, hardware or prosthetic failure if  implanted, symptomatic hardware requiring removal, dislocation, weakness, neurovascular injury, numbness, chronic regional pain disorder, tissue nonhealing/irreparability/retear, subsequent contralateral limb injury or pathology, chondral injury, arthritis, fracture, blood clot formation, inability to return to previous level of activity, anesthetic or regional block complication up to death, need for additional procedure as indicated intraoperatively, and potential need for further surgery.    The patient was also informed and understands that the risks of surgery are greater for patients with a current condition or history of heart disease, obesity, clotting disorders, recurrent infections, steroid use, current or past smoking, and factors such as sedentary lifestyle and noncompliance with medications, therapy or follow-up. The degree of the increased risk is hard to estimate with any degree of precision. If applicable, smoking cessation was discussed.     All questions were answered. The patient has verbalized understanding of these issues and wishes to proceed with the surgery as discussed.    Procedures

## 2024-01-25 ENCOUNTER — OFFICE VISIT (OUTPATIENT)
Dept: SPORTS MEDICINE | Facility: CLINIC | Age: 64
End: 2024-01-25
Payer: COMMERCIAL

## 2024-01-25 VITALS
WEIGHT: 205.56 LBS | SYSTOLIC BLOOD PRESSURE: 143 MMHG | BODY MASS INDEX: 30.45 KG/M2 | DIASTOLIC BLOOD PRESSURE: 93 MMHG | HEIGHT: 69 IN | HEART RATE: 92 BPM

## 2024-01-25 DIAGNOSIS — M25.561 CHRONIC PAIN OF RIGHT KNEE: ICD-10-CM

## 2024-01-25 DIAGNOSIS — G89.29 CHRONIC PAIN OF RIGHT KNEE: ICD-10-CM

## 2024-01-25 DIAGNOSIS — M17.11 PRIMARY OSTEOARTHRITIS OF RIGHT KNEE: Primary | ICD-10-CM

## 2024-01-25 PROCEDURE — 3044F HG A1C LEVEL LT 7.0%: CPT | Mod: CPTII,S$GLB,, | Performed by: ORTHOPAEDIC SURGERY

## 2024-01-25 PROCEDURE — 99999 PR PBB SHADOW E&M-EST. PATIENT-LVL III: CPT | Mod: PBBFAC,,, | Performed by: ORTHOPAEDIC SURGERY

## 2024-01-25 PROCEDURE — 1159F MED LIST DOCD IN RCRD: CPT | Mod: CPTII,S$GLB,, | Performed by: ORTHOPAEDIC SURGERY

## 2024-01-25 PROCEDURE — 99215 OFFICE O/P EST HI 40 MIN: CPT | Mod: S$GLB,,, | Performed by: ORTHOPAEDIC SURGERY

## 2024-01-25 PROCEDURE — 3008F BODY MASS INDEX DOCD: CPT | Mod: CPTII,S$GLB,, | Performed by: ORTHOPAEDIC SURGERY

## 2024-01-25 PROCEDURE — 3077F SYST BP >= 140 MM HG: CPT | Mod: CPTII,S$GLB,, | Performed by: ORTHOPAEDIC SURGERY

## 2024-01-25 PROCEDURE — 4010F ACE/ARB THERAPY RXD/TAKEN: CPT | Mod: CPTII,S$GLB,, | Performed by: ORTHOPAEDIC SURGERY

## 2024-01-25 PROCEDURE — 3080F DIAST BP >= 90 MM HG: CPT | Mod: CPTII,S$GLB,, | Performed by: ORTHOPAEDIC SURGERY

## 2024-01-25 NOTE — TELEPHONE ENCOUNTER
Spoke c pt. Advised that Dr. Lezama would like her to begin Celebrex. Confirmed pharmacy. Advised that this will be sent in tomorrow by Dr. Lezama's PA, Negin. Informed pt that she is to stop meloxicam once she received celebrex rx. Pt will call c additional questions/concerns in interim. Pt expressed understanding & was thankful.

## 2024-01-25 NOTE — TELEPHONE ENCOUNTER
----- Message from Jennifer Pascual MA sent at 1/25/2024  2:26 PM CST -----  Regarding: FW: ADVISE: MEDICATION  Contact: Self    ----- Message -----  From: Unruly Delgadillo  Sent: 1/25/2024   2:15 PM CST  To: Lise Solo Staff  Subject: ADVISE: MEDICATION                               Pt stated she need something for pain send to her pharmacy, totally forgot to ask when she was in the office. Pt ask for a call back to discuss      Contact info   [phone] (home)

## 2024-01-26 RX ORDER — CELECOXIB 200 MG/1
200 CAPSULE ORAL DAILY
Qty: 40 CAPSULE | Refills: 1 | Status: SHIPPED | OUTPATIENT
Start: 2024-01-26 | End: 2024-02-15

## 2024-01-29 ENCOUNTER — TELEPHONE (OUTPATIENT)
Dept: PREADMISSION TESTING | Facility: HOSPITAL | Age: 64
End: 2024-01-29
Payer: COMMERCIAL

## 2024-01-29 NOTE — TELEPHONE ENCOUNTER
----- Message from Jennifer Pascual MA sent at 1/25/2024  1:05 PM CST -----  Regarding: Clearance  Good afternoon team,    The above patient is scheduled on 2/26/24 for TKA with Dr Lezama and will need clearance.    Jennifer Pascual MA  Medical Assistant Lead  [facility]

## 2024-01-30 ENCOUNTER — TELEPHONE (OUTPATIENT)
Dept: INTERNAL MEDICINE | Facility: CLINIC | Age: 64
End: 2024-01-30
Payer: COMMERCIAL

## 2024-01-31 ENCOUNTER — LAB VISIT (OUTPATIENT)
Dept: LAB | Facility: HOSPITAL | Age: 64
End: 2024-01-31
Payer: COMMERCIAL

## 2024-01-31 ENCOUNTER — HOSPITAL ENCOUNTER (OUTPATIENT)
Dept: CARDIOLOGY | Facility: CLINIC | Age: 64
Discharge: HOME | End: 2024-01-31
Payer: COMMERCIAL

## 2024-01-31 ENCOUNTER — OFFICE VISIT (OUTPATIENT)
Dept: INTERNAL MEDICINE | Facility: CLINIC | Age: 64
End: 2024-01-31
Payer: COMMERCIAL

## 2024-01-31 VITALS
BODY MASS INDEX: 30.7 KG/M2 | DIASTOLIC BLOOD PRESSURE: 82 MMHG | TEMPERATURE: 98 F | OXYGEN SATURATION: 98 % | WEIGHT: 207.25 LBS | HEART RATE: 84 BPM | HEIGHT: 69 IN | SYSTOLIC BLOOD PRESSURE: 150 MMHG

## 2024-01-31 DIAGNOSIS — K21.9 GASTROESOPHAGEAL REFLUX DISEASE WITHOUT ESOPHAGITIS: ICD-10-CM

## 2024-01-31 DIAGNOSIS — M79.609 PAIN IN EXTREMITY, UNSPECIFIED EXTREMITY: ICD-10-CM

## 2024-01-31 DIAGNOSIS — R55 SYNCOPE, UNSPECIFIED SYNCOPE TYPE: ICD-10-CM

## 2024-01-31 DIAGNOSIS — I10 ESSENTIAL HYPERTENSION: ICD-10-CM

## 2024-01-31 DIAGNOSIS — I65.23 BILATERAL CAROTID ARTERY STENOSIS: ICD-10-CM

## 2024-01-31 DIAGNOSIS — G43.809 OTHER MIGRAINE WITHOUT STATUS MIGRAINOSUS, NOT INTRACTABLE: Primary | ICD-10-CM

## 2024-01-31 PROBLEM — R26.9 ABNORMAL GAIT: Status: RESOLVED | Noted: 2023-02-22 | Resolved: 2024-01-31

## 2024-01-31 PROBLEM — Z78.9 POOR TOLERANCE FOR AMBULATION: Status: RESOLVED | Noted: 2023-02-22 | Resolved: 2024-01-31

## 2024-01-31 LAB
INR PPP: 1 (ref 0.8–1.2)
PROTHROMBIN TIME: 10.5 SEC (ref 9–12.5)

## 2024-01-31 PROCEDURE — 1159F MED LIST DOCD IN RCRD: CPT | Mod: CPTII,S$GLB,, | Performed by: NURSE PRACTITIONER

## 2024-01-31 PROCEDURE — 93010 ELECTROCARDIOGRAM REPORT: CPT | Mod: S$GLB,,, | Performed by: INTERNAL MEDICINE

## 2024-01-31 PROCEDURE — 3077F SYST BP >= 140 MM HG: CPT | Mod: CPTII,S$GLB,, | Performed by: NURSE PRACTITIONER

## 2024-01-31 PROCEDURE — 36415 COLL VENOUS BLD VENIPUNCTURE: CPT | Performed by: NURSE PRACTITIONER

## 2024-01-31 PROCEDURE — 4010F ACE/ARB THERAPY RXD/TAKEN: CPT | Mod: CPTII,S$GLB,, | Performed by: NURSE PRACTITIONER

## 2024-01-31 PROCEDURE — 99417 PROLNG OP E/M EACH 15 MIN: CPT | Mod: S$GLB,,, | Performed by: NURSE PRACTITIONER

## 2024-01-31 PROCEDURE — 93005 ELECTROCARDIOGRAM TRACING: CPT | Mod: S$GLB,,, | Performed by: NURSE PRACTITIONER

## 2024-01-31 PROCEDURE — 3008F BODY MASS INDEX DOCD: CPT | Mod: CPTII,S$GLB,, | Performed by: NURSE PRACTITIONER

## 2024-01-31 PROCEDURE — 3044F HG A1C LEVEL LT 7.0%: CPT | Mod: CPTII,S$GLB,, | Performed by: NURSE PRACTITIONER

## 2024-01-31 PROCEDURE — 3079F DIAST BP 80-89 MM HG: CPT | Mod: CPTII,S$GLB,, | Performed by: NURSE PRACTITIONER

## 2024-01-31 PROCEDURE — 99215 OFFICE O/P EST HI 40 MIN: CPT | Mod: S$GLB,,, | Performed by: NURSE PRACTITIONER

## 2024-01-31 PROCEDURE — 85610 PROTHROMBIN TIME: CPT | Performed by: NURSE PRACTITIONER

## 2024-01-31 PROCEDURE — 99999 PR PBB SHADOW E&M-EST. PATIENT-LVL IV: CPT | Mod: PBBFAC,,, | Performed by: NURSE PRACTITIONER

## 2024-01-31 RX ORDER — DEXTROMETHORPHAN HYDROBROMIDE, GUAIFENESIN 5; 100 MG/5ML; MG/5ML
650 LIQUID ORAL EVERY 8 HOURS
COMMUNITY

## 2024-01-31 NOTE — PROGRESS NOTES
[facility]  Progress Note    Patient Name: Jenifer Burnett  MRN: [id number]  Date of Evaluation- 02/01/2024  PCP- Kadi Carlin MD    Future cases for Jenifer Burnett [[id number]]       Case ID Status Date Time Deandre Procedure Provider Location    [id number] [facility] 2/26/2024 12:40  ARTHROPLASTY, KNEE, TOTAL Lise, W MD [place] [[id number]] [facility] OR            HPI:  This is a 63 y.o. female  who presents today for a preoperative evaluation in preparation for right knee replacement  Surgery is indicated for osteoarthritis    .   Patient is new to me.    The history has been obtained by speaking with the patient and reviewing the electronic medical record and/or outside health information. Significant health conditions for the perioperative period are discussed below in assessment and plan.     Patient reports current health status to be Good.  Denies any new symptoms before surgery.       Subjective/ Objective:     Chief Complaint: Preoperative evaulation, perioperative medical management, and complication reduction plan.     Functional Capacity:  Able to climb a flight of stairs without CP SOBor Syncope.  Walks 7 am to 1 pm 5 days a week Able to meet 4 METs      Anesthesia issues: PONV    Difficulty mouth opening: n      Steroid use in the last 12 months: n     Dental Issues:n    Family anesthesia difficulty: None   Family Hx of Thrombosis mother had blood clots after surgery    Past Medical History:   Diagnosis Date    Arthritis     Depression     Encounter for blood transfusion     GERD (gastroesophageal reflux disease)     Hypertension     Migraine     Trigeminal neuralgia 03/2018     Past Surgical History:   Procedure Laterality Date    APPENDECTOMY      CHOLECYSTECTOMY  07/2012    ESOPHAGOGASTRODUODENOSCOPY N/A 10/14/2019    Procedure: EGD (ESOPHAGOGASTRODUODENOSCOPY);  Surgeon: Sean Girard MD;  Location: [facility] FLR);  Service: Endoscopy;  Laterality: N/A;    HYSTERECTOMY      LAPAROSCOPIC TOUPET  "FUNDOPLICATION N/A 11/22/2019    Procedure: FUNDOPLICATION, LAPAROSCOPIC, TOUPET;  Surgeon: Adriano Godoy MD;  Location: [facility] OR [address] [place] [address];  Service: General;  Laterality: N/A;       Review of Systems   Constitutional:  Negative for chills, fatigue and fever.   HENT:  Negative for trouble swallowing and voice change.    Eyes:  Negative for photophobia and visual disturbance.        No acute visual changes   Respiratory:  Negative for apnea, cough, chest tightness, shortness of breath and wheezing.         STOP bang  Score 2  Low risk JEFF     Cardiovascular:  Negative for chest pain, palpitations and leg swelling.   Gastrointestinal:  Negative for abdominal distention, abdominal pain, blood in stool, constipation, diarrhea, nausea and vomiting.        NO FLD, hepatitis, cirrhosis  No BRB or black tarry stool     Genitourinary:  Negative for difficulty urinating, dysuria, flank pain, frequency, hematuria and urgency.   Musculoskeletal:  Positive for arthralgias and gait problem. Negative for back pain, joint swelling, myalgias, neck pain and neck stiffness.   Neurological:  Positive for headaches. Negative for dizziness, tremors, seizures, syncope, weakness, light-headedness and numbness.   Psychiatric/Behavioral:  Negative for suicidal ideas.         VITALS  Visit Vitals  BP (!) 150/82 (BP Location: Left arm, Patient Position: Sitting)   Pulse 84   Temp 98 °F (36.7 °C) (Oral)   Ht 5' 9" (1.753 m)   Wt 94 kg (207 lb 3.7 oz)   SpO2 98%   BMI 30.60 kg/m²          Physical Exam  Constitutional:       General: She is not in acute distress.     Appearance: Normal appearance. She is well-developed. She is not diaphoretic.   HENT:      Head: Normocephalic.      Right Ear: Hearing normal.      Left Ear: Hearing normal.      Nose: Nose normal.      Mouth/Throat:      Lips: Pink.      Mouth: Mucous membranes are moist.      Pharynx: No oropharyngeal exudate.   Eyes:      General: Lids are normal.         Right eye: No " discharge.         Left eye: No discharge.      Conjunctiva/sclera: Conjunctivae normal.      Pupils: Pupils are equal, round, and reactive to light.   Neck:      Thyroid: No thyromegaly.      Vascular: No carotid bruit or JVD.      Trachea: Trachea and phonation normal. No tracheal deviation.   Cardiovascular:      Rate and Rhythm: Normal rate and regular rhythm.      Pulses: Normal pulses.           Carotid pulses are 2+ on the right side and 2+ on the left side.       Radial pulses are 2+ on the right side and 2+ on the left side.        Posterior tibial pulses are 2+ on the right side and 2+ on the left side.      Heart sounds: Normal heart sounds. No murmur heard.     No friction rub. No gallop.   Pulmonary:      Effort: Pulmonary effort is normal. No respiratory distress.      Breath sounds: Normal breath sounds. No stridor. No wheezing or rales.      Comments: Clear Anterior and Posterior BBS  Abdominal:      General: Abdomen is protuberant. Bowel sounds are normal. There is no distension.      Palpations: Abdomen is soft.      Tenderness: There is no abdominal tenderness. There is no guarding.   Musculoskeletal:         General: No tenderness or deformity. Normal range of motion.      Cervical back: Normal range of motion and neck supple. No rigidity.      Right lower leg: No edema.      Left lower leg: No edema.   Lymphadenopathy:      Head:      Right side of head: No submental, submandibular, tonsillar, preauricular, posterior auricular or occipital adenopathy.      Left side of head: No submental, submandibular, tonsillar, preauricular, posterior auricular or occipital adenopathy.      Cervical: No cervical adenopathy.      Right cervical: No superficial cervical adenopathy.     Left cervical: No superficial cervical adenopathy.   Skin:     General: Skin is warm and dry.      Capillary Refill: Capillary refill takes 2 to 3 seconds.      Coloration: Skin is not pale.      Findings: No erythema or rash.    Neurological:      Mental Status: She is alert and oriented to person, place, and time. Mental status is at baseline.      GCS: GCS eye subscore is 4. GCS verbal subscore is 5. GCS motor subscore is 6.      Motor: No abnormal muscle tone.      Coordination: Coordination normal.   Psychiatric:         Attention and Perception: Attention and perception normal.         Mood and Affect: Mood and affect normal.         Speech: Speech normal.         Behavior: Behavior normal. Behavior is cooperative.         Thought Content: Thought content normal.         Cognition and Memory: Cognition and memory normal.         Judgment: Judgment normal.          Significant Labs:  Lab Results   Component Value Date    WBC 5.19 01/17/2024    HGB 13.7 01/17/2024    HCT 43.8 01/17/2024     01/17/2024    CHOL 195 01/17/2024    TRIG 453 (H) 01/17/2024    HDL 52 01/17/2024    ALT 12 01/17/2024    AST 28 01/17/2024     01/17/2024    K 4.1 01/17/2024     01/17/2024    CREATININE 0.7 01/17/2024    BUN 18 01/17/2024    CO2 25 01/17/2024    TSH 2.325 01/17/2024    INR 1.0 12/01/2014    HGBA1C 5.3 01/17/2024       Diagnostic Studies: No relevant studies.    EKG:   Results for orders placed or performed during the hospital encounter of 11/13/19   SCHEDULED EKG 12-LEAD (to Muse)    Collection Time: 11/13/19  1:05 PM    Narrative    Test Reason : K44.9,    Vent. Rate : 077 BPM     Atrial Rate : 077 BPM     P-R Int : 150 ms          QRS Dur : 078 ms      QT Int : 396 ms       P-R-T Axes : 015 039 027 degrees     QTc Int : 448 ms    Normal sinus rhythm  Normal ECG  When compared with ECG of 02-DEC-2014 09:08,  No significant change was found  Confirmed by MARLON SCHMIDT MD ([id number]) on 11/13/2019 2:05:17 PM    Referred By: BECKY TOLENTINO           Confirmed By:MARLON SCHMIDT MD       2D ECHO:  TTE:  No results found for this or any previous visit.    AARON:  No results found for this or any previous visit.     Imaging   Active  Cardiac Conditions: None      Revised Cardiac Risk Index   High -Risk Surgery  Intraperitoneal; Intrathoracic; suprainguinal vascular Yes- + 1 No- 0   History of Ischemic Heart Disease   (Hx of MI/positive exercise test/current chest pain due to ischemia/use of nitrate therapy/EKG with pathological Q waves) Yes- + 1 No- 0   History of CHF  (Pulmonary edema/bilateral rales or S3 gallop/PND/CXR showing pulmonary vascular redistribution) Yes- + 1 No- 0   History of CVA   (Prior stroke or TIA) Yes- + 1 No- 0   Pre-operative treatment with insulin Yes- + 1 No- 0   Pre-operative creatinine > 2mg/dl Yes- + 1 No- 0   Total:      Risk Status:  Estimated risk of cardiac complications after non-cardiac surgery using the Revised Cardiac Risk Index for Preoperative risk is 3.9 %      ARISCAT (Canet) risk index: Low: 1.6% risk of post-op pulmonary complications.    American Society of Anesthesiologists Physical Status classification (ASA): 3           No further cardiac workup needed prior to surgery.        Preoperative cardiac risk assessment-  The patient does not have any active cardiac conditions . Revised cardiac risk index predictors- ---.Functional capacity is more than 4 Mets. She will be undergoing a Orthopedic procedure that carries a Moderate Risk risk     The estimated risk of the rate of adverse cardiac outcomes  3.9    No further cardiac work up is indicated prior to proceeding with the surgery     Orders Placed This Encounter    Protime-INR    EKG 12-lead       American Society of Anesthesiologists Physical status classification ( ASA ) class: 3     Postoperative pulmonary complication risk assessment: 1.6     ARISCAT ( Canet) risk index- risk class -  Low, if duration of surgery is under 3 hours, intermediate, if duration of surgery is over 3 hours          Assessment/Plan:     Migraine  Last migraine 3 days ago tylenol relieved pain    Essential hypertension  Current BP  150/82 today.    Taking:  Losartan  Patient reports home BP readings of: 140/70s      Lifestyle changes to reduce systolic BP:  exercise 30 minutes per day,  5 days per week or 150 minutes weekly; sodium reduction and avoidance of high salt foods such as processed meats, frozen meals and  fast foods.   Keeping a healthy weight/BMI can help with better BP control    BP acceptable for surgery. I recommend monitoring BP during perioperative period as uncontrolled pain can elevate blood pressure.         Bilateral carotid artery stenosis  Impression:    Findings are consistent with bilateral mild carotid artery stenosis calculated to be between 1-39%.    Electronically signed by resident: Frances Monte  Date: 01/10/2022  Time: 15:35     Gastroesophageal reflux disease without esophagitis  Acid reflux in control as long as she uses her meds    Syncope  One occurrence  Never repeated      Preventive perioperative care    Thromboembolic prophylaxis:  Her risk factors for thrombosis include obesity, surgical procedure, age, and reduced mobility.I suggest  thromboembolic prophylaxis ( mechanical/pharmacological, weighing the risk benefits of pharmacological agent use considering alexys procedural bleeding )  during the perioperative period.I suggested being active in the post operative period.      Postoperative pulmonary complication prophylaxis-Risk factors for post operative pulmonary complications include ASA class >2- I suggest incentive spirometry use, early ambulation, and pain control so as to avoid diaphragmatic splinting  Brush teeth twice per day, oral rinses, sleep with the head of the bed up 30 degrees     Renal complication prophylaxis-Risk factors for renal complications include age and hypertension . I suggest keeping her well hydrated and avoidance/ minimizing the use of  NSAID's,[place]GL[place] 2 Inhibitors ,IV contrast if possible in the perioperative period.I suggested drinking 2 litre's of water a day      Surgical site Infection Prophylaxis-I   suggest appropriate antibiotic for Prophylaxis against Surgical site infections Shower with Hibiclens in the night before surgery and the morning of surgery       In view of Spine procedure the patient  is at risk of postoperative urinary retention.  I suggest avoidance / minimizing the of  Benzodiazepines,Anticholinergic medication,antihistamines ( Benadryl) , if possible in the perioperative period. I suggest using the minimum possible use of opioids for the minimum period of time in the perioperative period. Benadryl avoidance suggested      This visit was focused on Preoperative evaluation, Perioperative Medical management, complication reduction plans. I suggest that the patient follows up with primary care or relevant sub specialists for ongoing health care.    I appreciate the opportunity to be involved in this patients care. Please feel free to contact me if there were any questions about this consultation.    Patient is optimized    I spent a total of 80 minutes on the day of the visit.This includes face to face time and non-face to face time preparing to see the patient (eg, review of tests), obtaining and/or reviewing separately obtained history, documenting clinical information in the electronic or other health record, independently interpreting results and communicating results to the patient/family/caregiver, or care coordinator.     Alonzo Arvizu NP  Perioperative Medicine  [facility]   Pager [phone]

## 2024-01-31 NOTE — ASSESSMENT & PLAN NOTE
Current BP  150/82 today.    Taking: Losartan  Patient reports home BP readings of: 140/70s      Lifestyle changes to reduce systolic BP:  exercise 30 minutes per day,  5 days per week or 150 minutes weekly; sodium reduction and avoidance of high salt foods such as processed meats, frozen meals and  fast foods.   Keeping a healthy weight/BMI can help with better BP control    BP acceptable for surgery. I recommend monitoring BP during perioperative period as uncontrolled pain can elevate blood pressure.

## 2024-01-31 NOTE — PATIENT INSTRUCTIONS
Preventive perioperative care    Thromboembolic prophylaxis:  Her risk factors for thrombosis include obesity, surgical procedure, age, and reduced mobility.I suggest  thromboembolic prophylaxis ( mechanical/pharmacological, weighing the risk benefits of pharmacological agent use considering alexys procedural bleeding )  during the perioperative period.I suggested being active in the post operative period.      Postoperative pulmonary complication prophylaxis-Risk factors for post operative pulmonary complications include ASA class >2- I suggest incentive spirometry use, early ambulation, and pain control so as to avoid diaphragmatic splinting  Brush teeth twice per day, oral rinses, sleep with the head of the bed up 30 degrees     Renal complication prophylaxis-Risk factors for renal complications include age and hypertension . I suggest keeping her well hydrated and avoidance/ minimizing the use of  NSAID's,ENGLAND 2 Inhibitors ,IV contrast if possible in the perioperative period.I suggested drinking 2 litre's of water a day      Surgical site Infection Prophylaxis-I  suggest appropriate antibiotic for Prophylaxis against Surgical site infections Shower with Hibiclens in the night before surgery and the morning of surgery       In view of Spine procedure the patient  is at risk of postoperative urinary retention.  I suggest avoidance / minimizing the of  Benzodiazepines,Anticholinergic medication,antihistamines ( Benadryl) , if possible in the perioperative period. I suggest using the minimum possible use of opioids for the minimum period of time in the perioperative period. Benadryl avoidance suggested      This visit was focused on Preoperative evaluation, Perioperative Medical management, complication reduction plans. I suggest that the patient follows up with primary care or relevant sub specialists for ongoing health care.    I appreciate the opportunity to be involved in this patients care. Please feel free to  contact me if there were any questions about this consultation.    Patient is pending optimization

## 2024-01-31 NOTE — HPI
This is a 63 y.o. female  who presents today for a preoperative evaluation in preparation for right knee replacement  Surgery is indicated for osteoarthritis    .   Patient is new to me.    The history has been obtained by speaking with the patient and reviewing the electronic medical record and/or outside health information. Significant health conditions for the perioperative period are discussed below in assessment and plan.     Patient reports current health status to be Good.  Denies any new symptoms before surgery.

## 2024-01-31 NOTE — DISCHARGE INSTRUCTIONS
Your surgery has been scheduled for:______2/26/24____________________________________    You should report to:  ____[facility], located on the [place] side of the first floor of the           [facility] ([phone])  ____[facility], located on the [facility] side of the            Second floor of the [facility] ([phone])  ____3rd Floor [facility] located on the [facility] side of the [facility] [phone]  _X___[facility]: located at [address] [facility] [place] [address]. [address].     Please Note   Tell your doctor if you take Aspirin, products containing Aspirin, herbal medications  or blood thinners, such as Coumadin, Ticlid, or Plavix.  (Consult your provider regarding holding or stopping before surgery).  Arrange for someone to drive you home following surgery.  You will not be allowed to leave the surgical facility alone or drive yourself home following sedation and anesthesia.    Before Surgery  Stop taking all herbal medications, vitamins, and supplements 7 days prior to surgery  No Motrin/Advil (Ibuprofen) 7 days before surgery  No Aleve (Naproxen) 7 days before surgery  Stop Taking Asprin, products containing Aspirin __7___days before surgery   No Goody's/BC Powder 7 days before surgery  Refrain from drinking alcoholic beverages for 24 hours before and after surgery  Stop or limit smoking at least 24 hours prior to surgery  You may take Tylenol for pain    Night before Surgery  Do not eat or drink after midnight  Take a shower or bath (shower is recommended).  Bathe with Hibiclens soap or an antibacterial soap from the neck down.  If not supplied by your surgeon, hibiclens soap will need to be purchased over the counter in pharmacy.  Rinse soap off thoroughly.  Shampoo your hair with your regular shampoo    The Day of Surgery  Take another bath or shower with hibiclens  or any antibacterial soap, to reduce the chance of infection.  Take heart and blood pressure medications with a small sip of water, as advised by the perioperative team.  Do not take fluid pills  You may brush your teeth and rinse your mouth, but do not swallow any additional water.   Do not apply perfumes, powder, body lotions or deodorant on the day of surgery.  Nail polish should be removed.  Do not wear makeup or moisturizer  Wear comfortable clothes, such as a button front shirt and loose fitting pants.  Leave all jewelry, including body piercings, and valuables at home.    Bring any devices you will neeed after surgery such as crutches or canes.  If you have sleep apnea, please bring your CPAP machine  In the event that your physical condition changes including the onset of a cold or respiratory illness, or if you have to delay or cancel your surgery, please notify your surgeon.

## 2024-01-31 NOTE — OUTPATIENT SUBJECTIVE & OBJECTIVE
Outpatient Subjective & Objective      Chief Complaint: Preoperative evaulation, perioperative medical management, and complication reduction plan.     Functional Capacity:  Able to climb a flight of stairs without CP SOBor Syncope.  Walks 7 am to 1 pm 5 days a week Able to meet 4 METs      Anesthesia issues: PONV    Difficulty mouth opening: n      Steroid use in the last 12 months: n     Dental Issues:n    Family anesthesia difficulty: None   Family Hx of Thrombosis mother had blood clots after surgery    Past Medical History:   Diagnosis Date    Arthritis     Depression     Encounter for blood transfusion     GERD (gastroesophageal reflux disease)     Hypertension     Migraine     Trigeminal neuralgia 03/2018     Past Surgical History:   Procedure Laterality Date    APPENDECTOMY      CHOLECYSTECTOMY  07/2012    ESOPHAGOGASTRODUODENOSCOPY N/A 10/14/2019    Procedure: EGD (ESOPHAGOGASTRODUODENOSCOPY);  Surgeon: Sean Girard MD;  Location: [facility] (4TH FLR);  Service: Endoscopy;  Laterality: N/A;    HYSTERECTOMY      LAPAROSCOPIC TOUPET FUNDOPLICATION N/A 11/22/2019    Procedure: FUNDOPLICATION, LAPAROSCOPIC, TOUPET;  Surgeon: Adriano Godoy MD;  Location: [address];  Service: General;  Laterality: N/A;       Review of Systems   Constitutional:  Negative for chills, fatigue and fever.   HENT:  Negative for trouble swallowing and voice change.    Eyes:  Negative for photophobia and visual disturbance.        No acute visual changes   Respiratory:  Negative for apnea, cough, chest tightness, shortness of breath and wheezing.         STOP bang  Score 2  Low risk JEFF     Cardiovascular:  Negative for chest pain, palpitations and leg swelling.   Gastrointestinal:  Negative for abdominal distention, abdominal pain, blood in stool, constipation, diarrhea, nausea and vomiting.        NO FLD, hepatitis, cirrhosis  No BRB or black tarry stool     Genitourinary:  Negative for difficulty urinating, dysuria, flank  "pain, frequency, hematuria and urgency.   Musculoskeletal:  Positive for arthralgias and gait problem. Negative for back pain, joint swelling, myalgias, neck pain and neck stiffness.   Neurological:  Positive for headaches. Negative for dizziness, tremors, seizures, syncope, weakness, light-headedness and numbness.   Psychiatric/Behavioral:  Negative for suicidal ideas.         VITALS  Visit Vitals  BP (!) 150/82 (BP Location: Left arm, Patient Position: Sitting)   Pulse 84   Temp 98 °F (36.7 °C) (Oral)   Ht 5' 9" (1.753 m)   Wt 94 kg (207 lb 3.7 oz)   SpO2 98%   BMI 30.60 kg/m²          Physical Exam  Constitutional:       General: She is not in acute distress.     Appearance: Normal appearance. She is well-developed. She is not diaphoretic.   HENT:      Head: Normocephalic.      Right Ear: Hearing normal.      Left Ear: Hearing normal.      Nose: Nose normal.      Mouth/Throat:      Lips: Pink.      Mouth: Mucous membranes are moist.      Pharynx: No oropharyngeal exudate.   Eyes:      General: Lids are normal.         Right eye: No discharge.         Left eye: No discharge.      Conjunctiva/sclera: Conjunctivae normal.      Pupils: Pupils are equal, round, and reactive to light.   Neck:      Thyroid: No thyromegaly.      Vascular: No carotid bruit or JVD.      Trachea: Trachea and phonation normal. No tracheal deviation.   Cardiovascular:      Rate and Rhythm: Normal rate and regular rhythm.      Pulses: Normal pulses.           Carotid pulses are 2+ on the right side and 2+ on the left side.       Radial pulses are 2+ on the right side and 2+ on the left side.        Posterior tibial pulses are 2+ on the right side and 2+ on the left side.      Heart sounds: Normal heart sounds. No murmur heard.     No friction rub. No gallop.   Pulmonary:      Effort: Pulmonary effort is normal. No respiratory distress.      Breath sounds: Normal breath sounds. No stridor. No wheezing or rales.      Comments: Clear Anterior and " Posterior BBS  Abdominal:      General: Abdomen is protuberant. Bowel sounds are normal. There is no distension.      Palpations: Abdomen is soft.      Tenderness: There is no abdominal tenderness. There is no guarding.   Musculoskeletal:         General: No tenderness or deformity. Normal range of motion.      Cervical back: Normal range of motion and neck supple. No rigidity.      Right lower leg: No edema.      Left lower leg: No edema.   Lymphadenopathy:      Head:      Right side of head: No submental, submandibular, tonsillar, preauricular, posterior auricular or occipital adenopathy.      Left side of head: No submental, submandibular, tonsillar, preauricular, posterior auricular or occipital adenopathy.      Cervical: No cervical adenopathy.      Right cervical: No superficial cervical adenopathy.     Left cervical: No superficial cervical adenopathy.   Skin:     General: Skin is warm and dry.      Capillary Refill: Capillary refill takes 2 to 3 seconds.      Coloration: Skin is not pale.      Findings: No erythema or rash.   Neurological:      Mental Status: She is alert and oriented to person, place, and time. Mental status is at baseline.      GCS: GCS eye subscore is 4. GCS verbal subscore is 5. GCS motor subscore is 6.      Motor: No abnormal muscle tone.      Coordination: Coordination normal.   Psychiatric:         Attention and Perception: Attention and perception normal.         Mood and Affect: Mood and affect normal.         Speech: Speech normal.         Behavior: Behavior normal. Behavior is cooperative.         Thought Content: Thought content normal.         Cognition and Memory: Cognition and memory normal.         Judgment: Judgment normal.          Significant Labs:  Lab Results   Component Value Date    WBC 5.19 01/17/2024    HGB 13.7 01/17/2024    HCT 43.8 01/17/2024     01/17/2024    CHOL 195 01/17/2024    TRIG 453 (H) 01/17/2024    HDL 52 01/17/2024    ALT 12 01/17/2024    AST 28  01/17/2024     01/17/2024    K 4.1 01/17/2024     01/17/2024    CREATININE 0.7 01/17/2024    BUN 18 01/17/2024    CO2 25 01/17/2024    TSH 2.325 01/17/2024    INR 1.0 12/01/2014    HGBA1C 5.3 01/17/2024       Diagnostic Studies: No relevant studies.    EKG:   Results for orders placed or performed during the hospital encounter of 11/13/19   SCHEDULED EKG 12-LEAD (to Muse)    Collection Time: 11/13/19  1:05 PM    Narrative    Test Reason : K44.9,    Vent. Rate : 077 BPM     Atrial Rate : 077 BPM     P-R Int : 150 ms          QRS Dur : 078 ms      QT Int : 396 ms       P-R-T Axes : 015 039 027 degrees     QTc Int : 448 ms    Normal sinus rhythm  Normal ECG  When compared with ECG of 02-DEC-2014 09:08,  No significant change was found  Confirmed by MARLON SCHMIDT MD ([id number]) on 11/13/2019 2:05:17 PM    Referred By: BECKY TOLENTINO           Confirmed By:MARLON SCHMIDT MD       2D ECHO:  TTE:  No results found for this or any previous visit.    AARON:  No results found for this or any previous visit.     Imaging   Active Cardiac Conditions: None      Revised Cardiac Risk Index   High -Risk Surgery  Intraperitoneal; Intrathoracic; suprainguinal vascular Yes- + 1 No- 0   History of Ischemic Heart Disease   (Hx of MI/positive exercise test/current chest pain due to ischemia/use of nitrate therapy/EKG with pathological Q waves) Yes- + 1 No- 0   History of CHF  (Pulmonary edema/bilateral rales or S3 gallop/PND/CXR showing pulmonary vascular redistribution) Yes- + 1 No- 0   History of CVA   (Prior stroke or TIA) Yes- + 1 No- 0   Pre-operative treatment with insulin Yes- + 1 No- 0   Pre-operative creatinine > 2mg/dl Yes- + 1 No- 0   Total:      Risk Status:  Estimated risk of cardiac complications after non-cardiac surgery using the Revised Cardiac Risk Index for Preoperative risk is 3.9 %      ARISCAT (Canet) risk index: Low: 1.6% risk of post-op pulmonary complications.    American Society of Anesthesiologists  Physical Status classification (ASA): 3           No further cardiac workup needed prior to surgery.    Outpatient Subjective & Objective

## 2024-01-31 NOTE — ASSESSMENT & PLAN NOTE
Impression:    Findings are consistent with bilateral mild carotid artery stenosis calculated to be between 1-39%.    Electronically signed by resident: Frances Monte  Date: 01/10/2022  Time: 15:35

## 2024-02-15 ENCOUNTER — PATIENT MESSAGE (OUTPATIENT)
Dept: ADMINISTRATIVE | Facility: OTHER | Age: 64
End: 2024-02-15
Payer: COMMERCIAL

## 2024-02-15 RX ORDER — MELOXICAM 7.5 MG/1
7.5 TABLET ORAL
Qty: 30 TABLET | Refills: 0 | Status: ON HOLD | OUTPATIENT
Start: 2024-02-15 | End: 2024-02-26 | Stop reason: HOSPADM

## 2024-02-19 ENCOUNTER — HOSPITAL ENCOUNTER (OUTPATIENT)
Dept: RADIOLOGY | Facility: HOSPITAL | Age: 64
Discharge: HOME | End: 2024-02-19
Attending: PHYSICIAN ASSISTANT
Payer: COMMERCIAL

## 2024-02-19 ENCOUNTER — OFFICE VISIT (OUTPATIENT)
Dept: SPORTS MEDICINE | Facility: CLINIC | Age: 64
End: 2024-02-19
Payer: COMMERCIAL

## 2024-02-19 VITALS
HEIGHT: 69 IN | SYSTOLIC BLOOD PRESSURE: 152 MMHG | WEIGHT: 205.5 LBS | BODY MASS INDEX: 30.44 KG/M2 | HEART RATE: 92 BPM | DIASTOLIC BLOOD PRESSURE: 102 MMHG

## 2024-02-19 DIAGNOSIS — M17.11 PRIMARY OSTEOARTHRITIS OF ONE KNEE, RIGHT: ICD-10-CM

## 2024-02-19 DIAGNOSIS — M17.11 PRIMARY OSTEOARTHRITIS OF RIGHT KNEE: Primary | ICD-10-CM

## 2024-02-19 DIAGNOSIS — M25.561 RIGHT KNEE PAIN, UNSPECIFIED CHRONICITY: ICD-10-CM

## 2024-02-19 PROCEDURE — 77073 BONE LENGTH STUDIES: CPT | Mod: 26,,, | Performed by: RADIOLOGY

## 2024-02-19 PROCEDURE — 3008F BODY MASS INDEX DOCD: CPT | Mod: CPTII,S$GLB,, | Performed by: PHYSICIAN ASSISTANT

## 2024-02-19 PROCEDURE — 1159F MED LIST DOCD IN RCRD: CPT | Mod: CPTII,S$GLB,, | Performed by: PHYSICIAN ASSISTANT

## 2024-02-19 PROCEDURE — 1160F RVW MEDS BY RX/DR IN RCRD: CPT | Mod: CPTII,S$GLB,, | Performed by: PHYSICIAN ASSISTANT

## 2024-02-19 PROCEDURE — 3077F SYST BP >= 140 MM HG: CPT | Mod: CPTII,S$GLB,, | Performed by: PHYSICIAN ASSISTANT

## 2024-02-19 PROCEDURE — 3044F HG A1C LEVEL LT 7.0%: CPT | Mod: CPTII,S$GLB,, | Performed by: PHYSICIAN ASSISTANT

## 2024-02-19 PROCEDURE — 3080F DIAST BP >= 90 MM HG: CPT | Mod: CPTII,S$GLB,, | Performed by: PHYSICIAN ASSISTANT

## 2024-02-19 PROCEDURE — 4010F ACE/ARB THERAPY RXD/TAKEN: CPT | Mod: CPTII,S$GLB,, | Performed by: PHYSICIAN ASSISTANT

## 2024-02-19 PROCEDURE — 77073 BONE LENGTH STUDIES: CPT | Mod: TC

## 2024-02-19 PROCEDURE — 99999 PR PBB SHADOW E&M-EST. PATIENT-LVL IV: CPT | Mod: PBBFAC,,, | Performed by: PHYSICIAN ASSISTANT

## 2024-02-19 PROCEDURE — 99215 OFFICE O/P EST HI 40 MIN: CPT | Mod: S$GLB,,, | Performed by: PHYSICIAN ASSISTANT

## 2024-02-19 RX ORDER — POLYETHYLENE GLYCOL 3350 17 G/17G
17 POWDER, FOR SOLUTION ORAL DAILY
Status: CANCELLED | OUTPATIENT
Start: 2024-02-19

## 2024-02-19 RX ORDER — MIDAZOLAM HYDROCHLORIDE 1 MG/ML
4 INJECTION INTRAMUSCULAR; INTRAVENOUS
Status: CANCELLED | OUTPATIENT
Start: 2024-02-19 | End: 2024-02-20

## 2024-02-19 RX ORDER — FENTANYL CITRATE 50 UG/ML
25 INJECTION, SOLUTION INTRAMUSCULAR; INTRAVENOUS EVERY 5 MIN PRN
Status: CANCELLED | OUTPATIENT
Start: 2024-02-19

## 2024-02-19 RX ORDER — METHOCARBAMOL 500 MG/1
500 TABLET, FILM COATED ORAL 4 TIMES DAILY
Qty: 56 TABLET | Refills: 0 | Status: SHIPPED | OUTPATIENT
Start: 2024-02-19 | End: 2024-03-11

## 2024-02-19 RX ORDER — AMOXICILLIN 250 MG
1 CAPSULE ORAL 2 TIMES DAILY
Status: CANCELLED | OUTPATIENT
Start: 2024-02-19

## 2024-02-19 RX ORDER — TALC
6 POWDER (GRAM) TOPICAL NIGHTLY PRN
Status: CANCELLED | OUTPATIENT
Start: 2024-02-19

## 2024-02-19 RX ORDER — SODIUM CHLORIDE 9 MG/ML
INJECTION, SOLUTION INTRAVENOUS
Status: CANCELLED | OUTPATIENT
Start: 2024-02-19

## 2024-02-19 RX ORDER — SODIUM CHLORIDE 0.9 % (FLUSH) 0.9 %
10 SYRINGE (ML) INJECTION
Status: CANCELLED | OUTPATIENT
Start: 2024-02-19

## 2024-02-19 RX ORDER — BISACODYL 10 MG/1
10 SUPPOSITORY RECTAL EVERY 12 HOURS PRN
Status: CANCELLED | OUTPATIENT
Start: 2024-02-19

## 2024-02-19 RX ORDER — ASPIRIN 81 MG/1
81 TABLET ORAL 2 TIMES DAILY
Status: CANCELLED | OUTPATIENT
Start: 2024-02-19

## 2024-02-19 RX ORDER — METHOCARBAMOL 750 MG/1
750 TABLET, FILM COATED ORAL 3 TIMES DAILY
Status: CANCELLED | OUTPATIENT
Start: 2024-02-19

## 2024-02-19 RX ORDER — CEFADROXIL 500 MG/1
1 CAPSULE ORAL EVERY 12 HOURS
Qty: 28 CAPSULE | Refills: 0 | Status: SHIPPED | OUTPATIENT
Start: 2024-02-19 | End: 2024-03-04

## 2024-02-19 RX ORDER — PREGABALIN 75 MG/1
75 CAPSULE ORAL NIGHTLY
Status: CANCELLED | OUTPATIENT
Start: 2024-02-19

## 2024-02-19 RX ORDER — SODIUM CHLORIDE 9 MG/ML
INJECTION, SOLUTION INTRAVENOUS CONTINUOUS
Status: CANCELLED | OUTPATIENT
Start: 2024-02-19 | End: 2024-02-20

## 2024-02-19 RX ORDER — MUPIROCIN 20 MG/G
1 OINTMENT TOPICAL 2 TIMES DAILY
Status: CANCELLED | OUTPATIENT
Start: 2024-02-19 | End: 2024-02-24

## 2024-02-19 RX ORDER — OXYCODONE HYDROCHLORIDE 5 MG/1
5 TABLET ORAL EVERY 4 HOURS PRN
Qty: 28 TABLET | Refills: 0 | Status: SHIPPED | OUTPATIENT
Start: 2024-02-19 | End: 2024-03-05 | Stop reason: SDUPTHER

## 2024-02-19 RX ORDER — OXYCODONE HYDROCHLORIDE 5 MG/1
5 TABLET ORAL
Status: CANCELLED | OUTPATIENT
Start: 2024-02-19

## 2024-02-19 RX ORDER — ACETAMINOPHEN 500 MG
1000 TABLET ORAL EVERY 6 HOURS
Status: CANCELLED | OUTPATIENT
Start: 2024-02-19

## 2024-02-19 RX ORDER — FENTANYL CITRATE 50 UG/ML
100 INJECTION, SOLUTION INTRAMUSCULAR; INTRAVENOUS
Status: CANCELLED | OUTPATIENT
Start: 2024-02-19 | End: 2024-02-20

## 2024-02-19 RX ORDER — PREGABALIN 75 MG/1
75 CAPSULE ORAL 2 TIMES DAILY
Qty: 28 CAPSULE | Refills: 0 | Status: SHIPPED | OUTPATIENT
Start: 2024-02-19 | End: 2024-03-11 | Stop reason: SDUPTHER

## 2024-02-19 RX ORDER — ACETAMINOPHEN 500 MG
1000 TABLET ORAL
Status: CANCELLED | OUTPATIENT
Start: 2024-02-19

## 2024-02-19 RX ORDER — OXYCODONE HYDROCHLORIDE 5 MG/1
10 TABLET ORAL
Status: CANCELLED | OUTPATIENT
Start: 2024-02-19

## 2024-02-19 RX ORDER — CEFAZOLIN SODIUM 2 G/50ML
2 SOLUTION INTRAVENOUS
Status: CANCELLED | OUTPATIENT
Start: 2024-02-19

## 2024-02-19 RX ORDER — MUPIROCIN 20 MG/G
1 OINTMENT TOPICAL
Status: CANCELLED | OUTPATIENT
Start: 2024-02-19

## 2024-02-19 RX ORDER — PREGABALIN 75 MG/1
75 CAPSULE ORAL
Status: CANCELLED | OUTPATIENT
Start: 2024-02-19

## 2024-02-19 RX ORDER — CELECOXIB 200 MG/1
200 CAPSULE ORAL DAILY
Status: CANCELLED | OUTPATIENT
Start: 2024-02-19

## 2024-02-19 RX ORDER — NALOXONE HCL 0.4 MG/ML
0.02 VIAL (ML) INJECTION
Status: CANCELLED | OUTPATIENT
Start: 2024-02-19

## 2024-02-19 RX ORDER — PROCHLORPERAZINE EDISYLATE 5 MG/ML
5 INJECTION INTRAMUSCULAR; INTRAVENOUS EVERY 6 HOURS PRN
Status: CANCELLED | OUTPATIENT
Start: 2024-02-19

## 2024-02-19 RX ORDER — ONDANSETRON HYDROCHLORIDE 2 MG/ML
4 INJECTION, SOLUTION INTRAVENOUS EVERY 8 HOURS PRN
Status: CANCELLED | OUTPATIENT
Start: 2024-02-19

## 2024-02-19 RX ORDER — MORPHINE SULFATE 2 MG/ML
2 INJECTION, SOLUTION INTRAMUSCULAR; INTRAVENOUS
Status: CANCELLED | OUTPATIENT
Start: 2024-02-19

## 2024-02-19 RX ORDER — FAMOTIDINE 20 MG/1
20 TABLET, FILM COATED ORAL 2 TIMES DAILY
Status: CANCELLED | OUTPATIENT
Start: 2024-02-19

## 2024-02-19 RX ORDER — CEFAZOLIN SODIUM 2 G/50ML
2 SOLUTION INTRAVENOUS
Status: CANCELLED | OUTPATIENT
Start: 2024-02-19 | End: 2024-02-20

## 2024-02-19 RX ORDER — CELECOXIB 200 MG/1
400 CAPSULE ORAL ONCE
Status: CANCELLED | OUTPATIENT
Start: 2024-02-19 | End: 2024-02-19

## 2024-02-19 RX ORDER — ASPIRIN 81 MG/1
81 TABLET ORAL DAILY
Qty: 42 TABLET | Refills: 0 | Status: ON HOLD | OUTPATIENT
Start: 2024-02-19 | End: 2024-02-26

## 2024-02-19 RX ORDER — CELECOXIB 200 MG/1
200 CAPSULE ORAL 2 TIMES DAILY
Qty: 56 CAPSULE | Refills: 0 | Status: SHIPPED | OUTPATIENT
Start: 2024-02-19 | End: 2024-03-06 | Stop reason: SDUPTHER

## 2024-02-19 RX ORDER — LIDOCAINE HYDROCHLORIDE 10 MG/ML
1 INJECTION, SOLUTION EPIDURAL; INFILTRATION; INTRACAUDAL; PERINEURAL
Status: CANCELLED | OUTPATIENT
Start: 2024-02-19

## 2024-02-19 NOTE — PROGRESS NOTES
Jenifer Burnett is a 64 y.o. year old here today for pre surgery optimization visit  in preparation for a Right total knee arthroplasty to be performed by Dr. Lezama on 2/26/2024.  she was last seen and treated in the clinic on 1/25/2024. she will be medically optimized by the pre op center. There has been no significant change in medical status since last visit. No fever, chills, malaise, or unexplained weight change.      Allergies, Medications, past medical and surgical history reviewed.    Focused examination performed.    Patient declined to see surgeon today. All questions answered. Patient encouraged to call with questions. Contact information given.     Pre, alexys, and post operative procedures and expectations discussed. Goals of successful surgery reviewed and include manageable pain levels, surgical site free of infection, medication management, and ambulation with PT/OT assistance. Healthy weight management discussed with patient and caregiver who were receptive to eduction of healthy diet and activity. No other necessary lifestyle changes identified. Educated patient about signs and symptoms of infection, medication management, anticoagulation therapy, risk of tobacco and alcohol use, and self-care to promote healing. Surgical guide given for future reference. Hibiclens given to patient with instructions. All questions were answered.     Jenifer Burnett verbalized an understanding to the education and goals. Patient has displayed readiness to engage in care and is ready to proceed with surgery.  Patient reports her sister Mindy is able and ready to provide assistance at home after discharge.    The mobility limitation cannot be sufficiently resolved by the use of a crutches. Patient's functional mobility deficit can be sufficiently resolved with the use of a (Rolling Walker or Walker). Patient's mobility limitation significantly impairs their ability to participate in one of more activities of daily  living. The use of a (Rolling Walker or Walker) will significantly improve the patient's ability to participate in MRADLS and the patient will use it on regular basis in the home.      Surgical and blood consents signed.    Jenifer Burnett will contact us if there are any questions, concerns, or changes in medical status prior to surgery.       Joint class: completed packet    Patient has discussed discharge planning with surgeon. Patient will be discharged to home following surgery.   patient will be scheduled with [facility].     50 minutes of time was spent on patient education, review of records, templating, H&P, , appointment scheduling and optimizing patient for surgery.

## 2024-02-19 NOTE — H&P
Jenifer Burnett  is here for a completion of her perioperative paperwork. she  Is scheduled to undergo Right total knee arthroplasty with Alonso triathlon system.  Universal tibial base plate.  Regular cement on 2/26/24.  She is a healthy individual and does need clearance for this procedure.     Patient cleared per preop center.    Patient GOING HOME.    Risks, indications and benefits of the surgical procedure were discussed with the patient. All questions with regard to surgery, rehab, expected return to functional activities, activities of daily living and recreational endeavors were answered to her satisfaction.    Discussed COVID-19 with the patient, they are aware of our current policies and procedures, were given the option of delaying surgery, and they elect to proceed.    Patient was informed and understands the risks of surgery are greater for patients with a current condition or hx of heart disease, obesity, clotting disorders, recurrent infections, steroid use, current or past smoking, and factors such as sedentary lifestyle and noncompliance with medications, therapy or f/u. The degree of the increased risk is hard to estimate w/ any degree of precision.    Once no other questions were asked, a brief history and physical exam was then performed.    PAST MEDICAL HISTORY:   Past Medical History:   Diagnosis Date    Abnormal gait 02/22/2023    Arthritis     Depression     Encounter for blood transfusion     GERD (gastroesophageal reflux disease)     Hypertension     Migraine     Poor tolerance for ambulation 02/22/2023    Trigeminal neuralgia 03/2018     PAST SURGICAL HISTORY:   Past Surgical History:   Procedure Laterality Date    APPENDECTOMY      CHOLECYSTECTOMY  07/2012    ESOPHAGOGASTRODUODENOSCOPY N/A 10/14/2019    Procedure: EGD (ESOPHAGOGASTRODUODENOSCOPY);  Surgeon: Sean Girard MD;  Location: [address];  Service: Endoscopy;  Laterality: N/A;    HYSTERECTOMY      LAPAROSCOPIC TOUPET  FUNDOPLICATION N/A 11/22/2019    Procedure: FUNDOPLICATION, LAPAROSCOPIC, TOUPET;  Surgeon: Adriano Godoy MD;  Location: [facility] OR [address] [place] [address];  Service: General;  Laterality: N/A;     FAMILY HISTORY:   Family History   Problem Relation Age of Onset    Hypertension Mother     Cancer Mother     Breast cancer Mother     Colon cancer Mother     Cancer Father      SOCIAL HISTORY:   Social History     Socioeconomic History    Marital status: Single    Number of children: 3   Occupational History    Occupation: General [employer]   Tobacco Use    Smoking status: Never    Smokeless tobacco: Never   Substance and Sexual Activity    Alcohol use: No    Drug use: No    Sexual activity: Yes     Partners: Male   Social History Narrative    Working at [employer], does do bending/lifting type work        MEDICATIONS:   Current Outpatient Medications:     acetaminophen (TYLENOL) 650 MG TbSR, Take 650 mg by mouth every 8 (eight) hours., Disp: , Rfl:     citalopram (CELEXA) 40 MG tablet, Take 1 tablet (40 mg total) by mouth once daily., Disp: 90 tablet, Rfl: 3    cyanocobalamin, vitamin B-12, 2,500 mcg Tab, Take 1 tablet by mouth once daily., Disp: , Rfl:     losartan (COZAAR) 50 MG tablet, Take 1 tablet (50 mg total) by mouth once daily. For blood pressure, Disp: 90 tablet, Rfl: 3    omeprazole (PRILOSEC) 40 MG capsule, Take 1 capsule (40 mg total) by mouth every morning., Disp: 90 capsule, Rfl: 3    vitamin D (VITAMIN D3) 1000 units Tab, Take 2,000 Units by mouth once daily., Disp: , Rfl:     HYDROcodone-acetaminophen (NORCO) 5-325 mg per tablet, Take 1 tablet by mouth every 12 (twelve) hours as needed for Pain. (Patient not taking: Reported on 1/25/2024), Disp: 14 tablet, Rfl: 0    meloxicam (MOBIC) 7.5 MG tablet, TAKE 1 TABLET BY MOUTH EVERY DAY (Patient not taking: Reported on 2/19/2024), Disp: 30 tablet, Rfl: 0  ALLERGIES: Review of patient's allergies indicates:  No Known Allergies    Review of Systems    Constitution: Negative. Negative for chills, fever and night sweats.   HENT: Negative for congestion and headaches.    Eyes: Negative for blurred vision, left vision loss and right vision loss.   Cardiovascular: Negative for chest pain and syncope.   Respiratory: Negative for cough and shortness of breath.    Endocrine: Negative for polydipsia, polyphagia and polyuria.   Hematologic/Lymphatic: Negative for bleeding problem. Does not bruise/bleed easily.   Skin: Negative for dry skin, itching and rash.   Musculoskeletal: Negative for falls and muscle weakness.   Gastrointestinal: Negative for abdominal pain and bowel incontinence.   Genitourinary: Negative for bladder incontinence and nocturia.   Neurological: Negative for disturbances in coordination, loss of balance and seizures.   Psychiatric/Behavioral: Negative for depression. The patient does not have insomnia.    Allergic/Immunologic: Negative for hives and persistent infections.     PHYSICAL EXAM:  GEN: A&Ox3, WD WN NAD  HEENT: WNL  CHEST: CTAB, no W/R/R  HEART: RRR, no M/R/G   ABD: Soft, NT ND, BS x4 QUADS  MS: Refer to previous note for detailed MS exam  NEURO: CN II-XII intact       The surgical consent was then reviewed with the patient, who agreed with all the contents of the consent form and it was signed.     PHYSICAL THERAPY:  She was also instructed regarding physical therapy and will begin on POD#1-3. She is doing physical therapy at [facility].    POST OP CARE: Instructions were reviewed including care of the wound and dressing after surgery and when she can shower.     PAIN MANAGEMENT: Jenifer Burnett was instructed regarding the Polar ice unit that will be in place after surgery and her postoperative pain medications.     MEDICATION:  Roxicodone 5 mg 1-2 q 4 hours PRN for pain  Zofran 4 mg q 8 hours PRN for nausea and vomiting.  Aspirin 81mg BID x 6 weeks for DVT prophylaxis starting on the evening after  surgery.  Celebrex 200mg BID x 4 weeks post op  Cefadroxil 500mg q12h x 7 days post op for infection prophylaxis  Lyrica 75mg BID x 2 weeks  Robaxin 500mg q8hr x 2 weeks as needed for muscle spasms     Post op meds to be delivered bedside prior to discharge. Deliver to family if patient is in surgery at 5pm.     Patient was instructed to purchase and take Colace to counter possible GI side effects of taking opiates.     DVT prophylaxis was discussed with the patient today including risk factors for developing DVTs and history of DVTs. The patient was asked if any specific recommendations were given from the doctor/s that did pre-operative surgical clearance.      If the patient was previously taking 81mg baby aspirin, they were told to not take additional baby aspirin, using the above stated aspirin and to restart the 81mg aspirin daily after completion of the aspirin dose.      Patient was also told to buy over the counter Prilosec medication and take it once daily for GI protection as long as they are taking NSAIDs or Aspirin.     The patient was told that narcotic pain medications may make them drowsy and instructions were given to not sign legal documents, drive or operate heavy machinery, cars, or equipment while under the influence of narcotic medications.     As there were no other questions to be asked, she was given my business card along with Dr. Lezama's business card if she has any questions or concerns prior to surgery or in the postop period.

## 2024-02-19 NOTE — H&P (VIEW-ONLY)
Jenifer Burnett  is here for a completion of her perioperative paperwork. she  Is scheduled to undergo Right total knee arthroplasty with [employer] triathlon system.  Universal tibial base plate.  Regular cement on 2/26/24.  She is a healthy individual and does need clearance for this procedure.     Patient cleared per preop center.    Patient GOING HOME.    Risks, indications and benefits of the surgical procedure were discussed with the patient. All questions with regard to surgery, rehab, expected return to functional activities, activities of daily living and recreational endeavors were answered to her satisfaction.    Discussed COVID-19 with the patient, they are aware of our current policies and procedures, were given the option of delaying surgery, and they elect to proceed.    Patient was informed and understands the risks of surgery are greater for patients with a current condition or hx of heart disease, obesity, clotting disorders, recurrent infections, steroid use, current or past smoking, and factors such as sedentary lifestyle and noncompliance with medications, therapy or f/u. The degree of the increased risk is hard to estimate w/ any degree of precision.    Once no other questions were asked, a brief history and physical exam was then performed.    PAST MEDICAL HISTORY:   Past Medical History:   Diagnosis Date    Abnormal gait 02/22/2023    Arthritis     Depression     Encounter for blood transfusion     GERD (gastroesophageal reflux disease)     Hypertension     Migraine     Poor tolerance for ambulation 02/22/2023    Trigeminal neuralgia 03/2018     PAST SURGICAL HISTORY:   Past Surgical History:   Procedure Laterality Date    APPENDECTOMY      CHOLECYSTECTOMY  07/2012    ESOPHAGOGASTRODUODENOSCOPY N/A 10/14/2019    Procedure: EGD (ESOPHAGOGASTRODUODENOSCOPY);  Surgeon: Sean Girard MD;  Location: [address];  Service: Endoscopy;  Laterality: N/A;    HYSTERECTOMY      LAPAROSCOPIC TOUPET  FUNDOPLICATION N/A 11/22/2019    Procedure: FUNDOPLICATION, LAPAROSCOPIC, TOUPET;  Surgeon: Adriano Godoy MD;  Location: [facility] OR [address] [place] [address];  Service: General;  Laterality: N/A;     FAMILY HISTORY:   Family History   Problem Relation Age of Onset    Hypertension Mother     Cancer Mother     Breast cancer Mother     Colon cancer Mother     Cancer Father      SOCIAL HISTORY:   Social History     Socioeconomic History    Marital status: Single    Number of children: 3   Occupational History    Occupation: [employer]   Tobacco Use    Smoking status: Never    Smokeless tobacco: Never   Substance and Sexual Activity    Alcohol use: No    Drug use: No    Sexual activity: Yes     Partners: Male   Social History Narrative    Working at [employer], does do bending/lifting type work        MEDICATIONS:   Current Outpatient Medications:     acetaminophen (TYLENOL) 650 MG TbSR, Take 650 mg by mouth every 8 (eight) hours., Disp: , Rfl:     citalopram (CELEXA) 40 MG tablet, Take 1 tablet (40 mg total) by mouth once daily., Disp: 90 tablet, Rfl: 3    cyanocobalamin, vitamin B-12, 2,500 mcg Tab, Take 1 tablet by mouth once daily., Disp: , Rfl:     losartan (COZAAR) 50 MG tablet, Take 1 tablet (50 mg total) by mouth once daily. For blood pressure, Disp: 90 tablet, Rfl: 3    omeprazole (PRILOSEC) 40 MG capsule, Take 1 capsule (40 mg total) by mouth every morning., Disp: 90 capsule, Rfl: 3    vitamin D (VITAMIN D3) 1000 units Tab, Take 2,000 Units by mouth once daily., Disp: , Rfl:     HYDROcodone-acetaminophen (NORCO) 5-325 mg per tablet, Take 1 tablet by mouth every 12 (twelve) hours as needed for Pain. (Patient not taking: Reported on 1/25/2024), Disp: 14 tablet, Rfl: 0    meloxicam (MOBIC) 7.5 MG tablet, TAKE 1 TABLET BY MOUTH EVERY DAY (Patient not taking: Reported on 2/19/2024), Disp: 30 tablet, Rfl: 0  ALLERGIES: Review of patient's allergies indicates:  No Known Allergies    Review of Systems    Constitution: Negative. Negative for chills, fever and night sweats.   HENT: Negative for congestion and headaches.    Eyes: Negative for blurred vision, left vision loss and right vision loss.   Cardiovascular: Negative for chest pain and syncope.   Respiratory: Negative for cough and shortness of breath.    Endocrine: Negative for polydipsia, polyphagia and polyuria.   Hematologic/Lymphatic: Negative for bleeding problem. Does not bruise/bleed easily.   Skin: Negative for dry skin, itching and rash.   Musculoskeletal: Negative for falls and muscle weakness.   Gastrointestinal: Negative for abdominal pain and bowel incontinence.   Genitourinary: Negative for bladder incontinence and nocturia.   Neurological: Negative for disturbances in coordination, loss of balance and seizures.   Psychiatric/Behavioral: Negative for depression. The patient does not have insomnia.    Allergic/Immunologic: Negative for hives and persistent infections.     PHYSICAL EXAM:  GEN: A&Ox3, WD WN NAD  HEENT: WNL  CHEST: CTAB, no W/R/R  HEART: RRR, no M/R/G   ABD: Soft, NT ND, BS x4 QUADS  MS: Refer to previous note for detailed MS exam  NEURO: CN II-XII intact       The surgical consent was then reviewed with the patient, who agreed with all the contents of the consent form and it was signed.     PHYSICAL THERAPY:  She was also instructed regarding physical therapy and will begin on POD#1-3. She is doing physical therapy at [facility].    POST OP CARE: Instructions were reviewed including care of the wound and dressing after surgery and when she can shower.     PAIN MANAGEMENT: Jenifer Burnett was instructed regarding the Polar ice unit that will be in place after surgery and her postoperative pain medications.     MEDICATION:  Roxicodone 5 mg 1-2 q 4 hours PRN for pain  Zofran 4 mg q 8 hours PRN for nausea and vomiting.  Aspirin 81mg BID x 6 weeks for DVT prophylaxis starting on the evening after  surgery.  Celebrex 200mg BID x 4 weeks post op  Cefadroxil 500mg q12h x 7 days post op for infection prophylaxis  Lyrica 75mg BID x 2 weeks  Robaxin 500mg q8hr x 2 weeks as needed for muscle spasms     Post op meds to be delivered bedside prior to discharge. Deliver to family if patient is in surgery at 5pm.     Patient was instructed to purchase and take Colace to counter possible GI side effects of taking opiates.     DVT prophylaxis was discussed with the patient today including risk factors for developing DVTs and history of DVTs. The patient was asked if any specific recommendations were given from the doctor/s that did pre-operative surgical clearance.      If the patient was previously taking 81mg baby aspirin, they were told to not take additional baby aspirin, using the above stated aspirin and to restart the 81mg aspirin daily after completion of the aspirin dose.      Patient was also told to buy over the counter Prilosec medication and take it once daily for GI protection as long as they are taking NSAIDs or Aspirin.     The patient was told that narcotic pain medications may make them drowsy and instructions were given to not sign legal documents, drive or operate heavy machinery, cars, or equipment while under the influence of narcotic medications.     As there were no other questions to be asked, she was given my business card along with Dr. Lezama's business card if she has any questions or concerns prior to surgery or in the postop period.

## 2024-02-22 ENCOUNTER — TELEPHONE (OUTPATIENT)
Dept: SPORTS MEDICINE | Facility: CLINIC | Age: 64
End: 2024-02-22
Payer: COMMERCIAL

## 2024-02-22 NOTE — TELEPHONE ENCOUNTER
Attempted to contact pt. Left voicemail. Informed pt that [facility] paperwork was faxed to employer today & confirmation received at 1449. Advised that it will be uploaded to her medical record & copy will be sent to her. Asked pt to return call to clinic at [phone] c additional questions/concerns.     ==  Scanned into Media. Pt notified via Qgivt.

## 2024-02-23 ENCOUNTER — TELEPHONE (OUTPATIENT)
Dept: SPORTS MEDICINE | Facility: CLINIC | Age: 64
End: 2024-02-23
Payer: COMMERCIAL

## 2024-02-23 ENCOUNTER — ANESTHESIA EVENT (OUTPATIENT)
Dept: SURGERY | Facility: HOSPITAL | Age: 64
End: 2024-02-23
Payer: COMMERCIAL

## 2024-02-23 NOTE — TELEPHONE ENCOUNTER
Spoke c pt. Informed pt of 0615 arrival time for 02/26/24 surgery at the [facility]. Reminded pt to be NPO. Pt expressed understanding & was thankful.

## 2024-02-26 ENCOUNTER — HOSPITAL ENCOUNTER (OUTPATIENT)
Facility: HOSPITAL | Age: 64
Discharge: HOME | End: 2024-02-26
Attending: ORTHOPAEDIC SURGERY | Admitting: ORTHOPAEDIC SURGERY
Payer: COMMERCIAL

## 2024-02-26 ENCOUNTER — ANESTHESIA (OUTPATIENT)
Dept: SURGERY | Facility: HOSPITAL | Age: 64
End: 2024-02-26
Payer: COMMERCIAL

## 2024-02-26 VITALS
RESPIRATION RATE: 11 BRPM | DIASTOLIC BLOOD PRESSURE: 70 MMHG | OXYGEN SATURATION: 95 % | SYSTOLIC BLOOD PRESSURE: 147 MMHG | BODY MASS INDEX: 30.36 KG/M2 | HEART RATE: 78 BPM | TEMPERATURE: 98 F | WEIGHT: 205 LBS | HEIGHT: 69 IN

## 2024-02-26 DIAGNOSIS — M17.11 PRIMARY OSTEOARTHRITIS OF ONE KNEE, RIGHT: Primary | ICD-10-CM

## 2024-02-26 DIAGNOSIS — M17.11 PRIMARY OSTEOARTHRITIS OF RIGHT KNEE: ICD-10-CM

## 2024-02-26 PROCEDURE — 97165 OT EVAL LOW COMPLEX 30 MIN: CPT

## 2024-02-26 PROCEDURE — 25000003 PHARM REV CODE 250: Performed by: PHYSICIAN ASSISTANT

## 2024-02-26 PROCEDURE — 71000033 HC RECOVERY, INTIAL HOUR: Performed by: ORTHOPAEDIC SURGERY

## 2024-02-26 PROCEDURE — 25000003 PHARM REV CODE 250: Performed by: NURSE ANESTHETIST, CERTIFIED REGISTERED

## 2024-02-26 PROCEDURE — 63600175 PHARM REV CODE 636 W HCPCS: Performed by: PHYSICIAN ASSISTANT

## 2024-02-26 PROCEDURE — 37000008 HC ANESTHESIA 1ST 15 MINUTES: Performed by: ORTHOPAEDIC SURGERY

## 2024-02-26 PROCEDURE — 71000015 HC POSTOP RECOV 1ST HR: Performed by: ORTHOPAEDIC SURGERY

## 2024-02-26 PROCEDURE — 94761 N-INVAS EAR/PLS OXIMETRY MLT: CPT

## 2024-02-26 PROCEDURE — 37000009 HC ANESTHESIA EA ADD 15 MINS: Performed by: ORTHOPAEDIC SURGERY

## 2024-02-26 PROCEDURE — 71000039 HC RECOVERY, EACH ADD'L HOUR: Performed by: ORTHOPAEDIC SURGERY

## 2024-02-26 PROCEDURE — C1776 JOINT DEVICE (IMPLANTABLE): HCPCS | Performed by: ORTHOPAEDIC SURGERY

## 2024-02-26 PROCEDURE — 97530 THERAPEUTIC ACTIVITIES: CPT

## 2024-02-26 PROCEDURE — 63600175 PHARM REV CODE 636 W HCPCS: Performed by: NURSE ANESTHETIST, CERTIFIED REGISTERED

## 2024-02-26 PROCEDURE — 36000711: Performed by: ORTHOPAEDIC SURGERY

## 2024-02-26 PROCEDURE — C1713 ANCHOR/SCREW BN/BN,TIS/BN: HCPCS | Performed by: ORTHOPAEDIC SURGERY

## 2024-02-26 PROCEDURE — 36000710: Performed by: ORTHOPAEDIC SURGERY

## 2024-02-26 PROCEDURE — D9220A PRA ANESTHESIA: Mod: CRNA,,, | Performed by: NURSE ANESTHETIST, CERTIFIED REGISTERED

## 2024-02-26 PROCEDURE — 27201423 OPTIME MED/SURG SUP & DEVICES STERILE SUPPLY: Performed by: ORTHOPAEDIC SURGERY

## 2024-02-26 PROCEDURE — 97161 PT EVAL LOW COMPLEX 20 MIN: CPT

## 2024-02-26 PROCEDURE — D9220A PRA ANESTHESIA: Mod: ANES,,, | Performed by: ANESTHESIOLOGY

## 2024-02-26 PROCEDURE — 27447 TOTAL KNEE ARTHROPLASTY: CPT | Mod: 82,RT,, | Performed by: STUDENT IN AN ORGANIZED HEALTH CARE EDUCATION/TRAINING PROGRAM

## 2024-02-26 PROCEDURE — 27447 TOTAL KNEE ARTHROPLASTY: CPT | Mod: RT,,, | Performed by: ORTHOPAEDIC SURGERY

## 2024-02-26 PROCEDURE — 71000016 HC POSTOP RECOV ADDL HR: Performed by: ORTHOPAEDIC SURGERY

## 2024-02-26 PROCEDURE — 97535 SELF CARE MNGMENT TRAINING: CPT

## 2024-02-26 PROCEDURE — 64448 NJX AA&/STRD FEM NRV NFS IMG: CPT | Performed by: ANESTHESIOLOGY

## 2024-02-26 PROCEDURE — 99900035 HC TECH TIME PER 15 MIN (STAT)

## 2024-02-26 PROCEDURE — 63600175 PHARM REV CODE 636 W HCPCS: Mod: JZ,JG | Performed by: ORTHOPAEDIC SURGERY

## 2024-02-26 PROCEDURE — 97116 GAIT TRAINING THERAPY: CPT

## 2024-02-26 PROCEDURE — 63600175 PHARM REV CODE 636 W HCPCS: Performed by: ANESTHESIOLOGY

## 2024-02-26 DEVICE — SYMMETRIC PATELLA
Type: IMPLANTABLE DEVICE | Site: KNEE | Status: FUNCTIONAL
Brand: TRIATHLON

## 2024-02-26 DEVICE — TIBIAL BEARING INSERT - PS
Type: IMPLANTABLE DEVICE | Site: KNEE | Status: FUNCTIONAL
Brand: TRIATHLON

## 2024-02-26 DEVICE — SIMPLEX® HV IS A FAST-SETTING ACRYLIC RESIN FOR USE IN BONE SURGERY. MIXING THE TWO SEPARATE STERILE COMPONENTS PRODUCES A DUCTILE BONE CEMENT WHICH, AFTER HARDENING, FIXES THE IMPLANT AND TRANSFERS STRESSES PRODUCED DURING MOVEMENT EVENLY TO THE BONE. SIMPLEX® HV CEMENT POWDER ALSO CONTAINS INSOLUBLE ZIRCONIUM DIOXIDE AS AN X-RAY CONTRAST MEDIUM. SIMPLEX® HV DOES NOT EMIT A SIGNAL AND DOES NOT POSE A SAFETY RISK IN A MAGNETIC RESONANCE ENVIRONMENT.
Type: IMPLANTABLE DEVICE | Site: KNEE | Status: FUNCTIONAL
Brand: SIMPLEX HV

## 2024-02-26 DEVICE — UNIVERSAL TIBIAL BASEPLATE
Type: IMPLANTABLE DEVICE | Site: KNEE | Status: FUNCTIONAL
Brand: TRIATHLON

## 2024-02-26 DEVICE — POSTERIOR STABILIZED FEMORAL
Type: IMPLANTABLE DEVICE | Site: KNEE | Status: FUNCTIONAL
Brand: TRIATHLON

## 2024-02-26 RX ORDER — CELECOXIB 200 MG/1
200 CAPSULE ORAL DAILY
Status: DISCONTINUED | OUTPATIENT
Start: 2024-02-26 | End: 2024-02-26 | Stop reason: HOSPADM

## 2024-02-26 RX ORDER — NALOXONE HCL 0.4 MG/ML
0.02 VIAL (ML) INJECTION
Status: DISCONTINUED | OUTPATIENT
Start: 2024-02-26 | End: 2024-02-26 | Stop reason: HOSPADM

## 2024-02-26 RX ORDER — ACETAMINOPHEN 500 MG
1000 TABLET ORAL EVERY 6 HOURS
Status: DISCONTINUED | OUTPATIENT
Start: 2024-02-26 | End: 2024-02-26 | Stop reason: HOSPADM

## 2024-02-26 RX ORDER — PROCHLORPERAZINE EDISYLATE 5 MG/ML
5 INJECTION INTRAMUSCULAR; INTRAVENOUS EVERY 6 HOURS PRN
Status: DISCONTINUED | OUTPATIENT
Start: 2024-02-26 | End: 2024-02-26 | Stop reason: HOSPADM

## 2024-02-26 RX ORDER — MIDAZOLAM HYDROCHLORIDE 1 MG/ML
4 INJECTION INTRAMUSCULAR; INTRAVENOUS
Status: DISCONTINUED | OUTPATIENT
Start: 2024-02-26 | End: 2024-02-26 | Stop reason: HOSPADM

## 2024-02-26 RX ORDER — FAMOTIDINE 10 MG/ML
INJECTION INTRAVENOUS
Status: DISCONTINUED | OUTPATIENT
Start: 2024-02-26 | End: 2024-02-26

## 2024-02-26 RX ORDER — ONDANSETRON HYDROCHLORIDE 2 MG/ML
INJECTION, SOLUTION INTRAVENOUS
Status: DISCONTINUED | OUTPATIENT
Start: 2024-02-26 | End: 2024-02-26

## 2024-02-26 RX ORDER — ACETAMINOPHEN 500 MG
1000 TABLET ORAL
Status: COMPLETED | OUTPATIENT
Start: 2024-02-26 | End: 2024-02-26

## 2024-02-26 RX ORDER — LIDOCAINE HYDROCHLORIDE 10 MG/ML
1 INJECTION, SOLUTION EPIDURAL; INFILTRATION; INTRACAUDAL; PERINEURAL
Status: DISCONTINUED | OUTPATIENT
Start: 2024-02-26 | End: 2024-02-26 | Stop reason: HOSPADM

## 2024-02-26 RX ORDER — OXYCODONE HYDROCHLORIDE 5 MG/1
5 TABLET ORAL
Status: DISCONTINUED | OUTPATIENT
Start: 2024-02-26 | End: 2024-02-26 | Stop reason: HOSPADM

## 2024-02-26 RX ORDER — SODIUM CHLORIDE 0.9 % (FLUSH) 0.9 %
10 SYRINGE (ML) INJECTION
Status: DISCONTINUED | OUTPATIENT
Start: 2024-02-26 | End: 2024-02-26 | Stop reason: HOSPADM

## 2024-02-26 RX ORDER — VANCOMYCIN HYDROCHLORIDE 1 G/20ML
INJECTION, POWDER, LYOPHILIZED, FOR SOLUTION INTRAVENOUS
Status: DISCONTINUED | OUTPATIENT
Start: 2024-02-26 | End: 2024-02-26 | Stop reason: HOSPADM

## 2024-02-26 RX ORDER — PREGABALIN 75 MG/1
75 CAPSULE ORAL
Status: COMPLETED | OUTPATIENT
Start: 2024-02-26 | End: 2024-02-26

## 2024-02-26 RX ORDER — PREGABALIN 75 MG/1
75 CAPSULE ORAL NIGHTLY
Status: DISCONTINUED | OUTPATIENT
Start: 2024-02-26 | End: 2024-02-26 | Stop reason: HOSPADM

## 2024-02-26 RX ORDER — ROPIVACAINE HYDROCHLORIDE 5 MG/ML
INJECTION, SOLUTION EPIDURAL; INFILTRATION; PERINEURAL
Status: COMPLETED | OUTPATIENT
Start: 2024-02-26 | End: 2024-02-26

## 2024-02-26 RX ORDER — FENTANYL CITRATE 50 UG/ML
100 INJECTION, SOLUTION INTRAMUSCULAR; INTRAVENOUS
Status: DISCONTINUED | OUTPATIENT
Start: 2024-02-26 | End: 2024-02-26 | Stop reason: HOSPADM

## 2024-02-26 RX ORDER — BUPIVACAINE HYDROCHLORIDE 5 MG/ML
INJECTION, SOLUTION EPIDURAL; INTRACAUDAL
Status: DISCONTINUED | OUTPATIENT
Start: 2024-02-26 | End: 2024-02-26 | Stop reason: HOSPADM

## 2024-02-26 RX ORDER — TALC
6 POWDER (GRAM) TOPICAL NIGHTLY PRN
Status: DISCONTINUED | OUTPATIENT
Start: 2024-02-26 | End: 2024-02-26 | Stop reason: HOSPADM

## 2024-02-26 RX ORDER — MUPIROCIN 20 MG/G
1 OINTMENT TOPICAL
Status: COMPLETED | OUTPATIENT
Start: 2024-02-26 | End: 2024-02-26

## 2024-02-26 RX ORDER — PROPOFOL 10 MG/ML
VIAL (ML) INTRAVENOUS
Status: DISCONTINUED | OUTPATIENT
Start: 2024-02-26 | End: 2024-02-26

## 2024-02-26 RX ORDER — CELECOXIB 200 MG/1
400 CAPSULE ORAL ONCE
Status: COMPLETED | OUTPATIENT
Start: 2024-02-26 | End: 2024-02-26

## 2024-02-26 RX ORDER — METHOCARBAMOL 750 MG/1
750 TABLET, FILM COATED ORAL 3 TIMES DAILY
Status: DISCONTINUED | OUTPATIENT
Start: 2024-02-26 | End: 2024-02-26 | Stop reason: HOSPADM

## 2024-02-26 RX ORDER — OXYCODONE HYDROCHLORIDE 5 MG/1
10 TABLET ORAL
Status: DISCONTINUED | OUTPATIENT
Start: 2024-02-26 | End: 2024-02-26 | Stop reason: HOSPADM

## 2024-02-26 RX ORDER — AMOXICILLIN 250 MG
1 CAPSULE ORAL 2 TIMES DAILY
Status: DISCONTINUED | OUTPATIENT
Start: 2024-02-26 | End: 2024-02-26 | Stop reason: HOSPADM

## 2024-02-26 RX ORDER — KETAMINE HYDROCHLORIDE 100 MG/ML
INJECTION, SOLUTION INTRAMUSCULAR; INTRAVENOUS
Status: DISCONTINUED | OUTPATIENT
Start: 2024-02-26 | End: 2024-02-26

## 2024-02-26 RX ORDER — DEXAMETHASONE SODIUM PHOSPHATE 4 MG/ML
INJECTION, SOLUTION INTRA-ARTICULAR; INTRALESIONAL; INTRAMUSCULAR; INTRAVENOUS; SOFT TISSUE
Status: DISCONTINUED | OUTPATIENT
Start: 2024-02-26 | End: 2024-02-26

## 2024-02-26 RX ORDER — SODIUM CHLORIDE 9 MG/ML
INJECTION, SOLUTION INTRAVENOUS
Status: COMPLETED | OUTPATIENT
Start: 2024-02-26 | End: 2024-02-26

## 2024-02-26 RX ORDER — FAMOTIDINE 20 MG/1
20 TABLET, FILM COATED ORAL 2 TIMES DAILY
Status: DISCONTINUED | OUTPATIENT
Start: 2024-02-26 | End: 2024-02-26 | Stop reason: HOSPADM

## 2024-02-26 RX ORDER — PROPOFOL 10 MG/ML
VIAL (ML) INTRAVENOUS CONTINUOUS PRN
Status: DISCONTINUED | OUTPATIENT
Start: 2024-02-26 | End: 2024-02-26

## 2024-02-26 RX ORDER — BISACODYL 10 MG/1
10 SUPPOSITORY RECTAL EVERY 12 HOURS PRN
Status: DISCONTINUED | OUTPATIENT
Start: 2024-02-26 | End: 2024-02-26 | Stop reason: HOSPADM

## 2024-02-26 RX ORDER — SODIUM CHLORIDE 9 MG/ML
INJECTION, SOLUTION INTRAVENOUS CONTINUOUS
Status: DISCONTINUED | OUTPATIENT
Start: 2024-02-26 | End: 2024-02-26 | Stop reason: HOSPADM

## 2024-02-26 RX ORDER — ROPIVACAINE HYDROCHLORIDE 2 MG/ML
INJECTION, SOLUTION EPIDURAL; INFILTRATION; PERINEURAL CONTINUOUS
Status: DISPENSED | OUTPATIENT
Start: 2024-02-26

## 2024-02-26 RX ORDER — MORPHINE SULFATE 2 MG/ML
2 INJECTION, SOLUTION INTRAMUSCULAR; INTRAVENOUS
Status: DISCONTINUED | OUTPATIENT
Start: 2024-02-26 | End: 2024-02-26 | Stop reason: HOSPADM

## 2024-02-26 RX ORDER — POLYETHYLENE GLYCOL 3350 17 G/17G
17 POWDER, FOR SOLUTION ORAL DAILY
Status: DISCONTINUED | OUTPATIENT
Start: 2024-02-26 | End: 2024-02-26 | Stop reason: HOSPADM

## 2024-02-26 RX ORDER — ROPIVACAINE HYDROCHLORIDE 5 MG/ML
INJECTION, SOLUTION EPIDURAL; INFILTRATION; PERINEURAL
Status: COMPLETED
Start: 2024-02-26 | End: 2024-02-26

## 2024-02-26 RX ORDER — FENTANYL CITRATE 50 UG/ML
25 INJECTION, SOLUTION INTRAMUSCULAR; INTRAVENOUS EVERY 5 MIN PRN
Status: DISCONTINUED | OUTPATIENT
Start: 2024-02-26 | End: 2024-02-26 | Stop reason: HOSPADM

## 2024-02-26 RX ORDER — EPINEPHRINE 1 MG/ML
INJECTION, SOLUTION, CONCENTRATE INTRAVENOUS
Status: DISCONTINUED
Start: 2024-02-26 | End: 2024-02-26 | Stop reason: HOSPADM

## 2024-02-26 RX ORDER — ASPIRIN 81 MG/1
81 TABLET ORAL 2 TIMES DAILY
Status: DISCONTINUED | OUTPATIENT
Start: 2024-02-26 | End: 2024-02-26 | Stop reason: HOSPADM

## 2024-02-26 RX ORDER — MUPIROCIN 20 MG/G
1 OINTMENT TOPICAL 2 TIMES DAILY
Status: DISCONTINUED | OUTPATIENT
Start: 2024-02-26 | End: 2024-02-26 | Stop reason: HOSPADM

## 2024-02-26 RX ORDER — DEXAMETHASONE SODIUM PHOSPHATE 10 MG/ML
INJECTION INTRAMUSCULAR; INTRAVENOUS
Status: DISCONTINUED
Start: 2024-02-26 | End: 2024-02-26 | Stop reason: HOSPADM

## 2024-02-26 RX ORDER — LIDOCAINE HYDROCHLORIDE 20 MG/ML
INJECTION INTRAVENOUS
Status: DISCONTINUED | OUTPATIENT
Start: 2024-02-26 | End: 2024-02-26

## 2024-02-26 RX ORDER — ASPIRIN 81 MG/1
81 TABLET ORAL 2 TIMES DAILY
Qty: 84 TABLET | Refills: 0 | Status: SHIPPED | OUTPATIENT
Start: 2024-02-27 | End: 2024-04-09

## 2024-02-26 RX ORDER — CLONIDINE 100 UG/ML
INJECTION, SOLUTION EPIDURAL
Status: DISCONTINUED
Start: 2024-02-26 | End: 2024-02-26 | Stop reason: HOSPADM

## 2024-02-26 RX ORDER — ONDANSETRON HYDROCHLORIDE 2 MG/ML
4 INJECTION, SOLUTION INTRAVENOUS EVERY 8 HOURS PRN
Status: DISCONTINUED | OUTPATIENT
Start: 2024-02-26 | End: 2024-02-26 | Stop reason: HOSPADM

## 2024-02-26 RX ADMIN — FAMOTIDINE 20 MG: 10 INJECTION, SOLUTION INTRAVENOUS at 07:02

## 2024-02-26 RX ADMIN — PREGABALIN 75 MG: 75 CAPSULE ORAL at 06:02

## 2024-02-26 RX ADMIN — VANCOMYCIN HYDROCHLORIDE 1500 MG: 1.5 INJECTION, POWDER, LYOPHILIZED, FOR SOLUTION INTRAVENOUS at 06:02

## 2024-02-26 RX ADMIN — ACETAMINOPHEN 1000 MG: 500 TABLET ORAL at 06:02

## 2024-02-26 RX ADMIN — SODIUM CHLORIDE: 9 INJECTION, SOLUTION INTRAVENOUS at 08:02

## 2024-02-26 RX ADMIN — SODIUM CHLORIDE: 9 INJECTION, SOLUTION INTRAVENOUS at 09:02

## 2024-02-26 RX ADMIN — OXYCODONE 5 MG: 5 TABLET ORAL at 09:02

## 2024-02-26 RX ADMIN — CEFAZOLIN 2 G: 2 INJECTION, POWDER, FOR SOLUTION INTRAMUSCULAR; INTRAVENOUS at 07:02

## 2024-02-26 RX ADMIN — MIDAZOLAM HYDROCHLORIDE 2 MG: 1 INJECTION, SOLUTION INTRAMUSCULAR; INTRAVENOUS at 06:02

## 2024-02-26 RX ADMIN — SODIUM CHLORIDE: 9 INJECTION, SOLUTION INTRAVENOUS at 06:02

## 2024-02-26 RX ADMIN — FENTANYL CITRATE 100 MCG: 50 INJECTION INTRAMUSCULAR; INTRAVENOUS at 06:02

## 2024-02-26 RX ADMIN — LIDOCAINE HYDROCHLORIDE 75 MG: 20 INJECTION INTRAVENOUS at 07:02

## 2024-02-26 RX ADMIN — Medication: at 09:02

## 2024-02-26 RX ADMIN — ONDANSETRON 4 MG: 2 INJECTION INTRAMUSCULAR; INTRAVENOUS at 07:02

## 2024-02-26 RX ADMIN — MEPIVACAINE HYDROCHLORIDE 3 ML: 20 INJECTION, SOLUTION EPIDURAL; INFILTRATION at 07:02

## 2024-02-26 RX ADMIN — PROPOFOL 40 MG: 10 INJECTION, EMULSION INTRAVENOUS at 07:02

## 2024-02-26 RX ADMIN — TRANEXAMIC ACID 1000 MG: 100 INJECTION INTRAVENOUS at 08:02

## 2024-02-26 RX ADMIN — ONDANSETRON 4 MG: 2 INJECTION INTRAMUSCULAR; INTRAVENOUS at 11:02

## 2024-02-26 RX ADMIN — CELECOXIB 400 MG: 200 CAPSULE ORAL at 06:02

## 2024-02-26 RX ADMIN — MUPIROCIN 1 G: 20 OINTMENT TOPICAL at 06:02

## 2024-02-26 RX ADMIN — TRANEXAMIC ACID 1000 MG: 100 INJECTION INTRAVENOUS at 07:02

## 2024-02-26 RX ADMIN — KETAMINE HYDROCHLORIDE 20 MG: 100 INJECTION INTRAMUSCULAR; INTRAVENOUS at 07:02

## 2024-02-26 RX ADMIN — PROPOFOL 100 MCG/KG/MIN: 10 INJECTION, EMULSION INTRAVENOUS at 07:02

## 2024-02-26 RX ADMIN — ROPIVACAINE HYDROCHLORIDE 10 ML: 5 INJECTION EPIDURAL; INFILTRATION; PERINEURAL at 06:02

## 2024-02-26 RX ADMIN — DEXAMETHASONE SODIUM PHOSPHATE 8 MG: 4 INJECTION, SOLUTION INTRAMUSCULAR; INTRAVENOUS at 07:02

## 2024-02-26 RX ADMIN — METHOCARBAMOL 750 MG: 750 TABLET ORAL at 09:02

## 2024-02-26 NOTE — PLAN OF CARE
Pre op complete with no distress noted.  Belongings locked in locker.  Patient has cane at home.  Will need crutches or walker.

## 2024-02-26 NOTE — OP NOTE
[facility]   OPERATIVE REPORT   ORTHOPAEDIC SURGERY   PROVIDER: DR. LILA IGLESIAS    PATIENT INFORMATION   Jenifer Burnett 64 y.o. female 1960   MRN: [id number]   LOCATION: [facility]     DATE OF PROCEDURE: 2/26/2024     PREOPERATIVE DIAGNOSES:   Right knee advanced degenerative arthritis     POSTOPERATIVE DIAGNOSES:   Right knee advanced degenerative arthritis     PROCEDURES PERFORMED:   Right total knee arthroplasty (CPT 33795)     SURGEON: LILA Iglesias MD     ASSISTANTS:   Yo Emery DO - Fellow - First Assist  SMA Briana     First Assistant Duties: A first assistant was necessary for this procedure. Assistance was provided for surgical wound exposure, manipulation, and retractor placement and positioning. There was no qualified resident available for the procedure.     ANESTHESIA: Spinal with adductor catheter and local anesthetic injection (0.5% Marcaine)     ESTIMATED BLOOD LOSS:  150 cc    IMPLANTS:   Implant Name Type Inv. Item Serial No.  Lot No. LRB No. Used Action   PIN FIX TEMP 1/8X2.5IN LF STER - [id number]  PIN FIX TEMP 1/8X2.5IN LF STER  [employer] BILL. [id number] Right 1 Implanted and Explanted   CEMENT BONE SIMPLEX HV RADPQ - [id number]  CEMENT BONE SIMPLEX HV RADPQ  [employer] BILL. [id number] Right 2 Implanted   BASEPLATE TRIATHLON TS SZ4 - [id number]  BASEPLATE TRIATHLON TS SZ4  [employer] BILL. [id number] Right 1 Implanted   PATELLA TRI 33X9 X3 POLYETHYLE - [id number]  PATELLA TRI 33X9 X3 POLYETHYLE  [employer] BILL. [id number] Right 1 Implanted   COMP FEM POST STAB MAEVE SZ 4 RT - [id number]  COMP FEM POST STAB MAEVE SZ 4 RT  [employer] BILL. [id number] Right 1 Implanted   TIBIAL POST STAB SZ 4 13X3 TANESHA - [id number]  TIBIAL POST STAB SZ 4 13X3 TANESHA  [employer] BILL. [id number] Right 1 Implanted      SPECIMENS: None.    COMPLICATIONS: None.     INTRAOPERATIVE COUNTS:  Correct.     PROPHYLACTIC IV ANTIBIOTICS: Given per [facility] Protocol.    INDICATIONS FOR PROCEDURE:  Jenifer Burnett is a 64 y.o. year-old with a longstanding history of right knee pain.  She has failed extensive conservative care to this point.  Preoperative imaging revealed degenerative joint disease and treatment options were discussed.  She elected to proceed with knee replacement.    Risks and complications were discussed including, but not limited to risks of anesthetic complications, infections, wound healing complications, aseptic loosening, instability, DVT, pulmonary embolism and death among others and she elected to proceed.    COMPONENTS USED:  The [employer] triathlon system with size femur 4, tibia 4, 13  mm X3 polyethylene, 33 mm patella.    DESCRIPTION OF PROCEDURE:  The patient was taken to the Operating Room where anesthesia was administered by the Anesthesia Department. She was then placed in the supine position and all superficial neurovascular structures were well padded.  The right lower extremity was then sterilely prepped and draped in the normal fashion.    Under tourniquet control, a 20 cm longitudinal incision was made over the anterior aspect of the knee.  Subcutaneous tissue was sharply dissected down to the deep fascia, which was incised along the line of the incision.  A standard medial parapatellar arthrotomy was made.  The proximal medial tibial plateau was then subperiosteally exposed protecting the medial collateral ligament.  A portion of the infrapatellar fat pad was excised.  The patella was everted and the knee was flexed to 90 degrees.    The extramedullary tibial alignment guide was then placed and the proximal tibial osteotomy was made approximately 4 mm distal to the most shallow surface of the tibial plateau.  This was done perpendicular to the axis of the tibia with approximately 3 degrees posterior slope.    A drill was then used to gain access into the intramedullary canal of  the distal femur. The distal femoral cutting guide was placed and the 8 mm distal femoral cut was made in 5 degrees of valgus.  Femur was sized to a size 4.  The size 4 cutting guide was applied and  the anterior femoral condyle, posterior condyle, and chamfer cuts were made. There was no notching anteriorly.  At this time, the cutting guide was removed and the cruciate ligaments, osteophytes, menisci and any debris was removed from the joint space. Flexion and extension gaps were checked and were found to be equal at 11 mm. The notch cutting guide for the posterior stabilized housing was placed and the notch cuts were then made.    We then turned our attention back towards the proximal tibia.  This was sized to a size 4, placed in neutral rotation, and the keel was punched in the standard fashion after drilling for the UBP.  Trial sizes of the 4 femur, 4 tibia, and 11 mm polyethylene liner were then placed.    The patellar cutting guide was then used to remove the dorsal 10 mm of the patellar surface to a final thickness of 14 mm.  This was sized to a size 33. Drill holes were placed and patellar trial was placed.    At this time, the knee was placed through range of motion.  Excellent patellofemoral tracking and stability were noted throughout.  Full extension was easily obtained and intraoperative range of motion was from approximately 5° of hyperextension to 130 degrees flexion.    Trial components were removed and the bony surfaces were thoroughly irrigated in a pulse lavage fashion and dried.  Two batches of regular cement were mixed and the tibial, femoral and patellar components were cemented into position, impacted and held in place as the cement polymerized.  Any excess cement was removed using Sylva elevators.  The 13 mm trial poly was better fitting in this case.  There was some residual very minimal hyperextension but this was felt to be better fitting than the 14 mm poly which I think would make the  flexion gap a bit too tight.  The 13 mm polyethylene was then locked into position.    The wound was once again thoroughly irrigated.  The tourniquet was deflated and any significant bleeding was stopped using electrocautery.  Tranexamic acid was given intravenously pre incision and at the time of component cementation for hemostasis. The wound was irrigated with dilute betadine wash followed by normal saline via pulse lavage prior to closure.     The quadriceps tendon and parapatellar retinaculum were then closed with interrupted figure-of-eight sutures of #1 Vicryl.  Vancomycin powder was placed deep within the wound prior to arthrotomy closure.  Subcutaneous tissue was closed with interrupted inverted stitches #3-0 Vicryl.  Skin was approximated using 3-0 Monocryl in running subcuticular fashion followed by staples followed by Dermabond.  Sterile dressing was applied and she was returned to the Postanesthesia Care Unit in stable condition.

## 2024-02-26 NOTE — TRANSFER OF CARE
"Anesthesia Transfer of Care Note    Patient: Jenifer Burnett    Procedure(s) Performed: Procedure(s) (LRB):  ARTHROPLASTY, KNEE, TOTAL (Right)    Patient location: PACU    Anesthesia Type: general    Transport from OR: Transported from OR on 6-10 L/min O2 by face mask with adequate spontaneous ventilation    Post pain: adequate analgesia    Post assessment: no apparent anesthetic complications and tolerated procedure well    Post vital signs: stable    Level of consciousness: awake and alert    Nausea/Vomiting: no nausea/vomiting    Complications: none    Transfer of care protocol was followed      Last vitals: Visit Vitals  BP (!) 158/76   Pulse 69   Temp 36.4 °C (97.5 °F) (Temporal)   Resp 17   Ht 5' 9" (1.753 m)   Wt 93 kg (205 lb)   SpO2 95%   Breastfeeding No   BMI 30.27 kg/m²     "

## 2024-02-26 NOTE — ANESTHESIA PREPROCEDURE EVALUATION
02/26/2024  Jenifer Burnett is a 64 y.o., female.      Pre-op Assessment    I have reviewed the Patient Summary Reports.     I have reviewed the Nursing Notes. I have reviewed the NPO Status.   I have reviewed the Medications.     Review of Systems  Anesthesia Hx:             Denies Family Hx of Anesthesia complications.   Personal Hx of Anesthesia complications, Post-Operative Nausea/Vomiting, in the past, but not with recent anesthetics / prophylaxis                    Social:  Non-Smoker       Hematology/Oncology:  Hematology Normal   Oncology Normal                                   EENT/Dental:  EENT/Dental Normal           Cardiovascular:  Exercise tolerance: good   Hypertension                                        Pulmonary:  Pulmonary Normal                       Renal/:  Renal/ Normal                 Hepatic/GI:    Hiatal Hernia, GERD             Musculoskeletal:  Musculoskeletal Normal                Neurological:      Headaches                                 Endocrine:  Endocrine Normal            Dermatological:  Skin Normal        Physical Exam  General: Well nourished, Cooperative and Alert    Airway:  Mallampati: II   Mouth Opening: Normal        Anesthesia Plan  Type of Anesthesia, risks & benefits discussed:    Anesthesia Type: Spinal, Regional  Post Op Pain Control Plan: multimodal analgesia, IV/PO Opioids PRN and peripheral nerve block  Induction:  IV  Informed Consent: Informed consent signed with the Patient and all parties understand the risks and agree with anesthesia plan.  All questions answered.   ASA Score: 2    Ready For Surgery From Anesthesia Perspective.     .

## 2024-02-26 NOTE — ANESTHESIA PROCEDURE NOTES
Spinal    Diagnosis: Right knee pain / arthritis  Patient location during procedure: OR  Start time: 2/26/2024 7:06 AM  Timeout: 2/26/2024 7:05 AM  End time: 2/26/2024 7:09 AM    Staffing  Authorizing Provider: Earnest Dumont MD  Performing Provider: Earnets Dumont MD    Staffing  Performed by: Earnest Dumont MD  Authorized by: Earnest Dumont MD    Preanesthetic Checklist  Completed: patient identified, IV checked, site marked, risks and benefits discussed, surgical consent, monitors and equipment checked, pre-op evaluation and timeout performed  Spinal Block  Patient position: sitting  Prep: ChloraPrep  Patient monitoring: heart rate  Approach: midline  Location: L3-4  Injection technique: single shot  CSF Fluid: clear free-flowing CSF  Needle  Needle type: pencil-tip   Needle gauge: 25 G  Needle length: 3.5 in  Additional Documentation: incremental injection, negative aspiration for heme and no paresthesia on injection  Needle localization: anatomical landmarks  Assessment  Ease of block: easy  Patient's tolerance of the procedure: comfortable throughout block and no complaints  Additional Notes  3 mL mepivacaine 2% preservative free given intrathecally  Medications:    Medications: mepivacaine (CARBOCAINE) injection 20 mg/mL (2%) - Intrathecal   3 mL - 2/26/2024 7:09:00 AM

## 2024-02-26 NOTE — PLAN OF CARE
Patient tolerated PT session well. Patient ambulated 40ft, 12ft, and 40ft with RW and contact guard assistance. No LOB or SOB noted. Patient ascended/descended 4 steps with left handrail and contact guard assistance. Patient ready to discharge home from PT standpoint.      Problem: Physical Therapy  Goal: Physical Therapy Goal  Outcome: Met

## 2024-02-26 NOTE — ANESTHESIA PROCEDURE NOTES
Right adductor catheter    Patient location during procedure: pre-op   Block not for primary anesthetic.  Reason for block: at surgeon's request and post-op pain management   Post-op Pain Location: Right knee pain   Start time: 2/26/2024 6:45 AM  Timeout: 2/26/2024 6:45 AM   End time: 2/26/2024 6:53 AM    Staffing  Authorizing Provider: Earnest Dumont MD  Performing Provider: Earnest Dumont MD    Staffing  Performed by: Earnest Dumont MD  Authorized by: Earnest Dumont MD    Preanesthetic Checklist  Completed: patient identified, IV checked, site marked, risks and benefits discussed, surgical consent, monitors and equipment checked, pre-op evaluation and timeout performed  Peripheral Block  Patient position: supine  Prep: ChloraPrep and site prepped and draped  Patient monitoring: heart rate, cardiac monitor, continuous pulse ox, continuous capnometry and frequent blood pressure checks  Block type: adductor canal  Laterality: right  Injection technique: continuous  Needle  Needle type: Tuohy   Needle gauge: 17 G  Needle length: 3.5 in  Needle localization: anatomical landmarks and ultrasound guidance  Catheter type: spring wound  Catheter size: 19 G  Test dose: lidocaine 1.5% with Epi 1-to-200,000 and negative   -ultrasound image captured on disc.  Assessment  Injection assessment: negative aspiration, negative parasthesia and local visualized surrounding nerve  Paresthesia pain: none  Heart rate change: no  Slow fractionated injection: yes  Pain Tolerance: comfortable throughout block and no complaints  Medications:    Medications: ropivacaine (NAROPIN) injection 0.5% - Perineural   10 mL - 2/26/2024 6:50:00 AM    Additional Notes  VSS.  [facility] RN monitoring vitals throughout procedure.  Patient tolerated procedure well.     20 mL ropivacaine 0.25% w/ epi 1:200k

## 2024-02-26 NOTE — BRIEF OP NOTE
[facility] - Surgery (Hospital)  Brief Operative Note    Surgery Date: 2/26/2024     Surgeon(s) and Role:     * ERNIE Lezama MD - Primary    Assisting Surgeon: None    Pre-op Diagnosis:  Primary osteoarthritis of right knee [M17.11]    Post-op Diagnosis:  Post-Op Diagnosis Codes:     * Primary osteoarthritis of right knee [M17.11]    Procedure(s) (LRB):  ARTHROPLASTY, KNEE, TOTAL (Right)    Anesthesia: Spinal    Operative Findings: See op note    Estimated Blood Loss: * No values recorded between 2/26/2024  7:33 AM and 2/26/2024  9:11 AM *         Specimens:   Specimen (24h ago, onward)      None              Discharge Note    OUTCOME: Patient tolerated treatment/procedure well without complication and is now ready for discharge.    DISPOSITION: Home or Self Care    FINAL DIAGNOSIS:  <principal problem not specified>    FOLLOWUP: In clinic    DISCHARGE INSTRUCTIONS:  No discharge procedures on file.

## 2024-02-26 NOTE — PLAN OF CARE
VSS. Pt able to tolerate oral liquids. Pt reports tolerable pain level for D/C. Ice packs and dressing intact. Thigh TEDs intact. Daughter received home meds per bedside delivery. Pt instructed not to wear SRI hose without wearing shoes/ socks due to increased risk of falls, verbalized understanding. Pt has walker for home use. No distress noted. Pt states she is ready for D/C. D/C and Nimbus instructions reviewed with pt and daughter, verbalized understanding. Pt voided, blue armband on pt.

## 2024-02-27 ENCOUNTER — OFFICE VISIT (OUTPATIENT)
Dept: SPORTS MEDICINE | Facility: CLINIC | Age: 64
End: 2024-02-27
Payer: COMMERCIAL

## 2024-02-27 DIAGNOSIS — Z96.652 S/P TOTAL KNEE ARTHROPLASTY, LEFT: Primary | ICD-10-CM

## 2024-02-27 PROCEDURE — 99024 POSTOP FOLLOW-UP VISIT: CPT | Mod: 95,,, | Performed by: PHYSICIAN ASSISTANT

## 2024-02-27 PROCEDURE — 3044F HG A1C LEVEL LT 7.0%: CPT | Mod: CPTII,95,, | Performed by: PHYSICIAN ASSISTANT

## 2024-02-27 PROCEDURE — 1160F RVW MEDS BY RX/DR IN RCRD: CPT | Mod: CPTII,95,, | Performed by: PHYSICIAN ASSISTANT

## 2024-02-27 PROCEDURE — 1159F MED LIST DOCD IN RCRD: CPT | Mod: CPTII,95,, | Performed by: PHYSICIAN ASSISTANT

## 2024-02-27 PROCEDURE — 4010F ACE/ARB THERAPY RXD/TAKEN: CPT | Mod: CPTII,95,, | Performed by: PHYSICIAN ASSISTANT

## 2024-02-27 RX ORDER — ONDANSETRON 4 MG/1
8 TABLET, ORALLY DISINTEGRATING ORAL 2 TIMES DAILY
Qty: 28 TABLET | Refills: 0 | Status: SHIPPED | OUTPATIENT
Start: 2024-02-27 | End: 2024-02-28

## 2024-02-27 NOTE — ANESTHESIA POSTPROCEDURE EVALUATION
Anesthesia Post Evaluation    Patient: Jenifer Burnett    Procedure(s) Performed: Procedure(s) (LRB):  ARTHROPLASTY, KNEE, TOTAL (Right)    Final Anesthesia Type: spinal      Patient location during evaluation: PACU  Patient participation: Yes- Able to Participate  Level of consciousness: awake and alert  Post-procedure vital signs: reviewed and stable  Pain management: adequate  Airway patency: patent  JEFF mitigation strategies: Multimodal analgesia  PONV status at discharge: No PONV  Anesthetic complications: no      Cardiovascular status: blood pressure returned to baseline  Respiratory status: unassisted  Hydration status: euvolemic  Follow-up not needed.              Vitals Value Taken Time   /70 02/26/24 1247   Temp 36.7 °C (98.1 °F) 02/26/24 1415   Pulse 83 02/26/24 1254   Resp 28 02/26/24 1254   SpO2 96 % 02/26/24 1254   Vitals shown include unvalidated device data.      Event Time   Out of Recovery 11:58:00         Pain/Олег Score: Pain Rating Prior to Med Admin: 4 (2/26/2024  9:49 AM)  Pain Rating Post Med Admin: 4 (2/26/2024  1:45 PM)  Олег Score: 10 (2/26/2024 11:45 AM)

## 2024-02-27 NOTE — PROGRESS NOTES
I called the patient today regarding her surgery with Dr. Lezama. The patient had a right TKA on 2/26/2023.    Pain Scale: 10 / 10, Just took Oxy 5mg, discussed ok to take 10mg and to take additional Tylenol. Also taking multimodal with Robaxin, Lyrica, Celebrex    Any issues with Fever: No.    Any issues with medications (specifically DVT prophylaxis): No. She does not have the Zofran script. Will call into pharmacy today.    Any issues with bowel movements:  Passing tamanna: No.                                                                 Urination: No.                                                                 Constipation: No.    Completing at home exercises: Yes:     Any concerns regarding their dressing/bandage:  No.    Patient confirmed first OP-PT appointment:  Dot on  2/28/24 at 0900.    Any other concerns: No.        The patient was informed that if they have any urgent issues with their bandage, medications or any other health concerns regarding their surgery to call the 24/7 Orthopedic Post-op Hot Line at [phone]. The patient was reminded that if they have any chest pain or shortness of breath to call 911 or go to the ER.    The patient verbalized understanding and does not have any other questions

## 2024-02-27 NOTE — ANESTHESIA POST-OP PAIN MANAGEMENT
"Acute Pain Service Progress Note    2/27/2024 1416    Patient contacted regarding [facility] infusion follow up. Reports that pain has been well controlled with the infusion pump. Denies signs of local anesthetic toxicity. PNC dressing remains clean, dry, and intact. All questions answered. Reminded patient that the infusion should be discontinued and PNC removed whenever the "infusion complete" alert is seen on the display screen or by POD 5 (3/2/24) at 12pm, whichever comes first. Encouraged patient to call the [facility] 24/7 support line at [phone] or the [facility] Anesthesia line at [phone] for any [facility] related questions/issues. Verbalizes understanding. Will continue to follow up.          "

## 2024-02-27 NOTE — PROGRESS NOTES
I called the patient today regarding her surgery with Dr. Lezama. The patient had a right TKA on 2/26/2023.    Pain Scale: 10 / 10    Any issues with Fever: No.    Any issues with medications (specifically DVT prophylaxis): No.    Any issues with bowel movements:  Passing tamanna: Yes:                                                                  Urination: Yes:                                                                  Constipation: No.    Completing at home exercises: Yes:     Any concerns regarding their dressing/bandage:  No.    Patient confirmed first OP-PT appointment:  Dot on  2/28/24 at 0900.    Any other concerns: No.        The patient was informed that if they have any urgent issues with their bandage, medications or any other health concerns regarding their surgery to call the 24/7 Orthopedic Post-op Hot Line at [phone]. The patient was reminded that if they have any chest pain or shortness of breath to call 911 or go to the ER.    The patient verbalized understanding and does not have any other questions

## 2024-02-27 NOTE — PT/OT/SLP EVAL
"Occupational Therapy Evaluation  and Discharge Note    Name: Jenifer Burnett  MRN: [id number]  Admitting Diagnosis: <principal problem not specified> Day of Surgery  Recent Surgery: Procedure(s) (LRB):  ARTHROPLASTY, KNEE, TOTAL (Right) Day of Surgery    Recommendations:     Discharge Recommendations: Low Intensity Therapy  Level of Assistance Recommended: 24 hours supervision  Discharge Equipment Recommendations: none  Barriers to discharge: None    Assessment:     Jenifer Burnett is a 64 y.o. female with a medical diagnosis of <principal problem not specified>. She presents with performance deficits affecting function including impaired self care skills, impaired functional mobility, orthopedic precautions. Pt was able to perform Sit <> Stand Transfer with modified independence with rolling walker.  Able to perform UB/LB dressing c modified independence  Educated pt on bathing, car transfers, and cryotherapy.  Pt walked to bathroom c PT earlier and performed toilet hygiene.      Rehab Prognosis: Good; patient would benefit from acute OT services to address these deficits and reach maximum level of function.    Plan:     Patient to be seen daily to address the above listed problems via self-care/home management, therapeutic activities, therapeutic exercises  Plan of Care Expires: 02/27/24  Plan of Care Reviewed with: patient, daughter    Subjective     Chief Complaint: R TKA  Patient Comments/Goals: "I went to the bathroom already."  Pain/Comfort:  Pain Rating 1: 5/10    Patients cultural, spiritual, Mandaen conflicts given the current situation: no    Social History:  Living Environment: Patient lives with their daughter in a 2 story home with number of outside stair(s): 3, number of inside stair(s): full flight, tub-shower combo, and built-in shower chair  Prior Level of Function: Prior to admission, patient was independent.  Roles and Routines: Patient was driving and working prior to admission.  Equipment Used at " Home: walker, rolling, shower chair  DME owned (not currently used): rolling walker and shower chair  Assistance Upon Discharge:  Daughter    Objective:     Communicated with RN prior to session. Patient found up in chair with cryotherapy, perineural catheter upon OT entry to room.    General Precautions: Standard, fall   Orthopedic Precautions: RLE weight bearing as tolerated   Braces: N/A    Respiratory Status: Room air    Occupational Performance    Gait belt applied - Yes    Functional Mobility/Transfers:  Sit <> Stand Transfer with contact guard assistance with rolling walker  Toilet Transfer Stand Pivot technique with contact guard assistance with rolling walker and bedside commode  Functional Mobility: Pt was able to walk to bathroom c CGA and RW.    Activities of Daily Living:  Upper Body Dressing: modified independence to don shirt.  Lower Body Dressing: modified independence to don underwear, shorts, socks, and shoes.      Physical Exam:  Upper Extremity Range of Motion:     -       Right Upper Extremity: WFL  -       Left Upper Extremity: WFL  Upper Extremity Strength:    -       Right Upper Extremity: WFL  -       Left Upper Extremity: WFL    AMPAC 6 Click ADL:  AMPAC Total Score: 24    Treatment & Education:  Educated on the importance of mobility to maximize recovery  Educated on the importance of OOB mobility within safe range in order to decrease adverse effects of prolonged bedrest  Educated on safety with functional mobility; hand placement to ensure safe transfers to various surfaces in prep for ADLs  Educated on performing functional mobility and ADLs in adherence to orthopedic precautions  Educated on weight bearing status  Will continue to educate as needed      Patient clear to ambulate to/from bathroom with RN/PCT, assist x1 .    Patient left up in chair with all lines intact, call button in reach, and RN notified.    GOALS:   Multidisciplinary Problems       Occupational Therapy Goals           Problem: Occupational Therapy    Goal Priority Disciplines Outcome Interventions   Occupational Therapy Goal     OT, PT/OT Ongoing, Progressing    Description: Goals to be met by: 2/26/24     Patient will increase functional independence with ADLs by performing:    UE Dressing with Modified [place].  LE Dressing with Modified [place] and Assistive Devices as needed.  Grooming while standing at sink with Modified [place].  Toileting from bedside commode with Modified [place] for hygiene and clothing management.   Bathing from  shower chair/bench with Modified [place].  Toilet transfer to bedside commode with Modified [place].                         History:     Past Medical History:   Diagnosis Date    Abnormal gait 02/22/2023    Arthritis     Depression     Encounter for blood transfusion     GERD (gastroesophageal reflux disease)     Hypertension     Migraine     Poor tolerance for ambulation 02/22/2023    Trigeminal neuralgia 03/2018         Past Surgical History:   Procedure Laterality Date    APPENDECTOMY      CHOLECYSTECTOMY  07/2012    ESOPHAGOGASTRODUODENOSCOPY N/A 10/14/2019    Procedure: EGD (ESOPHAGOGASTRODUODENOSCOPY);  Surgeon: Sean Girard MD;  Location: [address]);  Service: Endoscopy;  Laterality: N/A;    HYSTERECTOMY      LAPAROSCOPIC TOUPET FUNDOPLICATION N/A 11/22/2019    Procedure: FUNDOPLICATION, LAPAROSCOPIC, TOUPET;  Surgeon: Adriano Godoy MD;  Location: [address];  Service: General;  Laterality: N/A;       Time Tracking:     OT Date of Treatment: 02/26/24  OT Start Time: 1400  OT Stop Time: 1421  OT Total Time (min): 21 min    Billable Minutes: Evaluation 11 and Self Care/Home Management 10    2/26/2024

## 2024-02-27 NOTE — PT/OT/SLP EVAL
Physical Therapy Evaluation, Treatment, and Discharge Note    Patient Name:  Jenifer Burnett   MRN:  [id number]    Recommendations:     Discharge Recommendations: Low Intensity Therapy  Discharge Equipment Recommendations: none   Barriers to discharge: None    Assessment:     Jenifer Burnett is a 64 y.o. female admitted with a medical diagnosis of s/p R TKA. Patient tolerated PT session well. Patient ambulated 40ft, 12ft, and 40ft with RW and contact guard assistance. No LOB or SOB noted. Patient ascended/descended 4 steps with left handrail and contact guard assistance. Patient ready to discharge home from PT standpoint.     Recent Surgery: Procedure(s) (LRB):  ARTHROPLASTY, KNEE, TOTAL (Right) Day of Surgery    Plan:     During this hospitalization, patient does not require further acute PT services.  Please re-consult if situation changes.      Subjective     Chief Complaint: Right knee pain. Dizzy.   Patient/Family Comments/goals: To walk without pain.   Pain/Comfort:  Pain Rating 1:  (did not rate)  Location - Side 1: Right  Location 1: knee  Pain Addressed 1: Pre-medicate for activity, Reposition, Distraction    Patients cultural, spiritual, Church conflicts given the current situation:  n/a    Living Environment:  Patient lives in a [facility] with 3 steps and a left handrail to enter.   Prior to admission, patients level of function was independent for functional mobility. Equipment at home: walker, rolling.  Upon discharge, patient will have assistance from daughter.    Objective:     Communicated with RN prior to session.  Patient found HOB elevated with cryotherapy, perineural catheter (Nimbus) upon PT entry to room. Daughter present during PT session.     General Precautions: Standard, fall    Orthopedic Precautions:RLE weight bearing as tolerated   Braces: N/A  Respiratory Status: Room air    Exams:  Cognitive Exam:  Patient is oriented to Person, Place, Time, and Situation  Gross Motor Coordination:  WFL  RLE  ROM: WFL except limited at knee due to pain  RLE Strength: appears WFL but did not formally assess due to pain and recent surgery  LLE ROM: WFL  LLE Strength: WFL    Functional Mobility:  Bed Mobility:     Scooting: contact guard assistance  Supine to Sit: contact guard assistance  Transfers:     Sit to Stand:  contact guard assistance with rolling walker x1 from bed, x1 from bedside commode, and x1 from wheelchair  Toilet Transfer (bedside commode over toilet): contact guard assistance with rolling walker   Gait: Patient ambulated 40ft, 12ft, and 40ft with Rolling Walker and contact guard assistance using 3-point gait. Patient demonstrated decreased nas and decreased step length during gait due to pain, decreased ROM, and decreased strength.  Balance: Patient stood at the sink to wash her hands with contact guard assistance and rolling walker.   Stairs: Patient ascended/descended 4 steps with left handrail and contact guard assistance.       Treatment and Education:  Patient and daughter educated in:  -PT role and POC  -safety with transfers including hand placement  -gait sequencing and RW management  -OOB activity to maximize recovery including ambulating at home to prevent DVT   -car transfer  -stair training  -HEP for therex at home with handout provided       AM-PAC 6 CLICK MOBILITY  Total Score:23     Patient left  seated in transport chair  with all lines intact, call button in reach, RN notified, and daughter present.    GOALS:   Multidisciplinary Problems       Physical Therapy Goals       Not on file              Multidisciplinary Problems (Resolved)          Problem: Physical Therapy    Goal Priority Disciplines Outcome Goal Variances Interventions   Physical Therapy Goal   (Resolved)     PT, PT/OT Met                         History:     Past Medical History:   Diagnosis Date    Abnormal gait 02/22/2023    Arthritis     Depression     Encounter for blood transfusion     GERD (gastroesophageal reflux  disease)     Hypertension     Migraine     Poor tolerance for ambulation 02/22/2023    Trigeminal neuralgia 03/2018       Past Surgical History:   Procedure Laterality Date    APPENDECTOMY      CHOLECYSTECTOMY  07/2012    ESOPHAGOGASTRODUODENOSCOPY N/A 10/14/2019    Procedure: EGD (ESOPHAGOGASTRODUODENOSCOPY);  Surgeon: Sean Girard MD;  [facility]);  Service: Endoscopy;  Laterality: N/A;    HYSTERECTOMY      LAPAROSCOPIC TOUPET FUNDOPLICATION N/A 11/22/2019    Procedure: FUNDOPLICATION, LAPAROSCOPIC, TOUPET;  Surgeon: Adriano Godoy MD;  Location: [address];  Service: General;  Laterality: N/A;       Time Tracking:     PT Received On: 02/26/24  PT Start Time: 1307     PT Stop Time: 1341  PT Total Time (min): 34 min     Billable Minutes: Evaluation 10, Gait Training 12, and Therapeutic Activity 12    02/26/2024

## 2024-02-27 NOTE — PLAN OF CARE
Problem: Occupational Therapy  Goal: Occupational Therapy Goal  Description: Goals to be met by: 2/26/24     Patient will increase functional independence with ADLs by performing:    UE Dressing with Modified [place].  LE Dressing with Modified [place] and Assistive Devices as needed.  Grooming while standing at sink with Modified [place].  Toileting from bedside commode with Modified [place] for hygiene and clothing management.   Bathing from  shower chair/bench with Modified [place].  Toilet transfer to bedside commode with Modified [place].    Outcome: Ongoing, Progressing

## 2024-02-28 ENCOUNTER — TELEPHONE (OUTPATIENT)
Dept: SPORTS MEDICINE | Facility: CLINIC | Age: 64
End: 2024-02-28
Payer: COMMERCIAL

## 2024-02-28 ENCOUNTER — CLINICAL SUPPORT (OUTPATIENT)
Dept: REHABILITATION | Facility: HOSPITAL | Age: 64
End: 2024-02-28
Payer: COMMERCIAL

## 2024-02-28 DIAGNOSIS — R29.898 DECREASED STRENGTH OF LOWER EXTREMITY: ICD-10-CM

## 2024-02-28 DIAGNOSIS — M17.11 PRIMARY OSTEOARTHRITIS OF RIGHT KNEE: ICD-10-CM

## 2024-02-28 DIAGNOSIS — M25.661 DECREASED ROM OF RIGHT KNEE: Primary | ICD-10-CM

## 2024-02-28 PROCEDURE — 97110 THERAPEUTIC EXERCISES: CPT

## 2024-02-28 PROCEDURE — 97161 PT EVAL LOW COMPLEX 20 MIN: CPT

## 2024-02-28 PROCEDURE — 97140 MANUAL THERAPY 1/> REGIONS: CPT

## 2024-02-28 RX ORDER — ONDANSETRON 4 MG/1
TABLET, ORALLY DISINTEGRATING ORAL
Qty: 28 TABLET | Refills: 0 | Status: SHIPPED | OUTPATIENT
Start: 2024-02-28

## 2024-02-28 NOTE — TELEPHONE ENCOUNTER
"Called pharmacy for directions on Zofran. From [place]. " 1 every 4 to 6 hours as needed for nausea."  "

## 2024-02-28 NOTE — PROGRESS NOTES
[facility]  Physical Therapy Initial Evaluation    Name: Jenifer Burnett  Clinic Number: [id number]    Therapy Diagnosis:   Encounter Diagnoses   Name Primary?    Primary osteoarthritis of right knee     Decreased ROM of right knee Yes    Decreased strength of lower extremity      Physician: Negin Cardona*    Physician Orders: PT Eval and Treat   Medical Diagnosis from Referral: Primary osteoarthritis of right knee [M17.11]   Evaluation Date: 2/28/2024  Authorization Period Expiration: 12/31/24  Plan of Care Expiration: 5/22/24  Visit # / Visits authorized: 1/ 1  FOTO Code: [id number]      Time In: 9:00 AM  Time Out: 10:00 AM    Total Billable Time: 60 minutes    Precautions: Standard    Date of Surgery: 2/26/24  PROCEDURES PERFORMED:   Right total knee arthroplasty (CPT 38591)    Subjective   Date of onset: 2/26/24  History of current condition - Jenifer reports: s/p R TKA by Dr. Lezama on 2/26/24. Since surgery she states her pain levels have been poorly controlled taking pain medication PRN for pain relief however states she has gotten min pain relief with use of pain medication. Also taking tylenol. Reports that the first day her pain levels were well controlled and was able to move around pretty well however pain increased yesterday and notices she has increased pain and swelling making it difficult to move her leg around. Had to call JERICHO Castanon last night to make sure she didn't have a blood clot due to severe pain levels. States leg is still swollen today.  States she has been icing/elevating for pain relief. Endorses sleeping without a pillow under her knee. Sleeping in recliner  At this time she denies fever/night sweats, n/t, neurological, constitutional signs. Prior to surgery she states she had a history of R knee pain for 2 years that was refractive to conservative management that worsened over the past 6 months so the decision was made to undergo the above mentioned  procedure. No typical exercise routine           Medical History:   Past Medical History:   Diagnosis Date    Abnormal gait 02/22/2023    Arthritis     Depression     Encounter for blood transfusion     GERD (gastroesophageal reflux disease)     Hypertension     Migraine     Poor tolerance for ambulation 02/22/2023    Trigeminal neuralgia 03/2018       Surgical History:   Jenifer Burnett  has a past surgical history that includes Hysterectomy; Esophagogastroduodenoscopy (N/A, 10/14/2019); Cholecystectomy (07/2012); Laparoscopic Toupet fundoplication (N/A, 11/22/2019); Appendectomy; and Total knee arthroplasty (Right, 2/26/2024).    Medications:   Jenifer has a current medication list which includes the following prescription(s): acetaminophen, aspirin, cefadroxil, celecoxib, citalopram, cyanocobalamin (vitamin b-12), hydrocodone-acetaminophen, losartan, methocarbamol, omeprazole, ondansetron, oxycodone, pregabalin, and vitamin d, and the following Facility-Administered Medications: ropivacaine hcl/pf.    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 Decreased WB through RLE during stance phase    Knee Active Range of Motion:   Right  Left    Flexion 70 (at EOM) 130   Extension Lacking 5  5 hyper     Knee Passive Range of Motion:   Right  Left    Flexion 75 (at EOM) 130   Extension 0 5 hyper       Ankle Active Range of Motion: WNL      Quad Set: trace/poor      Joint Mobility: hypomobile PF/TF joint mobility as expected post-operatively       Palpation: TTP along incision sites, quad and distal calf mildly      Sensation: Intact throughout to light touch      Pulses: 2+ dorsal pedis      Edema:  Girth Measurement Joint line Mid calf 10 cm above   Right 47 cm 40 cm 51 cm   Left 42 cm 37 cm 48 cm       Special Tests: Not performed today due to post-op status   Strength: Not performed today due to post-op status.         CMS Impairment/Limitation/Restriction for FOTO Knee Survey    Therapist reviewed FOTO scores for Jenifer Burnett on 2/28/2024.   FOTO documents entered into EPIC - see Media section.    Limitation Score: See FOTO in media section         TREATMENT   Treatment Time In: 9:30 PM  Treatment Time Out: 10:00 PM  Total Treatment time separate from Evaluation: 30 minutes    Jenifer received therapeutic exercises to develop strength, endurance, ROM, and flexibility for 15 minutes including:  LLLD heel prop x8'  Seated knee flexion at EOM 3x10 5s holds    Attempted but poor tolerance and motion  Heel slides 3x10    Jenifer received the following manual therapy techniques: Joint mobilizations were applied to the: R knee for 5 minutes, including:  Patella mobs all directions  ROM assessment    Jenifer participated in neuromuscular re-education activities to improve: Coordination, Kinesthetic, Sense, and Proprioception for 10 minutes. The following activities were included:  Quad set with hinged knee extension mob 3x10  Quad sets 3x10 5s  holds  Ankle pumps 3x10    Home Exercises and Patient Education Provided    Education provided:   - HEP, POC, symptom management, recovery timeline, 5 acute goals following knee surgery    Written Home Exercises Provided: yes.  Exercises were reviewed and Jenifer was able to demonstrate them prior to the end of the session.  Jenifer demonstrated good  understanding of the education provided.     See EMR under Patient Instructions for exercises provided 2/28/2024.    Assessment   Jenifer is a 64 y.o. female referred to outpatient Physical Therapy with a medical diagnosis of Primary osteoarthritis of right knee [M17.11 . Pt presents with with complaints of continued R knee pain  consistent with referring dx limiting ADL's and functional activities 2/2 post-operative status. Upon evaluation patient presents with decreased ROM, joint mobility and flexibility restrictions, decreased strength and motor control contributing to limited functional status at this time. Patient would benefit from appropriate manual therapy, mobility, flexibility, strengthening and NM re-education in order to address the before-mentioned deficits and return to PLOF with ADL's and improve functional abilitites       Pt prognosis is Good.   Pt will benefit from skilled outpatient Physical Therapy to address the deficits stated above and in the chart below, provide pt/family education, and to maximize pt's level of independence.     Plan of care discussed with patient: Yes  Pt's spiritual, cultural and educational needs considered and patient is agreeable to the plan of care and goals as stated below:     Anticipated Barriers for therapy: high BMI    Medical Necessity is demonstrated by the following  History  Co-morbidities and personal factors that may impact the plan of care [x] LOW: no personal factors / co-morbidities  [] MODERATE: 1-2 personal factors / co-morbidities  [] HIGH: 3+ personal factors / co-morbidities    Moderate / High Support  Documentation:   Co-morbidities affecting plan of care: see medical chart    Personal Factors:   no deficits     Examination  Body Structures and Functions, activity limitations and participation restrictions that may impact the plan of care [x] LOW: addressing 1-2 elements  [] MODERATE: 3+ elements  [] HIGH: 4+ elements (please support below)    Moderate / High Support Documentation: see eval     Clinical Presentation [x] LOW: stable  [] MODERATE: Evolving  [] HIGH: Unstable     Decision Making/ Complexity Score: low         Goals:   Short Term Goals ( 2 Weeks ):   1. Pt will report reduced pain by 20 to 40% or greater for improved mobility, sleep  and ambulation.   2. Patients knee edema reduced by 1/4 to 1/2 inch or greater to enhance flexion ROM and knee comfort.   3. Pt to report minimal to no pain to palpation for improved level of comfort.   4. Pt to demonstrate symmetrical weight bearing w/o increased pain for stability and functional ambulation .  5. Pts neuromuscular response will be enhanced for unilateral standing stability and balance   6. Pt to achieve 90 degrees of passive knee flexion for restoring functional knee mobility, ambulating with proper gait pattern and sit to stand ability.   7 Pt to report understanding of all instruction pertinent to patient safety , gait instruction and HEP.    8. Pt to exhibit correct return demonstration of exercises for self-management and independence with HEP    Long Term Goals (12 Weeks):  1. Pt will demonstrate increased knee flexion AROM to 120 degrees or greater for return to functional activity  2. Pt will demonstrate increased knee extension AROM to 0 degrees for standing stability   3. Pt will demonstrate increased RLE strength by 1/2 to 1 grade for improved functional stability  4. Pt to demonstrate standing stability unsupported on dynamic surfaces for functional return to ADL and recreational activities.   5.The patient will be independent amb with no  assistive device on all surfaces for community distances.  6. Pt to demonstrate independence with HEP for self management  7. Patient will return to walking for fitness and recreation 3x/wk within 4 months.  8. Patient will improve FOTO score to </= see FOTO in media limited to decrease perceived limitation with mobility.            Plan   Plan of care Certification: 2/28/2024 to 5/22/24.    Outpatient Physical Therapy 2 times weekly for 12 weeks to include the following interventions: Electrical Stimulation NMES, Gait Training, Manual Therapy, Moist Heat/ Ice, Neuromuscular Re-ed, Patient Education, Self Care, Therapeutic Activities, Therapeutic Exercise, and Dry Needling .     CM HEBERT, PT, DPT, SCS  Board Certified Sports Clinical Specialist

## 2024-02-28 NOTE — TELEPHONE ENCOUNTER
----- Message from Nadiya Ellis sent at 2/28/2024  9:06 AM CST -----  [facility] calling in regards to no directions on Rx ondansetron (ZOFRAN-ODT) 4 MG TbDL          [id number] - [place] - [address]  [address]  [place] [address]  Phone: [phone] Fax: [phone]

## 2024-02-29 NOTE — PLAN OF CARE
[facility]  Physical Therapy Initial Evaluation    Name: Jenifer Burnett  Clinic Number: [id number]    Therapy Diagnosis:   Encounter Diagnoses   Name Primary?    Primary osteoarthritis of right knee     Decreased ROM of right knee Yes    Decreased strength of lower extremity      Physician: Negin Cardona*    Physician Orders: PT Eval and Treat   Medical Diagnosis from Referral: Primary osteoarthritis of right knee [M17.11]   Evaluation Date: 2/28/2024  Authorization Period Expiration: 12/31/24  Plan of Care Expiration: 5/22/24  Visit # / Visits authorized: 1/ 1  FOTO Code: [id number]      Time In: 9:00 AM  Time Out: 10:00 AM    Total Billable Time: 60 minutes    Precautions: Standard    Date of Surgery: 2/26/24  PROCEDURES PERFORMED:   Right total knee arthroplasty (CPT 15513)    Subjective   Date of onset: 2/26/24  History of current condition - Jenifer reports: s/p R TKA by Dr. Lzeama on 2/26/24. Since surgery she states her pain levels have been poorly controlled taking pain medication PRN for pain relief however states she has gotten min pain relief with use of pain medication. Also taking tylenol. Reports that the first day her pain levels were well controlled and was able to move around pretty well however pain increased yesterday and notices she has increased pain and swelling making it difficult to move her leg around. Had to call JERICHO Castanon last night to make sure she didn't have a blood clot due to severe pain levels. States leg is still swollen today.  States she has been icing/elevating for pain relief. Endorses sleeping without a pillow under her knee. Sleeping in recliner  At this time she denies fever/night sweats, n/t, neurological, constitutional signs. Prior to surgery she states she had a history of R knee pain for 2 years that was refractive to conservative management that worsened over the past 6 months so the decision was made to undergo the above mentioned  procedure. No typical exercise routine           Medical History:   Past Medical History:   Diagnosis Date    Abnormal gait 02/22/2023    Arthritis     Depression     Encounter for blood transfusion     GERD (gastroesophageal reflux disease)     Hypertension     Migraine     Poor tolerance for ambulation 02/22/2023    Trigeminal neuralgia 03/2018       Surgical History:   Jenifer Burnett  has a past surgical history that includes Hysterectomy; Esophagogastroduodenoscopy (N/A, 10/14/2019); Cholecystectomy (07/2012); Laparoscopic Toupet fundoplication (N/A, 11/22/2019); Appendectomy; and Total knee arthroplasty (Right, 2/26/2024).    Medications:   Jenifer has a current medication list which includes the following prescription(s): acetaminophen, aspirin, cefadroxil, celecoxib, citalopram, cyanocobalamin (vitamin b-12), hydrocodone-acetaminophen, losartan, methocarbamol, omeprazole, ondansetron, oxycodone, pregabalin, and vitamin d, and the following Facility-Administered Medications: ropivacaine hcl/pf.    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 Decreased WB through RLE during stance phase    Knee Active Range of Motion:   Right  Left    Flexion 70 (at EOM) 130   Extension Lacking 5  5 hyper     Knee Passive Range of Motion:   Right  Left    Flexion 75 (at EOM) 130   Extension 0 5 hyper       Ankle Active Range of Motion: WNL      Quad Set: trace/poor      Joint Mobility: hypomobile PF/TF joint mobility as expected post-operatively       Palpation: TTP along incision sites, quad and distal calf mildly      Sensation: Intact throughout to light touch      Pulses: 2+ dorsal pedis      Edema:  Girth Measurement Joint line Mid calf 10 cm above   Right 47 cm 40 cm 51 cm   Left 42 cm 37 cm 48 cm       Special Tests: Not performed today due to post-op status   Strength: Not performed today due to post-op status.         CMS Impairment/Limitation/Restriction for FOTO Knee Survey    Therapist reviewed FOTO scores for Jenifer Burnett on 2/28/2024.   FOTO documents entered into EPIC - see Media section.    Limitation Score: See FOTO in media section         TREATMENT   Treatment Time In: 9:25 PM  Treatment Time Out: 10:00 PM  Total Treatment time separate from Evaluation: 30 minutes    Jenifer received therapeutic exercises to develop strength, endurance, ROM, and flexibility for 15 minutes including:  LLLD heel prop x8'  Seated knee flexion at EOM 3x10 5s holds    Attempted but poor tolerance and motion  Heel slides 3x10    Jenifer received the following manual therapy techniques: Joint mobilizations were applied to the: R knee for 10 minutes, including:  Patella mobs all directions  ROM assessment  Dressing change    Jenifer participated in neuromuscular re-education activities to improve: Coordination, Kinesthetic, Sense, and Proprioception for 10 minutes. The following activities were included:  Quad set with hinged knee extension mob 3x10  Quad  sets 3x10 5s holds  Ankle pumps 3x10    Home Exercises and Patient Education Provided    Education provided:   - HEP, POC, symptom management, recovery timeline, 5 acute goals following knee surgery    Written Home Exercises Provided: yes.  Exercises were reviewed and Jenifer was able to demonstrate them prior to the end of the session.  eJnifer demonstrated good  understanding of the education provided.     See EMR under Patient Instructions for exercises provided 2/28/2024.    Assessment   Jenifer is a 64 y.o. female referred to outpatient Physical Therapy with a medical diagnosis of Primary osteoarthritis of right knee [M17.11 . Pt presents with with complaints of continued R knee pain  consistent with referring dx limiting ADL's and functional activities 2/2 post-operative status. Upon evaluation patient presents with decreased ROM, joint mobility and flexibility restrictions, decreased strength and motor control contributing to limited functional status at this time. Patient would benefit from appropriate manual therapy, mobility, flexibility, strengthening and NM re-education in order to address the before-mentioned deficits and return to PLOF with ADL's and improve functional abilitites       Pt prognosis is Good.   Pt will benefit from skilled outpatient Physical Therapy to address the deficits stated above and in the chart below, provide pt/family education, and to maximize pt's level of independence.     Plan of care discussed with patient: Yes  Pt's spiritual, cultural and educational needs considered and patient is agreeable to the plan of care and goals as stated below:     Anticipated Barriers for therapy: high BMI    Medical Necessity is demonstrated by the following  History  Co-morbidities and personal factors that may impact the plan of care [x] LOW: no personal factors / co-morbidities  [] MODERATE: 1-2 personal factors / co-morbidities  [] HIGH: 3+ personal factors / co-morbidities    Moderate / High  Support Documentation:   Co-morbidities affecting plan of care: see medical chart    Personal Factors:   no deficits     Examination  Body Structures and Functions, activity limitations and participation restrictions that may impact the plan of care [x] LOW: addressing 1-2 elements  [] MODERATE: 3+ elements  [] HIGH: 4+ elements (please support below)    Moderate / High Support Documentation: see eval     Clinical Presentation [x] LOW: stable  [] MODERATE: Evolving  [] HIGH: Unstable     Decision Making/ Complexity Score: low         Goals:   Short Term Goals ( 2 Weeks ):   1. Pt will report reduced pain by 20 to 40% or greater for improved mobility, sleep  and ambulation.   2. Patients knee edema reduced by 1/4 to 1/2 inch or greater to enhance flexion ROM and knee comfort.   3. Pt to report minimal to no pain to palpation for improved level of comfort.   4. Pt to demonstrate symmetrical weight bearing w/o increased pain for stability and functional ambulation .  5. Pts neuromuscular response will be enhanced for unilateral standing stability and balance   6. Pt to achieve 90 degrees of passive knee flexion for restoring functional knee mobility, ambulating with proper gait pattern and sit to stand ability.   7 Pt to report understanding of all instruction pertinent to patient safety , gait instruction and HEP.    8. Pt to exhibit correct return demonstration of exercises for self-management and independence with HEP    Long Term Goals (12 Weeks):  1. Pt will demonstrate increased knee flexion AROM to 120 degrees or greater for return to functional activity  2. Pt will demonstrate increased knee extension AROM to 0 degrees for standing stability   3. Pt will demonstrate increased RLE strength by 1/2 to 1 grade for improved functional stability  4. Pt to demonstrate standing stability unsupported on dynamic surfaces for functional return to ADL and recreational activities.   5.The patient will be independent amb  with no assistive device on all surfaces for community distances.  6. Pt to demonstrate independence with HEP for self management  7. Patient will return to walking for fitness and recreation 3x/wk within 4 months.  8. Patient will improve FOTO score to </= see FOTO in media limited to decrease perceived limitation with mobility.            Plan   Plan of care Certification: 2/28/2024 to 5/22/24.    Outpatient Physical Therapy 2 times weekly for 12 weeks to include the following interventions: Electrical Stimulation NMES, Gait Training, Manual Therapy, Moist Heat/ Ice, Neuromuscular Re-ed, Patient Education, Self Care, Therapeutic Activities, Therapeutic Exercise, and Dry Needling.     CM HEBERT, PT, DPT, SCS  Board Certified Sports Clinical Specialist

## 2024-03-01 ENCOUNTER — CLINICAL SUPPORT (OUTPATIENT)
Dept: REHABILITATION | Facility: HOSPITAL | Age: 64
End: 2024-03-01
Payer: COMMERCIAL

## 2024-03-01 DIAGNOSIS — R29.898 DECREASED STRENGTH OF LOWER EXTREMITY: Primary | ICD-10-CM

## 2024-03-01 DIAGNOSIS — M25.661 DECREASED ROM OF RIGHT KNEE: ICD-10-CM

## 2024-03-01 PROCEDURE — 97112 NEUROMUSCULAR REEDUCATION: CPT | Mod: CQ

## 2024-03-01 PROCEDURE — 97140 MANUAL THERAPY 1/> REGIONS: CPT | Mod: CQ

## 2024-03-01 NOTE — ANESTHESIA POST-OP PAIN MANAGEMENT
Acute Pain Service Progress Note    3/1/2024 1338    Unable to reach patient for Nimbus follow up. Voicemail left on patient's cell number encouraging to contact anesthesia at [phone] or Nimbus 24/7 support line at [phone] for any questions or concerns about the nerve block or infusion pump. Nimbus due to be discontinued by noon.

## 2024-03-01 NOTE — PROGRESS NOTES
[facility]   Physical Therapy Treatment Note     Name: Jenifer Burnett  Clinic Number: [id number]    Therapy Diagnosis: No diagnosis found.  Physician: Negin Cardona*    Visit Date: 3/1/2024    FOTO 1st:   FOTO 3rd:  FOTO 10th:    Physician: Negin Cardona*     Physician Orders: PT Eval and Treat   Medical Diagnosis from Referral: Primary osteoarthritis of right knee [M17.11]   Evaluation Date: 2/28/2024  Authorization Period Expiration: 12/31/24  Plan of Care Expiration: 5/22/24  Visit # / Visits authorized: 1/ 1  FOTO Code: [id number]       Time In: 9:00 AM  Time Out: 10:00 AM     Total Billable Time: 60 minutes (PT tech extender used)     Precautions: Standard     Date of Surgery: 2/26/24  PROCEDURES PERFORMED:   Right total knee arthroplasty (CPT 38822)          SUBJECTIVE     Pt reports: Doing HEP. Propping R leg all throughout the day    She was compliant with home exercise program.  Response to previous treatment: none  Functional change: improved AROM ext    Pain: 8/10  Location: right knee      OBJECTIVE     ROM: 0-65    Treatment       Jenifer received the treatments listed below:      Therapeutic exercises to develop strength, endurance, ROM, and posture for 05 minutes including:  ROM check    Manual therapy techniques: Joint mobilizations were applied to the: R LE for 10 minutes, including:  Ext hinge  AP TF mobs  FL off EOT    Therapeutic activities to improve functional performance for 00  minutes, including:      Neuromuscular re-education activities to improve: Balance, Coordination, Kinesthetic, Sense, Proprioception, and Posture for 45 minutes. The following activities were included:  NMES: 5/5  QS x 8 min  SAQ full foam bolster x 5min    Heel slide with QS at on ext x 5 min  Standing TKE with GTB with wt shift  Ambulating with RW with focus on quad activation with stance phase    Patient Education and Home Exercises     Home Exercises Provided and Patient  Education Provided     Education provided:   - Cont HEP, quad activation on stance phase, knee FL with swing through    Written Home Exercises Provided: Patient instructed to cont prior HEP. Exercises were reviewed and Jenifer was able to demonstrate them prior to the end of the session.  Jenifer demonstrated good  understanding of the education provided. See EMR under Patient Instructions for exercises provided during therapy sessions    ASSESSMENT     Jenifer's knee FL was less than previous session but knee active knee ext improved. Started 2 degrees hypoext and was able to achieve 0 after LLLD stretch. Used NMES today to improve quad activation. Ambulated into session with with flexed knee and left session with good TKE. Swelling noted above and below knee but no signs of DVT. Pain pump still attached.     Jenifer Is progressing well towards her goals.     Pt prognosis is Good.     Pt will continue to benefit from skilled outpatient physical therapy to address the deficits listed in the problem list box on initial evaluation, provide pt/family education and to maximize pt's level of independence in the home and community environment.     Pt's spiritual, cultural and educational needs considered and pt agreeable to plan of care and goals.     Anticipated barriers to physical therapy: High BMI    Goals:   Short Term Goals ( 2 Weeks ):   1. Pt will report reduced pain by 20 to 40% or greater for improved mobility, sleep  and ambulation.   2. Patients knee edema reduced by 1/4 to 1/2 inch or greater to enhance flexion ROM and knee comfort.   3. Pt to report minimal to no pain to palpation for improved level of comfort.   4. Pt to demonstrate symmetrical weight bearing w/o increased pain for stability and functional ambulation .  5. Pts neuromuscular response will be enhanced for unilateral standing stability and balance   6. Pt to achieve 90 degrees of passive knee flexion for restoring functional knee mobility, ambulating  with proper gait pattern and sit to stand ability.   7 Pt to report understanding of all instruction pertinent to patient safety , gait instruction and HEP.    8. Pt to exhibit correct return demonstration of exercises for self-management and independence with HEP     Long Term Goals (12 Weeks):  1. Pt will demonstrate increased knee flexion AROM to 120 degrees or greater for return to functional activity  2. Pt will demonstrate increased knee extension AROM to 0 degrees for standing stability   3. Pt will demonstrate increased RLE strength by 1/2 to 1 grade for improved functional stability  4. Pt to demonstrate standing stability unsupported on dynamic surfaces for functional return to ADL and recreational activities.   5.The patient will be independent amb with no assistive device on all surfaces for community distances.  6. Pt to demonstrate independence with HEP for self management  7. Patient will return to walking for fitness and recreation 3x/wk within 4 months.  8. Patient will improve FOTO score to </= see FOTO in media limited to decrease perceived limitation with mobility.    PLAN     Cont with POC to improve R LE ROM and strength    Lila De La Paz, PTA

## 2024-03-04 NOTE — OP NOTE
[facility]   OPERATIVE REPORT   ORTHOPAEDIC SURGERY   PROVIDER: DR. ILLA IGLESIAS    PATIENT INFORMATION   Jenifer Burnett 64 y.o. female 1960   MRN: [id number]   LOCATION: [facility]     DATE OF PROCEDURE: 2/26/2024     PREOPERATIVE DIAGNOSES:   Right knee advanced degenerative arthritis    POSTOPERATIVE DIAGNOSES:   Right knee advanced degenerative arthritis    PROCEDURES PERFORMED:   Right total knee arthroplasty (CPT 72070)    SURGEON: LILA Iglesias MD     ASSISTANTS:   Yo Emery DO - Fellow - First Assist  SMA Briana     First Assistant Duties: A first assistant was necessary for this procedure. Assistance was provided for surgical wound exposure, manipulation, and retractor placement and positioning. There was no qualified resident available for the procedure.     ANESTHESIA: Spinal with adductor catheter and local anesthetic injection (0.5% Marcaine)     ESTIMATED BLOOD LOSS:  150 cc    IMPLANTS:   Implant Name Type Inv. Item Serial No.  Lot No. LRB No. Used Action   PIN FIX TEMP 1/8X2.5IN LF STER - [id number]  PIN FIX TEMP 1/8X2.5IN LF STER  [employer] BILL. [id number] Right 1 Implanted and Explanted   CEMENT BONE SIMPLEX HV RADPQ - [id number]  CEMENT BONE SIMPLEX HV RADPQ  [employer] BILL. [id number] Right 2 Implanted   BASEPLATE TRIATHLON TS SZ4 - [id number]  BASEPLATE TRIATHLON TS SZ4  [employer] BILL. [id number] Right 1 Implanted   PATELLA TRI 33X9 X3 POLYETHYLE - [id number]  PATELLA TRI 33X9 X3 POLYETHYLE  [employer] BILL. [id number] Right 1 Implanted   COMP FEM POST STAB MAEVE SZ 4 RT - [id number]  COMP FEM POST STAB MAEVE SZ 4 RT  [employer] BILL. [id number] Right 1 Implanted   TIBIAL POST STAB SZ 4 13X3 TANESHA - [id number]  TIBIAL POST STAB SZ 4 13X3 TANESHA  [employer] BILL. [id number] Right 1 Implanted      SPECIMENS: None.    COMPLICATIONS: None.     INTRAOPERATIVE COUNTS: Correct.      PROPHYLACTIC IV ANTIBIOTICS: Given per [facility] Protocol.    INDICATIONS FOR PROCEDURE:  Jenifer Burnett is a 64 y.o. year-old with a longstanding history of right knee pain.  She has failed extensive conservative care to this point.  Preoperative imaging revealed degenerative joint disease and treatment options were discussed.  She elected to proceed with knee replacement.    Risks and complications were discussed including, but not limited to risks of anesthetic complications, infections, wound healing complications, aseptic loosening, instability, DVT, pulmonary embolism and death among others and she elected to proceed.    COMPONENTS USED:  The [email or web address] triathlon system with size femur 4, tibia 4, 13  mm polyethylene, 33 mm patella.    DESCRIPTION OF PROCEDURE:  The patient was taken to the Operating Room where anesthesia was administered by the Anesthesia Department. She was then placed in the supine position and all superficial neurovascular structures were well padded.  The right lower extremity was then sterilely prepped and draped in the normal fashion.    Under tourniquet control, a 20 cm longitudinal incision was made over the anterior aspect of the knee.  Subcutaneous tissue was sharply dissected down to the deep fascia, which was incised along the line of the incision.  A standard medial parapatellar arthrotomy was made.  The proximal medial tibial plateau was then subperiosteally exposed protecting the medial collateral ligament.  A portion of the infrapatellar fat pad was excised.  The patella was everted and the knee was flexed to 90 degrees.    The extramedullary tibial alignment guide was then placed and the proximal tibial osteotomy was made approximately 4 mm distal to the most shallow surface of the tibial plateau.  This was done perpendicular to the axis of the tibia with approximately 3 degrees posterior slope.    A drill was then used to gain access into the intramedullary canal of the distal  femur. The distal femoral cutting guide was placed and the 8 mm distal femoral cut was made in 5 degrees of valgus.  Femur was sized to a size 4.  The size 4 cutting guide was applied and  the anterior femoral condyle, posterior condyle, and chamfer cuts were made. There was no notching anteriorly.  At this time, the cutting guide was removed and the cruciate ligaments, osteophytes, menisci and any debris was removed from the joint space. Flexion and extension gaps were checked and were found to be equal at 13 mm. The notch cutting guide for the posterior stabilized housing was placed and the notch cuts were then made.    We then turned our attention back towards the proximal tibia.  This was sized to a size 4, placed in neutral rotation, and the keel was punched in the standard fashion after drilling for the UBP.  Trial sizes of the 4 femur, 4 tibia, and 13 mm polyethylene liner were then placed.    The patellar cutting guide was then used to remove the dorsal 10 mm of the patellar surface to a final thickness of 14 mm.  This was sized to a size 33. Drill holes were placed and patellar trial was placed.    At this time, the knee was placed through range of motion.  Excellent patellofemoral tracking and stability were noted throughout.  Full extension was easily obtained and intraoperative range of motion was from approximately 0 to 130 degrees.    Trial components were removed and the bony surfaces were thoroughly irrigated in a pulse lavage fashion and dried.  Two batches of cement were mixed and the tibial, femoral and patellar components were cemented into position, impacted and held in place as the cement polymerized.  Any excess cement was removed using Fritch elevators.  The 13 mm polyethylene was then locked into position.    The wound was once again thoroughly irrigated.  The tourniquet was deflated and any significant bleeding was stopped using electrocautery.  Tranexamic acid was given intravenously pre  incision and at the time of component cementation for hemostasis. The wound was irrigated with dilute betadine wash followed by normal saline via pulse lavage prior to closure.     The quadriceps tendon and parapatellar retinaculum were then closed with interrupted figure-of-eight sutures of #1 Vicryl.  Vancomycin powder was placed deep within the wound prior to arthrotomy closure.  Subcutaneous tissue was closed with interrupted inverted stitches #3-0 Vicryl.  Skin was approximated using running subcuticular 3-0 Monocryl followed by staples followed by Dermabond.  Sterile dressing was applied and she was returned to the Postanesthesia Care Unit in stable condition.

## 2024-03-05 RX ORDER — OXYCODONE HYDROCHLORIDE 5 MG/1
5 TABLET ORAL EVERY 4 HOURS PRN
Qty: 28 TABLET | Refills: 0 | Status: SHIPPED | OUTPATIENT
Start: 2024-03-05 | End: 2024-03-13

## 2024-03-06 ENCOUNTER — CLINICAL SUPPORT (OUTPATIENT)
Dept: REHABILITATION | Facility: HOSPITAL | Age: 64
End: 2024-03-06
Payer: COMMERCIAL

## 2024-03-06 DIAGNOSIS — M25.661 DECREASED ROM OF RIGHT KNEE: ICD-10-CM

## 2024-03-06 DIAGNOSIS — R29.898 DECREASED STRENGTH OF LOWER EXTREMITY: Primary | ICD-10-CM

## 2024-03-06 PROCEDURE — 97112 NEUROMUSCULAR REEDUCATION: CPT

## 2024-03-06 PROCEDURE — 97110 THERAPEUTIC EXERCISES: CPT

## 2024-03-06 PROCEDURE — 97140 MANUAL THERAPY 1/> REGIONS: CPT

## 2024-03-06 PROCEDURE — 97014 ELECTRIC STIMULATION THERAPY: CPT

## 2024-03-06 NOTE — PROGRESS NOTES
[facility]   Physical Therapy Treatment Note     Name: Jenifer Burnett  Clinic Number: [id number]    Therapy Diagnosis:   Encounter Diagnoses   Name Primary?    Decreased strength of lower extremity Yes    Decreased ROM of right knee      Physician: Negin Cardona*    Visit Date: 3/6/2024    FOTO 1st:   FOTO 3rd:  FOTO 10th:    Physician: Negin Cardona*     Physician Orders: PT Eval and Treat   Medical Diagnosis from Referral: Primary osteoarthritis of right knee [M17.11]   Evaluation Date: 2/28/2024  Authorization Period Expiration: 12/31/24  Plan of Care Expiration: 5/22/24  Visit # / Visits authorized: 2/ 20  FOTO Code: [id number]       Time In: 10:00 AM  Time Out: 11:00 AM     Total Billable Time: 60 minutes (PT tech extender used)     Precautions: Standard     Date of Surgery: 2/26/24  PROCEDURES PERFORMED:   Right total knee arthroplasty (CPT 38920)          SUBJECTIVE     Pt reports: doing alright still having a lot of pain. I have extensive bruising throughout my whole leg    She was compliant with home exercise program.  Response to previous treatment: none  Functional change: improved AROM ext    Pain: 8/10  Location: right knee      OBJECTIVE     Date of surgery: 2/26/24: 1 weeks 2 days POD as of 3/6    Knee Active Range of Motion:    Right  Left    Flexion 85 (at EOM) 130   Extension Lacking 5  5 hyper      Knee Passive Range of Motion:    Right  Left    Flexion 85 (at EOM) 130   Extension 0 5 hyper        Treatment       Jenifer received the treatments listed below:      Therapeutic exercises to develop strength, endurance, ROM, and posture for 15 minutes including:  LLLD heel prop x 8'  Heel slides 3x10 5s holds      ROM check    Manual therapy techniques: Joint mobilizations were applied to the: R LE for 10 minutes, including:  Hinged knee extension mobilization  Knee flexion at EOM  Patella mobs all directions      Therapeutic activities to improve functional  performance for 00  minutes, including:      Neuromuscular re-education activities to improve: Balance, Coordination, Kinesthetic, Sense, Proprioception, and Posture for 35 minutes. The following activities were included:  NMES applied for the following:  -QS x 5' min  -Quad set over towel x5'  -SAQ full foam bolster x 5min    Not today  Standing TKE with GTB with wt shift  Ambulating with RW with focus on quad activation with stance phase    Patient Education and Home Exercises     Home Exercises Provided and Patient Education Provided     Education provided:   - Cont HEP, quad activation on stance phase, knee FL with swing through    Written Home Exercises Provided: Patient instructed to cont prior HEP. Exercises were reviewed and Jenifer was able to demonstrate them prior to the end of the session.  Jenifer demonstrated good  understanding of the education provided. See EMR under Patient Instructions for exercises provided during therapy sessions    ASSESSMENT     Jenifer completed session as noted above. Initially lacking full terminal knee extension however improved from IE. Flexion ROM continues to be difficult in long sitting with heel slides that improves following knee flexion PROM on EOM. Quad tone continues to be fair so continued with NMES to augment volitional quad control    Jenifer Is progressing well towards her goals.     Pt prognosis is Good.     Pt will continue to benefit from skilled outpatient physical therapy to address the deficits listed in the problem list box on initial evaluation, provide pt/family education and to maximize pt's level of independence in the home and community environment.     Pt's spiritual, cultural and educational needs considered and pt agreeable to plan of care and goals.     Anticipated barriers to physical therapy: High BMI    Goals:   Short Term Goals ( 2 Weeks ):   1. Pt will report reduced pain by 20 to 40% or greater for improved mobility, sleep  and ambulation.   2.  Patients knee edema reduced by 1/4 to 1/2 inch or greater to enhance flexion ROM and knee comfort.   3. Pt to report minimal to no pain to palpation for improved level of comfort.   4. Pt to demonstrate symmetrical weight bearing w/o increased pain for stability and functional ambulation .  5. Pts neuromuscular response will be enhanced for unilateral standing stability and balance   6. Pt to achieve 90 degrees of passive knee flexion for restoring functional knee mobility, ambulating with proper gait pattern and sit to stand ability.   7 Pt to report understanding of all instruction pertinent to patient safety , gait instruction and HEP.    8. Pt to exhibit correct return demonstration of exercises for self-management and independence with HEP     Long Term Goals (12 Weeks):  1. Pt will demonstrate increased knee flexion AROM to 120 degrees or greater for return to functional activity  2. Pt will demonstrate increased knee extension AROM to 0 degrees for standing stability   3. Pt will demonstrate increased RLE strength by 1/2 to 1 grade for improved functional stability  4. Pt to demonstrate standing stability unsupported on dynamic surfaces for functional return to ADL and recreational activities.   5.The patient will be independent amb with no assistive device on all surfaces for community distances.  6. Pt to demonstrate independence with HEP for self management  7. Patient will return to walking for fitness and recreation 3x/wk within 4 months.  8. Patient will improve FOTO score to </= see FOTO in media limited to decrease perceived limitation with mobility.    PLAN     Cont with POC to improve R LE ROM and strength    CM HEBERT, PT, DPT, SCS

## 2024-03-07 ENCOUNTER — PATIENT MESSAGE (OUTPATIENT)
Dept: ADMINISTRATIVE | Facility: OTHER | Age: 64
End: 2024-03-07
Payer: COMMERCIAL

## 2024-03-08 ENCOUNTER — CLINICAL SUPPORT (OUTPATIENT)
Dept: REHABILITATION | Facility: HOSPITAL | Age: 64
End: 2024-03-08
Payer: COMMERCIAL

## 2024-03-08 DIAGNOSIS — R29.898 DECREASED STRENGTH OF LOWER EXTREMITY: Primary | ICD-10-CM

## 2024-03-08 DIAGNOSIS — M25.661 DECREASED ROM OF RIGHT KNEE: ICD-10-CM

## 2024-03-08 PROCEDURE — 97140 MANUAL THERAPY 1/> REGIONS: CPT | Mod: CQ

## 2024-03-08 PROCEDURE — 97110 THERAPEUTIC EXERCISES: CPT | Mod: CQ

## 2024-03-08 PROCEDURE — 97112 NEUROMUSCULAR REEDUCATION: CPT | Mod: CQ

## 2024-03-08 NOTE — PROGRESS NOTES
[facility]   Physical Therapy Treatment Note     Name: Jenifer Burnett  Clinic Number: [id number]    Therapy Diagnosis:   Encounter Diagnoses   Name Primary?    Decreased strength of lower extremity Yes    Decreased ROM of right knee      Physician: Negin Cardona*    Visit Date: 3/8/2024    FOTO 1st:   FOTO 3rd:  FOTO 10th:    Physician: Negin Cardona*     Physician Orders: PT Eval and Treat   Medical Diagnosis from Referral: Primary osteoarthritis of right knee [M17.11]   Evaluation Date: 2/28/2024  Authorization Period Expiration: 12/31/24  Plan of Care Expiration: 5/22/24  Visit # / Visits authorized: 3/ 20  FOTO Code: [id number]       Time In: 10:00 AM  Time Out: 11:00 AM     Total Billable Time: 60 minutes      Precautions: Standard     Date of Surgery: 2/26/24  PROCEDURES PERFORMED:   Right total knee arthroplasty (CPT 03029)          SUBJECTIVE     Pt reports: doing better     She was compliant with home exercise program.  Response to previous treatment: none  Functional change: improved AROM ext    Pain: 8/10  Location: right knee      OBJECTIVE     Date of surgery: 2/26/24: 1 weeks 2 days POD as of 3/6    Knee Active Range of Motion:    Right  Left    Flexion 85 (at EOM) 130   Extension Lacking 5  5 hyper      Knee Passive Range of Motion:    Right  Left    Flexion 85 (at EOM) 130   Extension 0 5 hyper        Treatment       Jenifer received the treatments listed below:      Therapeutic exercises to develop strength, endurance, ROM, and posture for 15 minutes including:  LLLD heel prop x 8'  Heel slides 3x10 5s holds  ROM check    Manual therapy techniques: Joint mobilizations were applied to the: R LE for 10 minutes, including:  Hinged knee extension mobilization  Knee flexion at EOM  Patella mobs all directions      Therapeutic activities to improve functional performance for 00  minutes, including:      Neuromuscular re-education activities to improve: Balance,  Coordination, Kinesthetic, Sense, Proprioception, and Posture for 35 minutes. The following activities were included:  QS x 5 min  TKE with red power band 4x10    NMES applied for the following:  SAQ 1/2 foam   SLR off EOT     Not today  Standing TKE with GTB with wt shift  Ambulating with RW with focus on quad activation with stance phase    Patient Education and Home Exercises     Home Exercises Provided and Patient Education Provided     Education provided:   - Cont HEP, quad activation on stance phase, knee FL with swing through    Written Home Exercises Provided: Patient instructed to cont prior HEP. Exercises were reviewed and Jenifer was able to demonstrate them prior to the end of the session.  Jenifer demonstrated good  understanding of the education provided. See EMR under Patient Instructions for exercises provided during therapy sessions    ASSESSMENT     Jenifer is improving with quad strength. Was able to perform SLR off EOT without lag but with stem unit. Reported some medial knee pain with flexion which improved with manual inferior patellar mob.     Jenifer Is progressing well towards her goals.     Pt prognosis is Good.     Pt will continue to benefit from skilled outpatient physical therapy to address the deficits listed in the problem list box on initial evaluation, provide pt/family education and to maximize pt's level of independence in the home and community environment.     Pt's spiritual, cultural and educational needs considered and pt agreeable to plan of care and goals.     Anticipated barriers to physical therapy: High BMI    Goals:   Short Term Goals ( 2 Weeks ):   1. Pt will report reduced pain by 20 to 40% or greater for improved mobility, sleep  and ambulation.   2. Patients knee edema reduced by 1/4 to 1/2 inch or greater to enhance flexion ROM and knee comfort.   3. Pt to report minimal to no pain to palpation for improved level of comfort.   4. Pt to demonstrate symmetrical weight bearing  w/o increased pain for stability and functional ambulation .  5. Pts neuromuscular response will be enhanced for unilateral standing stability and balance   6. Pt to achieve 90 degrees of passive knee flexion for restoring functional knee mobility, ambulating with proper gait pattern and sit to stand ability.   7 Pt to report understanding of all instruction pertinent to patient safety , gait instruction and HEP.    8. Pt to exhibit correct return demonstration of exercises for self-management and independence with HEP     Long Term Goals (12 Weeks):  1. Pt will demonstrate increased knee flexion AROM to 120 degrees or greater for return to functional activity  2. Pt will demonstrate increased knee extension AROM to 0 degrees for standing stability   3. Pt will demonstrate increased RLE strength by 1/2 to 1 grade for improved functional stability  4. Pt to demonstrate standing stability unsupported on dynamic surfaces for functional return to ADL and recreational activities.   5.The patient will be independent amb with no assistive device on all surfaces for community distances.  6. Pt to demonstrate independence with HEP for self management  7. Patient will return to walking for fitness and recreation 3x/wk within 4 months.  8. Patient will improve FOTO score to </= see FOTO in media limited to decrease perceived limitation with mobility.    PLAN     Cont with POC to improve R LE ROM and strength    Lila De La Paz PTA,

## 2024-03-11 ENCOUNTER — CLINICAL SUPPORT (OUTPATIENT)
Dept: REHABILITATION | Facility: HOSPITAL | Age: 64
End: 2024-03-11
Payer: COMMERCIAL

## 2024-03-11 ENCOUNTER — OFFICE VISIT (OUTPATIENT)
Dept: SPORTS MEDICINE | Facility: CLINIC | Age: 64
End: 2024-03-11
Payer: COMMERCIAL

## 2024-03-11 ENCOUNTER — HOSPITAL ENCOUNTER (OUTPATIENT)
Dept: RADIOLOGY | Facility: HOSPITAL | Age: 64
Discharge: HOME | End: 2024-03-11
Attending: PHYSICIAN ASSISTANT
Payer: COMMERCIAL

## 2024-03-11 VITALS
DIASTOLIC BLOOD PRESSURE: 73 MMHG | WEIGHT: 205 LBS | HEIGHT: 69 IN | HEART RATE: 88 BPM | SYSTOLIC BLOOD PRESSURE: 114 MMHG | BODY MASS INDEX: 30.36 KG/M2

## 2024-03-11 DIAGNOSIS — M25.561 ACUTE PAIN OF RIGHT KNEE: ICD-10-CM

## 2024-03-11 DIAGNOSIS — M25.661 DECREASED ROM OF RIGHT KNEE: ICD-10-CM

## 2024-03-11 DIAGNOSIS — Z96.651 S/P TOTAL KNEE ARTHROPLASTY, RIGHT: Primary | ICD-10-CM

## 2024-03-11 DIAGNOSIS — M25.561 RIGHT KNEE PAIN, UNSPECIFIED CHRONICITY: ICD-10-CM

## 2024-03-11 DIAGNOSIS — R29.898 DECREASED STRENGTH OF LOWER EXTREMITY: Primary | ICD-10-CM

## 2024-03-11 PROCEDURE — 1160F RVW MEDS BY RX/DR IN RCRD: CPT | Mod: CPTII,S$GLB,, | Performed by: PHYSICIAN ASSISTANT

## 2024-03-11 PROCEDURE — 97014 ELECTRIC STIMULATION THERAPY: CPT

## 2024-03-11 PROCEDURE — 3078F DIAST BP <80 MM HG: CPT | Mod: CPTII,S$GLB,, | Performed by: PHYSICIAN ASSISTANT

## 2024-03-11 PROCEDURE — 73560 X-RAY EXAM OF KNEE 1 OR 2: CPT | Mod: 26,RT,, | Performed by: RADIOLOGY

## 2024-03-11 PROCEDURE — 3044F HG A1C LEVEL LT 7.0%: CPT | Mod: CPTII,S$GLB,, | Performed by: PHYSICIAN ASSISTANT

## 2024-03-11 PROCEDURE — 73560 X-RAY EXAM OF KNEE 1 OR 2: CPT | Mod: TC,RT

## 2024-03-11 PROCEDURE — 99024 POSTOP FOLLOW-UP VISIT: CPT | Mod: S$GLB,,, | Performed by: PHYSICIAN ASSISTANT

## 2024-03-11 PROCEDURE — 4010F ACE/ARB THERAPY RXD/TAKEN: CPT | Mod: CPTII,S$GLB,, | Performed by: PHYSICIAN ASSISTANT

## 2024-03-11 PROCEDURE — 97112 NEUROMUSCULAR REEDUCATION: CPT

## 2024-03-11 PROCEDURE — 97140 MANUAL THERAPY 1/> REGIONS: CPT

## 2024-03-11 PROCEDURE — 99999 PR PBB SHADOW E&M-EST. PATIENT-LVL IV: CPT | Mod: PBBFAC,,, | Performed by: PHYSICIAN ASSISTANT

## 2024-03-11 PROCEDURE — 97110 THERAPEUTIC EXERCISES: CPT

## 2024-03-11 PROCEDURE — 3074F SYST BP LT 130 MM HG: CPT | Mod: CPTII,S$GLB,, | Performed by: PHYSICIAN ASSISTANT

## 2024-03-11 PROCEDURE — 1159F MED LIST DOCD IN RCRD: CPT | Mod: CPTII,S$GLB,, | Performed by: PHYSICIAN ASSISTANT

## 2024-03-11 RX ORDER — PREGABALIN 75 MG/1
75 CAPSULE ORAL 2 TIMES DAILY
Qty: 28 CAPSULE | Refills: 0 | Status: SHIPPED | OUTPATIENT
Start: 2024-03-11 | End: 2024-03-25

## 2024-03-11 NOTE — PROGRESS NOTES
S:Jenifer Burnett presents for post-operative evaluation.     DATE OF PROCEDURE: 2/26/2024      PROCEDURES PERFORMED:   Right total knee arthroplasty (CPT 28773)    Jenifer Burnett reports to be doing well 2wk s/p the above mentioned procedure. Denies fevers, chills, night sweats, chest pain, difficulty breathing, calf pain or tenderness. Going to PT 3xWeek at the [facility] location. Seeing good progress daily. Pain levels are improving. Taking Oxy 5mg only as needed- only a few times a week. Taking ASA as instructed. Completed antibiotic. Main complaint today is of burning/shocky pain in the knee- likely neuropathic.    O: The incision is healing well.  No signs of infection.  Staples were removed. No significant pain or unusual tenderness. Active assist ROM of the knee 0-90. Quad atrophied but firing. Able to slightly do straight leg raise.    Imaging:  Plain radiographs of the right knee demonstrate post operative total knee arthroplasty prosthesis in place and intact without complications.     A/P: She is doing well. Complaining of some neuropathic pain- will refill the Lyrica today. Images were reviewed with the patient. Incision healing well. Ok to shower with incision uncovered. No submerging at this point. Discussed heel props as well as tailgater stretches working on maintaining full extension. She looks good today. Plan to follow the rehab plan as previously outlined. RTC in 4 weeks.

## 2024-03-11 NOTE — PROGRESS NOTES
[facility]   Physical Therapy Treatment Note     Name: Jenifer Burnett  Clinic Number: [id number]    Therapy Diagnosis:   Encounter Diagnoses   Name Primary?    Decreased strength of lower extremity Yes    Decreased ROM of right knee        Physician: Negin Cardona*    Visit Date: 3/11/2024    FOTO 1st:   FOTO 3rd:  FOTO 10th:    Physician: Negin Cardona*     Physician Orders: PT Eval and Treat   Medical Diagnosis from Referral: Primary osteoarthritis of right knee [M17.11]   Evaluation Date: 2/28/2024  Authorization Period Expiration: 12/31/24  Plan of Care Expiration: 5/22/24  Visit # / Visits authorized: 4/ 20  FOTO Code: [id number]       Time In: 10:00 AM  Time Out: 10:55 AM     Total Billable Time: 55 minutes      Precautions: Standard     Date of Surgery: 2/26/24  PROCEDURES PERFORMED:   Right total knee arthroplasty (CPT 13533)          SUBJECTIVE     Pt reports: it was a rough weekend. Knee just hurting more. Mainly tender to touch    She was compliant with home exercise program.  Response to previous treatment: none  Functional change: improved AROM ext    Pain: 8/10  Location: right knee      OBJECTIVE     Date of surgery: 2/26/24: 2 weeks 0 days POD as of 3/11    Knee Active Range of Motion:    Right  Left    Flexion 95 130   Extension 0  5 hyper      Knee Passive Range of Motion:    Right  Left    Flexion 95 130   Extension 3 hyper 5 hyper        Treatment       Jenifer received the treatments listed below:      Therapeutic exercises to develop strength, endurance, ROM, and posture for 15 minutes including:  LLLD heel prop x 8'  Heel slides 3x10 5s holds  ROM check    Manual therapy techniques: Joint mobilizations were applied to the: R LE for 10 minutes, including:  Hinged knee extension mobilization  Knee flexion at EOM  Patella mobs all directions      Therapeutic activities to improve functional performance for 00  minutes, including:  Recumbent bike (next  session)    Neuromuscular re-education activities to improve: Balance, Coordination, Kinesthetic, Sense, Proprioception, and Posture for 30 minutes. The following activities were included:  NMES applied for the following:  -QS x 5 min  -SAQ x5'  -Supine QS-> SLR x 5'    TKE with red power band 4x10      Not today  Standing TKE with GTB with wt shift  Ambulating with RW with focus on quad activation with stance phase    Patient Education and Home Exercises     Home Exercises Provided and Patient Education Provided     Education provided:   - Cont HEP, quad activation on stance phase, knee FL with swing through    Written Home Exercises Provided: Patient instructed to cont prior HEP. Exercises were reviewed and Jenifer was able to demonstrate them prior to the end of the session.  Jenifer demonstrated good  understanding of the education provided. See EMR under Patient Instructions for exercises provided during therapy sessions    ASSESSMENT     Jenifer completed session as noted above. ROM continues to improve. Slight extension restriction upon arrival to session today. Able to achieve 95 deg of flexion with heel slides during session today. Overall continues to improve. Will plan to intro recumbent bike and begin transitioning from walker for ambulation over the next few weeks.     Jenifer Is progressing well towards her goals.     Pt prognosis is Good.     Pt will continue to benefit from skilled outpatient physical therapy to address the deficits listed in the problem list box on initial evaluation, provide pt/family education and to maximize pt's level of independence in the home and community environment.     Pt's spiritual, cultural and educational needs considered and pt agreeable to plan of care and goals.     Anticipated barriers to physical therapy: High BMI    Goals:   Short Term Goals ( 2 Weeks ):   1. Pt will report reduced pain by 20 to 40% or greater for improved mobility, sleep  and ambulation.   2. Patients  knee edema reduced by 1/4 to 1/2 inch or greater to enhance flexion ROM and knee comfort.   3. Pt to report minimal to no pain to palpation for improved level of comfort.   4. Pt to demonstrate symmetrical weight bearing w/o increased pain for stability and functional ambulation .  5. Pts neuromuscular response will be enhanced for unilateral standing stability and balance   6. Pt to achieve 90 degrees of passive knee flexion for restoring functional knee mobility, ambulating with proper gait pattern and sit to stand ability.   7 Pt to report understanding of all instruction pertinent to patient safety , gait instruction and HEP.    8. Pt to exhibit correct return demonstration of exercises for self-management and independence with HEP     Long Term Goals (12 Weeks):  1. Pt will demonstrate increased knee flexion AROM to 120 degrees or greater for return to functional activity  2. Pt will demonstrate increased knee extension AROM to 0 degrees for standing stability   3. Pt will demonstrate increased RLE strength by 1/2 to 1 grade for improved functional stability  4. Pt to demonstrate standing stability unsupported on dynamic surfaces for functional return to ADL and recreational activities.   5.The patient will be independent amb with no assistive device on all surfaces for community distances.  6. Pt to demonstrate independence with HEP for self management  7. Patient will return to walking for fitness and recreation 3x/wk within 4 months.  8. Patient will improve FOTO score to </= see FOTO in media limited to decrease perceived limitation with mobility.    PLAN     Cont with POC to improve R LE ROM and strength    CM HEBERT, PT, DPT, SCS

## 2024-03-15 ENCOUNTER — PATIENT MESSAGE (OUTPATIENT)
Dept: SPORTS MEDICINE | Facility: CLINIC | Age: 64
End: 2024-03-15
Payer: COMMERCIAL

## 2024-03-15 DIAGNOSIS — M25.561 ACUTE PAIN OF RIGHT KNEE: Primary | ICD-10-CM

## 2024-03-15 RX ORDER — HYDROCODONE BITARTRATE AND ACETAMINOPHEN 5; 325 MG/1; MG/1
1 TABLET ORAL EVERY 6 HOURS PRN
Qty: 25 TABLET | Refills: 0 | Status: SHIPPED | OUTPATIENT
Start: 2024-03-15

## 2024-03-18 ENCOUNTER — TELEPHONE (OUTPATIENT)
Dept: SPORTS MEDICINE | Facility: CLINIC | Age: 64
End: 2024-03-18
Payer: COMMERCIAL

## 2024-03-20 ENCOUNTER — CLINICAL SUPPORT (OUTPATIENT)
Dept: REHABILITATION | Facility: HOSPITAL | Age: 64
End: 2024-03-20
Payer: COMMERCIAL

## 2024-03-20 DIAGNOSIS — M25.661 DECREASED ROM OF RIGHT KNEE: ICD-10-CM

## 2024-03-20 DIAGNOSIS — R29.898 DECREASED STRENGTH OF LOWER EXTREMITY: Primary | ICD-10-CM

## 2024-03-20 PROCEDURE — 97140 MANUAL THERAPY 1/> REGIONS: CPT | Mod: CQ

## 2024-03-20 PROCEDURE — 97530 THERAPEUTIC ACTIVITIES: CPT | Mod: CQ

## 2024-03-20 PROCEDURE — 97110 THERAPEUTIC EXERCISES: CPT | Mod: CQ

## 2024-03-20 PROCEDURE — 97112 NEUROMUSCULAR REEDUCATION: CPT | Mod: CQ

## 2024-03-20 NOTE — PROGRESS NOTES
[facility]   Physical Therapy Treatment Note     Name: Jenifer Burnett  Clinic Number: [id number]    Therapy Diagnosis:   Encounter Diagnoses   Name Primary?    Decreased strength of lower extremity Yes    Decreased ROM of right knee        Physician: Negin Cardona*    Visit Date: 3/20/2024    FOTO 1st:   FOTO 3rd:  FOTO 10th:    Physician: Negin Cardona*     Physician Orders: PT Eval and Treat   Medical Diagnosis from Referral: Primary osteoarthritis of right knee [M17.11]   Evaluation Date: 2/28/2024  Authorization Period Expiration: 12/31/24  Plan of Care Expiration: 5/22/24  Visit # / Visits authorized: 5/ 20  FOTO Code: [id number]       Time In: 10:05 AM  Time Out: 11:00 AM     Total Billable Time: 55 minutes      Precautions: Standard     Date of Surgery: 2/26/24  PROCEDURES PERFORMED:   Right total knee arthroplasty (CPT 39433)          SUBJECTIVE     Pt reports:Knee is feeling better. TTP lateral quad    She was compliant with home exercise program.  Response to previous treatment: none  Functional change: improved AROM ext    Pain: 8/10  Location: right knee      OBJECTIVE     Date of surgery: 2/26/24: 2 weeks 0 days POD as of 3/11    Knee Active Range of Motion:    Right  Left    Flexion 95-nt 130   Extension 0  5 hyper      Knee Passive Range of Motion:    Right  Left    Flexion 100 130   Extension 3 hyper 5 hyper        Treatment       Jenifer received the treatments listed below:      Therapeutic exercises to develop strength, endurance, ROM, and posture for 10 minutes including:  LLLD heel prop x 5'  Heel slides 3x10 5s holds-np  ROM check    Manual therapy techniques: Joint mobilizations were applied to the: R LE for 10 minutes, including:  Hinged knee extension mobilization  Knee flexion at EOM  Patella mobs all directions      Therapeutic activities to improve functional performance for 20  minutes, including:  Recumbent bike x 10 min  Gait training with cueing  for quad activation    Neuromuscular re-education activities to improve: Balance, Coordination, Kinesthetic, Sense, Proprioception, and Posture for 20 minutes. The following activities were included:  QS with strap assist x 3 min  SAQ full bolster 3x10  SLR off EOT 4x10  TKE with purple power band 4x10      Np:  NMES applied for the following:  -QS x 5 min  -SAQ x5'  -Supine QS-> SLR x 5'          Not today  Standing TKE with GTB with wt shift  Ambulating with RW with focus on quad activation with stance phase    Patient Education and Home Exercises     Home Exercises Provided and Patient Education Provided     Education provided:   - Cont HEP, quad activation on stance phase, knee FL with swing through    Written Home Exercises Provided: Patient instructed to cont prior HEP. Exercises were reviewed and Jenifer was able to demonstrate them prior to the end of the session.  Jenifer demonstrated good  understanding of the education provided. See EMR under Patient Instructions for exercises provided during therapy sessions    ASSESSMENT     Jenifer started session with 100 degrees of knee FL. Was able to perform full revolutions on bike today. Decreased TKE during stance phase which improved after performing performing TKEs and gait training on turf. Focused on quad strength and TKE today. NMES not performed 2* not being available.     Jenifer Is progressing well towards her goals.     Pt prognosis is Good.     Pt will continue to benefit from skilled outpatient physical therapy to address the deficits listed in the problem list box on initial evaluation, provide pt/family education and to maximize pt's level of independence in the home and community environment.     Pt's spiritual, cultural and educational needs considered and pt agreeable to plan of care and goals.     Anticipated barriers to physical therapy: High BMI    Goals:   Short Term Goals ( 2 Weeks ):   1. Pt will report reduced pain by 20 to 40% or greater for  improved mobility, sleep  and ambulation.   2. Patients knee edema reduced by 1/4 to 1/2 inch or greater to enhance flexion ROM and knee comfort.   3. Pt to report minimal to no pain to palpation for improved level of comfort.   4. Pt to demonstrate symmetrical weight bearing w/o increased pain for stability and functional ambulation .  5. Pts neuromuscular response will be enhanced for unilateral standing stability and balance   6. Pt to achieve 90 degrees of passive knee flexion for restoring functional knee mobility, ambulating with proper gait pattern and sit to stand ability.   7 Pt to report understanding of all instruction pertinent to patient safety , gait instruction and HEP.    8. Pt to exhibit correct return demonstration of exercises for self-management and independence with HEP     Long Term Goals (12 Weeks):  1. Pt will demonstrate increased knee flexion AROM to 120 degrees or greater for return to functional activity  2. Pt will demonstrate increased knee extension AROM to 0 degrees for standing stability   3. Pt will demonstrate increased RLE strength by 1/2 to 1 grade for improved functional stability  4. Pt to demonstrate standing stability unsupported on dynamic surfaces for functional return to ADL and recreational activities.   5.The patient will be independent amb with no assistive device on all surfaces for community distances.  6. Pt to demonstrate independence with HEP for self management  7. Patient will return to walking for fitness and recreation 3x/wk within 4 months.  8. Patient will improve FOTO score to </= see FOTO in media limited to decrease perceived limitation with mobility.    PLAN     Cont with POC to improve R LE ROM and strength    Lila De La Paz PTA,

## 2024-03-22 ENCOUNTER — CLINICAL SUPPORT (OUTPATIENT)
Dept: REHABILITATION | Facility: HOSPITAL | Age: 64
End: 2024-03-22
Payer: COMMERCIAL

## 2024-03-22 DIAGNOSIS — M25.661 DECREASED ROM OF RIGHT KNEE: ICD-10-CM

## 2024-03-22 DIAGNOSIS — R29.898 DECREASED STRENGTH OF LOWER EXTREMITY: Primary | ICD-10-CM

## 2024-03-22 PROCEDURE — 97112 NEUROMUSCULAR REEDUCATION: CPT | Mod: CQ

## 2024-03-22 NOTE — PROGRESS NOTES
[facility]   Physical Therapy Treatment Note     Name: Jenifer Burnett  Clinic Number: [id number]    Therapy Diagnosis:   Encounter Diagnoses   Name Primary?    Decreased strength of lower extremity Yes    Decreased ROM of right knee        Physician: Negin Cardona*    Visit Date: 3/22/2024    FOTO 1st:   FOTO 3rd:  FOTO 10th:    Physician: Negin Cardona*     Physician Orders: PT Eval and Treat   Medical Diagnosis from Referral: Primary osteoarthritis of right knee [M17.11]   Evaluation Date: 2/28/2024  Authorization Period Expiration: 12/31/24  Plan of Care Expiration: 5/22/24  Visit # / Visits authorized: 6/ 20  FOTO Code: [id number]       Time In: 10:00 AM  Time Out: 11:00 AM     Total Billable Time: 23 minutes      Precautions: Standard     Date of Surgery: 2/26/24  PROCEDURES PERFORMED:   Right total knee arthroplasty (CPT 37327)          SUBJECTIVE     Pt reports:medial knee hurts today    She was compliant with home exercise program.  Response to previous treatment: none  Functional change: improved AROM ext    Pain: 8/10  Location: right knee      OBJECTIVE     Date of surgery: 2/26/24: 2 weeks 0 days POD as of 3/11    Knee Active Range of Motion:    Right  Left    Flexion 95-nt 130   Extension 0  5 hyper      Knee Passive Range of Motion:    Right  Left    Flexion 110 130   Extension 3 hyper 5 hyper        Treatment       Jenifer received the treatments listed below:      Therapeutic exercises to develop strength, endurance, ROM, and posture for 15 minutes including:  LLLD heel prop x 5'  Heel slides 3x10 5s holds  ROM check    Manual therapy techniques: Joint mobilizations were applied to the: R LE for 10 minutes, including:  Hinged knee extension mobilization  Knee flexion at EOM-np  Patella mobs all directions      Therapeutic activities to improve functional performance for 00  minutes, including:  Recumbent bike x 00 min  Gait training with cueing for quad  activation    Neuromuscular re-education activities to improve: Balance, Coordination, Kinesthetic, Sense, Proprioception, and Posture for 35 minutes. The following activities were included:  QS with strap assist x 3 min  LAQ 30x  SLR off EOT 4x10  TKE with purple power band 4x10  Pt education    Np:  NMES applied for the following:  -QS x 5 min  -SAQ x5'  -Supine QS-> SLR x 5'          Not today  Standing TKE with GTB with wt shift  Ambulating with RW with focus on quad activation with stance phase    Patient Education and Home Exercises     Home Exercises Provided and Patient Education Provided     Education provided:   - Cont HEP, quad activation on stance phase, knee FL with swing through    Written Home Exercises Provided: Patient instructed to cont prior HEP. Exercises were reviewed and Jenifer was able to demonstrate them prior to the end of the session.  Jenifer demonstrated good  understanding of the education provided. See EMR under Patient Instructions for exercises provided during therapy sessions    ASSESSMENT     Jenifer ended session with 110 degrees of FL. Cont walk on bent knee. Is able to ambulate at end of session with TKE with noted improved knee symptoms. Cont to emphasize importance of activating her quad while ambulating outside of clinic. Pt is going down to 1x/wk per PT. PROM ext into hyper    Jenifer Is progressing well towards her goals.     Pt prognosis is Good.     Pt will continue to benefit from skilled outpatient physical therapy to address the deficits listed in the problem list box on initial evaluation, provide pt/family education and to maximize pt's level of independence in the home and community environment.     Pt's spiritual, cultural and educational needs considered and pt agreeable to plan of care and goals.     Anticipated barriers to physical therapy: High BMI    Goals:   Short Term Goals ( 2 Weeks ):   1. Pt will report reduced pain by 20 to 40% or greater for improved mobility,  sleep  and ambulation.   2. Patients knee edema reduced by 1/4 to 1/2 inch or greater to enhance flexion ROM and knee comfort.   3. Pt to report minimal to no pain to palpation for improved level of comfort.   4. Pt to demonstrate symmetrical weight bearing w/o increased pain for stability and functional ambulation .  5. Pts neuromuscular response will be enhanced for unilateral standing stability and balance   6. Pt to achieve 90 degrees of passive knee flexion for restoring functional knee mobility, ambulating with proper gait pattern and sit to stand ability.   7 Pt to report understanding of all instruction pertinent to patient safety , gait instruction and HEP.    8. Pt to exhibit correct return demonstration of exercises for self-management and independence with HEP     Long Term Goals (12 Weeks):  1. Pt will demonstrate increased knee flexion AROM to 120 degrees or greater for return to functional activity  2. Pt will demonstrate increased knee extension AROM to 0 degrees for standing stability   3. Pt will demonstrate increased RLE strength by 1/2 to 1 grade for improved functional stability  4. Pt to demonstrate standing stability unsupported on dynamic surfaces for functional return to ADL and recreational activities.   5.The patient will be independent amb with no assistive device on all surfaces for community distances.  6. Pt to demonstrate independence with HEP for self management  7. Patient will return to walking for fitness and recreation 3x/wk within 4 months.  8. Patient will improve FOTO score to </= see FOTO in media limited to decrease perceived limitation with mobility.    PLAN     Cont with POC to improve R LE ROM and strength    Lila De La Paz PTA,

## 2024-03-26 ENCOUNTER — CLINICAL SUPPORT (OUTPATIENT)
Dept: REHABILITATION | Facility: HOSPITAL | Age: 64
End: 2024-03-26
Payer: COMMERCIAL

## 2024-03-26 DIAGNOSIS — M25.661 DECREASED ROM OF RIGHT KNEE: ICD-10-CM

## 2024-03-26 DIAGNOSIS — R29.898 DECREASED STRENGTH OF LOWER EXTREMITY: Primary | ICD-10-CM

## 2024-03-26 PROCEDURE — 97110 THERAPEUTIC EXERCISES: CPT

## 2024-03-26 PROCEDURE — 97112 NEUROMUSCULAR REEDUCATION: CPT

## 2024-03-26 PROCEDURE — 97140 MANUAL THERAPY 1/> REGIONS: CPT

## 2024-03-26 PROCEDURE — 97530 THERAPEUTIC ACTIVITIES: CPT

## 2024-03-26 NOTE — PROGRESS NOTES
[facility]   Physical Therapy Treatment Note     Name: Jenifer Burnett  Clinic Number: [id number]    Therapy Diagnosis:   Encounter Diagnoses   Name Primary?    Decreased strength of lower extremity Yes    Decreased ROM of right knee        Physician: Negin Cardona    Visit Date: 3/26/2024    Physician Orders: PT Eval and Treat   Medical Diagnosis from Referral: Primary osteoarthritis of right knee [M17.11]   Evaluation Date: 2/28/2024  Authorization Period Expiration: 12/31/24  Plan of Care Expiration: 5/22/24  Visit # / Visits authorized: 7/ 20  FOTO 2/2 administered 3/26/24  FOTO Code: [id number]       Time In: 10:00 AM  Time Out: 11:00 AM     Total Billable Time: 60 minutes      Precautions: Standard     Date of Surgery: 2/26/24  PROCEDURES PERFORMED:   Right total knee arthroplasty (CPT 78148)    SUBJECTIVE     Pt reports: hurting a little more since yesterday mainly along lateral knee and calf. Do you think I have a blood clot?    She was compliant with home exercise program.  Response to previous treatment: none  Functional change: improved AROM ext    Pain: 4/10  Location: right knee      OBJECTIVE     Date of surgery: 2/26/24: 4 weeks 1 days POD as of 3/26    Knee Active Range of Motion:    Right  Left    Flexion 100 130   Extension 0  5 hyper      Knee Passive Range of Motion:    Right  Left    Flexion 113 130   Extension 3 hyper 5 hyper        Treatment       Jenifer received the treatments listed below:      Therapeutic exercises to develop strength, endurance, ROM, and posture for 10 minutes including:  LLLD heel prop x 5' 2.5# above/below  Heel slides 3x10 5s holds  Pt education      Manual therapy techniques: Joint mobilizations were applied to the: R LE for 10 minutes, including:  Knee assessment  ROM check  Hinged knee extension mobilization  Knee flexion at EOM-np  Patella mobs all directions    Neuromuscular re-education activities to improve: Balance, Coordination,  "Kinesthetic, Sense, Proprioception, and Posture for 25 minutes. The following activities were included:  QS over 1/2 foam with strap assist 3x10 5s holds  DL bridge 3x10  Standing calf raise 3x10      Next:  Supine SLR over towel roll 3x10  DL shuttle squat 3x10  cords   SL shuttle squat 3x10  cords  6" step up 3x10    Not today  LAQ 3x10     TKE with purple power band 4x10  NMES applied for the following:  -QS x 5 min  -SAQ x5'  -Supine QS-> SLR x 5'    Therapeutic activities to improve functional performance for 15 minutes, including:  Recumbent bike x 10 min to improve CV endurance and tissue extensibility  Retro gait x 4 turf laps  Fwd gait x 4 turf laps          Patient Education and Home Exercises     Home Exercises Provided and Patient Education Provided     Education provided:   - Cont HEP, quad activation on stance phase, knee FL with swing through    Written Home Exercises Provided: Patient instructed to cont prior HEP. Exercises were reviewed and Jenifer was able to demonstrate them prior to the end of the session.  Jenifer demonstrated good  understanding of the education provided. See EMR under Patient Instructions for exercises provided during therapy sessions    ASSESSMENT     Jenifer presented with complaints of increased lateral knee and calf soreness that was TTP however no significant LE edema or other s/s concerning for potential DVT noted during session today. Session today focused towards continued emphasis on normalizing ROM as well as end range quad control in order to facilitate normal gait mechanics as she continues to walk on a mildly flexed knee and lacks appropriate knee flexion during swing phase of gait. Upon arrival mild restriction noted into end range extension that improved following LLLD heel propping. Flexion ROM measured at 110 at onset of treatment that improved to 113 post manual and heel slides. Overall she is doing well overall needs to continue to normalize ROM and quad " stevenson Burgess Is progressing well towards her goals.     Pt prognosis is Good.     Pt will continue to benefit from skilled outpatient physical therapy to address the deficits listed in the problem list box on initial evaluation, provide pt/family education and to maximize pt's level of independence in the home and community environment.     Pt's spiritual, cultural and educational needs considered and pt agreeable to plan of care and goals.     Anticipated barriers to physical therapy: High BMI    Goals:   Short Term Goals ( 2 Weeks ):   1. Pt will report reduced pain by 20 to 40% or greater for improved mobility, sleep  and ambulation.   2. Patients knee edema reduced by 1/4 to 1/2 inch or greater to enhance flexion ROM and knee comfort.   3. Pt to report minimal to no pain to palpation for improved level of comfort.   4. Pt to demonstrate symmetrical weight bearing w/o increased pain for stability and functional ambulation .  5. Pts neuromuscular response will be enhanced for unilateral standing stability and balance   6. Pt to achieve 90 degrees of passive knee flexion for restoring functional knee mobility, ambulating with proper gait pattern and sit to stand ability.   7 Pt to report understanding of all instruction pertinent to patient safety , gait instruction and HEP.    8. Pt to exhibit correct return demonstration of exercises for self-management and independence with HEP     Long Term Goals (12 Weeks):  1. Pt will demonstrate increased knee flexion AROM to 120 degrees or greater for return to functional activity  2. Pt will demonstrate increased knee extension AROM to 0 degrees for standing stability   3. Pt will demonstrate increased RLE strength by 1/2 to 1 grade for improved functional stability  4. Pt to demonstrate standing stability unsupported on dynamic surfaces for functional return to ADL and recreational activities.   5.The patient will be independent amb with no assistive device on all  surfaces for community distances.  6. Pt to demonstrate independence with HEP for self management  7. Patient will return to walking for fitness and recreation 3x/wk within 4 months.  8. Patient will improve FOTO score to </= see FOTO in media limited to decrease perceived limitation with mobility.    PLAN     Cont with POC to improve R LE ROM and strength    CM HEBERT, PT, DPT, SCS

## 2024-04-02 ENCOUNTER — CLINICAL SUPPORT (OUTPATIENT)
Dept: REHABILITATION | Facility: HOSPITAL | Age: 64
End: 2024-04-02
Payer: COMMERCIAL

## 2024-04-02 DIAGNOSIS — R29.898 DECREASED STRENGTH OF LOWER EXTREMITY: Primary | ICD-10-CM

## 2024-04-02 DIAGNOSIS — M25.661 DECREASED ROM OF RIGHT KNEE: ICD-10-CM

## 2024-04-02 PROCEDURE — 97112 NEUROMUSCULAR REEDUCATION: CPT | Mod: CQ

## 2024-04-02 PROCEDURE — 97530 THERAPEUTIC ACTIVITIES: CPT | Mod: CQ

## 2024-04-02 NOTE — PROGRESS NOTES
[facility]   Physical Therapy Treatment Note     Name: Jenifer Burnett  Clinic Number: [id number]    Therapy Diagnosis:   Encounter Diagnoses   Name Primary?    Decreased strength of lower extremity Yes    Decreased ROM of right knee        Physician: Negin Cardona    Visit Date: 4/2/2024    Physician Orders: PT Eval and Treat   Medical Diagnosis from Referral: Primary osteoarthritis of right knee [M17.11]   Evaluation Date: 2/28/2024  Authorization Period Expiration: 12/31/24  Plan of Care Expiration: 5/22/24  Visit # / Visits authorized: 7/ 20  FOTO 2/2 administered 3/26/24  FOTO Code: [id number]       Time In: 11:00 AM  Time Out: 12:00 PM     Total Billable Time: 23 minutes      Precautions: Standard     Date of Surgery: 2/26/24  PROCEDURES PERFORMED:   Right total knee arthroplasty (CPT 18040)    SUBJECTIVE     Pt reports: Still have lateral LE pain. Is better than over the weekend    She was compliant with home exercise program.  Response to previous treatment: none  Functional change: improved AROM ext    Pain: 4/10  Location: right knee      OBJECTIVE     Date of surgery: 2/26/24: 5 weeks 1 days POD as of 3/26    Knee Active Range of Motion:    Right  Left    Flexion 100 130   Extension 0  5 hyper      Knee Passive Range of Motion:    Right  Left    Flexion 113 130   Extension 3 hyper 5 hyper        Treatment       Jenifer received the treatments listed below:      Therapeutic exercises to develop strength, endurance, ROM, and posture for 10 minutes including:  LLLD heel prop x 5' 2.5# above/below  Heel slides 3x10 5s holds    Manual therapy techniques: Joint mobilizations were applied to the: R LE for 10 minutes, including:  Knee assessment  ROM check  Hinged knee extension mobilization  Patella mobs all directions    Neuromuscular re-education activities to improve: Balance, Coordination, Kinesthetic, Sense, Proprioception, and Posture for 25 minutes. The following activities  "were included:  QS over 1/2 foam with strap assist 3x10 5s holds  DL bridge 3x10  Standing calf raise 3x10  Seated SAQ into SLR 3x10  DL shuttle squat 3x10  cords   SL shuttle squat 3x10  cords    Next session:  6" step up 3x10    Therapeutic activities to improve functional performance for 10 minutes, including:  Recumbent bike x 10 min to improve CV endurance and tissue extensibility    Np:  Retro gait x 4 turf laps  Fwd gait x 4 turf laps          Patient Education and Home Exercises     Home Exercises Provided and Patient Education Provided     Education provided:   - Cont HEP, quad activation on stance phase, knee FL with swing through    Written Home Exercises Provided: Patient instructed to cont prior HEP. Exercises were reviewed and Jenifer was able to demonstrate them prior to the end of the session.  Jenifer demonstrated good  understanding of the education provided. See EMR under Patient Instructions for exercises provided during therapy sessions    ASSESSMENT     Jenifer continues to complain of lateral LE pain that runs down the peroneal to the foot. PROM into IV reproduces symptoms. Pt states it feel more nerve than soft tissue pain. Pt was shown nerve glide to perform which helped to decrease symptoms. Will perform at home as long as it continue to help improve symptoms not exacerbate them. Ambulated into session with improved TKE despite c/o LE pain. Will assess lateral R LE pain more next session. Noted some pain knee pain with SLR which improved with SAQ into SLR.       Jenifer Is progressing well towards her goals.     Pt prognosis is Good.     Pt will continue to benefit from skilled outpatient physical therapy to address the deficits listed in the problem list box on initial evaluation, provide pt/family education and to maximize pt's level of independence in the home and community environment.     Pt's spiritual, cultural and educational needs considered and pt agreeable to plan of care and goals.   "   Anticipated barriers to physical therapy: High BMI    Goals:   Short Term Goals ( 2 Weeks ):   1. Pt will report reduced pain by 20 to 40% or greater for improved mobility, sleep  and ambulation.   2. Patients knee edema reduced by 1/4 to 1/2 inch or greater to enhance flexion ROM and knee comfort.   3. Pt to report minimal to no pain to palpation for improved level of comfort.   4. Pt to demonstrate symmetrical weight bearing w/o increased pain for stability and functional ambulation .  5. Pts neuromuscular response will be enhanced for unilateral standing stability and balance   6. Pt to achieve 90 degrees of passive knee flexion for restoring functional knee mobility, ambulating with proper gait pattern and sit to stand ability.   7 Pt to report understanding of all instruction pertinent to patient safety , gait instruction and HEP.    8. Pt to exhibit correct return demonstration of exercises for self-management and independence with HEP     Long Term Goals (12 Weeks):  1. Pt will demonstrate increased knee flexion AROM to 120 degrees or greater for return to functional activity  2. Pt will demonstrate increased knee extension AROM to 0 degrees for standing stability   3. Pt will demonstrate increased RLE strength by 1/2 to 1 grade for improved functional stability  4. Pt to demonstrate standing stability unsupported on dynamic surfaces for functional return to ADL and recreational activities.   5.The patient will be independent amb with no assistive device on all surfaces for community distances.  6. Pt to demonstrate independence with HEP for self management  7. Patient will return to walking for fitness and recreation 3x/wk within 4 months.  8. Patient will improve FOTO score to </= see FOTO in media limited to decrease perceived limitation with mobility.    PLAN     Cont with POC to improve R LE ROM and strength    Lila De La Paz PTA,

## 2024-04-08 ENCOUNTER — HOSPITAL ENCOUNTER (OUTPATIENT)
Dept: RADIOLOGY | Facility: HOSPITAL | Age: 64
Discharge: HOME | End: 2024-04-08
Attending: ORTHOPAEDIC SURGERY
Payer: COMMERCIAL

## 2024-04-08 ENCOUNTER — OFFICE VISIT (OUTPATIENT)
Dept: SPORTS MEDICINE | Facility: CLINIC | Age: 64
End: 2024-04-08
Payer: COMMERCIAL

## 2024-04-08 VITALS
WEIGHT: 202.81 LBS | DIASTOLIC BLOOD PRESSURE: 94 MMHG | HEART RATE: 95 BPM | BODY MASS INDEX: 29.95 KG/M2 | SYSTOLIC BLOOD PRESSURE: 134 MMHG

## 2024-04-08 DIAGNOSIS — M25.561 RIGHT KNEE PAIN, UNSPECIFIED CHRONICITY: ICD-10-CM

## 2024-04-08 DIAGNOSIS — Z96.651 S/P TOTAL KNEE ARTHROPLASTY, RIGHT: ICD-10-CM

## 2024-04-08 DIAGNOSIS — M25.561 RIGHT KNEE PAIN, UNSPECIFIED CHRONICITY: Primary | ICD-10-CM

## 2024-04-08 PROCEDURE — 1159F MED LIST DOCD IN RCRD: CPT | Mod: CPTII,S$GLB,, | Performed by: ORTHOPAEDIC SURGERY

## 2024-04-08 PROCEDURE — 99999 PR PBB SHADOW E&M-EST. PATIENT-LVL III: CPT | Mod: PBBFAC,,, | Performed by: ORTHOPAEDIC SURGERY

## 2024-04-08 PROCEDURE — 4010F ACE/ARB THERAPY RXD/TAKEN: CPT | Mod: CPTII,S$GLB,, | Performed by: ORTHOPAEDIC SURGERY

## 2024-04-08 PROCEDURE — 73564 X-RAY EXAM KNEE 4 OR MORE: CPT | Mod: 26,RT,, | Performed by: RADIOLOGY

## 2024-04-08 PROCEDURE — 73562 X-RAY EXAM OF KNEE 3: CPT | Mod: 26,59,LT, | Performed by: RADIOLOGY

## 2024-04-08 PROCEDURE — 3044F HG A1C LEVEL LT 7.0%: CPT | Mod: CPTII,S$GLB,, | Performed by: ORTHOPAEDIC SURGERY

## 2024-04-08 PROCEDURE — 3080F DIAST BP >= 90 MM HG: CPT | Mod: CPTII,S$GLB,, | Performed by: ORTHOPAEDIC SURGERY

## 2024-04-08 PROCEDURE — 73562 X-RAY EXAM OF KNEE 3: CPT | Mod: TC,59,LT

## 2024-04-08 PROCEDURE — 99024 POSTOP FOLLOW-UP VISIT: CPT | Mod: S$GLB,,, | Performed by: ORTHOPAEDIC SURGERY

## 2024-04-08 PROCEDURE — 3075F SYST BP GE 130 - 139MM HG: CPT | Mod: CPTII,S$GLB,, | Performed by: ORTHOPAEDIC SURGERY

## 2024-04-08 RX ORDER — PREGABALIN 75 MG/1
75 CAPSULE ORAL 2 TIMES DAILY
Qty: 60 CAPSULE | Refills: 0 | Status: SHIPPED | OUTPATIENT
Start: 2024-04-08 | End: 2024-05-09

## 2024-04-08 RX ORDER — CELECOXIB 200 MG/1
200 CAPSULE ORAL DAILY
Qty: 60 CAPSULE | Refills: 0 | Status: SHIPPED | OUTPATIENT
Start: 2024-04-08

## 2024-04-08 NOTE — PROGRESS NOTES
S:Jenifer Burnett presents for post-operative evaluation.     DATE OF PROCEDURE: 2/26/2024   PROCEDURES PERFORMED:   Right total knee arthroplasty (CPT 19148)    Jenifer Burnett reports to be doing well 6 weeks s/p the above mentioned procedure. Going to PT  at the [place] location. Seeing good progress daily. Pain levels are improving. She does report that 2 weeks ago she was attempting to flex the knee when she noticed that the very top of her incision had a small superficial opening. She was instructed by PT to keep it covered and she has been doing that.  This is not something that I was aware of until now.  She does report some pain that starts in her knee and radiates down the lateral aspect of her lower leg and into her 4th/5th toes. She denies any prior back pain or radicular symptoms.  Overall getting better and making progress by her account.    O:  Exam of the right knee shows inappropriately healing midline incision.  There is a small area of epidermal opening over the proximally margin of the incision which is quite shallow.  Appropriate healing otherwise.  No sinus.  No drainage.  There was a small eschar over the distal incision.  Otherwise incision is well healed.  There is some soft tissue swelling but nothing too significant.  No effusion.  Intact extensor mechanism.  Full passive extension.  Active assisted flexion to 115° today with central patellar tracking.  No mid flexion instability.  Stable to varus and valgus stress.    No midline lumbar spine tenderness.  Negative straight leg raise.    Imaging:  Plain radiographs of the right knee demonstrate post operative total knee arthroplasty prosthesis in place and intact without complications.  The signs of loosening or complication otherwise seen.    A/P:  Clinically making progress.  The knee appears well balanced on exam.  Soft endpoint with flexion.  I expect this to continue to get better with the additional PT. soft tissue swelling as expected.   Primary issue of lower leg numbness and tingling into the lateral foot maybe related to the lumbar spine.  She denies any low back complaints at this time.  I do not think this is related necessarily to her knee.  She does have some numbness over the infrapatellar branch of the saphenous which is expected.  Plan to manage symptoms and continue PT.  -Lyrica 75 BID  -Celebrex 200mg QD-BID as needed  -F/u 6 weeks for repeat clinical and radiographic check.  Consider Medrol Dosepak down the road.  Hold off on that for now.  If she develops any lumbar spine specific symptoms or increased lower extremity complaints, we may need to consider imaging of the lumbar spine.  -Instructions given in regards to her incision.  No soaks for another month.  Given Betadine swabs and she can apply over the distal margin of her incision to help dry out and scab over.  It is healthy appearing.

## 2024-04-10 ENCOUNTER — TELEPHONE (OUTPATIENT)
Dept: INTERNAL MEDICINE | Facility: CLINIC | Age: 64
End: 2024-04-10
Payer: COMMERCIAL

## 2024-04-10 NOTE — TELEPHONE ENCOUNTER
----- Message from Karyna Adkins sent at 4/10/2024 10:36 AM CDT -----  Contact: [phone] pt  [email or web address]     Patient is calling for Medical Advice regarding:pt is calling to get something for a yeast infection. Pt was on antibiotics for 3 weeks from knee replacement.    How long has patient had these symptoms:2 weeks    Pharmacy name and phone#:  [id number] - [place], [place] - [address]  [address]  [facility] [place] [id number]  Phone: [phone] Fax: [phone]       Would like response via [employer]:  callback    Comments:

## 2024-04-11 ENCOUNTER — OFFICE VISIT (OUTPATIENT)
Dept: OBSTETRICS AND GYNECOLOGY | Facility: CLINIC | Age: 64
End: 2024-04-11
Payer: COMMERCIAL

## 2024-04-11 ENCOUNTER — OFFICE VISIT (OUTPATIENT)
Dept: INTERNAL MEDICINE | Facility: CLINIC | Age: 64
End: 2024-04-11
Payer: COMMERCIAL

## 2024-04-11 VITALS
BODY MASS INDEX: 30.64 KG/M2 | OXYGEN SATURATION: 99 % | SYSTOLIC BLOOD PRESSURE: 134 MMHG | HEART RATE: 77 BPM | WEIGHT: 207.44 LBS | DIASTOLIC BLOOD PRESSURE: 89 MMHG

## 2024-04-11 VITALS
DIASTOLIC BLOOD PRESSURE: 83 MMHG | WEIGHT: 206.38 LBS | HEART RATE: 83 BPM | BODY MASS INDEX: 30.47 KG/M2 | SYSTOLIC BLOOD PRESSURE: 154 MMHG

## 2024-04-11 DIAGNOSIS — N76.0 ACUTE VAGINITIS: ICD-10-CM

## 2024-04-11 DIAGNOSIS — N89.8 VAGINAL ITCHING: Primary | ICD-10-CM

## 2024-04-11 DIAGNOSIS — L30.9 VULVAR DERMATITIS: ICD-10-CM

## 2024-04-11 DIAGNOSIS — N76.0 ACUTE VAGINITIS: Primary | ICD-10-CM

## 2024-04-11 PROCEDURE — 3044F HG A1C LEVEL LT 7.0%: CPT | Mod: CPTII,S$GLB,, | Performed by: NURSE PRACTITIONER

## 2024-04-11 PROCEDURE — 3075F SYST BP GE 130 - 139MM HG: CPT | Mod: CPTII,S$GLB,, | Performed by: NURSE PRACTITIONER

## 2024-04-11 PROCEDURE — 81514 NFCT DS BV&VAGINITIS DNA ALG: CPT | Performed by: OBSTETRICS & GYNECOLOGY

## 2024-04-11 PROCEDURE — 3008F BODY MASS INDEX DOCD: CPT | Mod: CPTII,S$GLB,, | Performed by: NURSE PRACTITIONER

## 2024-04-11 PROCEDURE — 3077F SYST BP >= 140 MM HG: CPT | Mod: CPTII,S$GLB,, | Performed by: OBSTETRICS & GYNECOLOGY

## 2024-04-11 PROCEDURE — 99999 PR PBB SHADOW E&M-EST. PATIENT-LVL IV: CPT | Mod: PBBFAC,,, | Performed by: NURSE PRACTITIONER

## 2024-04-11 PROCEDURE — 99213 OFFICE O/P EST LOW 20 MIN: CPT | Mod: S$GLB,,, | Performed by: NURSE PRACTITIONER

## 2024-04-11 PROCEDURE — 1159F MED LIST DOCD IN RCRD: CPT | Mod: CPTII,S$GLB,, | Performed by: NURSE PRACTITIONER

## 2024-04-11 PROCEDURE — 4010F ACE/ARB THERAPY RXD/TAKEN: CPT | Mod: CPTII,S$GLB,, | Performed by: OBSTETRICS & GYNECOLOGY

## 2024-04-11 PROCEDURE — 3044F HG A1C LEVEL LT 7.0%: CPT | Mod: CPTII,S$GLB,, | Performed by: OBSTETRICS & GYNECOLOGY

## 2024-04-11 PROCEDURE — 4010F ACE/ARB THERAPY RXD/TAKEN: CPT | Mod: CPTII,S$GLB,, | Performed by: NURSE PRACTITIONER

## 2024-04-11 PROCEDURE — 1159F MED LIST DOCD IN RCRD: CPT | Mod: CPTII,S$GLB,, | Performed by: OBSTETRICS & GYNECOLOGY

## 2024-04-11 PROCEDURE — 99999 PR PBB SHADOW E&M-EST. PATIENT-LVL IV: CPT | Mod: PBBFAC,,, | Performed by: OBSTETRICS & GYNECOLOGY

## 2024-04-11 PROCEDURE — 3079F DIAST BP 80-89 MM HG: CPT | Mod: CPTII,S$GLB,, | Performed by: OBSTETRICS & GYNECOLOGY

## 2024-04-11 PROCEDURE — 3008F BODY MASS INDEX DOCD: CPT | Mod: CPTII,S$GLB,, | Performed by: OBSTETRICS & GYNECOLOGY

## 2024-04-11 PROCEDURE — 3079F DIAST BP 80-89 MM HG: CPT | Mod: CPTII,S$GLB,, | Performed by: NURSE PRACTITIONER

## 2024-04-11 PROCEDURE — 99203 OFFICE O/P NEW LOW 30 MIN: CPT | Mod: S$GLB,,, | Performed by: OBSTETRICS & GYNECOLOGY

## 2024-04-11 RX ORDER — FLUCONAZOLE 150 MG/1
150 TABLET ORAL DAILY
Qty: 1 TABLET | Refills: 0 | Status: CANCELLED | OUTPATIENT
Start: 2024-04-11 | End: 2024-04-12

## 2024-04-11 RX ORDER — TRIAMCINOLONE ACETONIDE 0.25 MG/G
OINTMENT TOPICAL 2 TIMES DAILY PRN
Qty: 15 G | Refills: 0 | Status: SHIPPED | OUTPATIENT
Start: 2024-04-11 | End: 2024-04-16

## 2024-04-11 RX ORDER — TERCONAZOLE 8 MG/G
1 CREAM VAGINAL NIGHTLY
Qty: 20 G | Refills: 0 | Status: SHIPPED | OUTPATIENT
Start: 2024-04-11 | End: 2024-04-14

## 2024-04-11 RX ORDER — CLOBETASOL PROPIONATE 0.5 MG/G
OINTMENT TOPICAL
Qty: 45 G | Refills: 0 | Status: SHIPPED | OUTPATIENT
Start: 2024-04-11

## 2024-04-11 NOTE — PROGRESS NOTES
INTERNAL MEDICINE PROGRESS/URGENT CARE NOTE    CHIEF COMPLAINT     Chief Complaint   Patient presents with    Vaginitis       HPI     Jenifer Burnett is a 64 y.o. female who presents for an urgent visit today.    Vaginal itching and burning x 2 weeks. No lesions or rashes. Denies any vaginal discharge or odor. Reports burning to area when she urinates. No hematuria, urgency, frequency, fevers, abd pain, nv. Hasn't been sexually active in years.    Past Medical History:  Past Medical History:   Diagnosis Date    Abnormal gait 02/22/2023    Arthritis     Depression     Encounter for blood transfusion     GERD (gastroesophageal reflux disease)     Hypertension     Migraine     Poor tolerance for ambulation 02/22/2023    Trigeminal neuralgia 03/2018        Past Surgical History:  Past Surgical History:   Procedure Laterality Date    APPENDECTOMY      CHOLECYSTECTOMY  07/2012    ESOPHAGOGASTRODUODENOSCOPY N/A 10/14/2019    Procedure: EGD (ESOPHAGOGASTRODUODENOSCOPY);  Surgeon: Sean Girard MD;  Location: [facility]  Service: Endoscopy;  Laterality: N/A;    HYSTERECTOMY      LAPAROSCOPIC TOUPET FUNDOPLICATION N/A 11/22/2019    Procedure: FUNDOPLICATION, LAPAROSCOPIC, TOUPET;  Surgeon: Adriano Godoy MD;  [address];  Service: General;  Laterality: N/A;    TOTAL KNEE ARTHROPLASTY Right 2/26/2024    Procedure: ARTHROPLASTY, KNEE, TOTAL;  Surgeon: ERNIE Lezama MD;  Location: [facility];  Service: Orthopedics;  Laterality: Right;  Regional w/Catheter, Adductor, 0.5% Marcaine        Allergies:  Review of patient's allergies indicates:  No Known Allergies    Home Medications:    Current Outpatient Medications:     acetaminophen (TYLENOL) 650 MG TbSR, Take 650 mg by mouth every 8 (eight) hours., Disp: , Rfl:     celecoxib (CELEBREX) 200 MG capsule, Take 1 capsule (200 mg total) by mouth once daily., Disp: 60 capsule, Rfl: 0    citalopram (CELEXA) 40 MG tablet, Take 1 tablet (40 mg total) by mouth  once daily., Disp: 90 tablet, Rfl: 3    cyanocobalamin, vitamin B-12, 2,500 mcg Tab, Take 1 tablet by mouth once daily., Disp: , Rfl:     HYDROcodone-acetaminophen (NORCO) 5-325 mg per tablet, Take 1 tablet by mouth every 12 (twelve) hours as needed for Pain., Disp: 14 tablet, Rfl: 0    HYDROcodone-acetaminophen (NORCO) 5-325 mg per tablet, Take 1 tablet by mouth every 6 (six) hours as needed for Pain., Disp: 25 tablet, Rfl: 0    losartan (COZAAR) 50 MG tablet, Take 1 tablet (50 mg total) by mouth once daily. For blood pressure, Disp: 90 tablet, Rfl: 3    omeprazole (PRILOSEC) 40 MG capsule, Take 1 capsule (40 mg total) by mouth every morning., Disp: 90 capsule, Rfl: 1    omeprazole (PRILOSEC) 40 MG capsule, Take one capsule by mouth every morning, Disp: 90 capsule, Rfl: 3    ondansetron (ZOFRAN-ODT) 4 MG TbDL, Dissolve one tablet on tongue every 4 to 6 hours as needed for nausea, Disp: 28 tablet, Rfl: 0    pregabalin (LYRICA) 75 MG capsule, Take 1 capsule (75 mg total) by mouth 2 (two) times daily., Disp: 60 capsule, Rfl: 0    vitamin D (VITAMIN D3) 1000 units Tab, Take 2,000 Units by mouth once daily., Disp: , Rfl:     aspirin (ECOTRIN) 81 MG EC tablet, Take 1 tablet (81 mg total) by mouth 2 (two) times a day., Disp: 84 tablet, Rfl: 0    terconazole (TERAZOL 3) 0.8 % vaginal cream, Place 1 applicator vaginally every evening. for 3 days, Disp: 20 g, Rfl: 0    triamcinolone acetonide 0.025% (KENALOG) 0.025 % Oint, Apply topically 2 (two) times daily as needed (itching. apply only to outside of labia)., Disp: 15 g, Rfl: 0  No current facility-administered medications for this visit.    Facility-Administered Medications Ordered in Other Visits:     ropivacaine 0.2% Perineural Pump infusion 500 ML, , Perineural, Continuous, [place] Singh PA-C, New Bag at 02/26/24 0976     Review of Systems:  Review of Systems   Constitutional:  Negative for fever.   Respiratory:  Negative for cough and shortness of breath.     Cardiovascular:  Negative for chest pain.   Gastrointestinal:  Negative for abdominal pain.   Genitourinary:  Negative for flank pain, frequency, hematuria, pelvic pain and vaginal discharge.         PHYSICAL EXAM     Vitals:    04/11/24 0801   BP: 134/89   BP Location: Right arm   Patient Position: Sitting   BP Method: Large (Automatic)   Pulse: 77   SpO2: 99%   Weight: 94.1 kg (207 lb 7.3 oz)      Body mass index is 30.64 kg/m².     Physical Exam  Vitals reviewed.   Constitutional:       Appearance: Normal appearance.   HENT:      Head: Normocephalic.      Mouth/Throat:      Mouth: Mucous membranes are moist.      Pharynx: Oropharynx is clear.   Eyes:      Conjunctiva/sclera: Conjunctivae normal.      Pupils: Pupils are equal, round, and reactive to light.   Cardiovascular:      Rate and Rhythm: Normal rate.   Pulmonary:      Effort: Pulmonary effort is normal.   Genitourinary:     Labia:         Right: No lesion.         Left: No lesion.       Comments: Mild erythema noted to bilat labia. No lesions. Has some scant white vaginal discharge.   Skin:     General: Skin is warm and dry.   Neurological:      General: No focal deficit present.      Mental Status: She is alert.   Psychiatric:         Mood and Affect: Mood normal.         Behavior: Behavior normal.         LABS     Lab Results   Component Value Date    HGBA1C 5.3 01/17/2024     CMP  Sodium   Date Value Ref Range Status   01/17/2024 141 136 - 145 mmol/L Final     Potassium   Date Value Ref Range Status   01/17/2024 4.1 3.5 - 5.1 mmol/L Final     Chloride   Date Value Ref Range Status   01/17/2024 104 95 - 110 mmol/L Final     CO2   Date Value Ref Range Status   01/17/2024 25 23 - 29 mmol/L Final     Glucose   Date Value Ref Range Status   01/17/2024 76 70 - 110 mg/dL Final     BUN   Date Value Ref Range Status   01/17/2024 18 8 - 23 mg/dL Final     Creatinine   Date Value Ref Range Status   01/17/2024 0.7 0.5 - 1.4 mg/dL Final     Calcium   Date Value  Ref Range Status   01/17/2024 9.2 8.7 - 10.5 mg/dL Final     Total Protein   Date Value Ref Range Status   01/17/2024 7.8 6.0 - 8.4 g/dL Final     Albumin   Date Value Ref Range Status   01/17/2024 3.8 3.5 - 5.2 g/dL Final     Total Bilirubin   Date Value Ref Range Status   01/17/2024 0.3 0.1 - 1.0 mg/dL Final     Comment:     For infants and newborns, interpretation of results should be based  on gestational age, weight and in agreement with clinical  observations.    Premature Infant recommended reference ranges:  Up to 24 hours.............<8.0 mg/dL  Up to 48 hours............<12.0 mg/dL  3-5 days..................<15.0 mg/dL  6-29 days.................<15.0 mg/dL       Alkaline Phosphatase   Date Value Ref Range Status   01/17/2024 67 55 - 135 U/L Final     AST   Date Value Ref Range Status   01/17/2024 28 10 - 40 U/L Final     ALT   Date Value Ref Range Status   01/17/2024 12 10 - 44 U/L Final     Anion Gap   Date Value Ref Range Status   01/17/2024 12 8 - 16 mmol/L Final     eGFR if    Date Value Ref Range Status   12/22/2021 >60.0 >60 mL/min/1.73 m^2 Final     eGFR if non    Date Value Ref Range Status   12/22/2021 >60.0 >60 mL/min/1.73 m^2 Final     Comment:     Calculation used to obtain the estimated glomerular filtration  rate (eGFR) is the CKD-EPI equation.        Lab Results   Component Value Date    WBC 5.19 01/17/2024    HGB 13.7 01/17/2024    HCT 43.8 01/17/2024    MCV 94 01/17/2024     01/17/2024     Lab Results   Component Value Date    CHOL 195 01/17/2024    CHOL 191 03/28/2023    CHOL 188 01/02/2021     Lab Results   Component Value Date    HDL 52 01/17/2024    HDL 59 03/28/2023    HDL 59 01/02/2021     Lab Results   Component Value Date    LDLCALC Invalid, Trig>400.0 01/17/2024    LDLCALC 104.2 03/28/2023    LDLCALC 103.2 01/02/2021     Lab Results   Component Value Date    TRIG 453 (H) 01/17/2024    TRIG 139 03/28/2023    TRIG 129 01/02/2021     Lab  Results   Component Value Date    CHOLHDL 26.7 01/17/2024    CHOLHDL 30.9 03/28/2023    CHOLHDL 31.4 01/02/2021     Lab Results   Component Value Date    TSH 2.325 01/17/2024       ASSESSMENT     1. Acute vaginitis           PLAN  Treat for yeast vaginitis. Will have her f/u with GYN    1. Acute vaginitis  - terconazole (TERAZOL 3) 0.8 % vaginal cream; Place 1 applicator vaginally every evening. for 3 days  Dispense: 20 g; Refill: 0  - triamcinolone acetonide 0.025% (KENALOG) 0.025 % Oint; Apply topically 2 (two) times daily as needed (itching. apply only to outside of labia).  Dispense: 15 g; Refill: 0  - Ambulatory referral/consult to Gynecology; Future       Patient's plan/treatment was discussed including medications and possible side effects. Verbalized understanding of all instructions.          KAISER Hurtado  Department of Internal Medicine - [facility]  04/11/2024

## 2024-04-11 NOTE — PROGRESS NOTES
Gynecology    SUBJECTIVE:     Chief Complaint: Vulvar Itch       History of Present Illness:  64 year old who presents with vaginal itching.  Had knee surgery 5 weeks ago and was concerned it might be because of antibiotics.  Tried monistat without relief.  She does not have discharge or odor.  No new sexual partners (not sexually active).  Reports itching is external and internal.  Also has itching in the fold of her skin as well.  No pelvic pain.  Itching started about three weeks ago when she started the medication that was prescribed for her surgery.  Has had a hysterectomy.  No vaginal bleeding.    Review of Systems:  Review of Systems   Genitourinary:  Negative for pelvic pain, vaginal bleeding, vaginal discharge, vaginal pain, postmenopausal bleeding and vaginal odor.        Vulvar itching, vaginal itching        OBJECTIVE:     Physical Exam:  Physical Exam  Vitals and nursing note reviewed.   Constitutional:       Appearance: She is well-developed.   Pulmonary:      Effort: Pulmonary effort is normal.   Abdominal:      Palpations: Abdomen is soft.   Genitourinary:     Labia:         Right: Rash present. No tenderness, lesion or injury.         Left: Rash present. No tenderness, lesion or injury.       Vagina: Normal. No signs of injury and foreign body. No vaginal discharge, erythema, tenderness or bleeding.      Adnexa:         Right: No mass, tenderness or fullness.          Left: No mass, tenderness or fullness.        Comments: Urethra: normal appearing urethra with no masses, tenderness or lesions  Urethral meatus: normal size, anterior vaginal wall with no prolapse, no lesions  Vulva: red and inflamed   Neurological:      Mental Status: She is alert and oriented to person, place, and time.   Psychiatric:         Behavior: Behavior normal.         Thought Content: Thought content normal.         Judgment: Judgment normal.         Chaperoned by: n/a    ASSESSMENT:       ICD-10-CM ICD-9-CM    1. Vaginal  itching  N89.8 698.1 Vaginosis Screen by DNA Probe      2. Acute vaginitis  N76.0 616.10 Ambulatory referral/consult to Gynecology      3. Vulvar dermatitis  L30.9 692.9 clobetasol 0.05% (TEMOVATE) 0.05 % Oint             Plan:      Jenifer was seen today for vulvar itch.    Diagnoses and all orders for this visit:    Vaginal itching  -     Vaginosis Screen by DNA Probe    Acute vaginitis  -     Ambulatory referral/consult to Gynecology    Vulvar dermatitis  -     clobetasol 0.05% (TEMOVATE) 0.05 % Oint; Use a pencil eraser amount on the vulva twice a day for two weeks, once a day for two weekx, then twice a week.    - vulvar dermatitis noted; prescribed clobetasol and given isntructions for use; notify me if no improvement; discussed vulvar irritants and ways to avoid scratching  - vaginosis screen performed    Orders Placed This Encounter   Procedures    Vaginosis Screen by DNA Probe           Blanche Vaughn

## 2024-04-12 LAB
BACTERIAL VAGINOSIS DNA: NEGATIVE
CANDIDA GLABRATA DNA: NEGATIVE
CANDIDA KRUSEI DNA: NEGATIVE
CANDIDA RRNA VAG QL PROBE: NEGATIVE
T VAGINALIS RRNA GENITAL QL PROBE: NEGATIVE

## 2024-04-15 NOTE — OPERATIVE NOTE ADDENDUM
Certification of Assistant at Surgery       Surgery Date: 2/26/2024     Participating Surgeons:  Surgeons and Role:     * ERNIE Lezama MD - Primary    Procedures:  Procedure(s) (LRB):  ARTHROPLASTY, KNEE, TOTAL (Right)    Assistant Surgeon's Certification of Necessity:  I understand that section 1842 (b) (6) (d) of the Social Security Act generally prohibits Medicare Part B reasonable charge payment for the services of assistants at surgery in teaching hospitals when qualified residents are available to furnish such services. I certify that the services for which payment is claimed were medically necessary, and that no qualified resident was available to perform the services. I further understand that these services are subject to post-payment review by the Medicare carrier.      Yo Emery DO    04/15/2024  9:55 AM

## 2024-04-16 ENCOUNTER — CLINICAL SUPPORT (OUTPATIENT)
Dept: REHABILITATION | Facility: HOSPITAL | Age: 64
End: 2024-04-16
Payer: COMMERCIAL

## 2024-04-16 ENCOUNTER — TELEPHONE (OUTPATIENT)
Dept: SPORTS MEDICINE | Facility: CLINIC | Age: 64
End: 2024-04-16
Payer: COMMERCIAL

## 2024-04-16 DIAGNOSIS — R29.898 DECREASED STRENGTH OF LOWER EXTREMITY: Primary | ICD-10-CM

## 2024-04-16 DIAGNOSIS — M25.661 DECREASED ROM OF RIGHT KNEE: ICD-10-CM

## 2024-04-16 PROCEDURE — 97530 THERAPEUTIC ACTIVITIES: CPT | Mod: CQ

## 2024-04-16 PROCEDURE — 97110 THERAPEUTIC EXERCISES: CPT | Mod: CQ

## 2024-04-16 PROCEDURE — 97112 NEUROMUSCULAR REEDUCATION: CPT | Mod: CQ

## 2024-04-16 NOTE — TELEPHONE ENCOUNTER
----- Message from Jennifer Pascual MA sent at 4/16/2024  2:34 PM CDT -----  Regarding: FW: returning call  Contact: [phone]    ----- Message -----  From: Brooks Motley MA  Sent: 4/16/2024   2:19 PM CDT  To: [facility] Solo Staff  Subject: returning call                                   Type:  Patient Returning Call    Who Called jadiel   Who Left Message for Patient: praveen   Does the patient know what this is regarding?: disability questions   Would the patient rather a call back or a response via [facility]? Call back   Best Call Back Number: [phone]   Additional Information:

## 2024-04-16 NOTE — PROGRESS NOTES
[facility]   Physical Therapy Treatment Note     Name: Jenifer Burnett  Clinic Number: [id number]    Therapy Diagnosis:   Encounter Diagnoses   Name Primary?    Decreased strength of lower extremity Yes    Decreased ROM of right knee        Physician: Negin Cardona    Visit Date: 4/16/2024    Physician Orders: PT Eval and Treat   Medical Diagnosis from Referral: Primary osteoarthritis of right knee [M17.11]   Evaluation Date: 2/28/2024  Authorization Period Expiration: 12/31/24  Plan of Care Expiration: 5/22/24  Visit # / Visits authorized: 7/ 20  FOTO 2/2 administered 3/26/24  FOTO Code: [id number]       Time In: 11:00 AM  Time Out: 12:00 PM     Total Billable Time: 60 minutes      Precautions: Standard     Date of Surgery: 2/26/24  PROCEDURES PERFORMED:   Right total knee arthroplasty (CPT 40485)    SUBJECTIVE     Pt reports: posterior knee hurts. Still having nerve pain but nerve glides do help    She was compliant with home exercise program.  Response to previous treatment: none  Functional change: improved AROM ext    Pain: 4/10  Location: right knee      OBJECTIVE     Date of surgery: 2/26/24: 5 weeks 1 days POD as of 3/26    Knee Active Range of Motion:    Right  Left    Flexion 100 130   Extension 0  5 hyper      Knee Passive Range of Motion:    Right  Left    Flexion 120 130   Extension 3 hyper 5 hyper        Treatment       Jenifer received the treatments listed below:      Therapeutic exercises to develop strength, endurance, ROM, and posture for 10 minutes including:  LLLD heel prop x 5' 4# above/below  Heel slides 3x10 5s holds   LAQ #3 tempo 3/3/3 3x10    Manual therapy techniques: Joint mobilizations were applied to the: R LE for 10 minutes, including:  Knee assessment  ROM check  Hinged knee extension mobilization  Patella mobs all directions    Neuromuscular re-education activities to improve: Balance, Coordination, Kinesthetic, Sense, Proprioception, and Posture for 30  minutes. The following activities were included:  QS over 1/2 foam with strap assist 3x10 5s holds  DL bridge 3x10  Slump sliders 20x  Supine active HS stretch 20x  Mini squat at EOT    NP:  DL shuttle squat 3x10  cords   SL shuttle squat 3x10  cords  Standing calf raise 3x10  Seated SAQ into SLR 3x10  Therapeutic activities to improve functional performance for 10 minutes, including:  Recumbent bike x 10 min to improve CV endurance and tissue extensibility    Np:  Retro gait x 4 turf laps  Fwd gait x 4 turf laps          Patient Education and Home Exercises     Home Exercises Provided and Patient Education Provided     Education provided:   - Cont HEP, quad activation on stance phase, knee FL with swing through    Written Home Exercises Provided: Patient instructed to cont prior HEP. Exercises were reviewed and Jenifer was able to demonstrate them prior to the end of the session.  Jenifer demonstrated good  understanding of the education provided. See EMR under Patient Instructions for exercises provided during therapy sessions    ASSESSMENT     Jenifer continues to have nerve pain down lateral aspect of her tibia. Symptoms reproduced when trying to perform HS for posterior knee tightness that she was reporting. Pt states she had this pain before surgery and that it was the reason she got her knee surgery. Talked to PT and he will perform a lumbar assessment to see if this is truly a nerve issue. Pt was told to perform nerve glides at home 4-5x/day and when she feels the symptoms. LAQ were ok to perform as long as foot was PF.      Jenifer Is progressing well towards her goals.     Pt prognosis is Good.     Pt will continue to benefit from skilled outpatient physical therapy to address the deficits listed in the problem list box on initial evaluation, provide pt/family education and to maximize pt's level of independence in the home and community environment.     Pt's spiritual, cultural and educational needs considered  and pt agreeable to plan of care and goals.     Anticipated barriers to physical therapy: High BMI    Goals:   Short Term Goals ( 2 Weeks ):   1. Pt will report reduced pain by 20 to 40% or greater for improved mobility, sleep  and ambulation.   2. Patients knee edema reduced by 1/4 to 1/2 inch or greater to enhance flexion ROM and knee comfort.   3. Pt to report minimal to no pain to palpation for improved level of comfort.   4. Pt to demonstrate symmetrical weight bearing w/o increased pain for stability and functional ambulation .  5. Pts neuromuscular response will be enhanced for unilateral standing stability and balance   6. Pt to achieve 90 degrees of passive knee flexion for restoring functional knee mobility, ambulating with proper gait pattern and sit to stand ability.   7 Pt to report understanding of all instruction pertinent to patient safety , gait instruction and HEP.    8. Pt to exhibit correct return demonstration of exercises for self-management and independence with HEP     Long Term Goals (12 Weeks):  1. Pt will demonstrate increased knee flexion AROM to 120 degrees or greater for return to functional activity  2. Pt will demonstrate increased knee extension AROM to 0 degrees for standing stability   3. Pt will demonstrate increased RLE strength by 1/2 to 1 grade for improved functional stability  4. Pt to demonstrate standing stability unsupported on dynamic surfaces for functional return to ADL and recreational activities.   5.The patient will be independent amb with no assistive device on all surfaces for community distances.  6. Pt to demonstrate independence with HEP for self management  7. Patient will return to walking for fitness and recreation 3x/wk within 4 months.  8. Patient will improve FOTO score to </= see FOTO in media limited to decrease perceived limitation with mobility.    PLAN     Cont with POC to improve R LE ROM and strength    Lila De La Paz PTA,

## 2024-04-16 NOTE — TELEPHONE ENCOUNTER
Attempted to contact pt. Left voicemail. Called to discuss RTW & paperwork she dropped off. There are 2 different claim numbers listed on the form & need to confirm correct #.  Asked pt to return call to clinic at [phone] c additional questions/concerns.

## 2024-04-16 NOTE — TELEPHONE ENCOUNTER
Spoke c pt. She plans to return to work 06/06/24. Confirmed claim #. Pt will call c additional questions/concerns in interim. Pt expressed understanding & was thankful.    ==  Faxed forms to [employer]. Confirmation received at 1503 on 04/16/24.     Scanned into Media. Pt notified via [facility] ([facility]).

## 2024-04-23 ENCOUNTER — TELEPHONE (OUTPATIENT)
Dept: INTERNAL MEDICINE | Facility: CLINIC | Age: 64
End: 2024-04-23
Payer: COMMERCIAL

## 2024-04-23 DIAGNOSIS — Z12.31 SCREENING MAMMOGRAM FOR BREAST CANCER: Primary | ICD-10-CM

## 2024-04-23 NOTE — TELEPHONE ENCOUNTER
----- Message from Nadiya Colon sent at 4/23/2024  1:35 PM CDT -----  Regarding: orders  Contact: @[phone]  Pt is requesting orders for the following a letter for her annual mammogram no available dates  ....Please call and adv @[phone]

## 2024-04-30 ENCOUNTER — HOSPITAL ENCOUNTER (OUTPATIENT)
Dept: RADIOLOGY | Facility: HOSPITAL | Age: 64
Discharge: HOME | End: 2024-04-30
Attending: PHYSICIAN ASSISTANT
Payer: COMMERCIAL

## 2024-04-30 ENCOUNTER — CLINICAL SUPPORT (OUTPATIENT)
Dept: REHABILITATION | Facility: HOSPITAL | Age: 64
End: 2024-04-30
Payer: COMMERCIAL

## 2024-04-30 ENCOUNTER — OFFICE VISIT (OUTPATIENT)
Dept: SPORTS MEDICINE | Facility: CLINIC | Age: 64
End: 2024-04-30
Payer: COMMERCIAL

## 2024-04-30 VITALS
HEIGHT: 69 IN | DIASTOLIC BLOOD PRESSURE: 90 MMHG | HEART RATE: 98 BPM | SYSTOLIC BLOOD PRESSURE: 136 MMHG | BODY MASS INDEX: 30.57 KG/M2 | WEIGHT: 206.38 LBS

## 2024-04-30 DIAGNOSIS — M25.661 DECREASED ROM OF RIGHT KNEE: ICD-10-CM

## 2024-04-30 DIAGNOSIS — M25.561 ACUTE PAIN OF RIGHT KNEE: Primary | ICD-10-CM

## 2024-04-30 DIAGNOSIS — M25.561 ACUTE PAIN OF RIGHT KNEE: ICD-10-CM

## 2024-04-30 DIAGNOSIS — R29.898 DECREASED STRENGTH OF LOWER EXTREMITY: Primary | ICD-10-CM

## 2024-04-30 PROCEDURE — 72114 X-RAY EXAM L-S SPINE BENDING: CPT | Mod: TC

## 2024-04-30 PROCEDURE — 3044F HG A1C LEVEL LT 7.0%: CPT | Mod: CPTII,S$GLB,, | Performed by: PHYSICIAN ASSISTANT

## 2024-04-30 PROCEDURE — 97112 NEUROMUSCULAR REEDUCATION: CPT | Mod: CQ

## 2024-04-30 PROCEDURE — 72114 X-RAY EXAM L-S SPINE BENDING: CPT | Mod: 26,,, | Performed by: RADIOLOGY

## 2024-04-30 PROCEDURE — 97110 THERAPEUTIC EXERCISES: CPT | Mod: CQ

## 2024-04-30 PROCEDURE — 99024 POSTOP FOLLOW-UP VISIT: CPT | Mod: S$GLB,,, | Performed by: PHYSICIAN ASSISTANT

## 2024-04-30 PROCEDURE — 1159F MED LIST DOCD IN RCRD: CPT | Mod: CPTII,S$GLB,, | Performed by: PHYSICIAN ASSISTANT

## 2024-04-30 PROCEDURE — 4010F ACE/ARB THERAPY RXD/TAKEN: CPT | Mod: CPTII,S$GLB,, | Performed by: PHYSICIAN ASSISTANT

## 2024-04-30 PROCEDURE — 3080F DIAST BP >= 90 MM HG: CPT | Mod: CPTII,S$GLB,, | Performed by: PHYSICIAN ASSISTANT

## 2024-04-30 PROCEDURE — 97140 MANUAL THERAPY 1/> REGIONS: CPT | Mod: CQ

## 2024-04-30 PROCEDURE — 3075F SYST BP GE 130 - 139MM HG: CPT | Mod: CPTII,S$GLB,, | Performed by: PHYSICIAN ASSISTANT

## 2024-04-30 PROCEDURE — 99999 PR PBB SHADOW E&M-EST. PATIENT-LVL IV: CPT | Mod: PBBFAC,,, | Performed by: PHYSICIAN ASSISTANT

## 2024-04-30 RX ORDER — METHYLPREDNISOLONE 4 MG/1
TABLET ORAL
Qty: 21 EACH | Refills: 0 | Status: SHIPPED | OUTPATIENT
Start: 2024-04-30 | End: 2024-05-21

## 2024-04-30 NOTE — PROGRESS NOTES
S:Jenifer Burnett presents for post-operative evaluation.     DATE OF PROCEDURE: 2/26/2024   PROCEDURES PERFORMED:   Right total knee arthroplasty (CPT 48053)    Jenifer Burnett reports to be doing well 9 weeks s/p the above mentioned procedure. Her main complaint today is the continued numbness/tingling over the anterior calf radiating down to her 4th and 5th toes. She says this was a problem prior to surgery- does endorse some intermittent back pain. Still going to PT at the [place] location. She says her knee is great and this feels separate. It was an issue prior to surgery and now that the knee feels better it feels like an isolated pain. Incision looks great.    O:  Exam of the right knee shows appropriately healing midline incision. Previous area scabbed over. No sinus.  No drainage. iIncision is well healed.  No effusion.  Intact extensor mechanism.  Full passive extension.  Active assisted flexion to 115° today with central patellar tracking.  No mid flexion instability.  Stable to varus and valgus stress.    Mild midline lumbar spine tenderness.  Negative straight leg raise.    Imaging:  Lumbar films ordered today in clinic demonstrate:   FINDINGS:  Mild scoliosis convexity to the right can be seen.  Vertebral bodies are intact.  No collapse or destruction can be seen.  L4-L5 disc space shows mild narrowing but there is no instability in flexion and extension.    Plain radiographs of the right knee demonstrate post operative total knee arthroplasty prosthesis in place and intact without complications.  The signs of loosening or complication otherwise seen.    A/P:  Clinically making progress. Primary issue of lower leg numbness and tingling into the lateral foot maybe related to the lumbar spine. She will follow up with back and spine. Continue Lyrica and I have prescibed a Medrol dose pack (ok-ed by Dr. Lezama). I do not think this is related to her knee. Continue PT and plan for 6 week follow up with   Lise as planned.

## 2024-04-30 NOTE — PROGRESS NOTES
[facility]   Physical Therapy Treatment Note     Name: Jenifer Burnett  Clinic Number: [id number]    Therapy Diagnosis:   Encounter Diagnoses   Name Primary?    Decreased strength of lower extremity Yes    Decreased ROM of right knee        Physician: Negin Cardona    Visit Date: 4/30/2024    Physician Orders: PT Eval and Treat   Medical Diagnosis from Referral: Primary osteoarthritis of right knee [M17.11]   Evaluation Date: 2/28/2024  Authorization Period Expiration: 12/31/24  Plan of Care Expiration: 5/22/24  Visit # / Visits authorized: 10/ 20  FOTO 2/2 administered 3/26/24  FOTO Code: [id number]       Time In: 10:05 AM  Time Out: 11:00 AM     Total Billable Time: 55 minutes      Precautions: Standard     Date of Surgery: 2/26/24  PROCEDURES PERFORMED:   Right total knee arthroplasty (CPT 10872)    SUBJECTIVE     Pt reports: Still having lateral leg pain. Nerve glides help some    She was compliant with home exercise program.  Response to previous treatment: none  Functional change: improved AROM ext    Pain: 4/10  Location: right knee      OBJECTIVE     Date of surgery: 2/26/24: 5 weeks 1 days POD as of 3/26    Knee Active Range of Motion:    Right  Left    Flexion 120 130   Extension 1  5 hyper      Knee Passive Range of Motion:    Right  Left    Flexion 120-   Extension 3 hyper 5 hyper        Treatment       Jenifer received the treatments listed below:      Therapeutic exercises to develop strength, endurance, ROM, and posture for 10 minutes including:  LLLD heel prop x 5' 4# above/below-np  Heel slides 3x10 5s holds   LAQ #3 tempo 3/3/3 3x10    Manual therapy techniques: Joint mobilizations were applied to the: R LE for 15 minutes, including:  Knee assessment  ROM check  Hinged knee extension mobilization  Patella mobs all directions  PT assessment    Neuromuscular re-education activities to improve: Balance, Coordination, Kinesthetic, Sense, Proprioception, and Posture for  "35 minutes. The following activities were included:  DL bridge 3x10  Supine nerve glides 20x  Ankle IV/EV GTB with EV 3x10  Step up on 4" block 4x10 (emphasis on knee ext)    Therapeutic activities to improve functional performance for 00 minutes, including:  Recumbent bike x 10 min to improve CV endurance and tissue extensibility          Patient Education and Home Exercises     Home Exercises Provided and Patient Education Provided     Education provided:   - Cont HEP, quad activation on stance phase, knee FL with swing through    Written Home Exercises Provided: Patient instructed to cont prior HEP. Exercises were reviewed and Jenifer was able to demonstrate them prior to the end of the session.  Jenifer demonstrated good  understanding of the education provided. See EMR under Patient Instructions for exercises provided during therapy sessions    ASSESSMENT     Jenifer was assessed by PT Ahmet Cornell to rule out lumbar being the cause of lateral R LE pain. Pt was negative for radicular pain from lumbar. Pain present right over fibular head. Performed proximal fibular head mobs as well as continued nerve glides. Knee FL and extension has improved since previous session. Was able to progress to step up today without producing nerve symptoms. Attempted lateral walk but  stop 2* reproducing lateral LE pain.      Jenifer Is progressing well towards her goals.     Pt prognosis is Good.     Pt will continue to benefit from skilled outpatient physical therapy to address the deficits listed in the problem list box on initial evaluation, provide pt/family education and to maximize pt's level of independence in the home and community environment.     Pt's spiritual, cultural and educational needs considered and pt agreeable to plan of care and goals.     Anticipated barriers to physical therapy: High BMI    Goals:   Short Term Goals ( 2 Weeks ):   1. Pt will report reduced pain by 20 to 40% or greater for improved mobility, sleep  " and ambulation.   2. Patients knee edema reduced by 1/4 to 1/2 inch or greater to enhance flexion ROM and knee comfort.   3. Pt to report minimal to no pain to palpation for improved level of comfort.   4. Pt to demonstrate symmetrical weight bearing w/o increased pain for stability and functional ambulation .  5. Pts neuromuscular response will be enhanced for unilateral standing stability and balance   6. Pt to achieve 90 degrees of passive knee flexion for restoring functional knee mobility, ambulating with proper gait pattern and sit to stand ability.   7 Pt to report understanding of all instruction pertinent to patient safety , gait instruction and HEP.    8. Pt to exhibit correct return demonstration of exercises for self-management and independence with HEP     Long Term Goals (12 Weeks):  1. Pt will demonstrate increased knee flexion AROM to 120 degrees or greater for return to functional activity  2. Pt will demonstrate increased knee extension AROM to 0 degrees for standing stability   3. Pt will demonstrate increased RLE strength by 1/2 to 1 grade for improved functional stability  4. Pt to demonstrate standing stability unsupported on dynamic surfaces for functional return to ADL and recreational activities.   5.The patient will be independent amb with no assistive device on all surfaces for community distances.  6. Pt to demonstrate independence with HEP for self management  7. Patient will return to walking for fitness and recreation 3x/wk within 4 months.  8. Patient will improve FOTO score to </= see FOTO in media limited to decrease perceived limitation with mobility.    PLAN     Cont with POC to improve R LE ROM and strength    Lila De La Paz PTA,

## 2024-05-07 NOTE — PROGRESS NOTES
DATE: 5/7/2024  PATIENT: Jenifer Burnett    Supervising Physician: Armand Ortiz M.D.    CHIEF COMPLAINT: right leg pain    HISTORY:  Jenifer Burnett is a 64 y.o. female here for initial evaluation of right leg pain (Back - 0, Leg - 3).  The pain in the side of the right shin is what bothers her most.  The pain has been present for months without injury. The patient describes the pain as aching and stabbing.  The pain is worse with walking and improved by nothing. There is positive associated numbness and tingling into the toes. There is negative subjective weakness. Pt had a right knee replacement on 2/26/24 and says that helped with her knee pain but not her shin pain. She has been doing PT for 8 weeks in the last 3 months with no relief.    The patient denies myelopathic symptoms such as handwriting changes or difficulty with buttons/coins/keys. Denies perineal paresthesias, bowel/bladder dysfunction.    PAST MEDICAL/SURGICAL HISTORY:  Past Medical History:   Diagnosis Date    Abnormal gait 02/22/2023    Arthritis     Depression     Encounter for blood transfusion     GERD (gastroesophageal reflux disease)     Hypertension     Migraine     Poor tolerance for ambulation 02/22/2023    Trigeminal neuralgia 03/2018     Past Surgical History:   Procedure Laterality Date    APPENDECTOMY      CHOLECYSTECTOMY  07/2012    ESOPHAGOGASTRODUODENOSCOPY N/A 10/14/2019    Procedure: EGD (ESOPHAGOGASTRODUODENOSCOPY);  Surgeon: Sean Girard MD;  Location: [facility] FLR);  Service: Endoscopy;  Laterality: N/A;    HYSTERECTOMY      LAPAROSCOPIC TOUPET FUNDOPLICATION N/A 11/22/2019    Procedure: FUNDOPLICATION, LAPAROSCOPIC, TOUPET;  Surgeon: Adriano Godoy MD;  Location: [facility] 2ND FLR;  Service: General;  Laterality: N/A;    TOTAL KNEE ARTHROPLASTY Right 2/26/2024    Procedure: ARTHROPLASTY, KNEE, TOTAL;  Surgeon: ERNIE Lezama MD;  Location: [facility];  Service: Orthopedics;  Laterality: Right;  Regional w/Catheter,  Adductor, 0.5% Marcaine       Medications:   Current Outpatient Medications on File Prior to Visit   Medication Sig Dispense Refill    acetaminophen (TYLENOL) 650 MG TbSR Take 650 mg by mouth every 8 (eight) hours.      aspirin (ECOTRIN) 81 MG EC tablet Take 1 tablet (81 mg total) by mouth 2 (two) times a day. 84 tablet 0    celecoxib (CELEBREX) 200 MG capsule Take 1 capsule (200 mg total) by mouth once daily. 60 capsule 0    citalopram (CELEXA) 40 MG tablet Take 1 tablet (40 mg total) by mouth once daily. 90 tablet 3    clobetasol 0.05% (TEMOVATE) 0.05 % Oint Use a pencil eraser amount on the vulva twice a day for two weeks, once a day for two weekx, then twice a week. 45 g 0    cyanocobalamin, vitamin B-12, 2,500 mcg Tab Take 1 tablet by mouth once daily.      HYDROcodone-acetaminophen (NORCO) 5-325 mg per tablet Take 1 tablet by mouth every 12 (twelve) hours as needed for Pain. 14 tablet 0    HYDROcodone-acetaminophen (NORCO) 5-325 mg per tablet Take 1 tablet by mouth every 6 (six) hours as needed for Pain. 25 tablet 0    losartan (COZAAR) 50 MG tablet Take 1 tablet (50 mg total) by mouth once daily. For blood pressure 90 tablet 3    methylPREDNISolone (MEDROL DOSEPACK) 4 mg tablet use as directed 21 each 0    omeprazole (PRILOSEC) 40 MG capsule Take 1 capsule (40 mg total) by mouth every morning. 90 capsule 1    omeprazole (PRILOSEC) 40 MG capsule Take one capsule by mouth every morning 90 capsule 3    ondansetron (ZOFRAN-ODT) 4 MG TbDL Dissolve one tablet on tongue every 4 to 6 hours as needed for nausea 28 tablet 0    pregabalin (LYRICA) 75 MG capsule Take 1 capsule (75 mg total) by mouth 2 (two) times daily. 60 capsule 0    triamcinolone acetonide 0.025% (KENALOG) 0.025 % Oint Apply topically 2 (two) times daily as needed (itching. apply only to outside of labia). 15 g 0    vitamin D (VITAMIN D3) 1000 units Tab Take 2,000 Units by mouth once daily.       Current Facility-Administered Medications on File Prior  to Visit   Medication Dose Route Frequency Provider Last Rate Last Admin    ropivacaine 0.2% Perineural Pump infusion 500 ML   Perineural Continuous North Singh PA-C   New Bag at 02/26/24 0935       Social History:   Social History     Socioeconomic History    Marital status: Single    Number of children: 3   Occupational History    Occupation: [employer]   Tobacco Use    Smoking status: Never    Smokeless tobacco: Never   Substance and Sexual Activity    Alcohol use: No    Drug use: No    Sexual activity: Yes     Partners: Male   Social History Narrative    Working at [employer], does do bending/lifting type work        REVIEW OF SYSTEMS:  Constitution: Negative. Negative for chills, fever and night sweats.   Cardiovascular: Negative for chest pain and syncope.   Respiratory: Negative for cough and shortness of breath.   Gastrointestinal: See HPI. Negative for nausea/vomiting. Negative for abdominal pain.  Genitourinary: See HPI. Negative for discoloration or dysuria.  Skin: Negative for dry skin, itching and rash.   Hematologic/Lymphatic: Negative for bleeding problem. Does not bruise/bleed easily.   Musculoskeletal: Negative for falls and muscle weakness.   Neurological: See HPI. No seizures.   Endocrine: Negative for polydipsia, polyphagia and polyuria.   Allergic/Immunologic: Negative for hives and persistent infections.     EXAM:  There were no vitals taken for this visit.    General: The patient is a very pleasant 64 y.o. female in no apparent distress, the patient is oriented to person, place and time.  Psych: Normal mood and affect  HEENT: Vision grossly intact, hearing intact to the spoken word.  Lungs: Respirations unlabored.  Gait: Normal station and gait, no difficulty with toe or heel walk.   Skin: Dorsal lumbar skin negative for rashes, lesions, hairy patches and surgical scars. There is negative lumbar tenderness to palpation.  Range of motion: Lumbar range of motion is  acceptable.  Spinal Balance: Global saggital and coronal spinal balance acceptable, not significant for scoliosis and kyphosis.  Musculoskeletal: No pain with the range of motion of the bilateral hips. No trochanteric tenderness to palpation.  Vascular: Bilateral lower extremities warm and well perfused, dorsalis pedis pulses 2+ bilaterally.  Neurological: Normal strength and tone in all major motor groups in the bilateral lower extremities. Normal sensation to light touch in the L2-S1 dermatomes bilaterally.  Deep tendon reflexes symmetric 2+ in the bilateral lower extremities.  Negative Babinski bilaterally. Straight leg raise negative bilaterally.    IMAGING:      Today I personally reviewed AP, Lat and Flex/Ex  upright L-spine films that demonstrate mild degenerative changes      There is no height or weight on file to calculate BMI.    Hemoglobin A1C   Date Value Ref Range Status   01/17/2024 5.3 4.0 - 5.6 % Final     Comment:     ADA Screening Guidelines:  5.7-6.4%  Consistent with prediabetes  >or=6.5%  Consistent with diabetes    High levels of fetal hemoglobin interfere with the HbA1C  assay. Heterozygous hemoglobin variants (HbS, HgC, etc)do  not significantly interfere with this assay.   However, presence of multiple variants may affect accuracy.     03/28/2023 5.3 4.0 - 5.6 % Final     Comment:     ADA Screening Guidelines:  5.7-6.4%  Consistent with prediabetes  >or=6.5%  Consistent with diabetes    High levels of fetal hemoglobin interfere with the HbA1C  assay. Heterozygous hemoglobin variants (HbS, HgC, etc)do  not significantly interfere with this assay.   However, presence of multiple variants may affect accuracy.     12/22/2021 5.3 4.0 - 5.6 % Final     Comment:     ADA Screening Guidelines:  5.7-6.4%  Consistent with prediabetes  >or=6.5%  Consistent with diabetes    High levels of fetal hemoglobin interfere with the HbA1C  assay. Heterozygous hemoglobin variants (HbS, HgC, etc)do  not  significantly interfere with this assay.   However, presence of multiple variants may affect accuracy.             ASSESSMENT/PLAN:    There are no diagnoses linked to this encounter.    Today we discussed at length all of the different treatment options including anti-inflammatories, acetaminophen, rest, ice, heat, physical therapy including strengthening and stretching exercises, home exercises, ROM, aerobic conditioning, aqua therapy, other modalities including ultrasound, massage, and dry needling, epidural steroid injections and finally surgical intervention.      Pt presents with chronic lumbar radiculopathy. Failure of conservative rx. Will switch from lyrica to gabapentin. I provided the patient with a home exercise program. It is the AAOS spine conditioning program. Exercises include head rolls, kneeling back extension, sitting rotation stretch, modified seated side straddle, knee to chest, bird dog, plank, modified seated plank, hip bridges, abdominal bracing, and abdominal crunch. Pt will complete each exercise 5 times daily for 6-8 weeks. Pt will fu if pain persists, will obtain lumbar MRI and consider TFESI.

## 2024-05-09 ENCOUNTER — OFFICE VISIT (OUTPATIENT)
Dept: ORTHOPEDICS | Facility: CLINIC | Age: 64
End: 2024-05-09
Payer: COMMERCIAL

## 2024-05-09 VITALS — WEIGHT: 206.38 LBS | BODY MASS INDEX: 30.57 KG/M2 | HEIGHT: 69 IN

## 2024-05-09 DIAGNOSIS — M54.16 LUMBAR RADICULOPATHY: Primary | ICD-10-CM

## 2024-05-09 PROCEDURE — 3044F HG A1C LEVEL LT 7.0%: CPT | Mod: CPTII,S$GLB,, | Performed by: ORTHOPAEDIC SURGERY

## 2024-05-09 PROCEDURE — 99214 OFFICE O/P EST MOD 30 MIN: CPT | Mod: S$GLB,,, | Performed by: ORTHOPAEDIC SURGERY

## 2024-05-09 PROCEDURE — 3008F BODY MASS INDEX DOCD: CPT | Mod: CPTII,S$GLB,, | Performed by: ORTHOPAEDIC SURGERY

## 2024-05-09 PROCEDURE — 99999 PR PBB SHADOW E&M-EST. PATIENT-LVL III: CPT | Mod: PBBFAC,,, | Performed by: ORTHOPAEDIC SURGERY

## 2024-05-09 PROCEDURE — 4010F ACE/ARB THERAPY RXD/TAKEN: CPT | Mod: CPTII,S$GLB,, | Performed by: ORTHOPAEDIC SURGERY

## 2024-05-09 PROCEDURE — 1159F MED LIST DOCD IN RCRD: CPT | Mod: CPTII,S$GLB,, | Performed by: ORTHOPAEDIC SURGERY

## 2024-05-09 RX ORDER — GABAPENTIN 300 MG/1
300 CAPSULE ORAL 3 TIMES DAILY
Qty: 90 CAPSULE | Refills: 11 | Status: SHIPPED | OUTPATIENT
Start: 2024-05-09 | End: 2025-05-09

## 2024-05-20 ENCOUNTER — TELEPHONE (OUTPATIENT)
Dept: SPORTS MEDICINE | Facility: CLINIC | Age: 64
End: 2024-05-20
Payer: COMMERCIAL

## 2024-05-20 NOTE — TELEPHONE ENCOUNTER
Spoke c pt. Informed pt that Dr. Lezama will no longer be seeing pts at scheduled appt time on 05/23/24. R/s appt to 05/21/24 1300. Confirmed appt date, time, location. Pt will call c additional questions/concerns in interim. Pt expressed understanding & was thankful.

## 2024-05-20 NOTE — PROGRESS NOTES
CC: Right knee post-op follow up    DATE OF PROCEDURE: 2/26/2024   PROCEDURES PERFORMED:   Right total knee arthroplasty (CPT 57661)    Jenifer Burnett presents today for follow up appointment of her right knee. Patient is now 12 weeks status post above procedure. Continues PT at [facility].  She is doing quite well.  Has some intermittent mild soft tissue soreness around the knee but nothing too significant.  No significant nighttime pain.  Overall much better compared to preop.  She does have some achiness over the anterolateral lower leg that is responsive to Gabapentin.  She experienced the symptoms about 2 months prior to her right total knee arthroplasty and is in association with some chronic low back pain.    Prior Hx 4/8/2024:   Jenifer Burnett reports to be doing well 6 weeks s/p the above mentioned procedure. Going to PT  at the [place] location. Seeing good progress daily. Pain levels are improving. She does report that 2 weeks ago she was attempting to flex the knee when she noticed that the very top of her incision had a small superficial opening. She was instructed by PT to keep it covered and she has been doing that.  This is not something that I was aware of until now.  She does report some pain that starts in her knee and radiates down the lateral aspect of her lower leg and into her 4th/5th toes. She denies any prior back pain or radicular symptoms.  Overall getting better and making progress by her account.    REVIEW OF SYSTEMS:   Constitution: Negative. Negative for chills, fever and night sweats.    Hematologic/Lymphatic: Negative for bleeding problem. Does not bruise/bleed easily.   Skin: Negative for dry skin, itching and rash.   Musculoskeletal: Negative for falls.  Negative for right knee pain and muscle weakness.     All other review of symptoms were reviewed and found to be noncontributory.     PAST MEDICAL HISTORY:   Past Medical History:   Diagnosis Date    Abnormal gait 02/22/2023     Arthritis     Depression     Encounter for blood transfusion     GERD (gastroesophageal reflux disease)     Hypertension     Migraine     Poor tolerance for ambulation 02/22/2023    Trigeminal neuralgia 03/2018     PAST SURGICAL HISTORY:   Past Surgical History:   Procedure Laterality Date    APPENDECTOMY      CHOLECYSTECTOMY  07/2012    ESOPHAGOGASTRODUODENOSCOPY N/A 10/14/2019    Procedure: EGD (ESOPHAGOGASTRODUODENOSCOPY);  Surgeon: Sean Girard MD;  Location: [facility] ENDO (4TH FLR);  Service: Endoscopy;  Laterality: N/A;    HYSTERECTOMY      LAPAROSCOPIC TOUPET FUNDOPLICATION N/A 11/22/2019    Procedure: FUNDOPLICATION, LAPAROSCOPIC, TOUPET;  Surgeon: Adriano Godoy MD;  Location: [facility] 2ND FLR;  Service: General;  Laterality: N/A;    TOTAL KNEE ARTHROPLASTY Right 2/26/2024    Procedure: ARTHROPLASTY, KNEE, TOTAL;  Surgeon: ERNIE Lezama MD;  Location: [facility];  Service: Orthopedics;  Laterality: Right;  Regional w/Catheter, Adductor, 0.5% Marcaine     FAMILY HISTORY:   Family History   Problem Relation Name Age of Onset    Hypertension Mother      Cancer Mother      Breast cancer Mother      Colon cancer Mother      Cancer Father       SOCIAL HISTORY:   Social History     Socioeconomic History    Marital status: Single    Number of children: 3   Occupational History    Occupation: General [employer]   Tobacco Use    Smoking status: Never    Smokeless tobacco: Never   Substance and Sexual Activity    Alcohol use: No    Drug use: No    Sexual activity: Yes     Partners: Male   Social History Narrative    Working at [employer], does do bending/lifting type work      MEDICATIONS:     Current Outpatient Medications:     acetaminophen (TYLENOL) 650 MG TbSR, Take 650 mg by mouth every 8 (eight) hours., Disp: , Rfl:     celecoxib (CELEBREX) 200 MG capsule, Take 1 capsule (200 mg total) by mouth once daily., Disp: 60 capsule, Rfl: 0    citalopram (CELEXA) 40 MG tablet, Take 1 tablet (40 mg total) by  mouth once daily., Disp: 90 tablet, Rfl: 3    clobetasol 0.05% (TEMOVATE) 0.05 % Oint, Use a pencil eraser amount on the vulva twice a day for two weeks, once a day for two weekx, then twice a week., Disp: 45 g, Rfl: 0    cyanocobalamin, vitamin B-12, 2,500 mcg Tab, Take 1 tablet by mouth once daily., Disp: , Rfl:     gabapentin (NEURONTIN) 300 MG capsule, Take 1 capsule (300 mg total) by mouth 3 (three) times daily., Disp: 90 capsule, Rfl: 11    losartan (COZAAR) 50 MG tablet, Take 1 tablet (50 mg total) by mouth once daily. For blood pressure, Disp: 90 tablet, Rfl: 3    omeprazole (PRILOSEC) 40 MG capsule, Take 1 capsule (40 mg total) by mouth every morning., Disp: 90 capsule, Rfl: 1    omeprazole (PRILOSEC) 40 MG capsule, Take one capsule by mouth every morning, Disp: 90 capsule, Rfl: 3    ondansetron (ZOFRAN-ODT) 4 MG TbDL, Dissolve one tablet on tongue every 4 to 6 hours as needed for nausea, Disp: 28 tablet, Rfl: 0    vitamin D (VITAMIN D3) 1000 units Tab, Take 2,000 Units by mouth once daily., Disp: , Rfl:     aspirin (ECOTRIN) 81 MG EC tablet, Take 1 tablet (81 mg total) by mouth 2 (two) times a day., Disp: 84 tablet, Rfl: 0    HYDROcodone-acetaminophen (NORCO) 5-325 mg per tablet, Take 1 tablet by mouth every 12 (twelve) hours as needed for Pain. (Patient not taking: Reported on 5/21/2024), Disp: 14 tablet, Rfl: 0    HYDROcodone-acetaminophen (NORCO) 5-325 mg per tablet, Take 1 tablet by mouth every 6 (six) hours as needed for Pain. (Patient not taking: Reported on 5/21/2024), Disp: 25 tablet, Rfl: 0    methylPREDNISolone (MEDROL DOSEPACK) 4 mg tablet, use as directed (Patient not taking: Reported on 5/21/2024), Disp: 21 each, Rfl: 0    triamcinolone acetonide 0.025% (KENALOG) 0.025 % Oint, Apply topically 2 (two) times daily as needed (itching. apply only to outside of labia)., Disp: 15 g, Rfl: 0  No current facility-administered medications for this visit.    Facility-Administered Medications Ordered in  "Other Visits:     ropivacaine 0.2% Perineural Pump infusion 500 ML, , Perineural, Continuous, North Singh PA-C, New Bag at 02/26/24 0946    ALLERGIES:   Review of patient's allergies indicates:  No Known Allergies     PHYSICAL EXAMINATION:  /89   Pulse 84   Ht 5' 9" (1.753 m)   Wt 94 kg (207 lb 3.7 oz)   BMI 30.60 kg/m²   General: Well-developed well-nourished 64 y.o. femalein no acute distress   Cardiovascular: Regular rhythm by palpation of distal pulse, normal color and temperature, no concerning varicosities on symptomatic side   Lungs: No labored breathing or wheezing appreciated   Neuro: Alert and oriented ×3   Psychiatric: well oriented to person, place and time, demonstrates normal mood and affect   Skin: No rashes, lesions or ulcers, normal temperature, turgor, and texture on involved extremity    Ortho/SPM Exam  Exam of the right knee progressive healing of the midline incision.  Predominantly healed.  Tiny residual eschar over the distal 3rd.  This appears healthy.  No significant areas of tenderness.  No significant swelling.  No effusion.  Full active extension, flexion to 125-130 degrees.  Central patellar tracking.  No mid flexion instability.  Stable to varus and valgus stress.    IMAGING:  X-rays including standing, weight bearing AP and flexion bilateral knees, right knee lateral and sunrise views reviewed by me demonstrate post operative total knee arthroplasty prosthesis in place and intact. No signs of loosening, fracture or complication otherwise seen.  Advanced DJD of the left knee. KL4.    ASSESSMENT:      ICD-10-CM ICD-9-CM   1. Primary osteoarthritis of right knee  M17.11 715.16   2. S/P total knee arthroplasty, right  Z96.651 V43.65   3. Primary osteoarthritis of left knee  M17.12 715.16       PLAN:     Overall doing quite well.  Patient is pleased.  Discussed no soaks of the knee under water until the small residual eschar has completely resolved.  She would like to " discuss return to work.  She thinks she can return towards the middle of June.  No provided with some request to allow some rest as needed.  In regards to her left knee, she does have advanced DJD.  This bothers her periodically.  Hold off on any further treatment at this time.  Discussed dental prophylaxis for the right knee.    I do think the patient has some right lower extremity lumbar radiculopathy that is well managed currently with conservative measures.  If that becomes more significant with time, we will have to make sure she has follow up access with the spine team.    Procedures

## 2024-05-21 ENCOUNTER — HOSPITAL ENCOUNTER (OUTPATIENT)
Dept: RADIOLOGY | Facility: HOSPITAL | Age: 64
Discharge: HOME | End: 2024-05-21
Attending: ORTHOPAEDIC SURGERY
Payer: COMMERCIAL

## 2024-05-21 ENCOUNTER — OFFICE VISIT (OUTPATIENT)
Dept: SPORTS MEDICINE | Facility: CLINIC | Age: 64
End: 2024-05-21
Payer: COMMERCIAL

## 2024-05-21 VITALS
HEIGHT: 69 IN | HEART RATE: 84 BPM | WEIGHT: 207.25 LBS | DIASTOLIC BLOOD PRESSURE: 89 MMHG | BODY MASS INDEX: 30.7 KG/M2 | SYSTOLIC BLOOD PRESSURE: 137 MMHG

## 2024-05-21 DIAGNOSIS — M25.561 RIGHT KNEE PAIN, UNSPECIFIED CHRONICITY: ICD-10-CM

## 2024-05-21 DIAGNOSIS — Z96.651 S/P TOTAL KNEE ARTHROPLASTY, RIGHT: ICD-10-CM

## 2024-05-21 DIAGNOSIS — M17.12 PRIMARY OSTEOARTHRITIS OF LEFT KNEE: ICD-10-CM

## 2024-05-21 DIAGNOSIS — M17.11 PRIMARY OSTEOARTHRITIS OF RIGHT KNEE: Primary | ICD-10-CM

## 2024-05-21 PROCEDURE — 99214 OFFICE O/P EST MOD 30 MIN: CPT | Mod: S$GLB,,, | Performed by: ORTHOPAEDIC SURGERY

## 2024-05-21 PROCEDURE — 73564 X-RAY EXAM KNEE 4 OR MORE: CPT | Mod: 26,RT,, | Performed by: RADIOLOGY

## 2024-05-21 PROCEDURE — 3075F SYST BP GE 130 - 139MM HG: CPT | Mod: CPTII,S$GLB,, | Performed by: ORTHOPAEDIC SURGERY

## 2024-05-21 PROCEDURE — 3008F BODY MASS INDEX DOCD: CPT | Mod: CPTII,S$GLB,, | Performed by: ORTHOPAEDIC SURGERY

## 2024-05-21 PROCEDURE — 3044F HG A1C LEVEL LT 7.0%: CPT | Mod: CPTII,S$GLB,, | Performed by: ORTHOPAEDIC SURGERY

## 2024-05-21 PROCEDURE — 3079F DIAST BP 80-89 MM HG: CPT | Mod: CPTII,S$GLB,, | Performed by: ORTHOPAEDIC SURGERY

## 2024-05-21 PROCEDURE — 73562 X-RAY EXAM OF KNEE 3: CPT | Mod: 59,TC,LT

## 2024-05-21 PROCEDURE — 99999 PR PBB SHADOW E&M-EST. PATIENT-LVL IV: CPT | Mod: PBBFAC,,, | Performed by: ORTHOPAEDIC SURGERY

## 2024-05-21 PROCEDURE — 1159F MED LIST DOCD IN RCRD: CPT | Mod: CPTII,S$GLB,, | Performed by: ORTHOPAEDIC SURGERY

## 2024-05-21 PROCEDURE — 4010F ACE/ARB THERAPY RXD/TAKEN: CPT | Mod: CPTII,S$GLB,, | Performed by: ORTHOPAEDIC SURGERY

## 2024-05-29 DIAGNOSIS — F41.9 ANXIETY: ICD-10-CM

## 2024-05-29 DIAGNOSIS — I10 ESSENTIAL HYPERTENSION: ICD-10-CM

## 2024-05-29 RX ORDER — OMEPRAZOLE 40 MG/1
40 CAPSULE, DELAYED RELEASE ORAL EVERY MORNING
Qty: 90 CAPSULE | Refills: 2 | Status: SHIPPED | OUTPATIENT
Start: 2024-05-29

## 2024-05-29 RX ORDER — LOSARTAN POTASSIUM 50 MG/1
50 TABLET ORAL DAILY
Qty: 90 TABLET | Refills: 2 | Status: SHIPPED | OUTPATIENT
Start: 2024-05-29

## 2024-05-29 RX ORDER — CITALOPRAM 40 MG/1
40 TABLET, FILM COATED ORAL DAILY
Qty: 90 TABLET | Refills: 2 | Status: SHIPPED | OUTPATIENT
Start: 2024-05-29

## 2024-05-29 NOTE — TELEPHONE ENCOUNTER
No care due was identified.  [facility] Embedded Care Due Messages. Reference number: [id number].   5/29/2024 9:49:12 AM CDT

## 2024-05-29 NOTE — TELEPHONE ENCOUNTER
Refill Decision Note   Jenifer Burnett  is requesting a refill authorization.  Brief Assessment and Rationale for Refill:  Approve     Medication Therapy Plan:         Alert overridden per protocol: Yes   Comments:     Note composed:12:31 PM 05/29/2024

## 2024-05-31 DIAGNOSIS — Z96.651 S/P TOTAL KNEE ARTHROPLASTY, RIGHT: Primary | ICD-10-CM

## 2024-05-31 RX ORDER — CEPHALEXIN 500 MG/1
500 CAPSULE ORAL 4 TIMES DAILY
Qty: 4 CAPSULE | Refills: 0 | Status: SHIPPED | OUTPATIENT
Start: 2024-05-31

## 2024-05-31 NOTE — TELEPHONE ENCOUNTER
----- Message from Jennifer Pascual MA sent at 5/31/2024 11:33 AM CDT -----  Regarding: FW: pharm auth  Contact: pt [phone]    ----- Message -----  From: Rohini Amor  Sent: 5/31/2024  11:27 AM CDT  To: Dulce Kam Staff  Subject: pharm auth                                       Pt calling to request antibiotic for dental appt . Pls call

## 2024-06-04 ENCOUNTER — HOSPITAL ENCOUNTER (OUTPATIENT)
Dept: RADIOLOGY | Facility: HOSPITAL | Age: 64
Discharge: HOME | End: 2024-06-04
Attending: INTERNAL MEDICINE
Payer: COMMERCIAL

## 2024-06-04 VITALS — WEIGHT: 200 LBS | BODY MASS INDEX: 29.53 KG/M2

## 2024-06-04 DIAGNOSIS — Z12.31 SCREENING MAMMOGRAM FOR BREAST CANCER: ICD-10-CM

## 2024-06-04 PROCEDURE — 77067 SCR MAMMO BI INCL CAD: CPT | Mod: 26,,, | Performed by: RADIOLOGY

## 2024-06-04 PROCEDURE — 77063 BREAST TOMOSYNTHESIS BI: CPT | Mod: 26,,, | Performed by: RADIOLOGY

## 2024-06-04 PROCEDURE — 77063 BREAST TOMOSYNTHESIS BI: CPT | Mod: TC

## 2024-06-10 ENCOUNTER — PATIENT MESSAGE (OUTPATIENT)
Dept: INTERNAL MEDICINE | Facility: CLINIC | Age: 64
End: 2024-06-10
Payer: COMMERCIAL

## 2024-06-29 ENCOUNTER — DOCUMENTATION ONLY (OUTPATIENT)
Dept: SPORTS MEDICINE | Facility: CLINIC | Age: 64
End: 2024-06-29
Payer: COMMERCIAL

## 2024-06-29 DIAGNOSIS — L30.9 VULVAR DERMATITIS: ICD-10-CM

## 2024-06-29 NOTE — TELEPHONE ENCOUNTER
Refill Routing Note   Medication(s) are not appropriate for processing by [facility] for the following reason(s):        New or recently adjusted medication    [facility] action(s):  Defer        Medication Therapy Plan: Unclear if intention is to continue using      Appointments  past 12m or future 3m with PCP    Date Provider   Last Visit   4/11/2024 Blanche Vaughn MD   Next Visit   Visit date not found Blanche Vaughn MD   ED visits in past 90 days: 0        Note composed:11:15 AM 06/29/2024

## 2024-07-01 RX ORDER — ONDANSETRON 4 MG/1
TABLET, ORALLY DISINTEGRATING ORAL
Qty: 9 TABLET | Refills: 3 | Status: SHIPPED | OUTPATIENT
Start: 2024-07-01

## 2024-07-02 RX ORDER — CLOBETASOL PROPIONATE 0.5 MG/G
OINTMENT TOPICAL
Qty: 45 G | Refills: 0 | Status: SHIPPED | OUTPATIENT
Start: 2024-07-02

## 2024-07-03 ENCOUNTER — PATIENT MESSAGE (OUTPATIENT)
Dept: SPORTS MEDICINE | Facility: CLINIC | Age: 64
End: 2024-07-03
Payer: COMMERCIAL

## 2024-07-15 ENCOUNTER — TELEPHONE (OUTPATIENT)
Dept: SPORTS MEDICINE | Facility: CLINIC | Age: 64
End: 2024-07-15
Payer: COMMERCIAL

## 2024-07-15 NOTE — TELEPHONE ENCOUNTER
----- Message from Jennifer Pascual MA sent at 7/15/2024  2:28 PM CDT -----  Regarding: FW: appt access  Contact: pt @ [phone]  Call and schedule with Breann please.  ----- Message -----  From: Jaren Bush  Sent: 7/15/2024   1:36 PM CDT  To: Lise Solo Staff  Subject: appt access                                      Pt is advising that knee appears to be infected where replacement was done. Please call to advise further as pt is wanting to be seen as soon as possible. Thank you for all you are doing.

## 2024-07-16 ENCOUNTER — OFFICE VISIT (OUTPATIENT)
Dept: SPORTS MEDICINE | Facility: CLINIC | Age: 64
End: 2024-07-16
Payer: COMMERCIAL

## 2024-07-16 ENCOUNTER — HOSPITAL ENCOUNTER (OUTPATIENT)
Dept: RADIOLOGY | Facility: HOSPITAL | Age: 64
Discharge: HOME | End: 2024-07-16
Attending: PHYSICIAN ASSISTANT
Payer: COMMERCIAL

## 2024-07-16 VITALS
BODY MASS INDEX: 30.7 KG/M2 | HEIGHT: 69 IN | WEIGHT: 207.25 LBS | SYSTOLIC BLOOD PRESSURE: 133 MMHG | DIASTOLIC BLOOD PRESSURE: 88 MMHG | HEART RATE: 82 BPM

## 2024-07-16 DIAGNOSIS — M25.561 RIGHT KNEE PAIN, UNSPECIFIED CHRONICITY: ICD-10-CM

## 2024-07-16 DIAGNOSIS — Z96.651 S/P TOTAL KNEE ARTHROPLASTY, RIGHT: Primary | ICD-10-CM

## 2024-07-16 PROCEDURE — 73564 X-RAY EXAM KNEE 4 OR MORE: CPT | Mod: 26,RT,, | Performed by: RADIOLOGY

## 2024-07-16 PROCEDURE — 3008F BODY MASS INDEX DOCD: CPT | Mod: CPTII,S$GLB,, | Performed by: PHYSICIAN ASSISTANT

## 2024-07-16 PROCEDURE — 99999 PR PBB SHADOW E&M-EST. PATIENT-LVL IV: CPT | Mod: PBBFAC,,, | Performed by: PHYSICIAN ASSISTANT

## 2024-07-16 PROCEDURE — 3079F DIAST BP 80-89 MM HG: CPT | Mod: CPTII,S$GLB,, | Performed by: PHYSICIAN ASSISTANT

## 2024-07-16 PROCEDURE — 3075F SYST BP GE 130 - 139MM HG: CPT | Mod: CPTII,S$GLB,, | Performed by: PHYSICIAN ASSISTANT

## 2024-07-16 PROCEDURE — 99213 OFFICE O/P EST LOW 20 MIN: CPT | Mod: S$GLB,,, | Performed by: PHYSICIAN ASSISTANT

## 2024-07-16 PROCEDURE — 1159F MED LIST DOCD IN RCRD: CPT | Mod: CPTII,S$GLB,, | Performed by: PHYSICIAN ASSISTANT

## 2024-07-16 PROCEDURE — 73562 X-RAY EXAM OF KNEE 3: CPT | Mod: 26,LT,, | Performed by: RADIOLOGY

## 2024-07-16 PROCEDURE — 3044F HG A1C LEVEL LT 7.0%: CPT | Mod: CPTII,S$GLB,, | Performed by: PHYSICIAN ASSISTANT

## 2024-07-16 PROCEDURE — 73562 X-RAY EXAM OF KNEE 3: CPT | Mod: TC,59,LT

## 2024-07-16 PROCEDURE — 4010F ACE/ARB THERAPY RXD/TAKEN: CPT | Mod: CPTII,S$GLB,, | Performed by: PHYSICIAN ASSISTANT

## 2024-07-16 PROCEDURE — 1160F RVW MEDS BY RX/DR IN RCRD: CPT | Mod: CPTII,S$GLB,, | Performed by: PHYSICIAN ASSISTANT

## 2024-07-16 RX ORDER — SULFAMETHOXAZOLE AND TRIMETHOPRIM 800; 160 MG/1; MG/1
1 TABLET ORAL 2 TIMES DAILY
Qty: 14 TABLET | Refills: 0 | Status: SHIPPED | OUTPATIENT
Start: 2024-07-16 | End: 2024-07-23

## 2024-07-16 NOTE — PROGRESS NOTES
CC: Right knee post-op follow up    DATE OF PROCEDURE: 2/26/2024   PROCEDURES PERFORMED:   Right total knee arthroplasty (CPT 33129)    Jenifer Burnett presents today for follow up appointment of her right knee. Patient is now 5 months status post above procedure. Completed PT at [facility] and continues to do HEP.  She is doing quite well.  No significant pain in the knee itself. Back to work. Overall much better compared to preop.  Still taking Gabapentin for the neuropathy.  Only complaint today is redness/pain over a single proximal spot of the incision. She says sometimes she puts a bandage over it and it looks like a spot of pus but then scabs over. No active drainage. Point tender and red at the spot. Denies constitutional symptoms.    Prior Hx 5/21/2024:  Jenifer Burnett presents today for follow up appointment of her right knee. Patient is now 12 weeks status post above procedure. Continues PT at [facility].  She is doing quite well.  Has some intermittent mild soft tissue soreness around the knee but nothing too significant.  No significant nighttime pain.  Overall much better compared to preop.  She does have some achiness over the anterolateral lower leg that is responsive to Gabapentin.  She experienced the symptoms about 2 months prior to her right total knee arthroplasty and is in association with some chronic low back pain.    Prior Hx 4/8/2024:   Jenifer Burnett reports to be doing well 6 weeks s/p the above mentioned procedure. Going to PT  at the [place] location. Seeing good progress daily. Pain levels are improving. She does report that 2 weeks ago she was attempting to flex the knee when she noticed that the very top of her incision had a small superficial opening. She was instructed by PT to keep it covered and she has been doing that.  This is not something that I was aware of until now.  She does report some pain that starts in her knee and radiates down the lateral aspect of her lower  leg and into her 4th/5th toes. She denies any prior back pain or radicular symptoms.  Overall getting better and making progress by her account.    REVIEW OF SYSTEMS:   Constitution: Negative. Negative for chills, fever and night sweats.    Hematologic/Lymphatic: Negative for bleeding problem. Does not bruise/bleed easily.   Skin: Negative for dry skin, itching and rash.   Musculoskeletal: Negative for falls.  Negative for right knee pain and muscle weakness.     All other review of symptoms were reviewed and found to be noncontributory.     PAST MEDICAL HISTORY:   Past Medical History:   Diagnosis Date    Abnormal gait 02/22/2023    Arthritis     Depression     Encounter for blood transfusion     GERD (gastroesophageal reflux disease)     Hypertension     Migraine     Poor tolerance for ambulation 02/22/2023    Trigeminal neuralgia 03/2018     PAST SURGICAL HISTORY:   Past Surgical History:   Procedure Laterality Date    APPENDECTOMY      CHOLECYSTECTOMY  07/2012    ESOPHAGOGASTRODUODENOSCOPY N/A 10/14/2019    Procedure: EGD (ESOPHAGOGASTRODUODENOSCOPY);  Surgeon: Sean Girard MD;  Location: [facility] F[facility]);  Service: Endoscopy;  Laterality: N/A;    HYSTERECTOMY      LAPAROSCOPIC TOUPET FUNDOPLICATION N/A 11/22/2019    Procedure: FUNDOPLICATION, LAPAROSCOPIC, TOUPET;  Surgeon: Adriano Godoy MD;  Location: [address];  Service: General;  Laterality: N/A;    TOTAL KNEE ARTHROPLASTY Right 2/26/2024    Procedure: ARTHROPLASTY, KNEE, TOTAL;  Surgeon: ERNIE Lezama MD;  Location: [facility];  Service: Orthopedics;  Laterality: Right;  Regional w/Catheter, Adductor, 0.5% Marcaine     FAMILY HISTORY:   Family History   Problem Relation Name Age of Onset    Hypertension Mother      Cancer Mother      Breast cancer Mother      Colon cancer Mother      Cancer Father       SOCIAL HISTORY:   Social History     Socioeconomic History    Marital status: Single    Number of children: 3   Occupational History     Occupation: General [employer]   Tobacco Use    Smoking status: Never    Smokeless tobacco: Never   Substance and Sexual Activity    Alcohol use: No    Drug use: No    Sexual activity: Yes     Partners: Male   Social History Narrative    Working at [employer], does do bending/lifting type work      MEDICATIONS:     Current Outpatient Medications:     acetaminophen (TYLENOL) 650 MG TbSR, Take 650 mg by mouth every 8 (eight) hours., Disp: , Rfl:     celecoxib (CELEBREX) 200 MG capsule, Take 1 capsule (200 mg total) by mouth once daily., Disp: 60 capsule, Rfl: 0    cephALEXin (KEFLEX) 500 MG capsule, Take 1 capsule (500 mg total) by mouth 4 (four) times daily. Take 4 capsules prior to dental procedure., Disp: 4 capsule, Rfl: 0    citalopram (CELEXA) 40 MG tablet, Take 1 tablet (40 mg total) by mouth once daily., Disp: 90 tablet, Rfl: 2    clobetasol 0.05% (TEMOVATE) 0.05 % Oint, USE A PENCIL ERASER AMOUNT ON THE VULVA TWICE A DAY FOR TWO WEEKS, ONCE A DAY FOR TWO WEEKX, THEN TWICE A WEEK., Disp: 45 g, Rfl: 0    cyanocobalamin, vitamin B-12, 2,500 mcg Tab, Take 1 tablet by mouth once daily., Disp: , Rfl:     gabapentin (NEURONTIN) 300 MG capsule, Take 1 capsule (300 mg total) by mouth 3 (three) times daily., Disp: 90 capsule, Rfl: 11    losartan (COZAAR) 50 MG tablet, Take 1 tablet (50 mg total) by mouth once daily., Disp: 90 tablet, Rfl: 2    omeprazole (PRILOSEC) 40 MG capsule, Take 1 capsule (40 mg total) by mouth every morning., Disp: 90 capsule, Rfl: 2    ondansetron (ZOFRAN-ODT) 4 MG TbDL, DISSOLVE 1 TABLET ON TONGUE EVERY 4 TO 6 HOURS AS NEEDED FOR NAUSEA, Disp: 9 tablet, Rfl: 3    vitamin D (VITAMIN D3) 1000 units Tab, Take 2,000 Units by mouth once daily., Disp: , Rfl:     aspirin (ECOTRIN) 81 MG EC tablet, Take 1 tablet (81 mg total) by mouth 2 (two) times a day., Disp: 84 tablet, Rfl: 0    HYDROcodone-acetaminophen (NORCO) 5-325 mg per tablet, Take 1 tablet by mouth every 12 (twelve) hours as  "needed for Pain. (Patient not taking: Reported on 5/21/2024), Disp: 14 tablet, Rfl: 0    HYDROcodone-acetaminophen (NORCO) 5-325 mg per tablet, Take 1 tablet by mouth every 6 (six) hours as needed for Pain. (Patient not taking: Reported on 5/21/2024), Disp: 25 tablet, Rfl: 0    sulfamethoxazole-trimethoprim 800-160mg (BACTRIM DS) 800-160 mg Tab, Take 1 tablet by mouth 2 (two) times daily. for 7 days, Disp: 14 tablet, Rfl: 0    triamcinolone acetonide 0.025% (KENALOG) 0.025 % Oint, Apply topically 2 (two) times daily as needed (itching. apply only to outside of labia)., Disp: 15 g, Rfl: 0  No current facility-administered medications for this visit.    Facility-Administered Medications Ordered in Other Visits:     ropivacaine 0.2% Perineural Pump infusion 500 ML, , Perineural, Continuous, North Singh PA-C, New Bag at 02/26/24 0958    ALLERGIES:   Review of patient's allergies indicates:  No Known Allergies     PHYSICAL EXAMINATION:  /88   Pulse 82   Ht 5' 9" (1.753 m)   Wt 94 kg (207 lb 3.7 oz)   BMI 30.60 kg/m²   General: Well-developed well-nourished 64 y.o. femalein no acute distress   Cardiovascular: Regular rhythm by palpation of distal pulse, normal color and temperature, no concerning varicosities on symptomatic side   Lungs: No labored breathing or wheezing appreciated   Neuro: Alert and oriented ×3   Psychiatric: well oriented to person, place and time, demonstrates normal mood and affect   Skin: No rashes, lesions or ulcers, normal temperature, turgor, and texture on involved extremity    Ortho/SPM Exam  Exam of the right knee progressive healing of the midline incision.  Predominantly healed.  Tiny residual eschar over the distal 3rd still present but resolving.  This appears healthy. She has a red point tender eschar at the proximal pole of the incision. Point tender. No active drainage. Likely vicryl suture abscess. No surrounding erythema.  No significant swelling.  No effusion.  Full " active extension, flexion to 125-130 degrees.  Central patellar tracking.  No mid flexion instability.  Stable to varus and valgus stress.    IMAGING:  X-rays including standing, weight bearing AP and flexion bilateral knees, right knee lateral and sunrise views reviewed by me demonstrate post operative total knee arthroplasty prosthesis in place and intact. No signs of loosening, fracture or complication otherwise seen.  Advanced DJD of the left knee. KL4.    ASSESSMENT:      ICD-10-CM ICD-9-CM   1. Right knee pain, unspecified chronicity  M25.561 719.46       PLAN:     Overall doing quite well.  Patient is pleased. Likely suture abscess at the proximal pole of the incision. I will prescribe 1 weeks of Bactrim.  Discussed still no soaks of the knee under water until the small residual eschar has completely resolved. Otherwise knee looks great. Continue HEP. In regards to her left knee, she does have advanced DJD.  This bothers her periodically.  Hold off on any further treatment at this time.  Discussed dental prophylaxis for the right knee.    I do think the patient has some right lower extremity lumbar radiculopathy that is well managed currently with conservative measures.  If that becomes more significant with time, we will have to make sure she has follow up access with the spine team.    RTC 1 week wound check    Procedures

## 2024-09-30 ENCOUNTER — NURSE TRIAGE (OUTPATIENT)
Dept: ADMINISTRATIVE | Facility: CLINIC | Age: 64
End: 2024-09-30
Payer: COMMERCIAL

## 2024-09-30 NOTE — TELEPHONE ENCOUNTER
"Spoke with pt who reports that she has been having chest pain for past 3 days that is noted to be across her chest. She also also reports having shoulder pain, and nausea. States CP comes and goes, but not worsening since starting. She also states that she has been feeling fatigued for 2-3 weeks. Advised to be seen in ED/UC. Pt states that she would like to make an appointment to be sen by PCP. Advise will send message to office. Verbalized understanding.    Reason for Disposition   Patient sounds very sick or weak to the triager    Additional Information   Negative: SEVERE difficulty breathing (e.g., struggling for each breath, speaks in single words)   Negative: Difficult to awaken or acting confused (e.g., disoriented, slurred speech)   Negative: Shock suspected (e.g., cold/pale/clammy skin, too weak to stand, low BP, rapid pulse)   Negative: Passed out (e.g., fainted, lost consciousness, blacked out and was not responding)   Negative: [1] Chest pain lasts > 5 minutes AND [2] age > 44   Negative: [1] Chest pain lasts > 5 minutes AND [2] age > 30 AND [3] one or more cardiac risk factors (e.g., diabetes, high blood pressure, high cholesterol, obesity with BMI 30 or higher, smoker, or strong family history of heart disease)   Negative: [1] Chest pain lasts > 5 minutes AND [2] history of heart disease (i.e., angina, heart attack, heart failure, bypass surgery, takes nitroglycerin)   Negative: [1] Chest pain lasts > 5 minutes AND [2] described as crushing, pressure-like, or heavy   Negative: Heart beating < 50 beats per minute OR > 140 beats per minute   Negative: Visible sweat on face or sweat dripping down face   Negative: Sounds like a life-threatening emergency to the triager   Negative: SEVERE chest pain   Negative: [1] Chest pain (or "angina") comes and goes AND [2] is happening more often (increasing in frequency) or getting worse (increasing in severity)  (Exception: Chest pains that last only a few " "seconds.)   Negative: Pain also in shoulder(s) or arm(s) or jaw  (Exception: Pain is clearly made worse by movement.)   Negative: Difficulty breathing   Negative: Feeling weak or lightheaded (e.g., woozy, feeling like they might faint)   Negative: Coughing up blood   Negative: Cocaine use within last 3 days   Negative: Major surgery in past month   Negative: Hip or leg fracture (broken bone) in past month (or had cast on leg or ankle in past month)   Negative: Illness requiring prolonged bedrest in past month (e.g., immobilization, long hospital stay)   Negative: Long-distance travel in past month (e.g., car, bus, train, plane; with trip lasting 6 or more hours)   Negative: History of prior "blood clot" in leg or lungs (i.e., deep vein thrombosis, pulmonary embolism)   Negative: History of inherited increased risk of blood clots (e.g., Factor 5 Leiden, Anti-thrombin 3, Protein C or Protein S deficiency, Prothrombin mutation)   Negative: Cancer treatment in past six months (or has cancer now)   Negative: [1] Chest pain lasts > 5 minutes AND [2] occurred in past 3 days (72 hours) (Exception: Feels exactly the same as previously diagnosed heartburn and has accompanying sour taste in mouth.)   Negative: Taking a deep breath makes pain worse    Protocols used: Chest Pain-A-AH    "

## 2024-10-31 ENCOUNTER — LAB VISIT (OUTPATIENT)
Dept: LAB | Facility: HOSPITAL | Age: 64
End: 2024-10-31
Payer: COMMERCIAL

## 2024-10-31 ENCOUNTER — OFFICE VISIT (OUTPATIENT)
Dept: INTERNAL MEDICINE | Facility: CLINIC | Age: 64
End: 2024-10-31
Payer: COMMERCIAL

## 2024-10-31 VITALS
HEIGHT: 69 IN | DIASTOLIC BLOOD PRESSURE: 76 MMHG | OXYGEN SATURATION: 98 % | WEIGHT: 202.63 LBS | BODY MASS INDEX: 30.01 KG/M2 | HEART RATE: 73 BPM | SYSTOLIC BLOOD PRESSURE: 132 MMHG

## 2024-10-31 DIAGNOSIS — R07.9 CHEST PAIN, UNSPECIFIED TYPE: Primary | ICD-10-CM

## 2024-10-31 DIAGNOSIS — F41.9 ANXIETY: ICD-10-CM

## 2024-10-31 DIAGNOSIS — K21.9 GASTROESOPHAGEAL REFLUX DISEASE WITHOUT ESOPHAGITIS: ICD-10-CM

## 2024-10-31 DIAGNOSIS — E78.1 HYPERTRIGLYCERIDEMIA: ICD-10-CM

## 2024-10-31 DIAGNOSIS — R07.9 CHEST PAIN, UNSPECIFIED TYPE: ICD-10-CM

## 2024-10-31 DIAGNOSIS — R07.89 CHEST WALL PAIN: ICD-10-CM

## 2024-10-31 DIAGNOSIS — I10 ESSENTIAL HYPERTENSION: ICD-10-CM

## 2024-10-31 LAB
ALBUMIN SERPL BCP-MCNC: 3.7 G/DL (ref 3.5–5.2)
ALP SERPL-CCNC: 67 U/L (ref 40–150)
ALT SERPL W/O P-5'-P-CCNC: 11 U/L (ref 10–44)
ANION GAP SERPL CALC-SCNC: 6 MMOL/L (ref 8–16)
AST SERPL-CCNC: 16 U/L (ref 10–40)
BILIRUB SERPL-MCNC: 0.6 MG/DL (ref 0.1–1)
BUN SERPL-MCNC: 13 MG/DL (ref 8–23)
CALCIUM SERPL-MCNC: 9.2 MG/DL (ref 8.7–10.5)
CHLORIDE SERPL-SCNC: 104 MMOL/L (ref 95–110)
CHOLEST SERPL-MCNC: 180 MG/DL (ref 120–199)
CHOLEST/HDLC SERPL: 3.2 {RATIO} (ref 2–5)
CO2 SERPL-SCNC: 28 MMOL/L (ref 23–29)
CREAT SERPL-MCNC: 0.8 MG/DL (ref 0.5–1.4)
EST. GFR  (NO RACE VARIABLE): >60 ML/MIN/1.73 M^2
ESTIMATED AVG GLUCOSE: 105 MG/DL (ref 68–131)
GLUCOSE SERPL-MCNC: 86 MG/DL (ref 70–110)
HBA1C MFR BLD: 5.3 % (ref 4–5.6)
HDLC SERPL-MCNC: 56 MG/DL (ref 40–75)
HDLC SERPL: 31.1 % (ref 20–50)
LDLC SERPL CALC-MCNC: 102.2 MG/DL (ref 63–159)
NONHDLC SERPL-MCNC: 124 MG/DL
POTASSIUM SERPL-SCNC: 4 MMOL/L (ref 3.5–5.1)
PROT SERPL-MCNC: 7.3 G/DL (ref 6–8.4)
SODIUM SERPL-SCNC: 138 MMOL/L (ref 136–145)
TRIGL SERPL-MCNC: 109 MG/DL (ref 30–150)
TSH SERPL DL<=0.005 MIU/L-ACNC: 1.56 UIU/ML (ref 0.4–4)

## 2024-10-31 PROCEDURE — 80053 COMPREHEN METABOLIC PANEL: CPT | Performed by: NURSE PRACTITIONER

## 2024-10-31 PROCEDURE — 99999 PR PBB SHADOW E&M-EST. PATIENT-LVL IV: CPT | Mod: PBBFAC,,, | Performed by: NURSE PRACTITIONER

## 2024-10-31 PROCEDURE — 83036 HEMOGLOBIN GLYCOSYLATED A1C: CPT | Performed by: NURSE PRACTITIONER

## 2024-10-31 PROCEDURE — 3078F DIAST BP <80 MM HG: CPT | Mod: CPTII,S$GLB,, | Performed by: NURSE PRACTITIONER

## 2024-10-31 PROCEDURE — 1159F MED LIST DOCD IN RCRD: CPT | Mod: CPTII,S$GLB,, | Performed by: NURSE PRACTITIONER

## 2024-10-31 PROCEDURE — 99214 OFFICE O/P EST MOD 30 MIN: CPT | Mod: S$GLB,,, | Performed by: NURSE PRACTITIONER

## 2024-10-31 PROCEDURE — 3075F SYST BP GE 130 - 139MM HG: CPT | Mod: CPTII,S$GLB,, | Performed by: NURSE PRACTITIONER

## 2024-10-31 PROCEDURE — 36415 COLL VENOUS BLD VENIPUNCTURE: CPT | Performed by: NURSE PRACTITIONER

## 2024-10-31 PROCEDURE — 4010F ACE/ARB THERAPY RXD/TAKEN: CPT | Mod: CPTII,S$GLB,, | Performed by: NURSE PRACTITIONER

## 2024-10-31 PROCEDURE — 84443 ASSAY THYROID STIM HORMONE: CPT | Performed by: NURSE PRACTITIONER

## 2024-10-31 PROCEDURE — 80061 LIPID PANEL: CPT | Performed by: NURSE PRACTITIONER

## 2024-10-31 PROCEDURE — 3008F BODY MASS INDEX DOCD: CPT | Mod: CPTII,S$GLB,, | Performed by: NURSE PRACTITIONER

## 2024-10-31 PROCEDURE — 3044F HG A1C LEVEL LT 7.0%: CPT | Mod: CPTII,S$GLB,, | Performed by: NURSE PRACTITIONER

## 2024-10-31 RX ORDER — LOSARTAN POTASSIUM 50 MG/1
50 TABLET ORAL DAILY
Qty: 90 TABLET | Refills: 2 | Status: SHIPPED | OUTPATIENT
Start: 2024-10-31

## 2024-10-31 RX ORDER — CITALOPRAM 40 MG/1
40 TABLET, FILM COATED ORAL DAILY
Qty: 90 TABLET | Refills: 1 | Status: SHIPPED | OUTPATIENT
Start: 2024-10-31

## 2024-10-31 RX ORDER — MELOXICAM 7.5 MG/1
7.5 TABLET ORAL DAILY PRN
Qty: 15 TABLET | Refills: 0 | Status: SHIPPED | OUTPATIENT
Start: 2024-10-31

## 2024-10-31 RX ORDER — HYDROXYZINE HYDROCHLORIDE 25 MG/1
25 TABLET, FILM COATED ORAL 3 TIMES DAILY PRN
Qty: 30 TABLET | Refills: 0 | Status: SHIPPED | OUTPATIENT
Start: 2024-10-31

## 2024-10-31 RX ORDER — OMEPRAZOLE 40 MG/1
40 CAPSULE, DELAYED RELEASE ORAL EVERY MORNING
Qty: 90 CAPSULE | Refills: 1 | Status: SHIPPED | OUTPATIENT
Start: 2024-10-31

## 2024-10-31 RX ORDER — METHOCARBAMOL 500 MG/1
500 TABLET, FILM COATED ORAL 3 TIMES DAILY PRN
Qty: 30 TABLET | Refills: 0 | Status: SHIPPED | OUTPATIENT
Start: 2024-10-31 | End: 2024-11-10

## 2025-01-02 ENCOUNTER — OFFICE VISIT (OUTPATIENT)
Dept: INTERNAL MEDICINE | Facility: CLINIC | Age: 65
End: 2025-01-02
Payer: COMMERCIAL

## 2025-01-02 VITALS
HEART RATE: 79 BPM | BODY MASS INDEX: 29.46 KG/M2 | WEIGHT: 198.88 LBS | SYSTOLIC BLOOD PRESSURE: 110 MMHG | HEIGHT: 69 IN | OXYGEN SATURATION: 98 % | DIASTOLIC BLOOD PRESSURE: 72 MMHG

## 2025-01-02 DIAGNOSIS — K52.9 GASTROENTERITIS: Primary | ICD-10-CM

## 2025-01-02 PROCEDURE — 99999 PR PBB SHADOW E&M-EST. PATIENT-LVL IV: CPT | Mod: PBBFAC,,, | Performed by: NURSE PRACTITIONER

## 2025-01-02 PROCEDURE — 3074F SYST BP LT 130 MM HG: CPT | Mod: CPTII,S$GLB,, | Performed by: NURSE PRACTITIONER

## 2025-01-02 PROCEDURE — 99213 OFFICE O/P EST LOW 20 MIN: CPT | Mod: S$GLB,,, | Performed by: NURSE PRACTITIONER

## 2025-01-02 PROCEDURE — 3008F BODY MASS INDEX DOCD: CPT | Mod: CPTII,S$GLB,, | Performed by: NURSE PRACTITIONER

## 2025-01-02 PROCEDURE — 3078F DIAST BP <80 MM HG: CPT | Mod: CPTII,S$GLB,, | Performed by: NURSE PRACTITIONER

## 2025-01-02 PROCEDURE — 1159F MED LIST DOCD IN RCRD: CPT | Mod: CPTII,S$GLB,, | Performed by: NURSE PRACTITIONER

## 2025-01-02 RX ORDER — ONDANSETRON 4 MG/1
4 TABLET, ORALLY DISINTEGRATING ORAL EVERY 8 HOURS PRN
Qty: 20 TABLET | Refills: 0 | Status: SHIPPED | OUTPATIENT
Start: 2025-01-02

## 2025-01-02 NOTE — LETTER
January 2, 2025      [facility]  [address]  [facility] [address]  Phone: [phone]  Fax: [phone]       Patient: Jenifer Burnett   YOB: 1960  Date of Visit: 01/02/2025    To Whom It May Concern:    Jorden Burnett  was at [facility] on 01/02/2025. The patient may return to work/school on 1/6/2025. Please excuse her from days missed this week as well. If you have any questions or concerns, or if I can be of further assistance, please do not hesitate to contact me.    Sincerely,     Mallika Louis NP

## 2025-01-02 NOTE — PROGRESS NOTES
INTERNAL MEDICINE PROGRESS/URGENT CARE NOTE    CHIEF COMPLAINT     Chief Complaint   Patient presents with    Nausea    Diarrhea    Fever       HPI     Jenifer Burnett is a 64 y.o. female who presents for an urgent visit today.    Started 4 days ago with nausea, headache. Then awoke next morning with vomiting, diarrhea, body aches, abd cramping and fever of 101.2. no fever since then though. Vomiting stopped but continues to have nausea and diarrhea. Taking tylenol. Diarrhea is improving. Only 1 episode today. Able to keep grits in without having to go to bathroom. No blood in stool.       Patient Active Problem List   Diagnosis    Essential hypertension    Migraine    Gastroesophageal reflux disease without esophagitis    History of repair of hiatal hernia    Anxiety    History of trigeminal neuralgia    Bilateral carotid artery stenosis    Syncope    Decreased range of motion of right ankle    Decreased strength of lower extremity    Decreased ROM of right knee        Past Medical History:  Past Medical History:   Diagnosis Date    Abnormal gait 02/22/2023    Arthritis     Depression     Encounter for blood transfusion     GERD (gastroesophageal reflux disease)     Hypertension     Migraine     Poor tolerance for ambulation 02/22/2023    Trigeminal neuralgia 03/2018        Past Surgical History:  Past Surgical History:   Procedure Laterality Date    APPENDECTOMY      CHOLECYSTECTOMY  07/2012    ESOPHAGOGASTRODUODENOSCOPY N/A 10/14/2019    Procedure: EGD (ESOPHAGOGASTRODUODENOSCOPY);  Surgeon: Sean Girard MD;  Location: [facility] ([address] [place] [address]);  Service: Endoscopy;  Laterality: N/A;    HYSTERECTOMY      LAPAROSCOPIC TOUPET FUNDOPLICATION N/A 11/22/2019    Procedure: FUNDOPLICATION, LAPAROSCOPIC, TOUPET;  Surgeon: Adriano Godoy MD;  Location: [address];  Service: General;  Laterality: N/A;    TOTAL KNEE ARTHROPLASTY Right 2/26/2024    Procedure: ARTHROPLASTY, KNEE, TOTAL;  Surgeon: ERNIE Lezama MD;   Location: [facility] OR;  Service: Orthopedics;  Laterality: Right;  Regional w/Catheter, Adductor, 0.5% Marcaine        Allergies:  Review of patient's allergies indicates:  No Known Allergies    Home Medications:    Current Outpatient Medications:     acetaminophen (TYLENOL) 650 MG TbSR, Take 650 mg by mouth every 8 (eight) hours., Disp: , Rfl:     citalopram (CELEXA) 40 MG tablet, Take 1 tablet (40 mg total) by mouth once daily., Disp: 90 tablet, Rfl: 1    clobetasol 0.05% (TEMOVATE) 0.05 % Oint, USE A PENCIL ERASER AMOUNT ON THE VULVA TWICE A DAY FOR TWO WEEKS, ONCE A DAY FOR TWO WEEKX, THEN TWICE A WEEK., Disp: 45 g, Rfl: 0    cyanocobalamin, vitamin B-12, 2,500 mcg Tab, Take 1 tablet by mouth once daily., Disp: , Rfl:     gabapentin (NEURONTIN) 300 MG capsule, Take 1 capsule (300 mg total) by mouth 3 (three) times daily., Disp: 90 capsule, Rfl: 11    hydrOXYzine HCL (ATARAX) 25 MG tablet, Take 1 tablet (25 mg total) by mouth 3 (three) times daily as needed for Anxiety., Disp: 30 tablet, Rfl: 0    losartan (COZAAR) 50 MG tablet, Take 1 tablet (50 mg total) by mouth once daily., Disp: 90 tablet, Rfl: 2    meloxicam (MOBIC) 7.5 MG tablet, Take 1 tablet (7.5 mg total) by mouth daily as needed for Pain., Disp: 15 tablet, Rfl: 0    omeprazole (PRILOSEC) 40 MG capsule, Take 1 capsule (40 mg total) by mouth every morning., Disp: 90 capsule, Rfl: 1    vitamin D (VITAMIN D3) 1000 units Tab, Take 2,000 Units by mouth once daily., Disp: , Rfl:     aspirin (ECOTRIN) 81 MG EC tablet, Take 1 tablet (81 mg total) by mouth 2 (two) times a day., Disp: 84 tablet, Rfl: 0    ondansetron (ZOFRAN-ODT) 4 MG TbDL, Take 1 tablet (4 mg total) by mouth every 8 (eight) hours as needed (nausea)., Disp: 20 tablet, Rfl: 0    triamcinolone acetonide 0.025% (KENALOG) 0.025 % Oint, Apply topically 2 (two) times daily as needed (itching. apply only to outside of labia)., Disp: 15 g, Rfl: 0  No current facility-administered medications for this  "visit.    Facility-Administered Medications Ordered in Other Visits:     ropivacaine 0.2% Perineural Pump infusion 500 ML, , Perineural, Continuous, [place] Singh PA-C, New Bag at 02/26/24 0935     Review of Systems:  Review of Systems   Constitutional:  Negative for fatigue and fever.   HENT:  Negative for congestion, ear pain and sore throat.    Respiratory:  Negative for cough and wheezing.    Cardiovascular:  Negative for chest pain.   Gastrointestinal:  Positive for abdominal pain, diarrhea, nausea and vomiting. Negative for abdominal distention and blood in stool.   Genitourinary:  Negative for dysuria.   Skin:  Negative for rash.   Neurological:  Negative for headaches.         PHYSICAL EXAM     Vitals:    01/02/25 1408   BP: 110/72   BP Location: Right arm   Patient Position: Sitting   Pulse: 79   SpO2: 98%   Weight: 90.2 kg (198 lb 13.7 oz)   Height: 5' 9" (1.753 m)      Body mass index is 29.37 kg/m².     Physical Exam  Vitals reviewed.   Constitutional:       Appearance: Normal appearance.   HENT:      Head: Normocephalic.      Mouth/Throat:      Mouth: Mucous membranes are moist.      Pharynx: Oropharynx is clear.   Eyes:      Conjunctiva/sclera: Conjunctivae normal.      Pupils: Pupils are equal, round, and reactive to light.   Cardiovascular:      Rate and Rhythm: Normal rate and regular rhythm.   Pulmonary:      Effort: Pulmonary effort is normal.      Breath sounds: Normal breath sounds.   Abdominal:      General: Bowel sounds are normal.      Palpations: Abdomen is soft. There is no mass.      Tenderness: There is no abdominal tenderness. There is no guarding.   Skin:     General: Skin is warm and dry.   Neurological:      General: No focal deficit present.      Mental Status: She is alert.   Psychiatric:         Mood and Affect: Mood normal.         Behavior: Behavior normal.         LABS     Lab Results   Component Value Date    HGBA1C 5.3 10/31/2024     CMP  Sodium   Date Value Ref Range " Status   10/31/2024 138 136 - 145 mmol/L Final     Potassium   Date Value Ref Range Status   10/31/2024 4.0 3.5 - 5.1 mmol/L Final     Chloride   Date Value Ref Range Status   10/31/2024 104 95 - 110 mmol/L Final     CO2   Date Value Ref Range Status   10/31/2024 28 23 - 29 mmol/L Final     Glucose   Date Value Ref Range Status   10/31/2024 86 70 - 110 mg/dL Final     BUN   Date Value Ref Range Status   10/31/2024 13 8 - 23 mg/dL Final     Creatinine   Date Value Ref Range Status   10/31/2024 0.8 0.5 - 1.4 mg/dL Final     Calcium   Date Value Ref Range Status   10/31/2024 9.2 8.7 - 10.5 mg/dL Final     Total Protein   Date Value Ref Range Status   10/31/2024 7.3 6.0 - 8.4 g/dL Final     Albumin   Date Value Ref Range Status   10/31/2024 3.7 3.5 - 5.2 g/dL Final     Total Bilirubin   Date Value Ref Range Status   10/31/2024 0.6 0.1 - 1.0 mg/dL Final     Comment:     For infants and newborns, interpretation of results should be based  on gestational age, weight and in agreement with clinical  observations.    Premature Infant recommended reference ranges:  Up to 24 hours.............<8.0 mg/dL  Up to 48 hours............<12.0 mg/dL  3-5 days..................<15.0 mg/dL  6-29 days.................<15.0 mg/dL       Alkaline Phosphatase   Date Value Ref Range Status   10/31/2024 67 40 - 150 U/L Final     AST   Date Value Ref Range Status   10/31/2024 16 10 - 40 U/L Final     ALT   Date Value Ref Range Status   10/31/2024 11 10 - 44 U/L Final     Anion Gap   Date Value Ref Range Status   10/31/2024 6 (L) 8 - 16 mmol/L Final     eGFR if    Date Value Ref Range Status   12/22/2021 >60.0 >60 mL/min/1.73 m^2 Final     eGFR if non    Date Value Ref Range Status   12/22/2021 >60.0 >60 mL/min/1.73 m^2 Final     Comment:     Calculation used to obtain the estimated glomerular filtration  rate (eGFR) is the CKD-EPI equation.        Lab Results   Component Value Date    WBC 5.19 01/17/2024    HGB  13.7 01/17/2024    HCT 43.8 01/17/2024    MCV 94 01/17/2024     01/17/2024     Lab Results   Component Value Date    CHOL 180 10/31/2024    CHOL 195 01/17/2024    CHOL 191 03/28/2023     Lab Results   Component Value Date    HDL 56 10/31/2024    HDL 52 01/17/2024    HDL 59 03/28/2023     Lab Results   Component Value Date    LDLCALC 102.2 10/31/2024    LDLCALC Invalid, Trig>400.0 01/17/2024    LDLCALC 104.2 03/28/2023     Lab Results   Component Value Date    TRIG 109 10/31/2024    TRIG 453 (H) 01/17/2024    TRIG 139 03/28/2023     Lab Results   Component Value Date    CHOLHDL 31.1 10/31/2024    CHOLHDL 26.7 01/17/2024    CHOLHDL 30.9 03/28/2023     Lab Results   Component Value Date    TSH 1.561 10/31/2024       ASSESSMENT     1. Gastroenteritis           PLAN  Likely viral gastroenteritis.  Improving.  We will send Zofran as needed for nausea and can take over-the-counter Imodium as needed for diarrhea.  Recommend continued rest and hydration.  Can advance diet as tolerated.  Follow up for failure to continue to improve or any new or worsening symptoms    1. Gastroenteritis  - ondansetron (ZOFRAN-ODT) 4 MG TbDL; Take 1 tablet (4 mg total) by mouth every 8 (eight) hours as needed (nausea).  Dispense: 20 tablet; Refill: 0       Follow up with PCP     Patient was counseled on when to seek emergent care. Patient's plan/treatment was discussed including medications and possible side effects. Verbalized understanding of all instructions.     This note was partly generated with [employer] voice recognition software. I apologize for any possible typographical errors.          KAISER Hurtado  Department of Internal Medicine - [facility]  01/02/2025

## 2025-01-06 DIAGNOSIS — L30.9 VULVAR DERMATITIS: ICD-10-CM

## 2025-01-06 RX ORDER — CLOBETASOL PROPIONATE 0.5 MG/G
OINTMENT TOPICAL
Qty: 45 G | Refills: 0 | Status: SHIPPED | OUTPATIENT
Start: 2025-01-06

## 2025-01-06 NOTE — TELEPHONE ENCOUNTER
Refill Routing Note   Medication(s) are not appropriate for processing by [facility] for the following reason(s):        Due for refill >6 months ago    [facility] action(s):  Defer               Appointments  past 12m or future 3m with PCP    Date Provider   Last Visit   4/11/2024 Blanche Vaughn MD   Next Visit   Visit date not found Blanche Vaughn MD   ED visits in past 90 days: 0        Note composed:3:37 PM 01/06/2025

## 2025-02-06 ENCOUNTER — HOSPITAL ENCOUNTER (OUTPATIENT)
Dept: RADIOLOGY | Facility: HOSPITAL | Age: 65
Discharge: HOME | End: 2025-02-06
Attending: NURSE PRACTITIONER
Payer: COMMERCIAL

## 2025-02-06 ENCOUNTER — OFFICE VISIT (OUTPATIENT)
Dept: INTERNAL MEDICINE | Facility: CLINIC | Age: 65
End: 2025-02-06
Payer: COMMERCIAL

## 2025-02-06 VITALS
HEIGHT: 69 IN | BODY MASS INDEX: 30.92 KG/M2 | WEIGHT: 208.75 LBS | OXYGEN SATURATION: 98 % | DIASTOLIC BLOOD PRESSURE: 76 MMHG | HEART RATE: 82 BPM | SYSTOLIC BLOOD PRESSURE: 126 MMHG

## 2025-02-06 DIAGNOSIS — M54.2 NECK PAIN: ICD-10-CM

## 2025-02-06 DIAGNOSIS — Z78.0 MENOPAUSE: ICD-10-CM

## 2025-02-06 DIAGNOSIS — M25.511 RIGHT SHOULDER PAIN, UNSPECIFIED CHRONICITY: ICD-10-CM

## 2025-02-06 DIAGNOSIS — M54.2 NECK PAIN: Primary | ICD-10-CM

## 2025-02-06 PROCEDURE — 99999 PR PBB SHADOW E&M-EST. PATIENT-LVL V: CPT | Mod: PBBFAC,,, | Performed by: NURSE PRACTITIONER

## 2025-02-06 PROCEDURE — 3078F DIAST BP <80 MM HG: CPT | Mod: CPTII,S$GLB,, | Performed by: NURSE PRACTITIONER

## 2025-02-06 PROCEDURE — 3288F FALL RISK ASSESSMENT DOCD: CPT | Mod: CPTII,S$GLB,, | Performed by: NURSE PRACTITIONER

## 2025-02-06 PROCEDURE — 1159F MED LIST DOCD IN RCRD: CPT | Mod: CPTII,S$GLB,, | Performed by: NURSE PRACTITIONER

## 2025-02-06 PROCEDURE — 3008F BODY MASS INDEX DOCD: CPT | Mod: CPTII,S$GLB,, | Performed by: NURSE PRACTITIONER

## 2025-02-06 PROCEDURE — 99213 OFFICE O/P EST LOW 20 MIN: CPT | Mod: S$GLB,,, | Performed by: NURSE PRACTITIONER

## 2025-02-06 PROCEDURE — 3074F SYST BP LT 130 MM HG: CPT | Mod: CPTII,S$GLB,, | Performed by: NURSE PRACTITIONER

## 2025-02-06 PROCEDURE — 73030 X-RAY EXAM OF SHOULDER: CPT | Mod: 26,RT,, | Performed by: INTERNAL MEDICINE

## 2025-02-06 PROCEDURE — 1101F PT FALLS ASSESS-DOCD LE1/YR: CPT | Mod: CPTII,S$GLB,, | Performed by: NURSE PRACTITIONER

## 2025-02-06 PROCEDURE — 73030 X-RAY EXAM OF SHOULDER: CPT | Mod: TC,RT

## 2025-02-06 RX ORDER — METHOCARBAMOL 500 MG/1
500 TABLET, FILM COATED ORAL 3 TIMES DAILY PRN
Qty: 30 TABLET | Refills: 0 | Status: SHIPPED | OUTPATIENT
Start: 2025-02-06 | End: 2025-02-16

## 2025-02-06 RX ORDER — MELOXICAM 7.5 MG/1
7.5 TABLET ORAL DAILY
Qty: 30 TABLET | Refills: 0 | Status: SHIPPED | OUTPATIENT
Start: 2025-02-06

## 2025-02-06 NOTE — PROGRESS NOTES
INTERNAL MEDICINE PROGRESS/URGENT CARE NOTE    CHIEF COMPLAINT     Chief Complaint   Patient presents with    Headache       HPI     Jenifer Burnett is a 65 y.o. female who presents for an urgent/follow up visit today.    Right shoulder, neck and right sided occipital pain x 2 weeks. She injured her shoulder at work 3 months ago. But no recent injury. Pain is neck is reproducible with shoulder movements. No cp, weakness, numbness, tingling. Has some right ear/jaw pain.       Patient Active Problem List   Diagnosis    Essential hypertension    Migraine    Gastroesophageal reflux disease without esophagitis    History of repair of hiatal hernia    Anxiety    History of trigeminal neuralgia    Bilateral carotid artery stenosis    Syncope    Decreased range of motion of right ankle    Decreased strength of lower extremity    Decreased ROM of right knee        Past Medical History:  Past Medical History:   Diagnosis Date    Abnormal gait 02/22/2023    Arthritis     Depression     Encounter for blood transfusion     GERD (gastroesophageal reflux disease)     Hypertension     Migraine     Poor tolerance for ambulation 02/22/2023    Trigeminal neuralgia 03/2018        Past Surgical History:  Past Surgical History:   Procedure Laterality Date    APPENDECTOMY      CHOLECYSTECTOMY  07/2012    ESOPHAGOGASTRODUODENOSCOPY N/A 10/14/2019    Procedure: EGD (ESOPHAGOGASTRODUODENOSCOPY);  Surgeon: Sean Girard MD;  Location: [facility]  Service: Endoscopy;  Laterality: N/A;    HYSTERECTOMY      LAPAROSCOPIC TOUPET FUNDOPLICATION N/A 11/22/2019    Procedure: FUNDOPLICATION, LAPAROSCOPIC, TOUPET;  Surgeon: Adriano Godoy MD;  [facility]: [facility];  Service: General;  Laterality: N/A;    TOTAL KNEE ARTHROPLASTY Right 2/26/2024    Procedure: ARTHROPLASTY, KNEE, TOTAL;  Surgeon: ERNIE Lezama MD;  Location: [facility];  Service: Orthopedics;  Laterality: Right;  Regional w/Catheter, Adductor, 0.5% Marcaine         Allergies:  Review of patient's allergies indicates:  No Known Allergies    Home Medications:    Current Outpatient Medications:     acetaminophen (TYLENOL) 650 MG TbSR, Take 650 mg by mouth every 8 (eight) hours., Disp: , Rfl:     citalopram (CELEXA) 40 MG tablet, Take 1 tablet (40 mg total) by mouth once daily., Disp: 90 tablet, Rfl: 1    clobetasol 0.05% (TEMOVATE) 0.05 % Oint, USE A PENCIL ERASER AMOUNT ON THE VULVA TWICE A DAY FOR TWO WEEKS, THEN ONCE A DAY FOR TWO WEEKS, THEN TWICE A WEEK., Disp: 45 g, Rfl: 0    cyanocobalamin, vitamin B-12, 2,500 mcg Tab, Take 1 tablet by mouth once daily., Disp: , Rfl:     gabapentin (NEURONTIN) 300 MG capsule, Take 1 capsule (300 mg total) by mouth 3 (three) times daily., Disp: 90 capsule, Rfl: 11    hydrOXYzine HCL (ATARAX) 25 MG tablet, Take 1 tablet (25 mg total) by mouth 3 (three) times daily as needed for Anxiety., Disp: 30 tablet, Rfl: 0    losartan (COZAAR) 50 MG tablet, Take 1 tablet (50 mg total) by mouth once daily., Disp: 90 tablet, Rfl: 2    omeprazole (PRILOSEC) 40 MG capsule, Take 1 capsule (40 mg total) by mouth every morning., Disp: 90 capsule, Rfl: 1    ondansetron (ZOFRAN-ODT) 4 MG TbDL, Take 1 tablet (4 mg total) by mouth every 8 (eight) hours as needed (nausea)., Disp: 20 tablet, Rfl: 0    vitamin D (VITAMIN D3) 1000 units Tab, Take 2,000 Units by mouth once daily., Disp: , Rfl:     aspirin (ECOTRIN) 81 MG EC tablet, Take 1 tablet (81 mg total) by mouth 2 (two) times a day., Disp: 84 tablet, Rfl: 0    meloxicam (MOBIC) 7.5 MG tablet, Take 1 tablet (7.5 mg total) by mouth once daily., Disp: 30 tablet, Rfl: 0    methocarbamoL (ROBAXIN) 500 MG Tab, Take 1 tablet (500 mg total) by mouth 3 (three) times daily as needed., Disp: 30 tablet, Rfl: 0    triamcinolone acetonide 0.025% (KENALOG) 0.025 % Oint, Apply topically 2 (two) times daily as needed (itching. apply only to outside of labia)., Disp: 15 g, Rfl: 0  No current facility-administered medications  "for this visit.    Facility-Administered Medications Ordered in Other Visits:     ropivacaine 0.2% Perineural Pump infusion 500 ML, , Perineural, Continuous, [place] Singh PA-C, New Bag at 02/26/24 0935     Review of Systems:  Review of Systems   Constitutional:  Negative for fever.   Respiratory:  Negative for cough and shortness of breath.    Cardiovascular:  Negative for chest pain and palpitations.   Musculoskeletal:  Positive for arthralgias and neck pain.   Neurological:  Positive for headaches. Negative for dizziness, tremors, syncope, weakness, light-headedness and numbness.         PHYSICAL EXAM     Vitals:    02/06/25 1319   BP: 126/76   BP Location: Left arm   Patient Position: Sitting   Pulse: 82   SpO2: 98%   Weight: 94.7 kg (208 lb 12.4 oz)   Height: 5' 9" (1.753 m)      Body mass index is 30.83 kg/m².     Physical Exam  Vitals reviewed.   Constitutional:       Appearance: Normal appearance.   HENT:      Head: Normocephalic.      Right Ear: Tympanic membrane and ear canal normal.      Left Ear: Tympanic membrane and ear canal normal.      Mouth/Throat:      Mouth: Mucous membranes are moist.      Pharynx: Oropharynx is clear.   Eyes:      Extraocular Movements: Extraocular movements intact.      Conjunctiva/sclera: Conjunctivae normal.      Pupils: Pupils are equal, round, and reactive to light.   Neck:     Cardiovascular:      Rate and Rhythm: Normal rate and regular rhythm.   Pulmonary:      Effort: Pulmonary effort is normal.      Breath sounds: Normal breath sounds.   Musculoskeletal:      Right shoulder: Tenderness present. No deformity. Normal strength. Normal pulse.      Cervical back: Muscular tenderness present.   Lymphadenopathy:      Cervical: No cervical adenopathy.   Skin:     General: Skin is warm and dry.   Neurological:      General: No focal deficit present.      Mental Status: She is alert and oriented to person, place, and time.   Psychiatric:         Mood and Affect: Mood " normal.         Behavior: Behavior normal.         LABS     Lab Results   Component Value Date    HGBA1C 5.3 10/31/2024     CMP  Sodium   Date Value Ref Range Status   10/31/2024 138 136 - 145 mmol/L Final     Potassium   Date Value Ref Range Status   10/31/2024 4.0 3.5 - 5.1 mmol/L Final     Chloride   Date Value Ref Range Status   10/31/2024 104 95 - 110 mmol/L Final     CO2   Date Value Ref Range Status   10/31/2024 28 23 - 29 mmol/L Final     Glucose   Date Value Ref Range Status   10/31/2024 86 70 - 110 mg/dL Final     BUN   Date Value Ref Range Status   10/31/2024 13 8 - 23 mg/dL Final     Creatinine   Date Value Ref Range Status   10/31/2024 0.8 0.5 - 1.4 mg/dL Final     Calcium   Date Value Ref Range Status   10/31/2024 9.2 8.7 - 10.5 mg/dL Final     Total Protein   Date Value Ref Range Status   10/31/2024 7.3 6.0 - 8.4 g/dL Final     Albumin   Date Value Ref Range Status   10/31/2024 3.7 3.5 - 5.2 g/dL Final     Total Bilirubin   Date Value Ref Range Status   10/31/2024 0.6 0.1 - 1.0 mg/dL Final     Comment:     For infants and newborns, interpretation of results should be based  on gestational age, weight and in agreement with clinical  observations.    Premature Infant recommended reference ranges:  Up to 24 hours.............<8.0 mg/dL  Up to 48 hours............<12.0 mg/dL  3-5 days..................<15.0 mg/dL  6-29 days.................<15.0 mg/dL       Alkaline Phosphatase   Date Value Ref Range Status   10/31/2024 67 40 - 150 U/L Final     AST   Date Value Ref Range Status   10/31/2024 16 10 - 40 U/L Final     ALT   Date Value Ref Range Status   10/31/2024 11 10 - 44 U/L Final     Anion Gap   Date Value Ref Range Status   10/31/2024 6 (L) 8 - 16 mmol/L Final     eGFR if    Date Value Ref Range Status   12/22/2021 >60.0 >60 mL/min/1.73 m^2 Final     eGFR if non    Date Value Ref Range Status   12/22/2021 >60.0 >60 mL/min/1.73 m^2 Final     Comment:     Calculation used  to obtain the estimated glomerular filtration  rate (eGFR) is the CKD-EPI equation.        Lab Results   Component Value Date    WBC 5.19 01/17/2024    HGB 13.7 01/17/2024    HCT 43.8 01/17/2024    MCV 94 01/17/2024     01/17/2024     Lab Results   Component Value Date    CHOL 180 10/31/2024    CHOL 195 01/17/2024    CHOL 191 03/28/2023     Lab Results   Component Value Date    HDL 56 10/31/2024    HDL 52 01/17/2024    HDL 59 03/28/2023     Lab Results   Component Value Date    LDLCALC 102.2 10/31/2024    LDLCALC Invalid, Trig>400.0 01/17/2024    LDLCALC 104.2 03/28/2023     Lab Results   Component Value Date    TRIG 109 10/31/2024    TRIG 453 (H) 01/17/2024    TRIG 139 03/28/2023     Lab Results   Component Value Date    CHOLHDL 31.1 10/31/2024    CHOLHDL 26.7 01/17/2024    CHOLHDL 30.9 03/28/2023     Lab Results   Component Value Date    TSH 1.561 10/31/2024       ASSESSMENT     1. Neck pain    2. Right shoulder pain, unspecified chronicity         PLAN  Pain likely coming from neck and shoulder.  We will x-ray shoulder given duration of pain and send to physical therapy for her neck and shoulder which should help.  We will start on meloxicam daily and add Robaxin to help.  Warm moist heat to area and neck exercises.  Follow up if no improvement with pcp    1. Neck pain  - X-ray Shoulder 2 or More Views Right; Future  - meloxicam (MOBIC) 7.5 MG tablet; Take 1 tablet (7.5 mg total) by mouth once daily.  Dispense: 30 tablet; Refill: 0  - methocarbamoL (ROBAXIN) 500 MG Tab; Take 1 tablet (500 mg total) by mouth 3 (three) times daily as needed.  Dispense: 30 tablet; Refill: 0    2. Right shoulder pain, unspecified chronicity  - X-ray Shoulder 2 or More Views Right; Future  - meloxicam (MOBIC) 7.5 MG tablet; Take 1 tablet (7.5 mg total) by mouth once daily.  Dispense: 30 tablet; Refill: 0  - methocarbamoL (ROBAXIN) 500 MG Tab; Take 1 tablet (500 mg total) by mouth 3 (three) times daily as needed.  Dispense: 30  tablet; Refill: 0  - Ambulatory Referral/Consult to Physical Therapy/Occupational Therapy; Future       Follow up with PCP     Patient was counseled on when to seek emergent care. Patient's plan/treatment was discussed including medications and possible side effects. Verbalized understanding of all instructions.     This note was partly generated with [employer] voice recognition software. I apologize for any possible typographical errors.          KAISER Hurtado  Department of Internal Medicine - [facility]  02/06/2025

## 2025-02-18 ENCOUNTER — OFFICE VISIT (OUTPATIENT)
Dept: INTERNAL MEDICINE | Facility: CLINIC | Age: 65
End: 2025-02-18
Payer: MEDICARE

## 2025-02-18 ENCOUNTER — HOSPITAL ENCOUNTER (OUTPATIENT)
Dept: RADIOLOGY | Facility: HOSPITAL | Age: 65
Discharge: HOME | End: 2025-02-18
Attending: NURSE PRACTITIONER
Payer: MEDICARE

## 2025-02-18 VITALS
DIASTOLIC BLOOD PRESSURE: 82 MMHG | BODY MASS INDEX: 30.3 KG/M2 | SYSTOLIC BLOOD PRESSURE: 138 MMHG | HEART RATE: 88 BPM | WEIGHT: 204.56 LBS | OXYGEN SATURATION: 97 % | HEIGHT: 69 IN

## 2025-02-18 DIAGNOSIS — M54.2 CERVICALGIA: Primary | ICD-10-CM

## 2025-02-18 DIAGNOSIS — M54.2 CERVICALGIA: ICD-10-CM

## 2025-02-18 DIAGNOSIS — R51.9 OCCIPITAL HEADACHE: ICD-10-CM

## 2025-02-18 PROCEDURE — 72040 X-RAY EXAM NECK SPINE 2-3 VW: CPT | Mod: TC

## 2025-02-18 RX ORDER — METHYLPREDNISOLONE 4 MG/1
TABLET ORAL
Qty: 21 EACH | Refills: 0 | Status: SHIPPED | OUTPATIENT
Start: 2025-02-18 | End: 2025-03-11

## 2025-02-18 NOTE — PROGRESS NOTES
INTERNAL MEDICINE PROGRESS/URGENT CARE NOTE    CHIEF COMPLAINT     Chief Complaint   Patient presents with    Headache       HPI     Jenifer Burnett is a 65 y.o. female who presents for an urgent visit today.    Saw her last week c/o right shoulder, neck and occipital pain x 2 weeks.   Pain is neck is reproducible with shoulder movements. No cp, weakness, numbness, tingling. Has pain behind her ear and parietal area. No rashes, no vision changes, fevers, nv. No neuro changes.   Gave her robaxin and meloxicam. Helped a little but headache still there.     Problem List[1]     Past Medical History:  Past Medical History:   Diagnosis Date    Abnormal gait 02/22/2023    Arthritis     Depression     Encounter for blood transfusion     GERD (gastroesophageal reflux disease)     Hypertension     Migraine     Poor tolerance for ambulation 02/22/2023    Trigeminal neuralgia 03/2018        Past Surgical History:  Past Surgical History:   Procedure Laterality Date    APPENDECTOMY      CHOLECYSTECTOMY  07/2012    ESOPHAGOGASTRODUODENOSCOPY N/A 10/14/2019    Procedure: EGD (ESOPHAGOGASTRODUODENOSCOPY);  Surgeon: Sean Girard MD;  [facility]  Service: Endoscopy;  Laterality: N/A;    HYSTERECTOMY      LAPAROSCOPIC TOUPET FUNDOPLICATION N/A 11/22/2019    Procedure: FUNDOPLICATION, LAPAROSCOPIC, TOUPET;  Surgeon: Adriano Godoy MD;  Location: [address];  Service: General;  Laterality: N/A;    TOTAL KNEE ARTHROPLASTY Right 2/26/2024    Procedure: ARTHROPLASTY, KNEE, TOTAL;  Surgeon: ERNIE Lezama MD;  Location: [facility];  Service: Orthopedics;  Laterality: Right;  Regional w/Catheter, Adductor, 0.5% Marcaine        Allergies:  Review of patient's allergies indicates:  No Known Allergies    Home Medications:  Current Medications[2]     Review of Systems:  Review of Systems   Constitutional:  Negative for fever.   HENT:  Negative for congestion and sore throat.    Eyes:  Negative for photophobia,  "discharge, redness, itching and visual disturbance.   Respiratory:  Negative for cough and shortness of breath.    Cardiovascular:  Negative for chest pain.   Neurological:  Positive for headaches. Negative for dizziness, tremors, seizures, syncope, facial asymmetry, weakness, light-headedness and numbness.         PHYSICAL EXAM     Vitals:    02/18/25 1311 02/18/25 1331   BP: (!) 142/86 138/82   BP Location: Left arm    Patient Position: Sitting    Pulse: 88    SpO2: 97%    Weight: 92.8 kg (204 lb 9.4 oz)    Height: 5' 9" (1.753 m)       Body mass index is 30.21 kg/m².     Physical Exam  Vitals reviewed.   Constitutional:       Appearance: Normal appearance.   HENT:      Head: Normocephalic.      Right Ear: Tympanic membrane and ear canal normal.      Left Ear: Tympanic membrane and ear canal normal.      Mouth/Throat:      Mouth: Mucous membranes are moist.      Pharynx: Oropharynx is clear.   Eyes:      General: Lids are normal. Vision grossly intact.      Extraocular Movements: Extraocular movements intact.      Conjunctiva/sclera: Conjunctivae normal.      Pupils: Pupils are equal, round, and reactive to light.   Neck:     Cardiovascular:      Rate and Rhythm: Normal rate and regular rhythm.   Pulmonary:      Effort: Pulmonary effort is normal.      Breath sounds: Normal breath sounds.   Musculoskeletal:      Right shoulder: Tenderness present. No deformity. Normal strength. Normal pulse.      Cervical back: Tenderness present. No rigidity. Muscular tenderness present.   Lymphadenopathy:      Cervical: No cervical adenopathy.   Skin:     General: Skin is warm and dry.   Neurological:      General: No focal deficit present.      Mental Status: She is alert and oriented to person, place, and time.      Cranial Nerves: Cranial nerves 2-12 are intact.      Motor: No weakness.      Coordination: Coordination is intact. Romberg sign negative.      Gait: Gait normal.   Psychiatric:         Mood and Affect: Mood " normal.         Behavior: Behavior normal.         LABS     Lab Results   Component Value Date    HGBA1C 5.3 10/31/2024     CMP  Sodium   Date Value Ref Range Status   10/31/2024 138 136 - 145 mmol/L Final     Potassium   Date Value Ref Range Status   10/31/2024 4.0 3.5 - 5.1 mmol/L Final     Chloride   Date Value Ref Range Status   10/31/2024 104 95 - 110 mmol/L Final     CO2   Date Value Ref Range Status   10/31/2024 28 23 - 29 mmol/L Final     Glucose   Date Value Ref Range Status   10/31/2024 86 70 - 110 mg/dL Final     BUN   Date Value Ref Range Status   10/31/2024 13 8 - 23 mg/dL Final     Creatinine   Date Value Ref Range Status   10/31/2024 0.8 0.5 - 1.4 mg/dL Final     Calcium   Date Value Ref Range Status   10/31/2024 9.2 8.7 - 10.5 mg/dL Final     Total Protein   Date Value Ref Range Status   10/31/2024 7.3 6.0 - 8.4 g/dL Final     Albumin   Date Value Ref Range Status   10/31/2024 3.7 3.5 - 5.2 g/dL Final     Total Bilirubin   Date Value Ref Range Status   10/31/2024 0.6 0.1 - 1.0 mg/dL Final     Comment:     For infants and newborns, interpretation of results should be based  on gestational age, weight and in agreement with clinical  observations.    Premature Infant recommended reference ranges:  Up to 24 hours.............<8.0 mg/dL  Up to 48 hours............<12.0 mg/dL  3-5 days..................<15.0 mg/dL  6-29 days.................<15.0 mg/dL       Alkaline Phosphatase   Date Value Ref Range Status   10/31/2024 67 40 - 150 U/L Final     AST   Date Value Ref Range Status   10/31/2024 16 10 - 40 U/L Final     ALT   Date Value Ref Range Status   10/31/2024 11 10 - 44 U/L Final     Anion Gap   Date Value Ref Range Status   10/31/2024 6 (L) 8 - 16 mmol/L Final     eGFR if    Date Value Ref Range Status   12/22/2021 >60.0 >60 mL/min/1.73 m^2 Final     eGFR if non    Date Value Ref Range Status   12/22/2021 >60.0 >60 mL/min/1.73 m^2 Final     Comment:     Calculation used  to obtain the estimated glomerular filtration  rate (eGFR) is the CKD-EPI equation.        Lab Results   Component Value Date    WBC 5.19 01/17/2024    HGB 13.7 01/17/2024    HCT 43.8 01/17/2024    MCV 94 01/17/2024     01/17/2024     Lab Results   Component Value Date    CHOL 180 10/31/2024    CHOL 195 01/17/2024    CHOL 191 03/28/2023     Lab Results   Component Value Date    HDL 56 10/31/2024    HDL 52 01/17/2024    HDL 59 03/28/2023     Lab Results   Component Value Date    LDLCALC 102.2 10/31/2024    LDLCALC Invalid, Trig>400.0 01/17/2024    LDLCALC 104.2 03/28/2023     Lab Results   Component Value Date    TRIG 109 10/31/2024    TRIG 453 (H) 01/17/2024    TRIG 139 03/28/2023     Lab Results   Component Value Date    CHOLHDL 31.1 10/31/2024    CHOLHDL 26.7 01/17/2024    CHOLHDL 30.9 03/28/2023     Lab Results   Component Value Date    TSH 1.561 10/31/2024       ASSESSMENT     1. Cervicalgia    2. Occipital headache           PLAN  Possible occipital neuralgia. Neuro exam benign. Will xray neck and start medrol pack. If no improvement in the next 1-2 days, she will let me know and will send for CT of head. She will let me know sooner if any other associated symptoms arise.   ER precautions discussed.     1. Occipital headache  - methylPREDNISolone (MEDROL DOSEPACK) 4 mg tablet; use as directed  Dispense: 21 each; Refill: 0    2. Cervicalgia  - methylPREDNISolone (MEDROL DOSEPACK) 4 mg tablet; use as directed  Dispense: 21 each; Refill: 0  - X-Ray Cervical Spine 2 or 3 Views; Future       Follow up with PCP     Patient was counseled on when to seek emergent care. Patient's plan/treatment was discussed including medications and possible side effects. Verbalized understanding of all instructions.     This note was partly generated with [employer] voice recognition software. I apologize for any possible typographical errors.          [facility] Hurtado  Department of Internal Medicine - [facility] [address]  02/18/2025          [1]   Patient Active Problem List  Diagnosis    Essential hypertension    Migraine    Gastroesophageal reflux disease without esophagitis    History of repair of hiatal hernia    Anxiety    History of trigeminal neuralgia    Bilateral carotid artery stenosis    Syncope    Decreased range of motion of right ankle    Decreased strength of lower extremity    Decreased ROM of right knee   [2]   Current Outpatient Medications:     acetaminophen (TYLENOL) 650 MG TbSR, Take 650 mg by mouth every 8 (eight) hours., Disp: , Rfl:     citalopram (CELEXA) 40 MG tablet, Take 1 tablet (40 mg total) by mouth once daily., Disp: 90 tablet, Rfl: 1    clobetasol 0.05% (TEMOVATE) 0.05 % Oint, USE A PENCIL ERASER AMOUNT ON THE VULVA TWICE A DAY FOR TWO WEEKS, THEN ONCE A DAY FOR TWO WEEKS, THEN TWICE A WEEK., Disp: 45 g, Rfl: 0    cyanocobalamin, vitamin B-12, 2,500 mcg Tab, Take 1 tablet by mouth once daily., Disp: , Rfl:     gabapentin (NEURONTIN) 300 MG capsule, Take 1 capsule (300 mg total) by mouth 3 (three) times daily., Disp: 90 capsule, Rfl: 11    hydrOXYzine HCL (ATARAX) 25 MG tablet, Take 1 tablet (25 mg total) by mouth 3 (three) times daily as needed for Anxiety., Disp: 30 tablet, Rfl: 0    losartan (COZAAR) 50 MG tablet, Take 1 tablet (50 mg total) by mouth once daily., Disp: 90 tablet, Rfl: 2    meloxicam (MOBIC) 7.5 MG tablet, Take 1 tablet (7.5 mg total) by mouth once daily., Disp: 30 tablet, Rfl: 0    omeprazole (PRILOSEC) 40 MG capsule, Take 1 capsule (40 mg total) by mouth every morning., Disp: 90 capsule, Rfl: 1    ondansetron (ZOFRAN-ODT) 4 MG TbDL, Take 1 tablet (4 mg total) by mouth every 8 (eight) hours as needed (nausea)., Disp: 20 tablet, Rfl: 0    vitamin D (VITAMIN D3) 1000 units Tab, Take 2,000 Units by mouth once daily., Disp: , Rfl:     aspirin (ECOTRIN) 81 MG EC tablet, Take 1 tablet (81 mg total) by mouth 2 (two) times a day., Disp: 84 tablet, Rfl: 0    methylPREDNISolone (MEDROL  DOSEPACK) 4 mg tablet, use as directed, Disp: 21 each, Rfl: 0    triamcinolone acetonide 0.025% (KENALOG) 0.025 % Oint, Apply topically 2 (two) times daily as needed (itching. apply only to outside of labia)., Disp: 15 g, Rfl: 0  No current facility-administered medications for this visit.    Facility-Administered Medications Ordered in Other Visits:     ropivacaine 0.2% Perineural Pump infusion 500 ML, , Perineural, Continuous, North Singh PA-C, New Bag at 02/26/24 4778

## 2025-02-19 ENCOUNTER — RESULTS FOLLOW-UP (OUTPATIENT)
Dept: INTERNAL MEDICINE | Facility: CLINIC | Age: 65
End: 2025-02-19

## 2025-02-21 ENCOUNTER — HOSPITAL ENCOUNTER (OUTPATIENT)
Dept: RADIOLOGY | Facility: CLINIC | Age: 65
Discharge: HOME | End: 2025-02-21
Attending: INTERNAL MEDICINE
Payer: MEDICARE

## 2025-02-21 DIAGNOSIS — Z78.0 MENOPAUSE: ICD-10-CM

## 2025-02-21 PROCEDURE — 77080 DXA BONE DENSITY AXIAL: CPT | Mod: 26,,, | Performed by: INTERNAL MEDICINE

## 2025-02-21 PROCEDURE — 77080 DXA BONE DENSITY AXIAL: CPT | Mod: TC

## 2025-02-24 DIAGNOSIS — Z00.00 ENCOUNTER FOR MEDICARE ANNUAL WELLNESS EXAM: ICD-10-CM

## 2025-02-25 ENCOUNTER — RESULTS FOLLOW-UP (OUTPATIENT)
Dept: INTERNAL MEDICINE | Facility: CLINIC | Age: 65
End: 2025-02-25

## 2025-03-03 ENCOUNTER — NURSE TRIAGE (OUTPATIENT)
Dept: ADMINISTRATIVE | Facility: CLINIC | Age: 65
End: 2025-03-03
Payer: MEDICARE

## 2025-03-03 ENCOUNTER — HOSPITAL ENCOUNTER (OUTPATIENT)
Dept: RADIOLOGY | Facility: HOSPITAL | Age: 65
Discharge: HOME | End: 2025-03-03
Attending: NURSE PRACTITIONER
Payer: MEDICARE

## 2025-03-03 DIAGNOSIS — R42 DIZZINESS: ICD-10-CM

## 2025-03-03 DIAGNOSIS — G43.809 OTHER MIGRAINE WITHOUT STATUS MIGRAINOSUS, NOT INTRACTABLE: Primary | ICD-10-CM

## 2025-03-03 DIAGNOSIS — G43.809 OTHER MIGRAINE WITHOUT STATUS MIGRAINOSUS, NOT INTRACTABLE: ICD-10-CM

## 2025-03-03 PROCEDURE — 25500020 PHARM REV CODE 255: Performed by: NURSE PRACTITIONER

## 2025-03-03 PROCEDURE — 70553 MRI BRAIN STEM W/O & W/DYE: CPT | Mod: 26,,, | Performed by: RADIOLOGY

## 2025-03-03 PROCEDURE — A9585 GADOBUTROL INJECTION: HCPCS | Performed by: NURSE PRACTITIONER

## 2025-03-03 PROCEDURE — 70553 MRI BRAIN STEM W/O & W/DYE: CPT | Mod: TC

## 2025-03-03 RX ORDER — GADOBUTROL 604.72 MG/ML
10 INJECTION INTRAVENOUS
Status: COMPLETED | OUTPATIENT
Start: 2025-03-03 | End: 2025-03-03

## 2025-03-03 RX ADMIN — GADOBUTROL 10 ML: 604.72 INJECTION INTRAVENOUS at 06:03

## 2025-03-03 NOTE — TELEPHONE ENCOUNTER
Pt stated she was calling to leave a message for PHILL Lee because she was treating her for a soreness on her head with steroids and she told her to call back if it didn't work. So that's why she is calling. Pain 4/10. She only feels it when she touch or push on it. Pt stated it a pain that goes into her eye. Symptoms present for a month. She had a dizzy spell this am while standing up talking to someone at work. Denies lightheadedness and or dizziness at present time. Pt stated she is calling to see if Dr lee wants to schedule a MRI? Care advice recommends pt see MD within 3 days. Pt refused a visit stated Dr Lee told her to call her if medication didn't work. Please call and advise pt. Pt is awaiting a return call.   Reason for Disposition   MILD dizziness (e.g., walking normally) and has NOT been evaluated by physician for this (Exception: Dizziness caused by heat exposure, sudden standing, or poor fluid intake.)    Additional Information   Negative: SEVERE difficulty breathing (e.g., struggling for each breath, speaks in single words)   Negative: Shock suspected (e.g., cold/pale/clammy skin, too weak to stand, low BP, rapid pulse)   Negative: Difficult to awaken or acting confused (e.g., disoriented, slurred speech)   Negative: Fainted, and still feels dizzy afterwards   Negative: Overdose (accidental or intentional) of medications   Negative: New neurologic deficit that is present now: * Weakness of the face, arm, or leg on one side of the body * Numbness of the face, arm, or leg on one side of the body * Loss of speech or garbled speech   Negative: Heart beating < 50 beats per minute OR > 140 beats per minute   Negative: Sounds like a life-threatening emergency to the triager   Negative: SEVERE dizziness (e.g., unable to stand, requires support to walk, feels like passing out now)   Negative: SEVERE headache or neck pain   Negative: Spinning or tilting sensation (vertigo) present now and one or more  stroke risk factors (i.e., hypertension, diabetes mellitus, prior stroke/TIA, heart attack, age over 60) (Exception: Prior physician evaluation for this AND no different/worse than usual.)   Negative: Neurologic deficit that was brief (now gone), ANY of the following:* Weakness of the face, arm, or leg on one side of the body* Numbness of the face, arm, or leg on one side of the body* Loss of speech or garbled speech   Negative: Loss of vision or double vision  (Exception: Similar to previous migraines.)   Negative: Extra heartbeats, irregular heart beating, or heart is beating very fast (i.e., 'palpitations')   Negative: Difficulty breathing   Negative: Drinking very little and dehydration suspected (e.g., no urine > 12 hours, very dry mouth, very lightheaded)   Negative: Follows bleeding (e.g., stomach, rectum, vagina)  (Exception: Became dizzy from sight of small amount blood.)   Negative: Patient sounds very sick or weak to the triager   Negative: Lightheadedness (dizziness) present now, after 2 hours of rest and fluids   Negative: Spinning or tilting sensation (vertigo) present now   Negative: Fever > 103 F (39.4 C)   Negative: Fever > 100 F (37.8 C) and has diabetes mellitus or a weak immune system (e.g., HIV positive, cancer chemotherapy, organ transplant, splenectomy, chronic steroids)   Negative: MODERATE dizziness (e.g., interferes with normal activities)  (Exception: Dizziness caused by heat exposure, sudden standing, or poor fluid intake.)   Negative: Vomiting occurs with dizziness   Negative: Patient wants to be seen   Negative: Taking a medicine that could cause dizziness (e.g., blood pressure medications, diuretics)    Protocols used: Dizziness-A-OH

## 2025-03-03 NOTE — TELEPHONE ENCOUNTER
I ordered a stat MRI since the dizziness is new. She needs to go get labs done today to check her kidney function. Please call her and set that up and see if we can get that MRI later tonight or tomorrow.   ER for any worsening or new symptoms

## 2025-03-03 NOTE — TELEPHONE ENCOUNTER
Pt was called and she stated that she just wanted an MRI, will got to the hospital if things get worse

## 2025-03-05 ENCOUNTER — RESULTS FOLLOW-UP (OUTPATIENT)
Dept: INTERNAL MEDICINE | Facility: CLINIC | Age: 65
End: 2025-03-05

## 2025-03-05 DIAGNOSIS — R51.9 OCCIPITAL HEADACHE: Primary | ICD-10-CM

## 2025-03-17 ENCOUNTER — PATIENT OUTREACH (OUTPATIENT)
Dept: ADMINISTRATIVE | Facility: HOSPITAL | Age: 65
End: 2025-03-17
Payer: MEDICARE

## 2025-03-17 NOTE — PROGRESS NOTES
Chart reviewed and updated. Reconciled immunizations.  Odilia Yanez LPN   Clinical Care Coordinator  [facility]ness

## 2025-03-20 ENCOUNTER — OFFICE VISIT (OUTPATIENT)
Dept: NEUROLOGY | Facility: CLINIC | Age: 65
End: 2025-03-20
Payer: MEDICARE

## 2025-03-20 DIAGNOSIS — R93.7 ABNORMAL X-RAY OF CERVICAL SPINE: ICD-10-CM

## 2025-03-20 DIAGNOSIS — G44.86 CERVICOGENIC HEADACHE: ICD-10-CM

## 2025-03-20 DIAGNOSIS — R51.9 OCCIPITAL HEADACHE: ICD-10-CM

## 2025-03-20 DIAGNOSIS — M25.511 RIGHT SHOULDER PAIN, UNSPECIFIED CHRONICITY: Primary | ICD-10-CM

## 2025-03-20 RX ORDER — CYCLOBENZAPRINE HCL 10 MG
10 TABLET ORAL 3 TIMES DAILY PRN
Qty: 30 TABLET | Refills: 2 | Status: SHIPPED | OUTPATIENT
Start: 2025-03-20

## 2025-03-20 RX ORDER — NAPROXEN 500 MG/1
500 TABLET ORAL 2 TIMES DAILY PRN
Qty: 30 TABLET | Refills: 1 | Status: SHIPPED | OUTPATIENT
Start: 2025-03-20

## 2025-03-20 RX ORDER — GABAPENTIN 300 MG/1
300 CAPSULE ORAL 3 TIMES DAILY
Qty: 90 CAPSULE | Refills: 5 | Status: SHIPPED | OUTPATIENT
Start: 2025-03-20 | End: 2026-03-20

## 2025-03-20 NOTE — PROGRESS NOTES
Neurology Telemedicine Note     Name: Jenifer Burnett  MRN: [id number]   CSN: [id number]      Date: 03/20/2025    The patient location is: Home  The chief complaint leading to consultation is: neck/shoulder pain, headache  Visit type: Virtual visit with synchronous audio and video    TOTAL TIME SPENT: 35 mins    Each patient to whom I provide medical services by telemedicine is:  (1) informed of the relationship between the physician and patient and the respective role of any other health care provider with respect to management of the patient; and (2) notified that they may decline to receive medical services by telemedicine and may withdraw from such care at any time.    History of Present Illness (HPI):  Patient is a 65-year-old female with history as below who presents to tele Neurology Clinic due to significant right-sided neck and occipital pain that radiates and traverses the right side of her towards her right orbit.  Patient also experiencing significant pain in her right shoulder.  She feels like all connected.  She does have some tightening of the muscles but states that she feels like it is a stabbing pain.  She has had an abnormal cervical x-ray in his not had any advanced cervical imaging such as MRI.  Patient states that sometimes pain radiates down to her hand.  She takes gabapentin but only once day.  She has not had her shoulder looked at but states that it is a significant source of her pain.  She tried a Medrol Dosepak states did not help.  She has not tried any NSAIDs.  We discussed going up on her gabapentin to twice a day and introducing a muscle relaxer as well as getting imaging her cervical spine and right shoulder along with orthopedic referral.    Nonmotor/Premotor ROS: as per HPI, and all other systems are negative    Past Medical History: The patient  has a past medical history of Abnormal gait (02/22/2023), Arthritis, Depression, Encounter for blood transfusion, GERD (gastroesophageal reflux  disease), Hypertension, Migraine, Poor tolerance for ambulation (02/22/2023), and Trigeminal neuralgia (03/2018).    Social History: The patient  reports that she has never smoked. She has never used smokeless tobacco. She reports that she does not drink alcohol and does not use drugs.    Family History: Their family history includes Breast cancer in her mother; Cancer in her father and mother; Colon cancer in her mother; Hypertension in her mother.    Allergies: Patient has no known allergies.     Meds: Medications Ordered Prior to Encounter[1]    Exam:  Vital Signs deferred with home visit    Constitutional  Well-developed, well-nourished, appears stated age   Eyes  No scleral icterus   ENT  Moist oral mucosa   Cardiovascular  No lower extremity edema    Respiratory  No labored breathing    Skin  No rashes   Hematologic  No bruising   Psychiatric  Normal mood and affect   Other  GI/ deferred    Neurological     * Mental status  Alert and oriented to person, place, time, and situation; no dysarthria; no aphasia; normal recent and remote memory; follows commands   * Cranial nerves     - CN II  Pupils midposition and symmetric   - CN III, IV, VI  Extraocular movements full, no nystagmus visualized   - CN V  Unable to test   - CN VII  Face strong and symmetric bilaterally    - CN VIII  Hearing grossly intact with conversation    - CN IX, X  Palate raises midline and symmetric    - CN XI  Strong shoulder shrug B    - CN XII  Tongue appears midline    * Motor  Normal bulk by appearance, no drift    * Sensory  Not tested objectively, no changes described by the patient   * Deep tendon reflexes  Not tested   * Coord/Movemt/Gait No hypophonic speech.  No facial masking.  No B bradykinesia.  No tremor with rest, posture, kinesis, or intention.   No chorea, athetosis, dystonia, myoclonus, or tics.   No motor impersistence.  Gait is deferred for safety.       Medical Record Review:  - Lab Results:  Lab Visit on 03/03/2025    Component Date Value Ref Range Status    Creatinine 2025 0.7  0.5 - 1.4 mg/dL Final    eGFR 2025 >60.0  >60 mL/min/1.73 m^2 Final       Diagnoses:   1) right cervicogenic headache  2) right shoulder pain with painful abduction  3) occipital neuralgia    Medical Decision Makin) increase gabapentin to 300 mg to 3 times a day  2) add Flexeril 10 mg as needed  3) naproxen as needed for neck pain  4) orthopedic referral  5) MRI of the right shoulder  6) MRI of the cervical spine    Follow up after above completed in 1-2 months      I spent 35 minutes with the patient with >50% of the time spent with counseling and education regarding:    This is a consult performed through audio-visual using [employer]. Pt and provider are in different locations. History and physical exam are limited due to the nature of this encounter.       Hasmukh Stewart MD             [1]   Current Outpatient Medications on File Prior to Visit   Medication Sig Dispense Refill    acetaminophen (TYLENOL) 650 MG TbSR Take 650 mg by mouth every 8 (eight) hours.      aspirin (ECOTRIN) 81 MG EC tablet Take 1 tablet (81 mg total) by mouth 2 (two) times a day. 84 tablet 0    citalopram (CELEXA) 40 MG tablet Take 1 tablet (40 mg total) by mouth once daily. 90 tablet 1    clobetasol 0.05% (TEMOVATE) 0.05 % Oint USE A PENCIL ERASER AMOUNT ON THE VULVA TWICE A DAY FOR TWO WEEKS, THEN ONCE A DAY FOR TWO WEEKS, THEN TWICE A WEEK. 45 g 0    cyanocobalamin, vitamin B-12, 2,500 mcg Tab Take 1 tablet by mouth once daily.      gabapentin (NEURONTIN) 300 MG capsule Take 1 capsule (300 mg total) by mouth 3 (three) times daily. 90 capsule 11    hydrOXYzine HCL (ATARAX) 25 MG tablet Take 1 tablet (25 mg total) by mouth 3 (three) times daily as needed for Anxiety. 30 tablet 0    losartan (COZAAR) 50 MG tablet Take 1 tablet (50 mg total) by mouth once daily. 90 tablet 2    meloxicam (MOBIC) 7.5 MG tablet Take 1 tablet (7.5 mg total) by mouth  once daily. 30 tablet 0    omeprazole (PRILOSEC) 40 MG capsule Take 1 capsule (40 mg total) by mouth every morning. 90 capsule 1    ondansetron (ZOFRAN-ODT) 4 MG TbDL Take 1 tablet (4 mg total) by mouth every 8 (eight) hours as needed (nausea). 20 tablet 0    triamcinolone acetonide 0.025% (KENALOG) 0.025 % Oint Apply topically 2 (two) times daily as needed (itching. apply only to outside of labia). 15 g 0    vitamin D (VITAMIN D3) 1000 units Tab Take 2,000 Units by mouth once daily.       Current Facility-Administered Medications on File Prior to Visit   Medication Dose Route Frequency Provider Last Rate Last Admin    ropivacaine 0.2% Perineural Pump infusion 500 ML   Perineural Continuous [place] Singh, JERICHO   New Bag at 02/26/24 [id number]

## 2025-03-26 ENCOUNTER — TELEPHONE (OUTPATIENT)
Dept: SPORTS MEDICINE | Facility: CLINIC | Age: 65
End: 2025-03-26
Payer: MEDICARE

## 2025-03-26 NOTE — TELEPHONE ENCOUNTER
Spoke c pt. Informed pt that Dr. Lezama will no longer be seeing pts at scheduled appt time on 03/31. R/s appt to 04/07. Confirmed appt date, time, location. Pt will call c additional questions/concerns in interim. Pt expressed understanding & was thankful.

## 2025-03-28 ENCOUNTER — HOSPITAL ENCOUNTER (OUTPATIENT)
Dept: RADIOLOGY | Facility: HOSPITAL | Age: 65
Discharge: HOME | End: 2025-03-28
Attending: PSYCHIATRY & NEUROLOGY
Payer: MEDICARE

## 2025-03-28 DIAGNOSIS — M25.511 RIGHT SHOULDER PAIN, UNSPECIFIED CHRONICITY: ICD-10-CM

## 2025-03-28 DIAGNOSIS — G44.86 CERVICOGENIC HEADACHE: ICD-10-CM

## 2025-03-28 DIAGNOSIS — R93.7 ABNORMAL X-RAY OF CERVICAL SPINE: ICD-10-CM

## 2025-03-28 PROCEDURE — 72141 MRI NECK SPINE W/O DYE: CPT | Mod: TC

## 2025-03-28 PROCEDURE — 73221 MRI JOINT UPR EXTREM W/O DYE: CPT | Mod: 26,RT,, | Performed by: RADIOLOGY

## 2025-03-28 PROCEDURE — 73221 MRI JOINT UPR EXTREM W/O DYE: CPT | Mod: TC,RT

## 2025-03-28 PROCEDURE — 72141 MRI NECK SPINE W/O DYE: CPT | Mod: 26,,, | Performed by: RADIOLOGY

## 2025-04-03 NOTE — PROGRESS NOTES
CC: Right shoulder pain    DATE OF PROCEDURE: 2/26/2024   PROCEDURES PERFORMED:   Right total knee arthroplasty (CPT 40386)    65 y.o. Female presents with new issue, previously seen for her right knee.  Doing well with that.  Patient is right hand dominant.  He had complaint is a 2 month history of right shoulder pain.  Localizes over the lateral subdeltoid recess and also anterolaterally.  Present at rest and also at night when lying on her right side.  Disrupt sleep.  Also worsened with overhead activity.  Pain can be quite significant.  Limits activities.  Refers into the upper arm and right trap and neck region.  Denies any numbness or tingling.  No radicular complaints.  Treatment has been limited to self-directed care.  No prior injections. No formal therapy.    PMHx notable for HTN, GERD, trigeminal neuralgia.   Negative for tobacco.   Negative for diabetes. Last A1C: 5.3 10/31/24    PAST MEDICAL HISTORY:   Past Medical History:   Diagnosis Date    Abnormal gait 02/22/2023    Arthritis     Depression     Encounter for blood transfusion     GERD (gastroesophageal reflux disease)     Hypertension     Migraine     Poor tolerance for ambulation 02/22/2023    Trigeminal neuralgia 03/2018     PAST SURGICAL HISTORY:  Past Surgical History:   Procedure Laterality Date    APPENDECTOMY      CHOLECYSTECTOMY  07/2012    ESOPHAGOGASTRODUODENOSCOPY N/A 10/14/2019    Procedure: EGD (ESOPHAGOGASTRODUODENOSCOPY);  Surgeon: Sean Girard MD;  Location: [facility] FLR);  Service: Endoscopy;  Laterality: N/A;    HYSTERECTOMY      LAPAROSCOPIC TOUPET FUNDOPLICATION N/A 11/22/2019    Procedure: FUNDOPLICATION, LAPAROSCOPIC, TOUPET;  Surgeon: Adriano Godoy MD;  Location: [facility] 2ND FLR;  Service: General;  Laterality: N/A;    TOTAL KNEE ARTHROPLASTY Right 2/26/2024    Procedure: ARTHROPLASTY, KNEE, TOTAL;  Surgeon: ERNIE Lezama MD;  Location: [facility];  Service: Orthopedics;  Laterality: Right;  Regional  "w/Catheter, Adductor, 0.5% Marcaine     FAMILY HISTORY:  Family History   Problem Relation Name Age of Onset    Hypertension Mother      Cancer Mother      Breast cancer Mother      Colon cancer Mother      Cancer Father       MEDICATIONS:  Current Medications[1]    ALLERGIES:  Review of patient's allergies indicates:  No Known Allergies     REVIEW OF SYSTEMS:  Constitution: Negative. Negative for chills, fever and night sweats.    Hematologic/Lymphatic: Negative for bleeding problem. Does not bruise/bleed easily.   Skin: Negative for dry skin, itching and rash.   Musculoskeletal: Negative for falls. Positive for right shoulder pain and muscle weakness.     All other review of symptoms were reviewed and found to be noncontributory.     PHYSICAL EXAMINATION:  Vitals:  /81 (Patient Position: Sitting)   Pulse 96   Ht 5' 9" (1.753 m)   Wt 91.2 kg (201 lb)   BMI 29.68 kg/m²    General: Well-developed well-nourished 65 y.o. femalein no acute distress   Cardiovascular: Regular rhythm by palpation of distal pulse, normal color and temperature, no concerning varicosities on symptomatic side   Lungs: No labored breathing or wheezing appreciated   Neuro: Alert and oriented ×3   Psychiatric: well oriented to person, place and time, demonstrates normal mood and affect   Skin: No rashes, lesions or ulcers, normal temperature, turgor, and texture on uninvolved extremity    Ortho/SPM Exam  Examination of the right shoulder demonstrates prominent pain to palpation over the proximal biceps groove and Codman's point.  Negative AC joint.  Active forward elevation scapular plane to 150, passive to 170 with pain at terminal range.  Positive Neer and Perez impingement signs.  Active external rotation with arm at side to 50, passive to 60.  No significant pain with passive external rotation stretch.  Internal rotation to back pocket with limitation due to pain.  4/5 resisted scaption with pain.  5- out of 5 resisted external " rotation with arm at side.  Some pain with belly press and bear hug.  Positive modified speed's test.  Intact cervical neck range of motion.  Negative Spurling's maneuver.    IMAGING:  Xrays including AP, Outlet and Axillary Lateral of RIGHT shoulder are reviewed by me:   No significant degenerative changes    Xrays 02/18/25 of cervical spine including AP and lateral views are reviewed by me:   Mild degenerative disc spondylosis C4-C6, adjacent facet joint arthropathy and uncinate process hypertrophy, particularly on left at C3-C4. C1-C2, airway, neck soft tissues normal. Mild levoscoliosis included mid dorsal spine.     MRI 03/28/25 of right shoulder reviewed by me and discussed with patient. Study shows:   Diffuse rotator cuff tendinosis, bursal sided fraying of the superior rotator cuff and very small superior subscapularis tendon tear.  Extra-articular biceps tenosynovitis, intra-articular tendinosis and split tearing.    MRI 03/28/25 of cervical spine reviewed by me and discussed with patient. Study shows:   *C2-C3: No spinal canal stenosis or neural foraminal narrowing.  *C3-C4: Uncovertebral spurring resulting in mild right neural foraminal narrowing.  No spinal canal stenosis.  *C4-C5: Posterior disc osteophyte complex, superimposed central disc protrusion, and facet arthropathy and uncovertebral spurring resulting in moderate right neural foraminal narrowing, mild left neural foraminal narrowing and mild spinal canal stenosis.  *C5-C6: Posterior disc osteophyte complex, facet arthropathy and uncovertebral spurring resulting in moderate bilateral neural foraminal narrowing and mild spinal canal stenosis.  *C6-C7: Posterior disc osteophyte complex, facet arthropathy and uncovertebral spurring resulting in moderate left neural foraminal narrowing and mild spinal canal stenosis.  *C7-T1: No spinal canal stenosis or neural foraminal narrowing.  Paraspinal muscles & soft tissues: Unremarkable.     Cervical  spondylosis, as above, without severe neural foraminal narrowing or severe spinal canal stenosis at any level.    ASSESSMENT:      ICD-10-CM ICD-9-CM   1. Nontraumatic incomplete tear of right rotator cuff  M75.111 726.13   2. Biceps tendinopathy of right upper extremity  M67.921 727.9   3. Chronic right shoulder pain  M25.511 719.41    G89.29 338.29     PLAN:     Findings discussed with the patient.  Exam findings and imaging demonstrate evidence of symptomatic biceps tendinopathy along with external impingement findings and symptomatic partial-thickness rotator cuff pathology.  Treatment to this point has been limited.  I have recommended an initial course of conservative care to include formal PT for cuff and periscapular strengthening.  We discussed the potential benefit of a subacromial steroid injection.  The patient has no interest in injections.  Celebrex with safe use instructions.  I will see her back in 6-8 weeks.  If pain is persistent or recurrent despite these measures, next step would be consideration for arthroscopy.  That would be for rotator cuff debridement versus repair as indicated, biceps tenodesis, debridement, subacromial decompression.  The hope would be to avoid that.  She does have fairly notable intra-articular biceps long head tearing with upper border subscapularis irritation and partial tearing.  I do have some concern that this will continue to bother her.    PT referral to [facility].    I do not detect any significant cervical radicular complaint at this time on exam.    Procedures         [1]   Current Outpatient Medications:     acetaminophen (TYLENOL) 650 MG TbSR, Take 650 mg by mouth every 8 (eight) hours., Disp: , Rfl:     citalopram (CELEXA) 40 MG tablet, Take 1 tablet (40 mg total) by mouth once daily., Disp: 90 tablet, Rfl: 1    clobetasol 0.05% (TEMOVATE) 0.05 % Oint, USE A PENCIL ERASER AMOUNT ON THE VULVA TWICE A DAY FOR TWO WEEKS, THEN ONCE A DAY FOR TWO WEEKS, THEN TWICE A  WEEK., Disp: 45 g, Rfl: 0    cyanocobalamin, vitamin B-12, 2,500 mcg Tab, Take 1 tablet by mouth once daily., Disp: , Rfl:     cyclobenzaprine (FLEXERIL) 10 MG tablet, Take 1 tablet (10 mg total) by mouth 3 (three) times daily as needed for Muscle spasms (neck pain)., Disp: 30 tablet, Rfl: 2    gabapentin (NEURONTIN) 300 MG capsule, Take 1 capsule (300 mg total) by mouth 3 (three) times daily., Disp: 90 capsule, Rfl: 5    hydrOXYzine HCL (ATARAX) 25 MG tablet, Take 1 tablet (25 mg total) by mouth 3 (three) times daily as needed for Anxiety., Disp: 30 tablet, Rfl: 0    losartan (COZAAR) 50 MG tablet, Take 1 tablet (50 mg total) by mouth once daily., Disp: 90 tablet, Rfl: 2    meloxicam (MOBIC) 7.5 MG tablet, Take 1 tablet (7.5 mg total) by mouth once daily., Disp: 30 tablet, Rfl: 0    naproxen (NAPROSYN) 500 MG tablet, Take 1 tablet (500 mg total) by mouth 2 (two) times daily as needed (shoulder, neck pain)., Disp: 30 tablet, Rfl: 1    omeprazole (PRILOSEC) 40 MG capsule, Take 1 capsule (40 mg total) by mouth every morning., Disp: 90 capsule, Rfl: 1    ondansetron (ZOFRAN-ODT) 4 MG TbDL, Take 1 tablet (4 mg total) by mouth every 8 (eight) hours as needed (nausea)., Disp: 20 tablet, Rfl: 0    vitamin D (VITAMIN D3) 1000 units Tab, Take 2,000 Units by mouth once daily., Disp: , Rfl:     aspirin (ECOTRIN) 81 MG EC tablet, Take 1 tablet (81 mg total) by mouth 2 (two) times a day., Disp: 84 tablet, Rfl: 0    celecoxib (CELEBREX) 200 MG capsule, Take 1 capsule (200 mg total) by mouth once daily., Disp: 30 capsule, Rfl: 1    triamcinolone acetonide 0.025% (KENALOG) 0.025 % Oint, Apply topically 2 (two) times daily as needed (itching. apply only to outside of labia)., Disp: 15 g, Rfl: 0  No current facility-administered medications for this visit.    Facility-Administered Medications Ordered in Other Visits:     ropivacaine 0.2% Perineural Pump infusion 500 ML, , Perineural, Continuous, [place], JERICHO, New Bag at  02/26/24 0935

## 2025-04-07 ENCOUNTER — HOSPITAL ENCOUNTER (OUTPATIENT)
Dept: RADIOLOGY | Facility: HOSPITAL | Age: 65
Discharge: HOME | End: 2025-04-07
Attending: ORTHOPAEDIC SURGERY
Payer: MEDICARE

## 2025-04-07 ENCOUNTER — OFFICE VISIT (OUTPATIENT)
Dept: SPORTS MEDICINE | Facility: CLINIC | Age: 65
End: 2025-04-07
Payer: MEDICARE

## 2025-04-07 VITALS
BODY MASS INDEX: 29.77 KG/M2 | SYSTOLIC BLOOD PRESSURE: 131 MMHG | WEIGHT: 201 LBS | HEART RATE: 96 BPM | HEIGHT: 69 IN | DIASTOLIC BLOOD PRESSURE: 81 MMHG

## 2025-04-07 DIAGNOSIS — M25.511 RIGHT SHOULDER PAIN, UNSPECIFIED CHRONICITY: ICD-10-CM

## 2025-04-07 DIAGNOSIS — M75.111 NONTRAUMATIC INCOMPLETE TEAR OF RIGHT ROTATOR CUFF: Primary | ICD-10-CM

## 2025-04-07 DIAGNOSIS — G89.29 CHRONIC RIGHT SHOULDER PAIN: ICD-10-CM

## 2025-04-07 DIAGNOSIS — M67.921 BICEPS TENDINOPATHY OF RIGHT UPPER EXTREMITY: ICD-10-CM

## 2025-04-07 DIAGNOSIS — M25.511 CHRONIC RIGHT SHOULDER PAIN: ICD-10-CM

## 2025-04-07 PROCEDURE — 4010F ACE/ARB THERAPY RXD/TAKEN: CPT | Mod: CPTII,S$GLB,, | Performed by: ORTHOPAEDIC SURGERY

## 2025-04-07 PROCEDURE — 3288F FALL RISK ASSESSMENT DOCD: CPT | Mod: CPTII,S$GLB,, | Performed by: ORTHOPAEDIC SURGERY

## 2025-04-07 PROCEDURE — 3008F BODY MASS INDEX DOCD: CPT | Mod: CPTII,S$GLB,, | Performed by: ORTHOPAEDIC SURGERY

## 2025-04-07 PROCEDURE — 73030 X-RAY EXAM OF SHOULDER: CPT | Mod: 26,RT,, | Performed by: RADIOLOGY

## 2025-04-07 PROCEDURE — 3075F SYST BP GE 130 - 139MM HG: CPT | Mod: CPTII,S$GLB,, | Performed by: ORTHOPAEDIC SURGERY

## 2025-04-07 PROCEDURE — 99214 OFFICE O/P EST MOD 30 MIN: CPT | Mod: S$GLB,,, | Performed by: ORTHOPAEDIC SURGERY

## 2025-04-07 PROCEDURE — 3079F DIAST BP 80-89 MM HG: CPT | Mod: CPTII,S$GLB,, | Performed by: ORTHOPAEDIC SURGERY

## 2025-04-07 PROCEDURE — 1159F MED LIST DOCD IN RCRD: CPT | Mod: CPTII,S$GLB,, | Performed by: ORTHOPAEDIC SURGERY

## 2025-04-07 PROCEDURE — 1101F PT FALLS ASSESS-DOCD LE1/YR: CPT | Mod: CPTII,S$GLB,, | Performed by: ORTHOPAEDIC SURGERY

## 2025-04-07 PROCEDURE — 73030 X-RAY EXAM OF SHOULDER: CPT | Mod: TC,RT

## 2025-04-07 PROCEDURE — 99999 PR PBB SHADOW E&M-EST. PATIENT-LVL IV: CPT | Mod: PBBFAC,,, | Performed by: ORTHOPAEDIC SURGERY

## 2025-04-07 RX ORDER — CELECOXIB 200 MG/1
200 CAPSULE ORAL DAILY
Qty: 30 CAPSULE | Refills: 1 | Status: SHIPPED | OUTPATIENT
Start: 2025-04-07

## 2025-04-22 ENCOUNTER — CLINICAL SUPPORT (OUTPATIENT)
Dept: REHABILITATION | Facility: HOSPITAL | Age: 65
End: 2025-04-22
Attending: ORTHOPAEDIC SURGERY
Payer: MEDICARE

## 2025-04-22 DIAGNOSIS — R29.898 DECREASED STRENGTH OF UPPER EXTREMITY: Primary | ICD-10-CM

## 2025-04-22 DIAGNOSIS — M67.921 BICEPS TENDINOPATHY OF RIGHT UPPER EXTREMITY: ICD-10-CM

## 2025-04-22 DIAGNOSIS — M75.111 NONTRAUMATIC INCOMPLETE TEAR OF RIGHT ROTATOR CUFF: ICD-10-CM

## 2025-04-22 DIAGNOSIS — G89.29 CHRONIC RIGHT SHOULDER PAIN: ICD-10-CM

## 2025-04-22 DIAGNOSIS — M25.511 CHRONIC RIGHT SHOULDER PAIN: ICD-10-CM

## 2025-04-22 DIAGNOSIS — M25.611 DECREASED RANGE OF MOTION OF RIGHT SHOULDER: ICD-10-CM

## 2025-04-22 PROCEDURE — 97110 THERAPEUTIC EXERCISES: CPT

## 2025-04-22 PROCEDURE — 97161 PT EVAL LOW COMPLEX 20 MIN: CPT

## 2025-04-22 NOTE — PROGRESS NOTES
Outpatient Rehab    Physical Therapy Evaluation    Patient Name: Jenifer Burnett  MRN: [id number]  YOB: 1960  Encounter Date: 4/22/2025    Therapy Diagnosis:   Encounter Diagnoses   Name Primary?    Nontraumatic incomplete tear of right rotator cuff     Biceps tendinopathy of right upper extremity     Chronic right shoulder pain     Decreased strength of upper extremity Yes    Decreased range of motion of right shoulder      Physician: ERNIE Lezama MD    Physician Orders: Eval and Treat  Medical Diagnosis: Nontraumatic incomplete tear of right rotator cuff  Biceps tendinopathy of right upper extremity  Chronic right shoulder pain    Visit # / Visits Authorized:  1 / 1  Insurance Authorization Period: 4/7/2025 to 4/7/2026  Date of Evaluation: 4/22/2025  Plan of Care Certification: 4/22/2025 to 7/4/2025     Time In: 1306   Time Out: 1351  Total Time: 45   Total Billable Time:      Intake Outcome Measure for FOTO Survey    Therapist reviewed FOTO scores for Jenifer Burnett on 4/22/2025.   FOTO report - see Media section or FOTO account episode details.     Intake Score: 49%         Subjective   History of Present Illness  Jenifer is a 65 y.o. female who reports to physical therapy with a chief concern of Right Shoulder Pain.     The patient reports a medical diagnosis of Nontraumatic incomplete tear of right rotator cuff (M75.111), Biceps tendinopathy of right upper extremity (M67.921), Chronic right shoulder pain (M25.511, G89.29).    Diagnostic tests related to this condition: MRI studies.   MRI Studies Details: Impression:     Diffuse rotator cuff tendinosis, bursal sided fraying of the superior rotator cuff and very small superior subscapularis tendon tear.     Extra-articular biceps tenosynovitis, intra-articular tendinosis and split tearing.    History of Present Condition/Illness: She has been having increased pain in her right shoulder that started about a month or two ago. The pain will sometimes  radiate down the arm and will often go up into her neck and can give her a headache. The pain is worse especially shoulder into head when sleeping in certain positions. The pain in her shoulder is worse and will shoot down her arm with reaching up, reaching out to far and behind her. She describes the pain as sharp and shooting pain and has to relax her arm in a protective like position to let it calm down after. Tylenol and heat somewhat help with pain. She does not recall any JIMMIE. 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 Her goals are to improve the pain in her shoulder so she can move it like normal and relieve the headaches. She reports a past medical history of HTN.                Activities of Daily Living  Social history was obtained from Patient.    General Prior Level of Function Comments: Independent in all ADLs and job duties.  General Current Level of Function Comments: Difficulty reaching, lifting, sleeping, and ADLs.           Pain     Patient reports a current pain level of 4/10. Pain at best is reported as 0/10. Pain at worst is reported as 8/10.   Location: Right Shoulder  Clinical Progression (since onset): Stable  Pain Qualities: Aching, Sharp, Other (Comment)  Other Pain Qualities: Shooting  Pain-Relieving Factors: Heat, Medications - over-the-counter  Pain-Aggravating Factors: Lifting, Reaching, Sleeping           Past Medical History/Physical Systems Review:   Jenifer Burnett  has a past medical history of Abnormal gait, Arthritis, Depression, Encounter for blood transfusion, GERD (gastroesophageal reflux disease), Hypertension, Migraine, Poor tolerance for ambulation, and Trigeminal neuralgia.    Jenifer Burnett  has a past surgical history that includes Hysterectomy; Esophagogastroduodenoscopy (N/A, 10/14/2019); Cholecystectomy (07/2012); Laparoscopic Toupet fundoplication (N/A, 11/22/2019); Appendectomy; and Total  knee arthroplasty (Right, 2/26/2024).    Jenifer has a current medication list which includes the following prescription(s): acetaminophen, aspirin, celecoxib, citalopram, clobetasol 0.05%, cyanocobalamin (vitamin b-12), cyclobenzaprine, gabapentin, hydroxyzine hcl, losartan, meloxicam, naproxen, omeprazole, ondansetron, triamcinolone acetonide 0.025%, and vitamin d, and the following Facility-Administered Medications: ropivacaine hcl/pf.    Review of patient's allergies indicates:  No Known Allergies     Objective   Posture                 Patient's static posture displays depressed right shoulder with increased anterior glide.     Upper Extremity Sensation  Right Upper Extremity Sensation  Intact: Light Touch       Left Upper Extremity Sensation  Intact: Light Touch                Subcranial Range of Motion   Active Restricted? Passive Restricted? Pain   Flexion         Protraction         Retraction           Cervical Range of Motion   Active (deg) Passive (deg) Pain   Flexion 40       Extension 30       Right Lateral Flexion 20       Right Rotation 60       Left Lateral Flexion 15   Yes   Left Rotation 60         Patient reported pain in right shoulder and neck area with left lateral flexion. Discomfort in neck with flexion and extension.     Shoulder Range of Motion  Right Shoulder   Active (deg) Passive (deg) Pain   Flexion 140 155 Yes   Extension         Scaption         ABduction 120 140 Yes   ADduction         Horizontal ABduction         Horizontal ADduction         External Rotation (Shoulder ABducted 0 degrees) 45 50 Yes   External Rotation (Shoulder ABducted 45 degrees)         External Rotation (Shoulder ABducted 90 degrees)   80 Yes   Internal Rotation (Shoulder ABducted 0 degrees)         Internal Rotation (Shoulder ABducted 45 degrees)         Internal Rotation (Shoulder ABducted 90 degrees)   20 Yes     Left Shoulder   Active (deg) Passive (deg) Pain   Flexion 170 175     Extension         Scaption    "      ABduction 170 175     ADduction         Horizontal ABduction         Horizontal ADduction         External Rotation (Shoulder ABducted 0 degrees) 55 60     External Rotation (Shoulder ABducted 45 degrees)         External Rotation (Shoulder ABducted 90 degrees)   90     Internal Rotation (Shoulder ABducted 0 degrees)         Internal Rotation (Shoulder ABducted 45 degrees)         Internal Rotation (Shoulder ABducted 90 degrees)   30                   Shoulder Strength - Planes of Motion   Right Strength Right Pain Left Strength Left  Pain   Flexion 3   4     Extension           ABduction 3   4     ADduction           Horizontal ABduction           Horizontal ADduction           Internal Rotation 0° 3   4-     Internal Rotation 90°           External Rotation 0° 3   4     External Rotation 90°                         Shoulder Special Tests  Rotator Cuff Tests  Positive: Right Drop Arm  Negative: Left Drop Arm  Impingement Tests  Positive: Right Perez-Matias and Right Infraspinatus Muscle  Negative: Left Perez-Matias and Left Infraspinatus Muscle           Shoulder Joint Mobility            Decreased right AP GHJ glide and inferior glide. Limited scapular upward rotation. Decreased upper thoracic PA joint mobility in CT junction.           Treatment:  Therapeutic Exercise  TE 1: Pt education on PT POC, Prognosis, and HEP  TE 2: ER isometrics with ball into wall 1 x 10 x 3-5" holds  TE 3: Table slides 1 x 10 reps  TE 4: Seated thoracic extensions 1 x 10 x 3" holds  TE 5: Level I UT holds 1 x 10 x 5" holds      Time Entry(in minutes):  PT Evaluation (Low) Time Entry: 30  Therapeutic Exercise Time Entry: 15    Assessment & Plan   Assessment  Jenifer presents with a condition of Low complexity.   Presentation of Symptoms: Stable  Will Comorbidities Impact Care: Yes  HTN    Functional Limitations: Activity tolerance, Carrying objects, Completing self-care activities, Completing work/school activities, Pain when " reaching, Pain with ADLs/IADLs, Performing household chores, Proprioception, Range of motion, Reaching  Impairments: Abnormal or restricted range of motion, Impaired physical strength, Lack of appropriate home exercise program, Pain with functional activity  Personal Factors Affecting Prognosis: Schedule, Pain    Patient Goal for Therapy (PT): To improve the pain and mobility in her shoulder and decrease the neck pain leading to headaches.  Prognosis: Good  Assessment Details: Patient presents to physical therapy with a medical diagnosis of Nontraumatic incomplete tear of right rotator cuff M75.111, Biceps tendinopathy of right upper extremity M67.921, Chronic right shoulder pain M25.511, G89.29. Patient presents with decreased right shoulder range of motion, decreased upper extremity strength, posture abnormality, pain, decreased joint mobility, difficulty with ADLs, reaching and lifting activities. Patient would benefit from skilled PT intervention to address above stated deficits and improve overall functional mobility and return to prior level of function.     Plan  From a physical therapy perspective, the patient would benefit from: Skilled Rehab Services    Planned therapy interventions include: Therapeutic exercise, Therapeutic activities, Neuromuscular re-education, Manual therapy, and ADLs/IADLs.    Planned modalities to include: Biofeedback, Cryotherapy (cold pack), Electrical stimulation - attended, Electrical stimulation - passive/unattended, and Thermotherapy (hot pack).        Visit Frequency: 2 times Per Week for 10 Weeks.  Other/tapered frequency details: 1-2x / per week.     This plan was discussed with Patient.   Discussion participants: Agreed Upon Plan of Care             Patient's spiritual, cultural, and educational needs considered and patient agreeable to plan of care and goals.     Education  Education was done with Patient.  The patient Demonstrates understanding.         Pt education on PT  POC, Prognosis, and HEP.        Goals:   Active       Long Term Goals        Patient will be independent in updated HEP to help supplement PT and maintain gains made in PT.        Start:  04/22/25    Expected End:  07/04/25            Patient will improve FOTO score to >/= predicted to show improvement in condition.        Start:  04/22/25    Expected End:  07/04/25            Patient will improve ER strength to >/= 4/5 to help with ADLs.        Start:  04/22/25    Expected End:  07/04/25            Patient will be able to sleep without neck pain / headache waking her up to help improve quality of life.        Start:  04/22/25    Expected End:  07/04/25               Short Term Goals        Patient will be independent in initial HEP to help supplement PT.        Start:  04/22/25    Expected End:  05/23/25            Patient will improve right shoulder range of motion to >/= 150 degrees to help with reaching and lifting activities.        Start:  04/22/25    Expected End:  05/23/25            Patient will improve pain at its worst to </= 5/10 to help improve quality of life.        Start:  04/22/25    Expected End:  05/23/25                Reshma Rivera, PT, DPT, OCS

## 2025-05-06 ENCOUNTER — CLINICAL SUPPORT (OUTPATIENT)
Dept: REHABILITATION | Facility: HOSPITAL | Age: 65
End: 2025-05-06
Payer: MEDICARE

## 2025-05-06 DIAGNOSIS — R29.898 DECREASED STRENGTH OF UPPER EXTREMITY: Primary | ICD-10-CM

## 2025-05-06 DIAGNOSIS — M25.611 DECREASED RANGE OF MOTION OF RIGHT SHOULDER: ICD-10-CM

## 2025-05-06 PROCEDURE — 97110 THERAPEUTIC EXERCISES: CPT

## 2025-05-06 PROCEDURE — 97140 MANUAL THERAPY 1/> REGIONS: CPT

## 2025-05-06 NOTE — PROGRESS NOTES
"Outpatient Rehab    Physical Therapy Visit    Patient Name: Jenifer Burnett  MRN: [id number]  YOB: 1960  Encounter Date: 5/6/2025    Therapy Diagnosis:   Encounter Diagnoses   Name Primary?    Decreased strength of upper extremity Yes    Decreased range of motion of right shoulder      Physician: ERNIE Lezama MD    Physician Orders: Eval and Treat  Medical Diagnosis: Nontraumatic incomplete tear of right rotator cuff  Biceps tendinopathy of right upper extremity  Chronic right shoulder pain    Visit # / Visits Authorized:  3 / 12  Insurance Authorization Period: 4/29/2025 to 6/29/2025  Date of Evaluation: 4/22/2025  Plan of Care Certification: 4/22/2025 to 7/4/2025     PT/PTA:     Number of PTA visits since last PT visit:   Time In: 1300   Time Out: 1354  Total Time (in minutes): 54   Total Billable Time (in minutes):  25    FOTO:  Intake Score:  %  Survey Score 2:  %  Survey Score 3:  %         Subjective   Doing about the same. Shoulder still bothering her with certain activities..         Objective            Treatment:  Therapeutic Exercise  TE 1: Seated thoracic extensions 1 x 15 x 3" holds  TE 2: Passive shoulder flexion walkbacks 2 x 10 x 3-5" holds  TE 3: Table slides flex/scap 3' each  TE 4: Supine dowel flexion 2 x 10 reps  TE 5: ER isometrics into wall submax 2 x 10 x 3" holds  TE 6: Pendulum position scap retracts 3 x 8 x 3" holds  Manual Therapy  MT 1: PROM shoulder/elbow        Time Entry(in minutes):   M: 12  TE: 42    Assessment & Plan   Assessment: Jenifer continues with limitations in shoulder range of motion with pain being most limiting factor. Guarding throughout session with manual therapy and cueing to limit guarding and improve range of motion. Continued AAROM exercises to help improve mobility in tolerable range and progressing with isometrics to help with pain relief. Will continue to monitor and progress as able.        Patient will continue to benefit from skilled outpatient " physical therapy to address the deficits listed in the problem list box on initial evaluation, provide pt/family education and to maximize pt's level of independence in the home and community environment.     Patient's spiritual, cultural, and educational needs considered and patient agreeable to plan of care and goals.           Plan:  Continue with current PT POC    Goals:   Active       Long Term Goals        Patient will be independent in updated HEP to help supplement PT and maintain gains made in PT.  (Progressing)       Start:  04/22/25    Expected End:  07/04/25            Patient will improve FOTO score to >/= predicted to show improvement in condition.  (Progressing)       Start:  04/22/25    Expected End:  07/04/25            Patient will improve ER strength to >/= 4/5 to help with ADLs.  (Progressing)       Start:  04/22/25    Expected End:  07/04/25            Patient will be able to sleep without neck pain / headache waking her up to help improve quality of life.  (Progressing)       Start:  04/22/25    Expected End:  07/04/25               Short Term Goals        Patient will be independent in initial HEP to help supplement PT.  (Progressing)       Start:  04/22/25    Expected End:  05/23/25            Patient will improve right shoulder range of motion to >/= 150 degrees to help with reaching and lifting activities.  (Progressing)       Start:  04/22/25    Expected End:  05/23/25            Patient will improve pain at its worst to </= 5/10 to help improve quality of life.  (Progressing)       Start:  04/22/25    Expected End:  05/23/25                Reshma Rivera, PT, DPT, OCS

## 2025-05-07 DIAGNOSIS — L30.9 VULVAR DERMATITIS: ICD-10-CM

## 2025-05-07 RX ORDER — CLOBETASOL PROPIONATE 0.5 MG/G
OINTMENT TOPICAL
Qty: 45 G | Refills: 0 | Status: SHIPPED | OUTPATIENT
Start: 2025-05-07

## 2025-05-07 NOTE — TELEPHONE ENCOUNTER
Refill Decision Note   Jenifer Burnett  is requesting a refill authorization.  Brief Assessment and Rationale for Refill:  Approve     Medication Therapy Plan:        Comments:     Note composed:4:09 PM 05/07/2025

## 2025-05-12 ENCOUNTER — CLINICAL SUPPORT (OUTPATIENT)
Dept: REHABILITATION | Facility: HOSPITAL | Age: 65
End: 2025-05-12
Payer: MEDICARE

## 2025-05-12 DIAGNOSIS — R29.898 DECREASED STRENGTH OF UPPER EXTREMITY: Primary | ICD-10-CM

## 2025-05-12 DIAGNOSIS — M25.611 DECREASED RANGE OF MOTION OF RIGHT SHOULDER: ICD-10-CM

## 2025-05-12 PROCEDURE — 97112 NEUROMUSCULAR REEDUCATION: CPT | Mod: CQ

## 2025-05-12 NOTE — PROGRESS NOTES
Outpatient Rehab    Physical Therapy Visit    Patient Name: Jenifer Burnett  MRN: [id number]  YOB: 1960  Encounter Date: 5/12/2025    Therapy Diagnosis:   Encounter Diagnoses   Name Primary?    Decreased strength of upper extremity Yes    Decreased range of motion of right shoulder      Physician: ERNIE Lezama MD    Physician Orders: Eval and Treat  Medical Diagnosis: Nontraumatic incomplete tear of right rotator cuff  Biceps tendinopathy of right upper extremity  Chronic right shoulder pain    Visit # / Visits Authorized:  2 / 12  Insurance Authorization Period: 4/29/2025 to 6/29/2025  Date of Evaluation: 4/22/25  Plan of Care Certification: 4/22/2025 to 7/4/2025        Time In: 1330   Time Out: 1430  Total Time (in minutes): 60   Total Billable Time (in minutes): 23    FOTO:  Intake Score:  %  Survey Score 2:  %  Survey Score 3:  %         Subjective   feeling a little better.         Objective            Treatment:     Manual therapy techniques: for 15 min Joint mobilizations including:  PROM in all directions  Manual IR/ER isometrics  Neuromuscular re-education activities for 45 min to improve: motor control, including  Shoulder rows YTB 3x15  Shoulder extenstion YTB 3x15   EOT elevated shoulder flexion 3x12  Flexion walk backs with chin tuck 30x    Therapeutic activities for 00 min to improve functional performance including:      Therapeutic exercises for 00 min to develop strength, endurance, ROM, and posture including:        Time Entry(in minutes):  Manual Therapy Time Entry: 15  Neuromuscular Re-Education Time Entry: 45    Assessment & Plan   Assessment: Jenifer states she is feeling a little better since last session. slightly guaded during PROM. ER isometrics caused symptoms into her R side neck. was able to decrease symtpoms with scapular strengthening. updated HEP to incorporate shoulder rows and extension. was able to perfomr table slides at an incline. did note anterior shoulder fatigue  witht this exercise. will cont to progress as tolerated.       Patient will continue to benefit from skilled outpatient physical therapy to address the deficits listed in the problem list box on initial evaluation, provide pt/family education and to maximize pt's level of independence in the home and community environment.     Patient's spiritual, cultural, and educational needs considered and patient agreeable to plan of care and goals.           Plan: Cont with POC    Goals:   Active       Long Term Goals        Patient will be independent in updated HEP to help supplement PT and maintain gains made in PT.        Start:  04/22/25    Expected End:  07/04/25            Patient will improve FOTO score to >/= predicted to show improvement in condition.        Start:  04/22/25    Expected End:  07/04/25            Patient will improve ER strength to >/= 4/5 to help with ADLs.        Start:  04/22/25    Expected End:  07/04/25            Patient will be able to sleep without neck pain / headache waking her up to help improve quality of life.        Start:  04/22/25    Expected End:  07/04/25               Short Term Goals        Patient will be independent in initial HEP to help supplement PT.        Start:  04/22/25    Expected End:  05/23/25            Patient will improve right shoulder range of motion to >/= 150 degrees to help with reaching and lifting activities.        Start:  04/22/25    Expected End:  05/23/25            Patient will improve pain at its worst to </= 5/10 to help improve quality of life.        Start:  04/22/25    Expected End:  05/23/25                Lila De La Paz, PTA

## 2025-05-19 ENCOUNTER — CLINICAL SUPPORT (OUTPATIENT)
Dept: REHABILITATION | Facility: HOSPITAL | Age: 65
End: 2025-05-19
Payer: MEDICARE

## 2025-05-19 DIAGNOSIS — M25.611 DECREASED RANGE OF MOTION OF RIGHT SHOULDER: ICD-10-CM

## 2025-05-19 DIAGNOSIS — R29.898 DECREASED STRENGTH OF UPPER EXTREMITY: Primary | ICD-10-CM

## 2025-05-19 PROCEDURE — 97112 NEUROMUSCULAR REEDUCATION: CPT | Mod: CQ

## 2025-05-19 NOTE — PROGRESS NOTES
"Outpatient Rehab    Physical Therapy Visit    Patient Name: Jenifer Burnett  MRN: [id number]  YOB: 1960  Encounter Date: 5/19/2025    Therapy Diagnosis:   Encounter Diagnoses   Name Primary?    Decreased strength of upper extremity Yes    Decreased range of motion of right shoulder      Physician: ERNIE Lezama MD    Physician Orders: Eval and Treat  Medical Diagnosis: Nontraumatic incomplete tear of right rotator cuff  Biceps tendinopathy of right upper extremity  Chronic right shoulder pain    Visit # / Visits Authorized:  3 / 12  Insurance Authorization Period: 4/29/2025 to 6/29/2025  Date of Evaluation: 4/22/25  Plan of Care Certification: 4/22/2025 to 7/4/2025        Time In: 1330   Time Out: 1426  Total Time (in minutes): 56   Total Billable Time (in minutes): 23    FOTO:  Intake Score:  %  Survey Score 2:  %  Survey Score 3:  %         Subjective   shoulder hurts when I sleep on it or reach behind back. during the day shoulder is not too bad.         Objective            Treatment:     Manual therapy techniques: for 10 min Joint mobilizations including:  PROM in all directions  Manual IR/ER isometrics  GH joint inferior/posterior mobs  Neuromuscular re-education activities for 40 min to improve: motor control, including  Shoulder rows YTB 3x15  Shoulder extenstion YTB 3x15   EOT elevated shoulder flexion 3x12  Flexion walk backs with chin tuck 30x  IR/ER wall isometrics 10x10" hold  Supine SA punch #2 DB 3x12  Sidelying flexoin with stick (attempted but stopped 2* pain)  Therapeutic activities for 00 min to improve functional performance including:      Therapeutic exercises for 06 min to develop strength, endurance, ROM, and posture including:  UBE 3/3 lvl 2      Time Entry(in minutes):  Manual Therapy Time Entry: 10  Neuromuscular Re-Education Time Entry: 40  Therapeutic Exercise Time Entry: 6    Assessment & Plan   Assessment: Jenifer experienced shoulder pain into her neck with sidelying " flexion and hitch hiker isos. PROM FL and abd caused same symtoms which improved with manual upward scapular rotation. able to perform ER isometrics without causing shoulder or neck symptoms.       Patient will continue to benefit from skilled outpatient physical therapy to address the deficits listed in the problem list box on initial evaluation, provide pt/family education and to maximize pt's level of independence in the home and community environment.     Patient's spiritual, cultural, and educational needs considered and patient agreeable to plan of care and goals.           Plan:  Cont with POC    Goals:   Active       Long Term Goals        Patient will be independent in updated HEP to help supplement PT and maintain gains made in PT.        Start:  04/22/25    Expected End:  07/04/25            Patient will improve FOTO score to >/= predicted to show improvement in condition.        Start:  04/22/25    Expected End:  07/04/25            Patient will improve ER strength to >/= 4/5 to help with ADLs.        Start:  04/22/25    Expected End:  07/04/25            Patient will be able to sleep without neck pain / headache waking her up to help improve quality of life.        Start:  04/22/25    Expected End:  07/04/25               Short Term Goals        Patient will be independent in initial HEP to help supplement PT.        Start:  04/22/25    Expected End:  05/23/25            Patient will improve right shoulder range of motion to >/= 150 degrees to help with reaching and lifting activities.        Start:  04/22/25    Expected End:  05/23/25            Patient will improve pain at its worst to </= 5/10 to help improve quality of life.        Start:  04/22/25    Expected End:  05/23/25                Lila De La Paz, PTA

## 2025-05-29 DIAGNOSIS — K21.9 GASTROESOPHAGEAL REFLUX DISEASE WITHOUT ESOPHAGITIS: ICD-10-CM

## 2025-05-29 DIAGNOSIS — F41.9 ANXIETY: ICD-10-CM

## 2025-05-29 DIAGNOSIS — I10 ESSENTIAL HYPERTENSION: ICD-10-CM

## 2025-05-30 RX ORDER — CITALOPRAM 40 MG/1
40 TABLET ORAL DAILY
Qty: 90 TABLET | Refills: 0 | Status: SHIPPED | OUTPATIENT
Start: 2025-05-30

## 2025-05-30 RX ORDER — LOSARTAN POTASSIUM 50 MG/1
50 TABLET ORAL DAILY
Qty: 90 TABLET | Refills: 1 | Status: SHIPPED | OUTPATIENT
Start: 2025-05-30

## 2025-05-30 RX ORDER — OMEPRAZOLE 40 MG/1
40 CAPSULE, DELAYED RELEASE ORAL EVERY MORNING
Qty: 90 CAPSULE | Refills: 0 | Status: SHIPPED | OUTPATIENT
Start: 2025-05-30

## 2025-06-01 DIAGNOSIS — G89.29 CHRONIC RIGHT SHOULDER PAIN: ICD-10-CM

## 2025-06-01 DIAGNOSIS — M67.921 BICEPS TENDINOPATHY OF RIGHT UPPER EXTREMITY: ICD-10-CM

## 2025-06-01 DIAGNOSIS — M75.111 NONTRAUMATIC INCOMPLETE TEAR OF RIGHT ROTATOR CUFF: ICD-10-CM

## 2025-06-01 DIAGNOSIS — M25.511 CHRONIC RIGHT SHOULDER PAIN: ICD-10-CM

## 2025-06-02 ENCOUNTER — OFFICE VISIT (OUTPATIENT)
Dept: INTERNAL MEDICINE | Facility: CLINIC | Age: 65
End: 2025-06-02
Payer: MEDICARE

## 2025-06-02 VITALS
WEIGHT: 210.75 LBS | SYSTOLIC BLOOD PRESSURE: 118 MMHG | BODY MASS INDEX: 31.12 KG/M2 | DIASTOLIC BLOOD PRESSURE: 78 MMHG | HEART RATE: 78 BPM | OXYGEN SATURATION: 99 %

## 2025-06-02 DIAGNOSIS — R61 EXCESSIVE SWEATING: ICD-10-CM

## 2025-06-02 DIAGNOSIS — R79.9 ABNORMAL FINDING OF BLOOD CHEMISTRY: ICD-10-CM

## 2025-06-02 DIAGNOSIS — F41.9 ANXIETY: ICD-10-CM

## 2025-06-02 DIAGNOSIS — Z12.31 ENCOUNTER FOR SCREENING MAMMOGRAM FOR BREAST CANCER: ICD-10-CM

## 2025-06-02 DIAGNOSIS — K21.9 GASTROESOPHAGEAL REFLUX DISEASE WITHOUT ESOPHAGITIS: ICD-10-CM

## 2025-06-02 DIAGNOSIS — E66.811 CLASS 1 OBESITY DUE TO EXCESS CALORIES WITH SERIOUS COMORBIDITY AND BODY MASS INDEX (BMI) OF 31.0 TO 31.9 IN ADULT: ICD-10-CM

## 2025-06-02 DIAGNOSIS — E66.09 CLASS 1 OBESITY DUE TO EXCESS CALORIES WITH SERIOUS COMORBIDITY AND BODY MASS INDEX (BMI) OF 31.0 TO 31.9 IN ADULT: ICD-10-CM

## 2025-06-02 DIAGNOSIS — I10 ESSENTIAL HYPERTENSION: Primary | ICD-10-CM

## 2025-06-02 DIAGNOSIS — G43.809 OTHER MIGRAINE WITHOUT STATUS MIGRAINOSUS, NOT INTRACTABLE: ICD-10-CM

## 2025-06-02 DIAGNOSIS — I65.23 BILATERAL CAROTID ARTERY STENOSIS: ICD-10-CM

## 2025-06-02 DIAGNOSIS — Z78.0 POSTMENOPAUSAL: ICD-10-CM

## 2025-06-02 PROCEDURE — 3078F DIAST BP <80 MM HG: CPT | Mod: CPTII,S$GLB,, | Performed by: NURSE PRACTITIONER

## 2025-06-02 PROCEDURE — 3074F SYST BP LT 130 MM HG: CPT | Mod: CPTII,S$GLB,, | Performed by: NURSE PRACTITIONER

## 2025-06-02 PROCEDURE — 3008F BODY MASS INDEX DOCD: CPT | Mod: CPTII,S$GLB,, | Performed by: NURSE PRACTITIONER

## 2025-06-02 PROCEDURE — 3288F FALL RISK ASSESSMENT DOCD: CPT | Mod: CPTII,S$GLB,, | Performed by: NURSE PRACTITIONER

## 2025-06-02 PROCEDURE — 99214 OFFICE O/P EST MOD 30 MIN: CPT | Mod: S$GLB,,, | Performed by: NURSE PRACTITIONER

## 2025-06-02 PROCEDURE — 99999 PR PBB SHADOW E&M-EST. PATIENT-LVL IV: CPT | Mod: PBBFAC,,, | Performed by: NURSE PRACTITIONER

## 2025-06-02 PROCEDURE — 1101F PT FALLS ASSESS-DOCD LE1/YR: CPT | Mod: CPTII,S$GLB,, | Performed by: NURSE PRACTITIONER

## 2025-06-02 PROCEDURE — 4010F ACE/ARB THERAPY RXD/TAKEN: CPT | Mod: CPTII,S$GLB,, | Performed by: NURSE PRACTITIONER

## 2025-06-02 PROCEDURE — 1159F MED LIST DOCD IN RCRD: CPT | Mod: CPTII,S$GLB,, | Performed by: NURSE PRACTITIONER

## 2025-06-02 RX ORDER — CELECOXIB 200 MG/1
200 CAPSULE ORAL
Qty: 30 CAPSULE | Refills: 1 | Status: SHIPPED | OUTPATIENT
Start: 2025-06-02

## 2025-06-02 RX ORDER — OMEPRAZOLE 40 MG/1
40 CAPSULE, DELAYED RELEASE ORAL EVERY MORNING
Qty: 90 CAPSULE | Refills: 0 | Status: SHIPPED | OUTPATIENT
Start: 2025-06-02

## 2025-06-02 RX ORDER — LOSARTAN POTASSIUM 50 MG/1
50 TABLET ORAL DAILY
Qty: 90 TABLET | Refills: 1 | Status: SHIPPED | OUTPATIENT
Start: 2025-06-02

## 2025-06-02 RX ORDER — CITALOPRAM 20 MG/1
20 TABLET ORAL DAILY
Qty: 90 TABLET | Refills: 0 | Status: SHIPPED | OUTPATIENT
Start: 2025-06-02 | End: 2026-06-02

## 2025-06-03 ENCOUNTER — HOSPITAL ENCOUNTER (OUTPATIENT)
Dept: RADIOLOGY | Facility: HOSPITAL | Age: 65
Discharge: HOME | End: 2025-06-03
Attending: NURSE PRACTITIONER
Payer: MEDICARE

## 2025-06-03 DIAGNOSIS — I65.23 BILATERAL CAROTID ARTERY STENOSIS: ICD-10-CM

## 2025-06-03 PROCEDURE — 93880 EXTRACRANIAL BILAT STUDY: CPT | Mod: TC

## 2025-06-03 PROCEDURE — 93880 EXTRACRANIAL BILAT STUDY: CPT | Mod: 26,,, | Performed by: RADIOLOGY

## 2025-06-04 ENCOUNTER — RESULTS FOLLOW-UP (OUTPATIENT)
Dept: INTERNAL MEDICINE | Facility: CLINIC | Age: 65
End: 2025-06-04

## 2025-06-05 ENCOUNTER — LAB VISIT (OUTPATIENT)
Dept: LAB | Facility: HOSPITAL | Age: 65
End: 2025-06-05
Payer: MEDICARE

## 2025-06-05 DIAGNOSIS — I65.23 BILATERAL CAROTID ARTERY STENOSIS: ICD-10-CM

## 2025-06-05 DIAGNOSIS — I10 ESSENTIAL HYPERTENSION: ICD-10-CM

## 2025-06-05 DIAGNOSIS — R79.9 ABNORMAL FINDING OF BLOOD CHEMISTRY: ICD-10-CM

## 2025-06-05 DIAGNOSIS — E66.09 CLASS 1 OBESITY DUE TO EXCESS CALORIES WITH SERIOUS COMORBIDITY AND BODY MASS INDEX (BMI) OF 31.0 TO 31.9 IN ADULT: ICD-10-CM

## 2025-06-05 DIAGNOSIS — F41.9 ANXIETY: ICD-10-CM

## 2025-06-05 DIAGNOSIS — R61 EXCESSIVE SWEATING: ICD-10-CM

## 2025-06-05 DIAGNOSIS — E66.811 CLASS 1 OBESITY DUE TO EXCESS CALORIES WITH SERIOUS COMORBIDITY AND BODY MASS INDEX (BMI) OF 31.0 TO 31.9 IN ADULT: ICD-10-CM

## 2025-06-05 LAB
ABSOLUTE EOSINOPHIL (OHS): 0.08 K/UL
ABSOLUTE MONOCYTE (OHS): 0.41 K/UL (ref 0.3–1)
ABSOLUTE NEUTROPHIL COUNT (OHS): 1.7 K/UL (ref 1.8–7.7)
ALBUMIN SERPL BCP-MCNC: 3.8 G/DL (ref 3.5–5.2)
ALP SERPL-CCNC: 70 UNIT/L (ref 40–150)
ALT SERPL W/O P-5'-P-CCNC: 13 UNIT/L (ref 10–44)
ANION GAP (OHS): 8 MMOL/L (ref 8–16)
AST SERPL-CCNC: 17 UNIT/L (ref 11–45)
BASOPHILS # BLD AUTO: 0.02 K/UL
BASOPHILS NFR BLD AUTO: 0.7 %
BILIRUB SERPL-MCNC: 0.5 MG/DL (ref 0.1–1)
BUN SERPL-MCNC: 15 MG/DL (ref 8–23)
CALCIUM SERPL-MCNC: 8.9 MG/DL (ref 8.7–10.5)
CHLORIDE SERPL-SCNC: 107 MMOL/L (ref 95–110)
CHOLEST SERPL-MCNC: 194 MG/DL (ref 120–199)
CHOLEST/HDLC SERPL: 2.9 {RATIO} (ref 2–5)
CO2 SERPL-SCNC: 26 MMOL/L (ref 23–29)
CORTIS SERPL-MCNC: 10.7 UG/DL
CREAT SERPL-MCNC: 0.8 MG/DL (ref 0.5–1.4)
EAG (OHS): 105 MG/DL (ref 68–131)
ERYTHROCYTE [DISTWIDTH] IN BLOOD BY AUTOMATED COUNT: 12.8 % (ref 11.5–14.5)
GFR SERPLBLD CREATININE-BSD FMLA CKD-EPI: >60 ML/MIN/1.73/M2
GLUCOSE SERPL-MCNC: 92 MG/DL (ref 70–110)
HBA1C MFR BLD: 5.3 % (ref 4–5.6)
HCT VFR BLD AUTO: 41.6 % (ref 37–48.5)
HDLC SERPL-MCNC: 67 MG/DL (ref 40–75)
HDLC SERPL: 34.5 % (ref 20–50)
HGB BLD-MCNC: 13.3 GM/DL (ref 12–16)
IMM GRANULOCYTES # BLD AUTO: 0.01 K/UL (ref 0–0.04)
IMM GRANULOCYTES NFR BLD AUTO: 0.3 % (ref 0–0.5)
LDLC SERPL CALC-MCNC: 108.6 MG/DL (ref 63–159)
LYMPHOCYTES # BLD AUTO: 0.85 K/UL (ref 1–4.8)
MCH RBC QN AUTO: 29 PG (ref 27–31)
MCHC RBC AUTO-ENTMCNC: 32 G/DL (ref 32–36)
MCV RBC AUTO: 91 FL (ref 82–98)
NONHDLC SERPL-MCNC: 127 MG/DL
NUCLEATED RBC (/100WBC) (OHS): 0 /100 WBC
PLATELET # BLD AUTO: 202 K/UL (ref 150–450)
PMV BLD AUTO: 10.2 FL (ref 9.2–12.9)
POTASSIUM SERPL-SCNC: 4.1 MMOL/L (ref 3.5–5.1)
PROT SERPL-MCNC: 7.3 GM/DL (ref 6–8.4)
RBC # BLD AUTO: 4.59 M/UL (ref 4–5.4)
RELATIVE EOSINOPHIL (OHS): 2.6 %
RELATIVE LYMPHOCYTE (OHS): 27.7 % (ref 18–48)
RELATIVE MONOCYTE (OHS): 13.4 % (ref 4–15)
RELATIVE NEUTROPHIL (OHS): 55.3 % (ref 38–73)
SODIUM SERPL-SCNC: 141 MMOL/L (ref 136–145)
TRIGL SERPL-MCNC: 92 MG/DL (ref 30–150)
TSH SERPL-ACNC: 2.45 UIU/ML (ref 0.4–4)
WBC # BLD AUTO: 3.07 K/UL (ref 3.9–12.7)

## 2025-06-05 PROCEDURE — 83718 ASSAY OF LIPOPROTEIN: CPT

## 2025-06-05 PROCEDURE — 83036 HEMOGLOBIN GLYCOSYLATED A1C: CPT

## 2025-06-05 PROCEDURE — 82533 TOTAL CORTISOL: CPT

## 2025-06-05 PROCEDURE — 82040 ASSAY OF SERUM ALBUMIN: CPT

## 2025-06-05 PROCEDURE — 84443 ASSAY THYROID STIM HORMONE: CPT

## 2025-06-05 PROCEDURE — 36415 COLL VENOUS BLD VENIPUNCTURE: CPT

## 2025-06-05 PROCEDURE — 85025 COMPLETE CBC W/AUTO DIFF WBC: CPT

## 2025-06-09 ENCOUNTER — OFFICE VISIT (OUTPATIENT)
Dept: INTERNAL MEDICINE | Facility: CLINIC | Age: 65
End: 2025-06-09
Payer: MEDICARE

## 2025-06-09 VITALS
SYSTOLIC BLOOD PRESSURE: 124 MMHG | WEIGHT: 206.56 LBS | OXYGEN SATURATION: 98 % | BODY MASS INDEX: 30.59 KG/M2 | HEIGHT: 69 IN | DIASTOLIC BLOOD PRESSURE: 72 MMHG | HEART RATE: 83 BPM

## 2025-06-09 DIAGNOSIS — L25.9 CONTACT DERMATITIS, UNSPECIFIED CONTACT DERMATITIS TYPE, UNSPECIFIED TRIGGER: ICD-10-CM

## 2025-06-09 DIAGNOSIS — N89.8 VAGINAL ITCHING: ICD-10-CM

## 2025-06-09 DIAGNOSIS — F41.9 ANXIETY: ICD-10-CM

## 2025-06-09 DIAGNOSIS — F32.A DEPRESSION, UNSPECIFIED DEPRESSION TYPE: Primary | ICD-10-CM

## 2025-06-09 PROCEDURE — 99999 PR PBB SHADOW E&M-EST. PATIENT-LVL IV: CPT | Mod: PBBFAC,,, | Performed by: NURSE PRACTITIONER

## 2025-06-09 PROCEDURE — 3074F SYST BP LT 130 MM HG: CPT | Mod: CPTII,S$GLB,, | Performed by: NURSE PRACTITIONER

## 2025-06-09 PROCEDURE — 1101F PT FALLS ASSESS-DOCD LE1/YR: CPT | Mod: CPTII,S$GLB,, | Performed by: NURSE PRACTITIONER

## 2025-06-09 PROCEDURE — 3078F DIAST BP <80 MM HG: CPT | Mod: CPTII,S$GLB,, | Performed by: NURSE PRACTITIONER

## 2025-06-09 PROCEDURE — 3044F HG A1C LEVEL LT 7.0%: CPT | Mod: CPTII,S$GLB,, | Performed by: NURSE PRACTITIONER

## 2025-06-09 PROCEDURE — 1159F MED LIST DOCD IN RCRD: CPT | Mod: CPTII,S$GLB,, | Performed by: NURSE PRACTITIONER

## 2025-06-09 PROCEDURE — 4010F ACE/ARB THERAPY RXD/TAKEN: CPT | Mod: CPTII,S$GLB,, | Performed by: NURSE PRACTITIONER

## 2025-06-09 PROCEDURE — 3008F BODY MASS INDEX DOCD: CPT | Mod: CPTII,S$GLB,, | Performed by: NURSE PRACTITIONER

## 2025-06-09 PROCEDURE — 3288F FALL RISK ASSESSMENT DOCD: CPT | Mod: CPTII,S$GLB,, | Performed by: NURSE PRACTITIONER

## 2025-06-09 PROCEDURE — G2211 COMPLEX E/M VISIT ADD ON: HCPCS | Mod: S$GLB,,, | Performed by: NURSE PRACTITIONER

## 2025-06-09 PROCEDURE — 99214 OFFICE O/P EST MOD 30 MIN: CPT | Mod: S$GLB,,, | Performed by: NURSE PRACTITIONER

## 2025-06-09 RX ORDER — BUPROPION HYDROCHLORIDE 150 MG/1
150 TABLET ORAL DAILY
Qty: 30 TABLET | Refills: 11 | Status: SHIPPED | OUTPATIENT
Start: 2025-06-09 | End: 2026-06-09

## 2025-06-09 RX ORDER — TRIAMCINOLONE ACETONIDE 1 MG/G
CREAM TOPICAL 2 TIMES DAILY
Qty: 80 G | Refills: 0 | Status: SHIPPED | OUTPATIENT
Start: 2025-06-09 | End: 2025-06-19

## 2025-06-09 RX ORDER — HYDROXYZINE HYDROCHLORIDE 25 MG/1
25 TABLET, FILM COATED ORAL 3 TIMES DAILY PRN
Qty: 30 TABLET | Refills: 0 | Status: SHIPPED | OUTPATIENT
Start: 2025-06-09

## 2025-06-09 NOTE — LETTER
June 9, 2025      [facility]  [address]  [facility] [address]  Phone: [phone]  Fax: [phone]       Patient: Jenifer Burnett   YOB: 1960  Date of Visit: 06/09/2025    To Whom It May Concern:    Jorden Burnett  was at [facility] on 06/09/2025. The patient may return to work/school on 06/16/25. If you have any questions or concerns, or if I can be of further assistance, please do not hesitate to contact me.    Sincerely,    JEROD Hurtado

## 2025-06-09 NOTE — PROGRESS NOTES
INTERNAL MEDICINE PROGRESS/URGENT CARE NOTE    CHIEF COMPLAINT     Chief Complaint   Patient presents with    Rash    Depression       HPI     Jenifer Burnett is a 65 y.o. female who presents for a follow up visit today.    Saw her 5 days ago. Hx of anxiety and depression. She was complaining then of worsening issues with depression- mostly stemming from work stress and being harassed at work. Also having issues with her son not having enough time for her. She was on 40 mg of celexa at the time x over 10 yrs. Not working as well for her. Discussed given age, should technically be on 20 mg given risk of QT issues but also can begin process of weaning her off celexa and trying something else and gave her a f/u with pcp in 2 weeks. States cannot wait the 2 weeks- more depressed since she knows she needs to return to work. No si or HI. Not sleeping well.  Phq9- 19.     She is also c/o a rash that follows the distrubution of her pants x 2 weeks. Itching.     She would also like a referral to a GYN. Continues to have vulvar itching. No discharge. Chronic issue    Problem List[1]     Past Medical History:  Past Medical History:   Diagnosis Date    Abnormal gait 02/22/2023    Arthritis     Depression     Encounter for blood transfusion     GERD (gastroesophageal reflux disease)     Hypertension     Migraine     Poor tolerance for ambulation 02/22/2023    Trigeminal neuralgia 03/2018        Past Surgical History:  Past Surgical History:   Procedure Laterality Date    APPENDECTOMY      CHOLECYSTECTOMY  07/2012    ESOPHAGOGASTRODUODENOSCOPY N/A 10/14/2019    Procedure: EGD (ESOPHAGOGASTRODUODENOSCOPY);  Surgeon: Sean Girard MD;  Location: [facility] (4TH FLR);  Service: Endoscopy;  Laterality: N/A;    HYSTERECTOMY      LAPAROSCOPIC TOUPET FUNDOPLICATION N/A 11/22/2019    Procedure: FUNDOPLICATION, LAPAROSCOPIC, TOUPET;  Surgeon: Adriano Godoy MD;  Location: [facility] OR [address] [place] [address];  Service: General;  Laterality: N/A;    TOTAL  "KNEE ARTHROPLASTY Right 2/26/2024    Procedure: ARTHROPLASTY, KNEE, TOTAL;  Surgeon: ERNIE Lezama MD;  Location: [facility];  Service: Orthopedics;  Laterality: Right;  Regional w/Catheter, Adductor, 0.5% Marcaine        Allergies:  Review of patient's allergies indicates:  No Known Allergies    Home Medications:  Current Medications[2]     Review of Systems:  Review of Systems   Constitutional:  Negative for fever.   HENT:  Negative for congestion and sore throat.    Respiratory:  Negative for cough and shortness of breath.    Cardiovascular:  Negative for chest pain.   Skin:  Positive for rash.   Neurological:  Negative for dizziness, weakness and headaches.   Psychiatric/Behavioral:  Positive for decreased concentration, dysphoric mood and sleep disturbance.          PHYSICAL EXAM     Vitals:    06/09/25 0857   BP: 124/72   BP Location: Left arm   Patient Position: Sitting   Pulse: 83   SpO2: 98%   Weight: 93.7 kg (206 lb 9.1 oz)   Height: 5' 9" (1.753 m)      Body mass index is 30.51 kg/m².     Physical Exam  Vitals reviewed.   Constitutional:       Appearance: Normal appearance.   HENT:      Head: Normocephalic.   Eyes:      Conjunctiva/sclera: Conjunctivae normal.   Cardiovascular:      Rate and Rhythm: Normal rate.   Pulmonary:      Effort: Pulmonary effort is normal.   Skin:     General: Skin is warm and dry.             Comments: Red papular rash to area.    Neurological:      Mental Status: She is alert and oriented to person, place, and time.   Psychiatric:         Mood and Affect: Mood normal.         Behavior: Behavior normal.         LABS     Lab Results   Component Value Date    HGBA1C 5.3 06/05/2025     CMP  Sodium   Date Value Ref Range Status   06/05/2025 141 136 - 145 mmol/L Final   10/31/2024 138 136 - 145 mmol/L Final     Potassium   Date Value Ref Range Status   06/05/2025 4.1 3.5 - 5.1 mmol/L Final   10/31/2024 4.0 3.5 - 5.1 mmol/L Final     Chloride   Date Value Ref Range Status "   06/05/2025 107 95 - 110 mmol/L Final   10/31/2024 104 95 - 110 mmol/L Final     CO2   Date Value Ref Range Status   06/05/2025 26 23 - 29 mmol/L Final   10/31/2024 28 23 - 29 mmol/L Final     Glucose   Date Value Ref Range Status   06/05/2025 92 70 - 110 mg/dL Final   10/31/2024 86 70 - 110 mg/dL Final     BUN   Date Value Ref Range Status   06/05/2025 15 8 - 23 mg/dL Final     Creatinine   Date Value Ref Range Status   06/05/2025 0.8 0.5 - 1.4 mg/dL Final     Calcium   Date Value Ref Range Status   06/05/2025 8.9 8.7 - 10.5 mg/dL Final   10/31/2024 9.2 8.7 - 10.5 mg/dL Final     Protein Total   Date Value Ref Range Status   06/05/2025 7.3 6.0 - 8.4 gm/dL Final     Total Protein   Date Value Ref Range Status   10/31/2024 7.3 6.0 - 8.4 g/dL Final     Albumin   Date Value Ref Range Status   06/05/2025 3.8 3.5 - 5.2 g/dL Final   10/31/2024 3.7 3.5 - 5.2 g/dL Final     Total Bilirubin   Date Value Ref Range Status   10/31/2024 0.6 0.1 - 1.0 mg/dL Final     Comment:     For infants and newborns, interpretation of results should be based  on gestational age, weight and in agreement with clinical  observations.    Premature Infant recommended reference ranges:  Up to 24 hours.............<8.0 mg/dL  Up to 48 hours............<12.0 mg/dL  3-5 days..................<15.0 mg/dL  6-29 days.................<15.0 mg/dL       Bilirubin Total   Date Value Ref Range Status   06/05/2025 0.5 0.1 - 1.0 mg/dL Final     Comment:     For infants and newborns, interpretation of results should be based   on gestational age, weight and in agreement with clinical   observations.    Premature Infant recommended reference ranges:   0-24 hours:  <8.0 mg/dL   24-48 hours: <12.0 mg/dL   3-5 days:    <15.0 mg/dL   6-29 days:   <15.0 mg/dL     Alkaline Phosphatase   Date Value Ref Range Status   10/31/2024 67 40 - 150 U/L Final     ALP   Date Value Ref Range Status   06/05/2025 70 40 - 150 unit/L Final     AST   Date Value Ref Range Status    06/05/2025 17 11 - 45 unit/L Final   10/31/2024 16 10 - 40 U/L Final     ALT   Date Value Ref Range Status   06/05/2025 13 10 - 44 unit/L Final   10/31/2024 11 10 - 44 U/L Final     Anion Gap   Date Value Ref Range Status   06/05/2025 8 8 - 16 mmol/L Final     eGFR if    Date Value Ref Range Status   12/22/2021 >60.0 >60 mL/min/1.73 m^2 Final     eGFR if non    Date Value Ref Range Status   12/22/2021 >60.0 >60 mL/min/1.73 m^2 Final     Comment:     Calculation used to obtain the estimated glomerular filtration  rate (eGFR) is the CKD-EPI equation.        Lab Results   Component Value Date    WBC 3.07 (L) 06/05/2025    HGB 13.3 06/05/2025    HCT 41.6 06/05/2025    MCV 91 06/05/2025     06/05/2025     Lab Results   Component Value Date    CHOL 194 06/05/2025    CHOL 180 10/31/2024    CHOL 195 01/17/2024     Lab Results   Component Value Date    HDL 67 06/05/2025    HDL 56 10/31/2024    HDL 52 01/17/2024     Lab Results   Component Value Date    LDLCALC 108.6 06/05/2025    LDLCALC 102.2 10/31/2024    LDLCALC Invalid, Trig>400.0 01/17/2024     Lab Results   Component Value Date    TRIG 92 06/05/2025    TRIG 109 10/31/2024    TRIG 453 (H) 01/17/2024     Lab Results   Component Value Date    CHOLHDL 34.5 06/05/2025    CHOLHDL 31.1 10/31/2024    CHOLHDL 26.7 01/17/2024     Lab Results   Component Value Date    TSH 2.448 06/05/2025       ASSESSMENT     1. Depression, unspecified depression type    2. Anxiety    3. Contact dermatitis, unspecified contact dermatitis type, unspecified trigger    4. Vaginal itching         PLAN  Depression/anxiety- no hx of seizures, doesn't drink etoh. Will add on wellbutrin 150 mg daily to take with the 20 mg of celexa. We discussed side effects. She will contact me if she feels bad. Hydroxyzine prn anxiety. Referral placed to psych. She is planning on  to HR.   Contact derm- looks like coming from elastic of pants. Try steroid  cream  Vaginal itching- referred to gyn.     Keep f/u as scheduled with Dr. Carlin in  2 weeks to reassess.    1. Anxiety  - hydrOXYzine HCL (ATARAX) 25 MG tablet; Take 1 tablet (25 mg total) by mouth 3 (three) times daily as needed for Anxiety.  Dispense: 30 tablet; Refill: 0    2. Depression, unspecified depression type  - Ambulatory referral/consult to Psychiatry; Future  - buPROPion (WELLBUTRIN XL) 150 MG TB24 tablet; Take 1 tablet (150 mg total) by mouth once daily.  Dispense: 30 tablet; Refill: 11    3. Contact dermatitis, unspecified contact dermatitis type, unspecified trigger  - triamcinolone acetonide 0.1% (KENALOG) 0.1 % cream; Apply topically 2 (two) times daily. for 10 days  Dispense: 80 g; Refill: 0    4. Vaginal itching  - Ambulatory referral/consult to Obstetrics / Gynecology; Future       Follow up with PCP     Patient's plan/treatment was discussed including medications and possible side effects. Verbalized understanding of all instructions.     This note was partly generated with [employer] voice recognition software. I apologize for any possible typographical errors.     Visit today included increased complexity associated with the care of the episodic problem addressed and managing the longitudinal care of the patient due to the serious and/or complex managed problem(s).          KAISER Hurtado  Department of Internal Medicine - [facility]  06/09/2025          [1]   Patient Active Problem List  Diagnosis    Essential hypertension    Migraine    Gastroesophageal reflux disease without esophagitis    History of repair of hiatal hernia    Anxiety    History of trigeminal neuralgia    Bilateral carotid artery stenosis    Syncope    Decreased range of motion of right ankle    Decreased strength of lower extremity    Decreased ROM of right knee    Decreased strength of upper extremity    Decreased range of motion of right shoulder   [2]   Current Outpatient Medications:     acetaminophen  (TYLENOL) 650 MG TbSR, Take 650 mg by mouth every 8 (eight) hours., Disp: , Rfl:     celecoxib (CELEBREX) 200 MG capsule, TAKE 1 CAPSULE BY MOUTH ONCE DAILY., Disp: 30 capsule, Rfl: 1    citalopram (CELEXA) 20 MG tablet, Take 1 tablet (20 mg total) by mouth once daily., Disp: 90 tablet, Rfl: 0    cyanocobalamin, vitamin B-12, 2,500 mcg Tab, Take 1 tablet by mouth once daily., Disp: , Rfl:     cyclobenzaprine (FLEXERIL) 10 MG tablet, Take 1 tablet (10 mg total) by mouth 3 (three) times daily as needed for Muscle spasms (neck pain)., Disp: 30 tablet, Rfl: 2    gabapentin (NEURONTIN) 300 MG capsule, Take 1 capsule (300 mg total) by mouth 3 (three) times daily., Disp: 90 capsule, Rfl: 5    losartan (COZAAR) 50 MG tablet, Take 1 tablet (50 mg total) by mouth once daily., Disp: 90 tablet, Rfl: 1    omeprazole (PRILOSEC) 40 MG capsule, Take 1 capsule (40 mg total) by mouth every morning., Disp: 90 capsule, Rfl: 0    ondansetron (ZOFRAN-ODT) 4 MG TbDL, Take 1 tablet (4 mg total) by mouth every 8 (eight) hours as needed (nausea)., Disp: 20 tablet, Rfl: 0    vitamin D (VITAMIN D3) 1000 units Tab, Take 2,000 Units by mouth once daily., Disp: , Rfl:     buPROPion (WELLBUTRIN XL) 150 MG TB24 tablet, Take 1 tablet (150 mg total) by mouth once daily., Disp: 30 tablet, Rfl: 11    clobetasol 0.05% (TEMOVATE) 0.05 % Oint, USE A PENCIL ERASER AMOUNT ON THE VULVA TWICE A DAY FOR TWO WEEKS, THEN ONCE A DAY FOR TWO WEEKS, THEN TWICE A WEEK., Disp: 45 g, Rfl: 0    hydrOXYzine HCL (ATARAX) 25 MG tablet, Take 1 tablet (25 mg total) by mouth 3 (three) times daily as needed for Anxiety., Disp: 30 tablet, Rfl: 0    triamcinolone acetonide 0.1% (KENALOG) 0.1 % cream, Apply topically 2 (two) times daily. for 10 days, Disp: 80 g, Rfl: 0  No current facility-administered medications for this visit.    Facility-Administered Medications Ordered in Other Visits:     ropivacaine 0.2% Perineural Pump infusion 500 ML, , Perineural, Continuous, [place],  [place] PA-C, New Bag at 02/26/24 0938

## 2025-06-09 NOTE — PROGRESS NOTES
CC: Right shoulder pain    DATE OF PROCEDURE: 2/26/2024   PROCEDURES PERFORMED:   Right total knee arthroplasty (CPT 40232)    Jenifer Burnett, presents today for follow up appointment of her right shoulder. Continues PT at [facility] with [facility].  She reports continued and recurrent right shoulder pain despite recent conservative treatment to include formal physical therapy and oral medication.  No prior injections.  She denies any neck pain.  She has had some intermittent symptoms radiating past the elbow in the past but nothing too significant.  Her typical pain is present over the right shoulder with radiation into the upper arm.  She does have prominent mechanical symptoms with the associated pain.  Current pain is daily and activity limiting.      Prior Hx 4/7/2025:   65 y.o. Female presents with new issue, previously seen for her right knee.  Doing well with that.  Patient is right hand dominant.  He had complaint is a 2 month history of right shoulder pain.  Localizes over the lateral subdeltoid recess and also anterolaterally.  Present at rest and also at night when lying on her right side.  Disrupt sleep.  Also worsened with overhead activity.  Pain can be quite significant.  Limits activities.  Refers into the upper arm and right trap and neck region.  Denies any numbness or tingling.  No radicular complaints.  Treatment has been limited to self-directed care.  No prior injections. No formal therapy.    PMHx notable for HTN, GERD, trigeminal neuralgia.   Negative for tobacco.   Negative for diabetes. Last A1C: 5.3 10/31/24    PAST MEDICAL HISTORY:   Past Medical History:   Diagnosis Date    Abnormal gait 02/22/2023    Arthritis     Depression     Encounter for blood transfusion     GERD (gastroesophageal reflux disease)     Hypertension     Migraine     Poor tolerance for ambulation 02/22/2023    Trigeminal neuralgia 03/2018     PAST SURGICAL HISTORY:  Past Surgical History:   Procedure Laterality  "Date    APPENDECTOMY      CHOLECYSTECTOMY  07/2012    ESOPHAGOGASTRODUODENOSCOPY N/A 10/14/2019    Procedure: EGD (ESOPHAGOGASTRODUODENOSCOPY);  Surgeon: Sean Girard MD;  Location: [facility] (4TH FLR);  Service: Endoscopy;  Laterality: N/A;    HYSTERECTOMY      LAPAROSCOPIC TOUPET FUNDOPLICATION N/A 11/22/2019    Procedure: FUNDOPLICATION, LAPAROSCOPIC, TOUPET;  Surgeon: Adriano Godoy MD;  Location: [facility] 2ND FLR;  Service: General;  Laterality: N/A;    TOTAL KNEE ARTHROPLASTY Right 2/26/2024    Procedure: ARTHROPLASTY, KNEE, TOTAL;  Surgeon: ERNIE Lezama MD;  Location: [facility];  Service: Orthopedics;  Laterality: Right;  Regional w/Catheter, Adductor, 0.5% Marcaine     FAMILY HISTORY:  Family History   Problem Relation Name Age of Onset    Hypertension Mother      Cancer Mother      Breast cancer Mother      Colon cancer Mother      Cancer Father       MEDICATIONS:  Current Medications[1]    ALLERGIES:  Review of patient's allergies indicates:  No Known Allergies     REVIEW OF SYSTEMS:  Constitution: Negative. Negative for chills, fever and night sweats.    Hematologic/Lymphatic: Negative for bleeding problem. Does not bruise/bleed easily.   Skin: Negative for dry skin, itching and rash.   Musculoskeletal: Negative for falls. Positive for right shoulder pain and muscle weakness.     All other review of symptoms were reviewed and found to be noncontributory.     PHYSICAL EXAMINATION:  Vitals:  /87 (Patient Position: Sitting)   Pulse 82   Ht 5' 9" (1.753 m)   Wt 93.1 kg (205 lb 2.2 oz)   BMI 30.29 kg/m²    General: Well-developed well-nourished 65 y.o. femalein no acute distress   Cardiovascular: Regular rhythm by palpation of distal pulse, normal color and temperature, no concerning varicosities on symptomatic side   Lungs: No labored breathing or wheezing appreciated   Neuro: Alert and oriented ×3   Psychiatric: well oriented to person, place and time, demonstrates normal mood and affect "   Skin: No rashes, lesions or ulcers, normal temperature, turgor, and texture on uninvolved extremity    Ortho/SPM Exam  Examination of the right shoulder again demonstrates prominent pain to palpation over the proximal biceps groove and Codman's point.  She has audible and palpable clicking and popping in the shoulder with compression rotation.  This is painful for her.  Positive [place]'s test.  Negative AC joint.  Active forward elevation scapular plane to 150-160, passive to 170 with pain at terminal range.  Positive Neer and Perez impingement signs.  Active external rotation with arm at side to 50, passive to 60.  No significant pain with passive external rotation stretch.  Internal rotation to back pocket with limitation due to pain.  4+/5 resisted scaption with pain.  Some limitation due to pain.  5- out of 5 resisted external rotation with arm at side.  Some pain with belly press and bear hug.  Positive modified speed's test.  Intact cervical neck range of motion.  Negative Spurling's maneuver.    IMAGING:  Xrays 04/07/25 including AP, Outlet and Axillary Lateral of RIGHT shoulder are reviewed by me:   No significant degenerative changes    Xrays 02/18/25 of cervical spine including AP and lateral views are reviewed by me:   Mild degenerative disc spondylosis C4-C6, adjacent facet joint arthropathy and uncinate process hypertrophy, particularly on left at C3-C4. C1-C2, airway, neck soft tissues normal. Mild levoscoliosis included mid dorsal spine.     MRI 03/28/25 of right shoulder reviewed by me and discussed with patient. Study shows:   Diffuse rotator cuff tendinosis, bursal sided fraying of the superior rotator cuff and very small superior subscapularis tendon tear.  Extra-articular biceps tenosynovitis, intra-articular tendinosis and split tearing.    On my read:  Fairly well-maintained rotator cuff integrity.  Mild bursal sided fraying.  Significant intra-articular biceps long head split tearing with  tendinopathy.    MRI 03/28/25 of cervical spine reviewed by me and discussed with patient. Study shows:   *C2-C3: No spinal canal stenosis or neural foraminal narrowing.  *C3-C4: Uncovertebral spurring resulting in mild right neural foraminal narrowing.  No spinal canal stenosis.  *C4-C5: Posterior disc osteophyte complex, superimposed central disc protrusion, and facet arthropathy and uncovertebral spurring resulting in moderate right neural foraminal narrowing, mild left neural foraminal narrowing and mild spinal canal stenosis.  *C5-C6: Posterior disc osteophyte complex, facet arthropathy and uncovertebral spurring resulting in moderate bilateral neural foraminal narrowing and mild spinal canal stenosis.  *C6-C7: Posterior disc osteophyte complex, facet arthropathy and uncovertebral spurring resulting in moderate left neural foraminal narrowing and mild spinal canal stenosis.  *C7-T1: No spinal canal stenosis or neural foraminal narrowing.  Paraspinal muscles & soft tissues: Unremarkable.     Cervical spondylosis, as above, without severe neural foraminal narrowing or severe spinal canal stenosis at any level.    ASSESSMENT:      ICD-10-CM ICD-9-CM   1. Biceps tendinopathy of right upper extremity  M67.921 727.9   2. Nontraumatic incomplete tear of right rotator cuff  M75.111 726.13   3. Chronic pain of right knee  M25.561 719.46    G89.29 338.29     PLAN:     The patient returns with chronic recurrent right shoulder pain localized deep in the joint.  She does have mechanical symptoms.  Again previous MRI showed low-grade partial rotator cuff pathology.  She has notable intra-articular biceps long head tendinopathy with partial tearing.  I do think that is her primary pain generator.  She has had some referred symptoms down the right arm in the past distal to the elbow but nothing recent and nothing consistent.  She denies any neck complaints.  No radicular symptoms at this time.  Negative Spurling's maneuver  today.  No reported pain or symptoms with neck lateral bend or rotational motion.  Ongoing pain and problems with the right shoulder despite initial conservative treatment.  We did discuss the potential benefit of arthroscopy for extensive debridement and open subpectoral biceps tenodesis.  The details of that surgery were discussed to include the expected postop rehab and recovery course.  We discussed the less likely scenario of needing a rotator cuff repair.  Outlined risks include but are not limited to continued or recurrent pain, stiffness, biceps deformity, weakness among others.  I expect her to do well.    Plan is right shoulder arthroscopic rotator cuff debridement versus repair as indicated, open subpectoral biceps tenodesis, subacromial decompression    Preop clearance per PCP    Informed Consent:    The details of the surgical procedure were explained, including the location of probable incisions and a description of possible hardware and/or grafts to be used. Alternatives to both operative and non-operative options with associated risks and benefits were discussed. The patient understands the likely convalescence after surgery and, in particular, the expected postop rehab and recovery course. The outlined risks and potential complications of the proposed procedure include but are not limited to: infection, poor wound healing, scarring, deformity, stiffness, swelling, continued or recurrent pain, instability, hardware or prosthetic failure if implanted, symptomatic hardware requiring removal, dislocation, weakness, neurovascular injury, numbness, chronic regional pain disorder, tissue nonhealing/irreparability/retear, subsequent contralateral limb injury or pathology, chondral injury, arthritis, fracture, blood clot formation, inability to return to previous level of activity, anesthetic or regional block complication up to death, need for additional procedure as indicated intraoperatively, and  potential need for further surgery.    The patient was also informed and understands that the risks of surgery are greater for patients with a current condition or history of heart disease, obesity, clotting disorders, recurrent infections, steroid use, current or past smoking, and factors such as sedentary lifestyle and noncompliance with medications, therapy or follow-up. The degree of the increased risk is hard to estimate with any degree of precision. If applicable, smoking cessation was discussed.     All questions were answered. The patient has verbalized understanding of these issues and wishes to proceed with the surgery as discussed.    Procedures         [1]   Current Outpatient Medications:     acetaminophen (TYLENOL) 650 MG TbSR, Take 650 mg by mouth every 8 (eight) hours., Disp: , Rfl:     buPROPion (WELLBUTRIN XL) 150 MG TB24 tablet, Take 1 tablet (150 mg total) by mouth once daily., Disp: 30 tablet, Rfl: 11    citalopram (CELEXA) 20 MG tablet, Take 1 tablet (20 mg total) by mouth once daily., Disp: 90 tablet, Rfl: 0    cyanocobalamin, vitamin B-12, 2,500 mcg Tab, Take 1 tablet by mouth once daily., Disp: , Rfl:     gabapentin (NEURONTIN) 300 MG capsule, Take 1 capsule (300 mg total) by mouth 3 (three) times daily., Disp: 90 capsule, Rfl: 5    losartan (COZAAR) 50 MG tablet, Take 1 tablet (50 mg total) by mouth once daily., Disp: 90 tablet, Rfl: 1    omeprazole (PRILOSEC) 40 MG capsule, Take 1 capsule (40 mg total) by mouth every morning., Disp: 90 capsule, Rfl: 0    triamcinolone acetonide 0.1% (KENALOG) 0.1 % cream, Apply topically 2 (two) times daily. for 10 days, Disp: 80 g, Rfl: 0    vitamin D (VITAMIN D3) 1000 units Tab, Take 2,000 Units by mouth once daily., Disp: , Rfl:     celecoxib (CELEBREX) 200 MG capsule, TAKE 1 CAPSULE BY MOUTH ONCE DAILY. (Patient not taking: Reported on 6/10/2025), Disp: 30 capsule, Rfl: 1    clobetasol 0.05% (TEMOVATE) 0.05 % Oint, USE A PENCIL ERASER AMOUNT ON THE  VULVA TWICE A DAY FOR TWO WEEKS, THEN ONCE A DAY FOR TWO WEEKS, THEN TWICE A WEEK., Disp: 45 g, Rfl: 0    cyclobenzaprine (FLEXERIL) 10 MG tablet, Take 1 tablet (10 mg total) by mouth 3 (three) times daily as needed for Muscle spasms (neck pain). (Patient not taking: Reported on 6/10/2025), Disp: 30 tablet, Rfl: 2    hydrOXYzine HCL (ATARAX) 25 MG tablet, Take 1 tablet (25 mg total) by mouth 3 (three) times daily as needed for Anxiety. (Patient not taking: Reported on 6/10/2025), Disp: 30 tablet, Rfl: 0    ondansetron (ZOFRAN-ODT) 4 MG TbDL, Take 1 tablet (4 mg total) by mouth every 8 (eight) hours as needed (nausea). (Patient not taking: Reported on 6/10/2025), Disp: 20 tablet, Rfl: 0  No current facility-administered medications for this visit.    Facility-Administered Medications Ordered in Other Visits:     ropivacaine 0.2% Perineural Pump infusion 500 ML, , Perineural, Continuous, [place] PA-C, New Bag at 02/26/24 [id number]

## 2025-06-09 NOTE — LETTER
June 9, 2025      [facility]  [address]  [facility] [address]  Phone: [phone]  Fax: [phone]       Patient: Jenifer Burnett   YOB: 1960  Date of Visit: 06/09/2025    To Whom It May Concern:    Jorden Burnett  was at [facility] on 06/09/2025. The patient may return to work/school on 06/11/25. If you have any questions or concerns, or if I can be of further assistance, please do not hesitate to contact me.    Sincerely,    JEROD Hurtado

## 2025-06-10 ENCOUNTER — OFFICE VISIT (OUTPATIENT)
Dept: SPORTS MEDICINE | Facility: CLINIC | Age: 65
End: 2025-06-10
Payer: MEDICARE

## 2025-06-10 VITALS
HEART RATE: 82 BPM | DIASTOLIC BLOOD PRESSURE: 87 MMHG | HEIGHT: 69 IN | WEIGHT: 205.13 LBS | SYSTOLIC BLOOD PRESSURE: 136 MMHG | BODY MASS INDEX: 30.38 KG/M2

## 2025-06-10 DIAGNOSIS — M67.921 BICEPS TENDINOPATHY OF RIGHT UPPER EXTREMITY: Primary | ICD-10-CM

## 2025-06-10 DIAGNOSIS — M75.111 NONTRAUMATIC INCOMPLETE TEAR OF RIGHT ROTATOR CUFF: ICD-10-CM

## 2025-06-10 DIAGNOSIS — M25.561 CHRONIC PAIN OF RIGHT KNEE: ICD-10-CM

## 2025-06-10 DIAGNOSIS — G89.29 CHRONIC PAIN OF RIGHT KNEE: ICD-10-CM

## 2025-06-10 PROCEDURE — 3079F DIAST BP 80-89 MM HG: CPT | Mod: CPTII,S$GLB,, | Performed by: ORTHOPAEDIC SURGERY

## 2025-06-10 PROCEDURE — 4010F ACE/ARB THERAPY RXD/TAKEN: CPT | Mod: CPTII,S$GLB,, | Performed by: ORTHOPAEDIC SURGERY

## 2025-06-10 PROCEDURE — 99215 OFFICE O/P EST HI 40 MIN: CPT | Mod: S$GLB,,, | Performed by: ORTHOPAEDIC SURGERY

## 2025-06-10 PROCEDURE — 3044F HG A1C LEVEL LT 7.0%: CPT | Mod: CPTII,S$GLB,, | Performed by: ORTHOPAEDIC SURGERY

## 2025-06-10 PROCEDURE — 1101F PT FALLS ASSESS-DOCD LE1/YR: CPT | Mod: CPTII,S$GLB,, | Performed by: ORTHOPAEDIC SURGERY

## 2025-06-10 PROCEDURE — 1159F MED LIST DOCD IN RCRD: CPT | Mod: CPTII,S$GLB,, | Performed by: ORTHOPAEDIC SURGERY

## 2025-06-10 PROCEDURE — 3288F FALL RISK ASSESSMENT DOCD: CPT | Mod: CPTII,S$GLB,, | Performed by: ORTHOPAEDIC SURGERY

## 2025-06-10 PROCEDURE — 3008F BODY MASS INDEX DOCD: CPT | Mod: CPTII,S$GLB,, | Performed by: ORTHOPAEDIC SURGERY

## 2025-06-10 PROCEDURE — 3075F SYST BP GE 130 - 139MM HG: CPT | Mod: CPTII,S$GLB,, | Performed by: ORTHOPAEDIC SURGERY

## 2025-06-10 PROCEDURE — 99999 PR PBB SHADOW E&M-EST. PATIENT-LVL III: CPT | Mod: PBBFAC,,, | Performed by: ORTHOPAEDIC SURGERY

## 2025-06-10 NOTE — LETTER
Patient: Jenifer Burnett   YOB: 1960   Clinic Number: [id number]   Today's Date: Madelin 10, 2025        Certificate to Return to Work     Jenifer  was seen by Edil Lezama MD on 6/10/2025.      Jenifer  can return to work on 06/16/2025 with limited duty until further notice.    Specific restrictions: No pushing, pulling, reaching, carrying, or lifting.     If you have any questions or concerns, please feel free to contact the office at [phone].    Thank you.            Edil Lezama MD

## 2025-06-11 ENCOUNTER — TELEPHONE (OUTPATIENT)
Dept: INTERNAL MEDICINE | Facility: CLINIC | Age: 65
End: 2025-06-11
Payer: MEDICARE

## 2025-06-11 DIAGNOSIS — M67.921 BICEPS TENDINOPATHY OF RIGHT UPPER EXTREMITY: ICD-10-CM

## 2025-06-11 DIAGNOSIS — M75.111 NONTRAUMATIC INCOMPLETE TEAR OF RIGHT ROTATOR CUFF: Primary | ICD-10-CM

## 2025-06-11 NOTE — TELEPHONE ENCOUNTER
Copied from CRM #[id number]. Topic: General Inquiry - Patient Advice  >> Jun 11, 2025 12:02 PM Leif wrote:  Type: Returning a call    Who left a message?Pt     When did the practice call?    Does patient know what this is regarding:    Would the patient rather a call back or a response via [facility]? Call back     Comments:Pt would like a  call back to discuss if the doc can give her a surgery clearance to  this can be fax to him at [phone] since she seen on 6/9/25. Pt is having surgery on 7/9/25. Please call pt to advise [phone]

## 2025-06-12 ENCOUNTER — TELEPHONE (OUTPATIENT)
Dept: SPORTS MEDICINE | Facility: CLINIC | Age: 65
End: 2025-06-12
Payer: MEDICARE

## 2025-06-12 NOTE — TELEPHONE ENCOUNTER
Spoke c pt. Due to change in Dr. Lezama's surgery schedule offered new surgery date of 07/03/25. Pt was not able to accommodate her work schedule but r/s to 07/25/25. Advised her pre-op can remain as scheduled. Informed her PO appts will be r/s to reflect new surgery date. Pt will call c additional questions/concerns in interim. Pt expressed understanding & was thankful.    ==  [place] [facility] notified.

## 2025-06-25 ENCOUNTER — HOSPITAL ENCOUNTER (OUTPATIENT)
Dept: RADIOLOGY | Facility: HOSPITAL | Age: 65
Discharge: HOME | End: 2025-06-25
Attending: NURSE PRACTITIONER
Payer: MEDICARE

## 2025-06-25 VITALS — WEIGHT: 206 LBS | BODY MASS INDEX: 30.42 KG/M2

## 2025-06-25 DIAGNOSIS — Z12.31 ENCOUNTER FOR SCREENING MAMMOGRAM FOR BREAST CANCER: ICD-10-CM

## 2025-06-25 PROCEDURE — 77063 BREAST TOMOSYNTHESIS BI: CPT | Mod: TC

## 2025-06-30 ENCOUNTER — TELEPHONE (OUTPATIENT)
Dept: SPORTS MEDICINE | Facility: CLINIC | Age: 65
End: 2025-06-30
Payer: MEDICARE

## 2025-06-30 NOTE — TELEPHONE ENCOUNTER
Spoke c pt's daughter, Mindy Walker . She was having difficulty faxing pt's FMLA paperwork. Provided email address. She requested that form be completed ASAP by 07/03/25. Pt's daughterwill call c additional questions/concerns in interim, expressed understanding & was thankful.

## 2025-06-30 NOTE — TELEPHONE ENCOUNTER
Faxed forms. Confirmation received.     All documents & fax confirmation scanned into Media. Pt notified via [employer].

## 2025-06-30 NOTE — TELEPHONE ENCOUNTER
Copied from CRM #[id number]. Topic: General Inquiry - Patient Advice  >> Jun 30, 2025  9:03 AM Med Assistant Brooks wrote:  Type:  Needs Medical Advice    Who Called: Mindy patient's daughter is calling to see if there is an email that  the office can provide to her to get her mother's paperwork sent over stated that there fax machine is not working. Stated that the paperwork have to be filled  out  by July 3rd in order for her surgery to be  approved please advise.  Would the patient rather a call back or a response via [facility]?  Call back   Best Call Back Number: Mindy   Additional Information:

## 2025-07-01 ENCOUNTER — OFFICE VISIT (OUTPATIENT)
Dept: SPORTS MEDICINE | Facility: CLINIC | Age: 65
End: 2025-07-01
Payer: MEDICARE

## 2025-07-01 VITALS
DIASTOLIC BLOOD PRESSURE: 86 MMHG | HEIGHT: 69 IN | SYSTOLIC BLOOD PRESSURE: 137 MMHG | HEART RATE: 86 BPM | BODY MASS INDEX: 31.25 KG/M2 | WEIGHT: 211 LBS

## 2025-07-01 DIAGNOSIS — M25.511 CHRONIC RIGHT SHOULDER PAIN: Primary | ICD-10-CM

## 2025-07-01 DIAGNOSIS — M67.921 BICEPS TENDINOPATHY OF RIGHT UPPER EXTREMITY: ICD-10-CM

## 2025-07-01 DIAGNOSIS — M75.111 NONTRAUMATIC INCOMPLETE TEAR OF RIGHT ROTATOR CUFF: ICD-10-CM

## 2025-07-01 DIAGNOSIS — G89.29 CHRONIC RIGHT SHOULDER PAIN: Primary | ICD-10-CM

## 2025-07-01 PROCEDURE — 99999 PR PBB SHADOW E&M-EST. PATIENT-LVL IV: CPT | Mod: PBBFAC,,, | Performed by: PHYSICIAN ASSISTANT

## 2025-07-01 PROCEDURE — 99499 UNLISTED E&M SERVICE: CPT | Mod: S$GLB,,, | Performed by: PHYSICIAN ASSISTANT

## 2025-07-01 RX ORDER — ASPIRIN 81 MG/1
81 TABLET ORAL 2 TIMES DAILY
Qty: 28 TABLET | Refills: 0 | Status: SHIPPED | OUTPATIENT
Start: 2025-07-01

## 2025-07-01 RX ORDER — OXYCODONE HYDROCHLORIDE 5 MG/1
5-10 TABLET ORAL
Qty: 28 TABLET | Refills: 0 | Status: SHIPPED | OUTPATIENT
Start: 2025-07-01

## 2025-07-01 RX ORDER — ONDANSETRON 4 MG/1
4 TABLET, FILM COATED ORAL EVERY 8 HOURS PRN
Qty: 30 TABLET | Refills: 0 | Status: SHIPPED | OUTPATIENT
Start: 2025-07-01

## 2025-07-01 RX ORDER — MUPIROCIN 20 MG/G
OINTMENT TOPICAL
OUTPATIENT
Start: 2025-07-01

## 2025-07-01 NOTE — H&P
Jenifer Burnett  is here for a completion of her perioperative paperwork. She is scheduled to undergo right shoulder arthroscopic rotator cuff debridement versus repair as indicated, open subpectoral biceps tenodesis, subacromial decompression on July 25, 2025.  She is a healthy individual and does need clearance for this procedure.      She had to reschedule her pre-op appointment and is scheduled to see her internist on Monday, July 7, 2025.     Risks, indications and benefits of the surgical procedure were discussed with the patient. All questions with regard to surgery, rehab, expected return to functional activities, activities of daily living and recreational endeavors were answered to her satisfaction.     Patient was informed and understands the risks of surgery are greater for patients with a current condition or hx of heart disease, obesity, clotting disorders, recurrent infections, steroid use, current or past smoking, and factors such as sedentary lifestyle and noncompliance with medications, therapy or f/u. The degree of the increased risk is hard to estimate w/ any degree of precision.     Once no other questions were asked, a brief history and physical exam was then performed.     PAST MEDICAL HISTORY:        Past Medical History:   Diagnosis Date    Abnormal gait 02/22/2023    Arthritis      Depression      Encounter for blood transfusion      GERD (gastroesophageal reflux disease)      Hypertension      Migraine      Poor tolerance for ambulation 02/22/2023    Trigeminal neuralgia 03/2018      PAST SURGICAL HISTORY:         Past Surgical History:   Procedure Laterality Date    APPENDECTOMY        CHOLECYSTECTOMY   07/2012    ESOPHAGOGASTRODUODENOSCOPY N/A 10/14/2019     Procedure: EGD (ESOPHAGOGASTRODUODENOSCOPY);  Surgeon: Sean Griard MD;  Location: [address];  Service: Endoscopy;  Laterality: N/A;    HYSTERECTOMY        LAPAROSCOPIC TOUPET FUNDOPLICATION N/A 11/22/2019     Procedure:  FUNDOPLICATION, LAPAROSCOPIC, TOUPET;  Surgeon: Adriano Godoy MD;  Location: [facility] OR [address] [place] [address];  Service: General;  Laterality: N/A;    TOTAL KNEE ARTHROPLASTY Right 2/26/2024     Procedure: ARTHROPLASTY, KNEE, TOTAL;  Surgeon: ERNIE Lezama MD;  Location: [facility] OR;  Service: Orthopedics;  Laterality: Right;  Regional w/Catheter, Adductor, 0.5% Marcaine      FAMILY HISTORY:          Family History   Problem Relation Name Age of Onset    Hypertension Mother        Cancer Mother        Breast cancer Mother        Colon cancer Mother        Cancer Father          SOCIAL HISTORY: [Social History]    [Social History]        Socioeconomic History    Marital status: Single    Number of children: 3   Occupational History    Occupation: [employer]   Tobacco Use    Smoking status: Never    Smokeless tobacco: Never   Substance and Sexual Activity    Alcohol use: No    Drug use: No    Sexual activity: Yes       Partners: Male   Social History Narrative     Working at [employer], does do bending/lifting type work       Social Drivers of Health           Financial Resource Strain: Patient Declined (3/19/2025)     Overall Financial Resource Strain (CARDIA)      Difficulty of Paying Living Expenses: Patient declined   Food Insecurity: Patient Declined (3/19/2025)     Hunger Vital Sign      Worried About Running Out of Food in the Last Year: Patient declined      Ran Out of Food in the Last Year: Patient declined   Transportation Needs: Patient Declined (3/19/2025)     PRAPARE - Transportation      Lack of Transportation (Medical): Patient declined      Lack of Transportation (Non-Medical): Patient declined   Physical Activity: Patient Declined (3/19/2025)     Exercise Vital Sign      Days of Exercise per Week: Patient declined      Minutes of Exercise per Session: Patient declined   Stress: Patient Declined (3/19/2025)     [employer] - Occupational Stress Questionnaire       Feeling of Stress : Patient declined   Housing Stability: Patient Declined (3/19/2025)     Housing Stability Vital Sign      Unable to Pay for Housing in the Last Year: Patient declined      Homeless in the Last Year: Patient declined        MEDICATIONS: [Current Medications]    [Current Medications]     Current Outpatient Medications:     acetaminophen (TYLENOL) 650 MG TbSR, Take 650 mg by mouth every 8 (eight) hours., Disp: , Rfl:     buPROPion (WELLBUTRIN XL) 150 MG TB24 tablet, Take 1 tablet (150 mg total) by mouth once daily., Disp: 30 tablet, Rfl: 11    citalopram (CELEXA) 20 MG tablet, Take 1 tablet (20 mg total) by mouth once daily., Disp: 90 tablet, Rfl: 0    gabapentin (NEURONTIN) 300 MG capsule, Take 1 capsule (300 mg total) by mouth 3 (three) times daily., Disp: 90 capsule, Rfl: 5    losartan (COZAAR) 50 MG tablet, Take 1 tablet (50 mg total) by mouth once daily., Disp: 90 tablet, Rfl: 1    omeprazole (PRILOSEC) 40 MG capsule, Take 1 capsule (40 mg total) by mouth every morning., Disp: 90 capsule, Rfl: 0    vitamin D (VITAMIN D3) 1000 units Tab, Take 2,000 Units by mouth once daily., Disp: , Rfl:     celecoxib (CELEBREX) 200 MG capsule, TAKE 1 CAPSULE BY MOUTH ONCE DAILY. (Patient not taking: Reported on 7/1/2025), Disp: 30 capsule, Rfl: 1    clobetasol 0.05% (TEMOVATE) 0.05 % Oint, USE A PENCIL ERASER AMOUNT ON THE VULVA TWICE A DAY FOR TWO WEEKS, THEN ONCE A DAY FOR TWO WEEKS, THEN TWICE A WEEK., Disp: 45 g, Rfl: 0    cyanocobalamin, vitamin B-12, 2,500 mcg Tab, Take 1 tablet by mouth once daily. (Patient not taking: Reported on 7/1/2025), Disp: , Rfl:     cyclobenzaprine (FLEXERIL) 10 MG tablet, Take 1 tablet (10 mg total) by mouth 3 (three) times daily as needed for Muscle spasms (neck pain). (Patient not taking: Reported on 7/1/2025), Disp: 30 tablet, Rfl: 2    hydrOXYzine HCL (ATARAX) 25 MG tablet, Take 1 tablet (25 mg total) by mouth 3 (three) times daily as needed for Anxiety. (Patient not  taking: Reported on 7/1/2025), Disp: 30 tablet, Rfl: 0    ondansetron (ZOFRAN-ODT) 4 MG TbDL, Take 1 tablet (4 mg total) by mouth every 8 (eight) hours as needed (nausea). (Patient not taking: Reported on 7/1/2025), Disp: 20 tablet, Rfl: 0    triamcinolone acetonide 0.1% (KENALOG) 0.1 % cream, Apply topically 2 (two) times daily. for 10 days, Disp: 80 g, Rfl: 0  No current facility-administered medications for this visit.     Facility-Administered Medications Ordered in Other Visits:     ropivacaine 0.2% Perineural Pump infusion 500 ML, , Perineural, Continuous, North Singh PA-C, New Bag at 02/26/24 0960  ALLERGIES: Review of patient's allergies indicates:  No Known Allergies     Review of Systems   Constitution: Negative. Negative for chills, fever and night sweats.   HENT: Negative for congestion and headaches.    Eyes: Negative for blurred vision, left vision loss and right vision loss.   Cardiovascular: Negative for chest pain and syncope.   Respiratory: Negative for cough and shortness of breath.    Endocrine: Negative for polydipsia, polyphagia and polyuria.   Hematologic/Lymphatic: Negative for bleeding problem. Does not bruise/bleed easily.   Skin: Negative for dry skin, itching and rash.   Musculoskeletal: Negative for falls and muscle weakness.   Gastrointestinal: Negative for abdominal pain and bowel incontinence.   Genitourinary: Negative for bladder incontinence and nocturia.   Neurological: Negative for disturbances in coordination, loss of balance and seizures.   Psychiatric/Behavioral: Negative for depression. The patient does not have insomnia.    Allergic/Immunologic: Negative for hives and persistent infections.      PHYSICAL EXAM:  GEN: A&Ox3, WD WN NAD  HEENT: WNL  CHEST: CTAB, no W/R/R  HEART: RRR, no M/R/G   ABD: Soft, NT ND, BS x4 QUADS  MS: Refer to previous note for detailed MS exam  NEURO: CN II-XII intact        The surgical consent was then reviewed with the patient, who agreed  with all the contents of the consent form and it was signed.      PHYSICAL THERAPY:  She was also instructed regarding physical therapy and will begin on POD#1-3. She is doing physical therapy at [facility].     POST OP CARE: Instructions were reviewed including care of the wound and dressing after surgery and when she can shower.      PAIN MANAGEMENT: Jenifer Burnett was instructed regarding the Polar ice unit that will be in place after surgery and her postoperative pain medications.      MEDICATION:  Roxicodone 5 mg 1-2 q 4 hours PRN for pain  Zofran 4 mg q 8 hours PRN for nausea and vomiting.  Aspirin 81mg BID x 2 weeks for DVT prophylaxis starting on the evening after surgery.        Post op meds to be delivered bedside prior to discharge. Deliver to family if patient is in surgery at 5pm.      Patient was instructed to purchase and take Colace to counter possible GI side effects of taking opiates.      DVT prophylaxis was discussed with the patient today including risk factors for developing DVTs and history of DVTs. The patient was asked if any specific recommendations were given from the doctor/s that did pre-operative surgical clearance.      If the patient was previously taking 81mg baby aspirin, they were told to not take additional baby aspirin, using the above stated aspirin and to restart the 81mg aspirin daily after completion of the aspirin dose.       Patient was also told to buy over the counter Prilosec medication and take it once daily for GI protection as long as they are taking NSAIDs or Aspirin.     The patient was told that narcotic pain medications may make them drowsy and instructions were given to not sign legal documents, drive or operate heavy machinery, cars, or equipment while under the influence of narcotic medications.         As there were no other questions to be asked, she was given my business card along with Dr. Lezama's business card if she has  any questions or concerns prior to surgery or in the postop period.            North Pinzon PA-C, [facility]    Physician Assistant    [facility]    [phone]

## 2025-07-01 NOTE — H&P (VIEW-ONLY)
Jenifer Burnett  is here for a completion of her perioperative paperwork. She is scheduled to undergo right shoulder arthroscopic rotator cuff debridement versus repair as indicated, open subpectoral biceps tenodesis, subacromial decompression on July 25, 2025.  She is a healthy individual and does need clearance for this procedure.      She had to reschedule her pre-op appointment and is scheduled to see her internist on Monday, July 7, 2025.     Risks, indications and benefits of the surgical procedure were discussed with the patient. All questions with regard to surgery, rehab, expected return to functional activities, activities of daily living and recreational endeavors were answered to her satisfaction.     Patient was informed and understands the risks of surgery are greater for patients with a current condition or hx of heart disease, obesity, clotting disorders, recurrent infections, steroid use, current or past smoking, and factors such as sedentary lifestyle and noncompliance with medications, therapy or f/u. The degree of the increased risk is hard to estimate w/ any degree of precision.     Once no other questions were asked, a brief history and physical exam was then performed.     PAST MEDICAL HISTORY:        Past Medical History:   Diagnosis Date    Abnormal gait 02/22/2023    Arthritis      Depression      Encounter for blood transfusion      GERD (gastroesophageal reflux disease)      Hypertension      Migraine      Poor tolerance for ambulation 02/22/2023    Trigeminal neuralgia 03/2018      PAST SURGICAL HISTORY:         Past Surgical History:   Procedure Laterality Date    APPENDECTOMY        CHOLECYSTECTOMY   07/2012    ESOPHAGOGASTRODUODENOSCOPY N/A 10/14/2019     Procedure: EGD (ESOPHAGOGASTRODUODENOSCOPY);  Surgeon: Sean Girard MD;  Location: [address];  Service: Endoscopy;  Laterality: N/A;    HYSTERECTOMY        LAPAROSCOPIC TOUPET FUNDOPLICATION N/A 11/22/2019     Procedure:  FUNDOPLICATION, LAPAROSCOPIC, TOUPET;  Surgeon: Adriano Godoy MD;  Location: [facility] OR [address] [place] [address];  Service: General;  Laterality: N/A;    TOTAL KNEE ARTHROPLASTY Right 2/26/2024     Procedure: ARTHROPLASTY, KNEE, TOTAL;  Surgeon: ERNIE Lezama MD;  Location: [place] OR;  Service: Orthopedics;  Laterality: Right;  Regional w/Catheter, Adductor, 0.5% Marcaine      FAMILY HISTORY:          Family History   Problem Relation Name Age of Onset    Hypertension Mother        Cancer Mother        Breast cancer Mother        Colon cancer Mother        Cancer Father          SOCIAL HISTORY: [Social History]    [Social History]        Socioeconomic History    Marital status: Single    Number of children: 3   Occupational History    Occupation: [employer]   Tobacco Use    Smoking status: Never    Smokeless tobacco: Never   Substance and Sexual Activity    Alcohol use: No    Drug use: No    Sexual activity: Yes       Partners: Male   Social History Narrative     Working at [employer], does do bending/lifting type work       Social Drivers of Health           Financial Resource Strain: Patient Declined (3/19/2025)     Overall Financial Resource Strain (CARDIA)      Difficulty of Paying Living Expenses: Patient declined   Food Insecurity: Patient Declined (3/19/2025)     Hunger Vital Sign      Worried About Running Out of Food in the Last Year: Patient declined      Ran Out of Food in the Last Year: Patient declined   Transportation Needs: Patient Declined (3/19/2025)     PRAPARE - Transportation      Lack of Transportation (Medical): Patient declined      Lack of Transportation (Non-Medical): Patient declined   Physical Activity: Patient Declined (3/19/2025)     Exercise Vital Sign      Days of Exercise per Week: Patient declined      Minutes of Exercise per Session: Patient declined   Stress: Patient Declined (3/19/2025)     [employer] - Occupational Stress Questionnaire       Feeling of Stress : Patient declined   Housing Stability: Patient Declined (3/19/2025)     Housing Stability Vital Sign      Unable to Pay for Housing in the Last Year: Patient declined      Homeless in the Last Year: Patient declined        MEDICATIONS: [Current Medications]    [Current Medications]     Current Outpatient Medications:     acetaminophen (TYLENOL) 650 MG TbSR, Take 650 mg by mouth every 8 (eight) hours., Disp: , Rfl:     buPROPion (WELLBUTRIN XL) 150 MG TB24 tablet, Take 1 tablet (150 mg total) by mouth once daily., Disp: 30 tablet, Rfl: 11    citalopram (CELEXA) 20 MG tablet, Take 1 tablet (20 mg total) by mouth once daily., Disp: 90 tablet, Rfl: 0    gabapentin (NEURONTIN) 300 MG capsule, Take 1 capsule (300 mg total) by mouth 3 (three) times daily., Disp: 90 capsule, Rfl: 5    losartan (COZAAR) 50 MG tablet, Take 1 tablet (50 mg total) by mouth once daily., Disp: 90 tablet, Rfl: 1    omeprazole (PRILOSEC) 40 MG capsule, Take 1 capsule (40 mg total) by mouth every morning., Disp: 90 capsule, Rfl: 0    vitamin D (VITAMIN D3) 1000 units Tab, Take 2,000 Units by mouth once daily., Disp: , Rfl:     celecoxib (CELEBREX) 200 MG capsule, TAKE 1 CAPSULE BY MOUTH ONCE DAILY. (Patient not taking: Reported on 7/1/2025), Disp: 30 capsule, Rfl: 1    clobetasol 0.05% (TEMOVATE) 0.05 % Oint, USE A PENCIL ERASER AMOUNT ON THE VULVA TWICE A DAY FOR TWO WEEKS, THEN ONCE A DAY FOR TWO WEEKS, THEN TWICE A WEEK., Disp: 45 g, Rfl: 0    cyanocobalamin, vitamin B-12, 2,500 mcg Tab, Take 1 tablet by mouth once daily. (Patient not taking: Reported on 7/1/2025), Disp: , Rfl:     cyclobenzaprine (FLEXERIL) 10 MG tablet, Take 1 tablet (10 mg total) by mouth 3 (three) times daily as needed for Muscle spasms (neck pain). (Patient not taking: Reported on 7/1/2025), Disp: 30 tablet, Rfl: 2    hydrOXYzine HCL (ATARAX) 25 MG tablet, Take 1 tablet (25 mg total) by mouth 3 (three) times daily as needed for Anxiety. (Patient not  taking: Reported on 7/1/2025), Disp: 30 tablet, Rfl: 0    ondansetron (ZOFRAN-ODT) 4 MG TbDL, Take 1 tablet (4 mg total) by mouth every 8 (eight) hours as needed (nausea). (Patient not taking: Reported on 7/1/2025), Disp: 20 tablet, Rfl: 0    triamcinolone acetonide 0.1% (KENALOG) 0.1 % cream, Apply topically 2 (two) times daily. for 10 days, Disp: 80 g, Rfl: 0  No current facility-administered medications for this visit.     Facility-Administered Medications Ordered in Other Visits:     ropivacaine 0.2% Perineural Pump infusion 500 ML, , Perineural, Continuous, North Singh PA-C, New Bag at 02/26/24 0955  ALLERGIES: Review of patient's allergies indicates:  No Known Allergies     Review of Systems   Constitution: Negative. Negative for chills, fever and night sweats.   HENT: Negative for congestion and headaches.    Eyes: Negative for blurred vision, left vision loss and right vision loss.   Cardiovascular: Negative for chest pain and syncope.   Respiratory: Negative for cough and shortness of breath.    Endocrine: Negative for polydipsia, polyphagia and polyuria.   Hematologic/Lymphatic: Negative for bleeding problem. Does not bruise/bleed easily.   Skin: Negative for dry skin, itching and rash.   Musculoskeletal: Negative for falls and muscle weakness.   Gastrointestinal: Negative for abdominal pain and bowel incontinence.   Genitourinary: Negative for bladder incontinence and nocturia.   Neurological: Negative for disturbances in coordination, loss of balance and seizures.   Psychiatric/Behavioral: Negative for depression. The patient does not have insomnia.    Allergic/Immunologic: Negative for hives and persistent infections.      PHYSICAL EXAM:  GEN: A&Ox3, WD WN NAD  HEENT: WNL  CHEST: CTAB, no W/R/R  HEART: RRR, no M/R/G   ABD: Soft, NT ND, BS x4 QUADS  MS: Refer to previous note for detailed MS exam  NEURO: CN II-XII intact        The surgical consent was then reviewed with the patient, who agreed  with all the contents of the consent form and it was signed.      PHYSICAL THERAPY:  She was also instructed regarding physical therapy and will begin on POD#1-3. She is doing physical therapy at [facility].     POST OP CARE: Instructions were reviewed including care of the wound and dressing after surgery and when she can shower.      PAIN MANAGEMENT: Jenifer Burnett was instructed regarding the Polar ice unit that will be in place after surgery and her postoperative pain medications.      MEDICATION:  Roxicodone 5 mg 1-2 q 4 hours PRN for pain  Zofran 4 mg q 8 hours PRN for nausea and vomiting.  Aspirin 81mg BID x 2 weeks for DVT prophylaxis starting on the evening after surgery.        Post op meds to be delivered bedside prior to discharge. Deliver to family if patient is in surgery at 5pm.      Patient was instructed to purchase and take Colace to counter possible GI side effects of taking opiates.      DVT prophylaxis was discussed with the patient today including risk factors for developing DVTs and history of DVTs. The patient was asked if any specific recommendations were given from the doctor/s that did pre-operative surgical clearance.      If the patient was previously taking 81mg baby aspirin, they were told to not take additional baby aspirin, using the above stated aspirin and to restart the 81mg aspirin daily after completion of the aspirin dose.       Patient was also told to buy over the counter Prilosec medication and take it once daily for GI protection as long as they are taking NSAIDs or Aspirin.     The patient was told that narcotic pain medications may make them drowsy and instructions were given to not sign legal documents, drive or operate heavy machinery, cars, or equipment while under the influence of narcotic medications.         As there were no other questions to be asked, she was given my business card along with Dr. Lezama's business card if she has  any questions or concerns prior to surgery or in the postop period.            North Pinzon PA-C, [facility]    Physician Assistant    [facility]    [phone]

## 2025-07-01 NOTE — PROGRESS NOTES
Jenifer Burnett  is here for a completion of her perioperative paperwork. She is scheduled to undergo right shoulder arthroscopic rotator cuff debridement versus repair as indicated, open subpectoral biceps tenodesis, subacromial decompression on July 25, 2025.  She is a healthy individual and does need clearance for this procedure.     She had to reschedule her pre-op appointment and is scheduled to see her internist on Monday, July 7, 2025.    Risks, indications and benefits of the surgical procedure were discussed with the patient. All questions with regard to surgery, rehab, expected return to functional activities, activities of daily living and recreational endeavors were answered to her satisfaction.    Patient was informed and understands the risks of surgery are greater for patients with a current condition or hx of heart disease, obesity, clotting disorders, recurrent infections, steroid use, current or past smoking, and factors such as sedentary lifestyle and noncompliance with medications, therapy or f/u. The degree of the increased risk is hard to estimate w/ any degree of precision.    Once no other questions were asked, a brief history and physical exam was then performed.    PAST MEDICAL HISTORY:   Past Medical History:   Diagnosis Date    Abnormal gait 02/22/2023    Arthritis     Depression     Encounter for blood transfusion     GERD (gastroesophageal reflux disease)     Hypertension     Migraine     Poor tolerance for ambulation 02/22/2023    Trigeminal neuralgia 03/2018     PAST SURGICAL HISTORY:   Past Surgical History:   Procedure Laterality Date    APPENDECTOMY      CHOLECYSTECTOMY  07/2012    ESOPHAGOGASTRODUODENOSCOPY N/A 10/14/2019    Procedure: EGD (ESOPHAGOGASTRODUODENOSCOPY);  Surgeon: Sean Girard MD;  Location: [address];  Service: Endoscopy;  Laterality: N/A;    HYSTERECTOMY      LAPAROSCOPIC TOUPET FUNDOPLICATION N/A 11/22/2019    Procedure: FUNDOPLICATION, LAPAROSCOPIC,  LEIGH;  Surgeon: Adriano Godoy MD;  Location: [facility] OR [address] [place] [address];  Service: General;  Laterality: N/A;    TOTAL KNEE ARTHROPLASTY Right 2/26/2024    Procedure: ARTHROPLASTY, KNEE, TOTAL;  Surgeon: ERNIE Lezama MD;  Location: [facility];  Service: Orthopedics;  Laterality: Right;  Regional w/Catheter, Adductor, 0.5% Marcaine     FAMILY HISTORY:   Family History   Problem Relation Name Age of Onset    Hypertension Mother      Cancer Mother      Breast cancer Mother      Colon cancer Mother      Cancer Father       SOCIAL HISTORY: Social History[1]    MEDICATIONS: Current Medications[2]  ALLERGIES: Review of patient's allergies indicates:  No Known Allergies    Review of Systems   Constitution: Negative. Negative for chills, fever and night sweats.   HENT: Negative for congestion and headaches.    Eyes: Negative for blurred vision, left vision loss and right vision loss.   Cardiovascular: Negative for chest pain and syncope.   Respiratory: Negative for cough and shortness of breath.    Endocrine: Negative for polydipsia, polyphagia and polyuria.   Hematologic/Lymphatic: Negative for bleeding problem. Does not bruise/bleed easily.   Skin: Negative for dry skin, itching and rash.   Musculoskeletal: Negative for falls and muscle weakness.   Gastrointestinal: Negative for abdominal pain and bowel incontinence.   Genitourinary: Negative for bladder incontinence and nocturia.   Neurological: Negative for disturbances in coordination, loss of balance and seizures.   Psychiatric/Behavioral: Negative for depression. The patient does not have insomnia.    Allergic/Immunologic: Negative for hives and persistent infections.     PHYSICAL EXAM:  GEN: A&Ox3, WD WN NAD  HEENT: WNL  CHEST: CTAB, no W/R/R  HEART: RRR, no M/R/G   ABD: Soft, NT ND, BS x4 QUADS  MS: Refer to previous note for detailed MS exam  NEURO: CN II-XII intact       The surgical consent was then reviewed with the patient, who agreed with all the contents  of the consent form and it was signed.     PHYSICAL THERAPY:  She was also instructed regarding physical therapy and will begin on POD#1-3. She is doing physical therapy at [facility].    POST OP CARE: Instructions were reviewed including care of the wound and dressing after surgery and when she can shower.     PAIN MANAGEMENT: Jenifer Burnett was instructed regarding the Polar ice unit that will be in place after surgery and her postoperative pain medications.     MEDICATION:  Roxicodone 5 mg 1-2 q 4 hours PRN for pain  Zofran 4 mg q 8 hours PRN for nausea and vomiting.  Aspirin 81mg BID x 2 weeks for DVT prophylaxis starting on the evening after surgery.      Post op meds to be delivered bedside prior to discharge. Deliver to family if patient is in surgery at 5pm.     Patient was instructed to purchase and take Colace to counter possible GI side effects of taking opiates.     DVT prophylaxis was discussed with the patient today including risk factors for developing DVTs and history of DVTs. The patient was asked if any specific recommendations were given from the doctor/s that did pre-operative surgical clearance.      If the patient was previously taking 81mg baby aspirin, they were told to not take additional baby aspirin, using the above stated aspirin and to restart the 81mg aspirin daily after completion of the aspirin dose.      Patient was also told to buy over the counter Prilosec medication and take it once daily for GI protection as long as they are taking NSAIDs or Aspirin.     The patient was told that narcotic pain medications may make them drowsy and instructions were given to not sign legal documents, drive or operate heavy machinery, cars, or equipment while under the influence of narcotic medications.       As there were no other questions to be asked, she was given my business card along with Dr. Lezama's business card if she has any questions or concerns prior to  surgery or in the postop period.          North Pinzon PA-C, [facility]    Physician Assistant    [facility]    [phone]         [1]   Social History  Socioeconomic History    Marital status: Single    Number of children: 3   Occupational History    Occupation: General [employer]   Tobacco Use    Smoking status: Never    Smokeless tobacco: Never   Substance and Sexual Activity    Alcohol use: No    Drug use: No    Sexual activity: Yes     Partners: Male   Social History Narrative    Working at [employer], does do bending/lifting type work      Social Drivers of Health     Financial Resource Strain: Patient Declined (3/19/2025)    Overall Financial Resource Strain (CARDIA)     Difficulty of Paying Living Expenses: Patient declined   Food Insecurity: Patient Declined (3/19/2025)    Hunger Vital Sign     Worried About Running Out of Food in the Last Year: Patient declined     Ran Out of Food in the Last Year: Patient declined   Transportation Needs: Patient Declined (3/19/2025)    PRAPARE - Transportation     Lack of Transportation (Medical): Patient declined     Lack of Transportation (Non-Medical): Patient declined   Physical Activity: Patient Declined (3/19/2025)    Exercise Vital Sign     Days of Exercise per Week: Patient declined     Minutes of Exercise per Session: Patient declined   Stress: Patient Declined (3/19/2025)    [employer] - Occupational Stress Questionnaire     Feeling of Stress : Patient declined   Housing Stability: Patient Declined (3/19/2025)    Housing Stability Vital Sign     Unable to Pay for Housing in the Last Year: Patient declined     Homeless in the Last Year: Patient declined   [2]   Current Outpatient Medications:     acetaminophen (TYLENOL) 650 MG TbSR, Take 650 mg by mouth every 8 (eight) hours., Disp: , Rfl:     buPROPion (WELLBUTRIN XL) 150 MG TB24 tablet, Take 1 tablet (150 mg total) by mouth once daily.,  Disp: 30 tablet, Rfl: 11    citalopram (CELEXA) 20 MG tablet, Take 1 tablet (20 mg total) by mouth once daily., Disp: 90 tablet, Rfl: 0    gabapentin (NEURONTIN) 300 MG capsule, Take 1 capsule (300 mg total) by mouth 3 (three) times daily., Disp: 90 capsule, Rfl: 5    losartan (COZAAR) 50 MG tablet, Take 1 tablet (50 mg total) by mouth once daily., Disp: 90 tablet, Rfl: 1    omeprazole (PRILOSEC) 40 MG capsule, Take 1 capsule (40 mg total) by mouth every morning., Disp: 90 capsule, Rfl: 0    vitamin D (VITAMIN D3) 1000 units Tab, Take 2,000 Units by mouth once daily., Disp: , Rfl:     celecoxib (CELEBREX) 200 MG capsule, TAKE 1 CAPSULE BY MOUTH ONCE DAILY. (Patient not taking: Reported on 7/1/2025), Disp: 30 capsule, Rfl: 1    clobetasol 0.05% (TEMOVATE) 0.05 % Oint, USE A PENCIL ERASER AMOUNT ON THE VULVA TWICE A DAY FOR TWO WEEKS, THEN ONCE A DAY FOR TWO WEEKS, THEN TWICE A WEEK., Disp: 45 g, Rfl: 0    cyanocobalamin, vitamin B-12, 2,500 mcg Tab, Take 1 tablet by mouth once daily. (Patient not taking: Reported on 7/1/2025), Disp: , Rfl:     cyclobenzaprine (FLEXERIL) 10 MG tablet, Take 1 tablet (10 mg total) by mouth 3 (three) times daily as needed for Muscle spasms (neck pain). (Patient not taking: Reported on 7/1/2025), Disp: 30 tablet, Rfl: 2    hydrOXYzine HCL (ATARAX) 25 MG tablet, Take 1 tablet (25 mg total) by mouth 3 (three) times daily as needed for Anxiety. (Patient not taking: Reported on 7/1/2025), Disp: 30 tablet, Rfl: 0    ondansetron (ZOFRAN-ODT) 4 MG TbDL, Take 1 tablet (4 mg total) by mouth every 8 (eight) hours as needed (nausea). (Patient not taking: Reported on 7/1/2025), Disp: 20 tablet, Rfl: 0    triamcinolone acetonide 0.1% (KENALOG) 0.1 % cream, Apply topically 2 (two) times daily. for 10 days, Disp: 80 g, Rfl: 0  No current facility-administered medications for this visit.    Facility-Administered Medications Ordered in Other Visits:     ropivacaine 0.2% Perineural Pump infusion 500 ML, ,  Perineural, Continuous, North Singh, JERICHO, New Bag at 02/26/24 0942

## 2025-07-10 ENCOUNTER — OFFICE VISIT (OUTPATIENT)
Dept: INTERNAL MEDICINE | Facility: CLINIC | Age: 65
End: 2025-07-10
Payer: MEDICARE

## 2025-07-10 VITALS
WEIGHT: 208.31 LBS | OXYGEN SATURATION: 98 % | HEART RATE: 80 BPM | BODY MASS INDEX: 30.85 KG/M2 | SYSTOLIC BLOOD PRESSURE: 126 MMHG | HEIGHT: 69 IN | DIASTOLIC BLOOD PRESSURE: 78 MMHG

## 2025-07-10 DIAGNOSIS — F32.1 MAJOR DEPRESSIVE DISORDER, SINGLE EPISODE, MODERATE: ICD-10-CM

## 2025-07-10 DIAGNOSIS — Z01.810 PREOP CARDIOVASCULAR EXAM: Primary | ICD-10-CM

## 2025-07-10 PROCEDURE — 4010F ACE/ARB THERAPY RXD/TAKEN: CPT | Mod: CPTII,S$GLB,, | Performed by: INTERNAL MEDICINE

## 2025-07-10 PROCEDURE — 1159F MED LIST DOCD IN RCRD: CPT | Mod: CPTII,S$GLB,, | Performed by: INTERNAL MEDICINE

## 2025-07-10 PROCEDURE — 1101F PT FALLS ASSESS-DOCD LE1/YR: CPT | Mod: CPTII,S$GLB,, | Performed by: INTERNAL MEDICINE

## 2025-07-10 PROCEDURE — 3288F FALL RISK ASSESSMENT DOCD: CPT | Mod: CPTII,S$GLB,, | Performed by: INTERNAL MEDICINE

## 2025-07-10 PROCEDURE — 3044F HG A1C LEVEL LT 7.0%: CPT | Mod: CPTII,S$GLB,, | Performed by: INTERNAL MEDICINE

## 2025-07-10 PROCEDURE — 3074F SYST BP LT 130 MM HG: CPT | Mod: CPTII,S$GLB,, | Performed by: INTERNAL MEDICINE

## 2025-07-10 PROCEDURE — 3078F DIAST BP <80 MM HG: CPT | Mod: CPTII,S$GLB,, | Performed by: INTERNAL MEDICINE

## 2025-07-10 PROCEDURE — 99999 PR PBB SHADOW E&M-EST. PATIENT-LVL IV: CPT | Mod: PBBFAC,,, | Performed by: INTERNAL MEDICINE

## 2025-07-10 PROCEDURE — 3008F BODY MASS INDEX DOCD: CPT | Mod: CPTII,S$GLB,, | Performed by: INTERNAL MEDICINE

## 2025-07-10 PROCEDURE — 99214 OFFICE O/P EST MOD 30 MIN: CPT | Mod: S$GLB,,, | Performed by: INTERNAL MEDICINE

## 2025-07-10 NOTE — PROGRESS NOTES
Subjective:       Patient ID: Jenifer Burnett is a 65 y.o. female.    Chief Complaint: Pre-op Exam    HPI    Presents for preop exam.     Scheduled for upcoming right shoulder surgery.     She is otherwise feeling well today. No new complaints.     Denies any chest pain or shortness of breath on exertion.       Functional mets > 4.         Surgical Risk Assessment   Active cardiac issues:  Active decompensated heart failure? No   Unstable angina?  No   Significant uncontrolled arrhythmias? No   Severe valvular heart disease-Aortic or Mitral Stenosis? No   Recent MI or coronary revascularization < 30 days? No     Cardiac Risk Factors  History of CAD/ischemic heart disease? No   History of cerebrovascular disease? No   History of compensated heart failure? No   Type 2 diabetes requiring insulin? No   Serum Creatinine > 2? No   Total cardiac risk factors 0       Review of Systems   Constitutional:  Negative for activity change, appetite change and chills.   HENT:  Negative for ear pain, sinus pressure/congestion and sneezing.    Respiratory:  Negative for cough and shortness of breath.    Cardiovascular:  Negative for chest pain, palpitations and leg swelling.   Gastrointestinal:  Negative for abdominal distention, abdominal pain, constipation, diarrhea, nausea and vomiting.   Genitourinary:  Negative for dysuria and hematuria.   Musculoskeletal:  Negative for arthralgias, back pain and myalgias.   Neurological:  Negative for dizziness and headaches.   Psychiatric/Behavioral:  Negative for agitation. The patient is not nervous/anxious.            Past Medical History:   Diagnosis Date    Abnormal gait 02/22/2023    Arthritis     Depression     Encounter for blood transfusion     GERD (gastroesophageal reflux disease)     Hypertension     Migraine     Poor tolerance for ambulation 02/22/2023    Trigeminal neuralgia 03/2018     Past Surgical History:   Procedure Laterality Date    APPENDECTOMY      CHOLECYSTECTOMY  07/2012     ESOPHAGOGASTRODUODENOSCOPY N/A 10/14/2019    Procedure: EGD (ESOPHAGOGASTRODUODENOSCOPY);  Surgeon: Sean Girard MD;  Location: [facility] (4TH FLR);  Service: Endoscopy;  Laterality: N/A;    HYSTERECTOMY      LAPAROSCOPIC TOUPET FUNDOPLICATION N/A 11/22/2019    Procedure: FUNDOPLICATION, LAPAROSCOPIC, TOUPET;  Surgeon: Adriano Godoy MD;  Location: [facility] 2ND FLR;  Service: General;  Laterality: N/A;    TOTAL KNEE ARTHROPLASTY Right 2/26/2024    Procedure: ARTHROPLASTY, KNEE, TOTAL;  Surgeon: ERNIE Lezama MD;  Location: [facility];  Service: Orthopedics;  Laterality: Right;  Regional w/Catheter, Adductor, 0.5% Marcaine      Problem List[1]     Objective:      Physical Exam  Constitutional:       Appearance: Normal appearance.   HENT:      Head: Normocephalic.   Cardiovascular:      Rate and Rhythm: Normal rate and regular rhythm.      Pulses: Normal pulses.      Heart sounds: Normal heart sounds.   Pulmonary:      Effort: Pulmonary effort is normal.      Breath sounds: Normal breath sounds.   Abdominal:      General: Abdomen is flat. Bowel sounds are normal.      Palpations: Abdomen is soft.   Musculoskeletal:         General: Normal range of motion.      Cervical back: Normal range of motion and neck supple.   Skin:     General: Skin is warm and dry.   Neurological:      General: No focal deficit present.      Mental Status: She is alert and oriented to person, place, and time.   Psychiatric:         Mood and Affect: Mood normal.         Assessment:       Problem List Items Addressed This Visit    None  Visit Diagnoses         Preop cardiovascular exam    -  Primary            Plan:         Jenifer was seen today for pre-op exam.    Diagnoses and all orders for this visit:    Preop cardiovascular exam       Assessment/Plan:   Cardiovascular Risk Assessment:  Non-emergent surgery.  No active cardiac problems.   Surgery is moderate  risk  Functional Status: able to climb a flight of stairs (> 4  METS)    EKG reviewed and unchanged.     RCRI Calculator Class and Risk percentage:        0.4%                   Recommendation:  1. Proceed to Surgery.      Depression: recently switched to Wellbutrin for depression and doing well.     Kadi Carlin MD   Internal Medicine   Primary Care           [1]   Patient Active Problem List  Diagnosis    Essential hypertension    Migraine    Gastroesophageal reflux disease without esophagitis    History of repair of hiatal hernia    Anxiety    History of trigeminal neuralgia    Bilateral carotid artery stenosis    Syncope    Decreased range of motion of right ankle    Decreased strength of lower extremity    Decreased ROM of right knee    Decreased strength of upper extremity    Decreased range of motion of right shoulder

## 2025-07-11 ENCOUNTER — HOSPITAL ENCOUNTER (OUTPATIENT)
Dept: CARDIOLOGY | Facility: CLINIC | Age: 65
Discharge: HOME | End: 2025-07-11
Payer: MEDICARE

## 2025-07-11 DIAGNOSIS — Z01.810 PREOP CARDIOVASCULAR EXAM: ICD-10-CM

## 2025-07-11 LAB
OHS QRS DURATION: 74 MS
OHS QTC CALCULATION: 442 MS

## 2025-07-11 PROCEDURE — 93000 ELECTROCARDIOGRAM COMPLETE: CPT | Mod: S$GLB,,, | Performed by: INTERNAL MEDICINE

## 2025-07-11 PROCEDURE — 93005 ELECTROCARDIOGRAM TRACING: CPT | Mod: S$GLB,,, | Performed by: INTERNAL MEDICINE

## 2025-07-21 ENCOUNTER — TELEPHONE (OUTPATIENT)
Dept: SPORTS MEDICINE | Facility: CLINIC | Age: 65
End: 2025-07-21
Payer: MEDICARE

## 2025-07-21 ENCOUNTER — PATIENT MESSAGE (OUTPATIENT)
Dept: ADMINISTRATIVE | Facility: HOSPITAL | Age: 65
End: 2025-07-21
Payer: MEDICARE

## 2025-07-21 ENCOUNTER — PATIENT MESSAGE (OUTPATIENT)
Dept: PREADMISSION TESTING | Facility: HOSPITAL | Age: 65
End: 2025-07-21
Payer: MEDICARE

## 2025-07-21 NOTE — ANESTHESIA PAT ROS NOTE
07/21/2025  Jenifer Burnett is a 65 y.o., female.      Pre-op Assessment    I have reviewed the Patient Summary Reports.       I have reviewed the Medications.     Review of Systems  Anesthesia Hx:  No problems with previous Anesthesia   History of prior surgery of interest to airway management or planning: nissen fundoplication. Previous anesthesia: General, Spinal 11/22/2019  Laparoscopic Toupet Fundiplication with general anesthesia.  Procedure performed at an [facility].  2/26/2024  Right TKA with spinal.  Procedure performed at an [facility].   Successful Spinal/Epidural level at L 3-4     Airway issues documented on chart review include GETA      Personal Hx of Anesthesia complications, Post-Operative Nausea/Vomiting, in the past, but not with recent anesthetics / prophylaxis                    Social:  No Alcohol Use, Non-Smoker       Hematology/Oncology:    Oncology Normal    -- Anemia:               Hematology Comments: H/O blood transfusion                    EENT/Dental:  EENT/Dental Normal           Cardiovascular:  Exercise tolerance: good   Hypertension   Denies MI.     Denies CABG/stent.    Denies Angina.     hyperlipidemia  Denies SCHAFFER.  ECG has been reviewed. Bilateral carotid artery stenosis,  Syncope X1 occurrence Patient not on beta blockers                          Pulmonary:  Pulmonary Normal   Denies COPD.  Denies Asthma.   Denies Shortness of breath.                  Renal/:  Renal/ Normal                 Hepatic/GI:    Hiatal Hernia, GERD   H/O Appendectomy,  Cholecystectomy,   Laparoscopic Toupet Fundoplication (Repair of Hiatal Hernia) Not Taking GLP-1 Agonists            Musculoskeletal:  Arthritis   Nontraumatic incomplete tear of right rotator cuff,  Biceps tendinopathy of right upper extremity,  H/O Right TKA            OB/GYN/PEDS:  H/O Hysterectomy            Neurological:    Denies CVA. Neuromuscular Disease,  Headaches Denies Seizures.    Trigeminal neuralgia,  Migraine headaches                            Endocrine:  Denies Diabetes. Denies Hypothyroidism.        Obesity / BMI > 30  Psych:   anxiety depression              Past Medical History:   Diagnosis Date    Abnormal gait 02/22/2023    Arthritis     Depression     Encounter for blood transfusion     GERD (gastroesophageal reflux disease)     Hypertension     Migraine     Poor tolerance for ambulation 02/22/2023    Trigeminal neuralgia 03/2018     Past Surgical History:   Procedure Laterality Date    APPENDECTOMY      CHOLECYSTECTOMY  07/2012    ESOPHAGOGASTRODUODENOSCOPY N/A 10/14/2019    Procedure: EGD (ESOPHAGOGASTRODUODENOSCOPY);  Surgeon: Sean Girard MD;  Location: [facility] (4TH FLR);  Service: Endoscopy;  Laterality: N/A;    HYSTERECTOMY      LAPAROSCOPIC TOUPET FUNDOPLICATION N/A 11/22/2019    Procedure: FUNDOPLICATION, LAPAROSCOPIC, TOUPET;  Surgeon: Adriano Godoy MD;  Location: [facility] 2ND FLR;  Service: General;  Laterality: N/A;    TOTAL KNEE ARTHROPLASTY Right 2/26/2024    Procedure: ARTHROPLASTY, KNEE, TOTAL;  Surgeon: ERNIE Lezama MD;  Location: [facility];  Service: Orthopedics;  Laterality: Right;  Regional w/Catheter, Adductor, 0.5% Marcaine          Anesthesia Assessment: Preoperative EQUATION    Planned Procedure: Procedure(s) (LRB):  DEBRIDEMENT, SHOULDER, ARTHROSCOPIC (Right)  ARTHROSCOPY,SHOULDER,WITH BICEPS TENODESIS (Right)  Requested Anesthesia Type:General/Regional  Surgeon: ERNIE Lezama MD  Service: Orthopedics  Known or anticipated Date of Surgery:7/25/2025    Surgeon notes: reviewed    Electronic QUestionnaire Assessment completed via nurse interview with patient.        Triage considerations:     The patient has no apparent active cardiac condition (No unstable coronary Syndrome such as severe unstable angina or recent [<1 month] myocardial infarction,  decompensated CHF, severe valvular   disease or significant arrhythmia)    Previous anesthesia records:GETA, Spinal Anesthesia , No problems, and Hx PONV    Last PCP note: within 1 month , within [facility]   Subspecialty notes: n/a    Other important co-morbidities: GERD, HLD, HTN, and Obesity       EKG 7/11/2025:  Vent. Rate :  78 BPM     Atrial Rate :  78 BPM      P-R Int : 162 ms          QRS Dur :  74 ms       QT Int : 388 ms       P-R-T Axes :  53  40  27 degrees     QTcB Int : 442 ms   Normal sinus rhythm   Normal ECG   When compared with ECG of 31-Oct-2024 09:20,   No significant change was found   Confirmed by Star Rivera ([id number]) on 7/11/2025 11:53:37 AM       Outside Echocardiogram 2/2/2022:  Summary:    1. Normal left ventricular systolic function. Estimated left ventricular ejection fraction is 55 to 60%.    2. LV diastolic function is mildly abnormal (Grade I).    3. Right ventricular systolic function appears normal.    4. Estimated pulmonary artery systolic pressure is normal.    5. There is trivial pericardial effusion.       Exercise Stress EKG 7/2/2020:  Interpretation Summary  Show Result Comparison     The EKG portion of this study is negative for ischemia at  and 8 estimated METs despite the 5/10 chest pain at peak exercise that lasrted 10 minutes.    The patient reported chest pain during the stress test.    The test was stopped because the patient experienced chest pain.    The blood pressure response to stress was normal.     Arrhythmias during stress: rare PACs.    The exercise capacity was mildly impaired.      Carotid Ultrasound 6/3/2025;  Impression:   No evidence of hemodynamically significant stenosis. Calculated stenosis at bilateral carotid bifurcations as follows:   1 - 39% stenosis of the right internal carotid artery.  1 - 39% stenosis of the left internal carotid artery.       Tests already available:  Results have been reviewed.             Instructions given. (See in Nurse's  note) Preop medication instructions sent via portal message.     Optimization:  Anesthesia Preop Clinic Assessment Not Indicated    Medical Opinion Indicated: Yes, PCP       Sub-specialist consult indicated: No      Plan: Consultation:Patient's PCP for a statement of optimization     Patient  has previously scheduled Medical Appointment: 7/10/2025    Navigation: Patient is cleared/ optimized for surgery by PCP at low risk.       Ht: 5'9  Wt: 94.5 kg (208 lb)  BMI: 30.77

## 2025-07-21 NOTE — TELEPHONE ENCOUNTER
Spoke c pt. Offered sooner surgery date of 07/23/25, pt declined. Plan to proceed as planned 07/25/25. Pt will call c additional questions/concerns in interim. Pt expressed understanding & was thankful.

## 2025-07-22 NOTE — PRE-PROCEDURE INSTRUCTIONS
Spoke to Patient on phone, preop instructions given, verbalized understanding. Also informed her that these instructions were sent to her [facility] portal.

## 2025-07-23 ENCOUNTER — TELEPHONE (OUTPATIENT)
Dept: SPORTS MEDICINE | Facility: CLINIC | Age: 65
End: 2025-07-23
Payer: MEDICARE

## 2025-07-23 ENCOUNTER — TELEPHONE (OUTPATIENT)
Dept: INTERNAL MEDICINE | Facility: CLINIC | Age: 65
End: 2025-07-23
Payer: MEDICARE

## 2025-07-23 PROBLEM — F32.1 MAJOR DEPRESSIVE DISORDER, SINGLE EPISODE, MODERATE: Status: ACTIVE | Noted: 2025-07-23

## 2025-07-23 NOTE — TELEPHONE ENCOUNTER
Spoke c pt. She states that Agata has not received any documentation for her FMLA. Advised that original paperwork was submitted 06/16/25. Advised I would refax & send her a copy once confirmation had been received. She also requested a letter to start an [employer] claim. Advised letter would be routed to her via [employer] as she does not have a current claim with them. Asked pt to send us additional paperwork if/when it becomes available. Pt will call c additional questions/concerns in interim. Pt expressed understanding & was thankful.    ==  Faxed [employer] forms. Confirmation received.         All documents & fax confirmation scanned into Media. Pt notified via [employer].

## 2025-07-23 NOTE — TELEPHONE ENCOUNTER
Spoke with patient. Patient states that Drake needs a letter stating how she hurt her right shoulder.Patient states that her surgery is on Friday, July 25,2025.

## 2025-07-23 NOTE — TELEPHONE ENCOUNTER
Copied from CRM #[id number]. Topic: General Inquiry - Patient Advice  >> Jul 23, 2025 11:48 AM Leif wrote:  Type:  Needs Medical Advice    Who Called: Patient   Symptoms (please be specific):    How long has patient had these symptoms:    Pharmacy name and phone #:    Would the patient rather a call back or a response via [facility]? Call back   Best Call Back Number: [phone]  Additional Information: Pt would like a call back to discuss some office notes from her visit that her ins needs. Please call pt to advise

## 2025-07-23 NOTE — TELEPHONE ENCOUNTER
Copied from CRM #[id number]. Topic: General Inquiry - Patient Advice  >> Jul 23, 2025 11:13 AM Teena wrote:  Pt is calling to speak with someone in office concerning surgery and insurance, asking for call back

## 2025-07-24 ENCOUNTER — TELEPHONE (OUTPATIENT)
Dept: INTERNAL MEDICINE | Facility: CLINIC | Age: 65
End: 2025-07-24
Payer: MEDICARE

## 2025-07-24 ENCOUNTER — TELEPHONE (OUTPATIENT)
Dept: SPORTS MEDICINE | Facility: CLINIC | Age: 65
End: 2025-07-24
Payer: MEDICARE

## 2025-07-24 ENCOUNTER — ANESTHESIA EVENT (OUTPATIENT)
Dept: SURGERY | Facility: HOSPITAL | Age: 65
End: 2025-07-24
Payer: MEDICARE

## 2025-07-24 NOTE — TELEPHONE ENCOUNTER
Patient is calling for Medical Advice regarding: Pt is calling in regards to her surgery on tomorrow. Pt stated she needs a letter stating how she hurt her shoulder. Pt stated she needs it from her PCP for [employer] because she is the first physician she went to. Pt did not go to Dr Lezama first. The insurance wants it from the first physician the pt saw for the shoulder pain. Pt was seen in Feb by Mallika Louis NP for the shoulder pain.  Pt stated if possible she needs it today. Pt stated she needs a letter stating she hurt her shoulder lifting her niece or they will deny her claim. Please call and advise. Thank you.

## 2025-07-24 NOTE — ANESTHESIA PREPROCEDURE EVALUATION
07/24/2025    Jenifer Burnett is a 65 y.o. female  who presents  for Procedure(s):  DEBRIDEMENT, SHOULDER, ARTHROSCOPIC  ARTHROSCOPY,SHOULDER,WITH BICEPS TENODESIS        Review of patient's allergies indicates:  No Known Allergies    Wt Readings from Last 1 Encounters:   07/10/25 1114 94.5 kg (208 lb 5.4 oz)       BP Readings from Last 3 Encounters:   07/10/25 126/78   07/01/25 137/86   06/10/25 136/87       Patient Active Problem List    Diagnosis Date Noted    Major depressive disorder, single episode, moderate 07/23/2025    Decreased strength of upper extremity 04/22/2025    Decreased range of motion of right shoulder 04/22/2025    Decreased ROM of right knee 02/28/2024    Decreased range of motion of right ankle 02/22/2023    Decreased strength of lower extremity 02/22/2023    Bilateral carotid artery stenosis 01/10/2022    Syncope 01/10/2022    History of trigeminal neuralgia 10/13/2021    History of repair of hiatal hernia 02/03/2021    Anxiety 02/03/2021    Gastroesophageal reflux disease without esophagitis 10/14/2019    Migraine 12/11/2018    Essential hypertension 12/01/2014        Past Surgical History:   Procedure Laterality Date    APPENDECTOMY      CHOLECYSTECTOMY  07/2012    ESOPHAGOGASTRODUODENOSCOPY N/A 10/14/2019    Procedure: EGD (ESOPHAGOGASTRODUODENOSCOPY);  Surgeon: Sean Girard MD;  Location: [facility]);  Service: Endoscopy;  Laterality: N/A;    HYSTERECTOMY      LAPAROSCOPIC TOUPET FUNDOPLICATION N/A 11/22/2019    Procedure: FUNDOPLICATION, LAPAROSCOPIC, TOUPET;  Surgeon: Adriano Godoy MD;  Location: [facility] 2ND FLR;  Service: General;  Laterality: N/A;    TOTAL KNEE ARTHROPLASTY Right 2/26/2024    Procedure: ARTHROPLASTY, KNEE, TOTAL;  Surgeon: ERNIE Lezama MD;  Location: [facility];  Service: Orthopedics;  Laterality: Right;  Regional w/Catheter, Adductor, 0.5% Marcaine       Social History[1]      Chemistry        Component Value Date/Time     06/05/2025 0900    NA  "138 10/31/2024 1011    K 4.1 06/05/2025 0900    K 4.0 10/31/2024 1011     06/05/2025 0900     10/31/2024 1011    CO2 26 06/05/2025 0900    CO2 28 10/31/2024 1011    BUN 15 06/05/2025 0900    CREATININE 0.8 06/05/2025 0900    GLU 92 06/05/2025 0900    GLU 86 10/31/2024 1011        Component Value Date/Time    CALCIUM 8.9 06/05/2025 0900    CALCIUM 9.2 10/31/2024 1011    ALKPHOS 70 06/05/2025 0900    ALKPHOS 67 10/31/2024 1011    AST 17 06/05/2025 0900    AST 16 10/31/2024 1011    ALT 13 06/05/2025 0900    ALT 11 10/31/2024 1011    BILITOT 0.5 06/05/2025 0900    BILITOT 0.6 10/31/2024 1011    ESTGFRAFRICA >60.0 12/22/2021 0848    EGFRNONAA >60.0 12/22/2021 0848            Lab Results   Component Value Date    WBC 3.07 (L) 06/05/2025    HGB 13.3 06/05/2025    HCT 41.6 06/05/2025     06/05/2025    CHOL 194 06/05/2025    TRIG 92 06/05/2025    HDL 67 06/05/2025    ALT 13 06/05/2025    AST 17 06/05/2025     06/05/2025    K 4.1 06/05/2025     06/05/2025    CREATININE 0.8 06/05/2025    BUN 15 06/05/2025    CO2 26 06/05/2025    TSH 2.448 06/05/2025    INR 1.0 01/31/2024    HGBA1C 5.3 06/05/2025       No results for input(s): "PT", "INR", "PROTIME", "APTT" in the last 72 hours.    No results found for this or any previous visit.         Pre-op Assessment    I have reviewed the Patient Summary Reports.     I have reviewed the Nursing Notes. I have reviewed the NPO Status.   I have reviewed the Medications.     Review of Systems  Anesthesia Hx:  No problems with previous Anesthesia   History of prior surgery of interest to airway management or planning: nissen fundoplication. Previous anesthesia: General, Spinal 11/22/2019  Laparoscopic Toupet Fundiplication with general anesthesia.  Procedure performed at an [facility].  2/26/2024  Right TKA with spinal.  Procedure performed at an [facility].   Successful Spinal/Epidural level at L 3-4     Airway issues documented on chart review " include GETA      Personal Hx of Anesthesia complications, Post-Operative Nausea/Vomiting, in the past, but not with recent anesthetics / prophylaxis                    Social:  No Alcohol Use, Non-Smoker       Hematology/Oncology:    Oncology Normal    -- Anemia:               Hematology Comments: H/O blood transfusion                    EENT/Dental:  EENT/Dental Normal           Cardiovascular:  Exercise tolerance: good   Hypertension   Denies MI.     Denies CABG/stent.    Denies Angina.     hyperlipidemia  Denies SCHAFFER.  ECG has been reviewed. Bilateral carotid artery stenosis,  Syncope X1 occurrence Patient not on beta blockers                          Pulmonary:  Pulmonary Normal   Denies COPD.  Denies Asthma.   Denies Shortness of breath.                  Renal/:  Renal/ Normal                 Hepatic/GI:    Hiatal Hernia, GERD   H/O Appendectomy,  Cholecystectomy,   Laparoscopic Toupet Fundoplication (Repair of Hiatal Hernia) Not Taking GLP-1 Agonists            Musculoskeletal:  Arthritis   Nontraumatic incomplete tear of right rotator cuff,  Biceps tendinopathy of right upper extremity,  H/O Right TKA            OB/GYN/PEDS:  H/O Hysterectomy           Neurological:    Denies CVA. Neuromuscular Disease,  Headaches Denies Seizures.    Trigeminal neuralgia,  Migraine headaches                            Endocrine:  Denies Diabetes. Denies Hypothyroidism.        Obesity / BMI > 30  Psych:   anxiety depression              Past Medical History:   Diagnosis Date    Abnormal gait 02/22/2023    Arthritis     Depression     Encounter for blood transfusion     GERD (gastroesophageal reflux disease)     Hypertension     Migraine     Poor tolerance for ambulation 02/22/2023    Trigeminal neuralgia 03/2018     Past Surgical History:   Procedure Laterality Date    APPENDECTOMY      CHOLECYSTECTOMY  07/2012    ESOPHAGOGASTRODUODENOSCOPY N/A 10/14/2019    Procedure: EGD (ESOPHAGOGASTRODUODENOSCOPY);  Surgeon: Sean Girard  MD;  Location: [facility] ENDO (4TH FLR);  Service: Endoscopy;  Laterality: N/A;    HYSTERECTOMY      LAPAROSCOPIC TOUPET FUNDOPLICATION N/A 11/22/2019    Procedure: FUNDOPLICATION, LAPAROSCOPIC, TOUPET;  Surgeon: Adriano Godoy MD;  Location: [facility] 2ND FLR;  Service: General;  Laterality: N/A;    TOTAL KNEE ARTHROPLASTY Right 2/26/2024    Procedure: ARTHROPLASTY, KNEE, TOTAL;  Surgeon: ERNIE Lezama MD;  Location: [facility];  Service: Orthopedics;  Laterality: Right;  Regional w/Catheter, Adductor, 0.5% Marcaine       Physical Exam  General: Well nourished, Cooperative, Oriented and Alert    Airway:  Mallampati: II   Mouth Opening: Normal  TM Distance: Normal  Tongue: Normal  Neck ROM: Normal ROM    Dental:  Intact, Periodontal disease        Anesthesia Assessment: Preoperative EQUATION    Planned Procedure: Procedure(s) (LRB):  DEBRIDEMENT, SHOULDER, ARTHROSCOPIC (Right)  ARTHROSCOPY,SHOULDER,WITH BICEPS TENODESIS (Right)  Requested Anesthesia Type:General/Regional  Surgeon: ERNIE Lezama MD  Service: Orthopedics  Known or anticipated Date of Surgery:7/25/2025    Surgeon notes: reviewed    Electronic QUestionnaire Assessment completed via nurse interview with patient.        Triage considerations:     The patient has no apparent active cardiac condition (No unstable coronary Syndrome such as severe unstable angina or recent [<1 month] myocardial infarction, decompensated CHF, severe valvular   disease or significant arrhythmia)    Previous anesthesia records:GETA, Spinal Anesthesia , No problems, and Hx PONV    Last PCP note: within 1 month , within [facility]   Subspecialty notes: n/a    Other important co-morbidities: GERD, HLD, HTN, and Obesity       EKG 7/11/2025:  Vent. Rate :  78 BPM     Atrial Rate :  78 BPM      P-R Int : 162 ms          QRS Dur :  74 ms       QT Int : 388 ms       P-R-T Axes :  53  40  27 degrees     QTcB Int : 442 ms   Normal sinus rhythm   Normal ECG   When compared with ECG of  31-Oct-2024 09:20,   No significant change was found   Confirmed by Star Rivera ([id number]) on 7/11/2025 11:53:37 AM       Outside Echocardiogram 2/2/2022:  Summary:    1. Normal left ventricular systolic function. Estimated left ventricular ejection fraction is 55 to 60%.    2. LV diastolic function is mildly abnormal (Grade I).    3. Right ventricular systolic function appears normal.    4. Estimated pulmonary artery systolic pressure is normal.    5. There is trivial pericardial effusion.       Exercise Stress EKG 7/2/2020:  Interpretation Summary  Show Result Comparison     The EKG portion of this study is negative for ischemia at  and 8 estimated METs despite the 5/10 chest pain at peak exercise that lasrted 10 minutes.    The patient reported chest pain during the stress test.    The test was stopped because the patient experienced chest pain.    The blood pressure response to stress was normal.     Arrhythmias during stress: rare PACs.    The exercise capacity was mildly impaired.      Carotid Ultrasound 6/3/2025;  Impression:   No evidence of hemodynamically significant stenosis. Calculated stenosis at bilateral carotid bifurcations as follows:   1 - 39% stenosis of the right internal carotid artery.  1 - 39% stenosis of the left internal carotid artery.       Tests already available:  Results have been reviewed.             Instructions given. (See in Nurse's note) Preop medication instructions sent via portal message.     Optimization:  Anesthesia Preop Clinic Assessment Not Indicated    Medical Opinion Indicated: Yes, PCP       Sub-specialist consult indicated: No      Plan: Consultation:Patient's PCP for a statement of optimization     Patient  has previously scheduled Medical Appointment: 7/10/2025    Navigation: Patient is cleared/ optimized for surgery by PCP at low risk.       Ht: 5'9  Wt: 94.5 kg (208 lb)  BMI: 30.77    Anesthesia Plan  Type of Anesthesia, risks & benefits discussed:    Anesthesia  Type: Gen ETT, Regional  Intra-op Monitoring Plan: Standard ASA Monitors  Post Op Pain Control Plan: multimodal analgesia, IV/PO Opioids PRN and peripheral nerve block  Induction:  IV  Airway Plan: Video  Informed Consent: Informed consent signed with the Patient and all parties understand the risks and agree with anesthesia plan.  All questions answered.   ASA Score: 2  Day of Surgery Review of History & Physical: H&P Update referred to the surgeon/provider.  Anesthesia Plan Notes: Interscalene ssi pnb     Preoperative evaluation completed by chart review, updated DOS after patient evaluation and physical examination.    Discussed: general anesthesia with  ett, regional block for postoperative pain control. Discussed risks associated with regional block to include but not be limited to: bleeding, infection, or even nerve injury. Discussed risks associated with general anesthesia to include but not be limited to: dental or lip injury, post operative nausea and vomiting, cardiac arrest, stroke, or even death.     Patient voiced understanding and elects to proceed.       Ready For Surgery From Anesthesia Perspective.     .         [1]   Social History  Socioeconomic History    Marital status: Single    Number of children: 3   Occupational History    Occupation: [employer]   Tobacco Use    Smoking status: Never    Smokeless tobacco: Never   Substance and Sexual Activity    Alcohol use: No    Drug use: No    Sexual activity: Yes     Partners: Male   Social History Narrative    Working at [employer], does do bending/lifting type work      Social Drivers of Health     Financial Resource Strain: Patient Declined (3/19/2025)    Overall Financial Resource Strain (CARDIA)     Difficulty of Paying Living Expenses: Patient declined   Food Insecurity: Patient Declined (3/19/2025)    Hunger Vital Sign     Worried About Running Out of Food in the Last Year: Patient declined     Ran Out of Food in the Last Year:  Patient declined   Transportation Needs: Patient Declined (3/19/2025)    PRAPARE - Transportation     Lack of Transportation (Medical): Patient declined     Lack of Transportation (Non-Medical): Patient declined   Physical Activity: Patient Declined (3/19/2025)    Exercise Vital Sign     Days of Exercise per Week: Patient declined     Minutes of Exercise per Session: Patient declined   Stress: Patient Declined (3/19/2025)    [facility] of Occupational [employer] - Occupational Stress Questionnaire     Feeling of Stress : Patient declined   Housing Stability: Patient Declined (3/19/2025)    Housing Stability Vital Sign     Unable to Pay for Housing in the Last Year: Patient declined     Homeless in the Last Year: Patient declined      Spontaneous, unlabored and symmetrical

## 2025-07-24 NOTE — TELEPHONE ENCOUNTER
Pt called & was transferred by phone staff.     ==  Spoke c pt. She stated she found the letter I sent to her. Advised that I am not able to write in letter that her should pain began after lifting up grandchild as it does not specify that it Dr. Lezama's note. Recommended that she reach out to [employer], start claims process & we can move on from there. Pt will call c additional questions/concerns in interim. Pt expressed understanding & was thankful.

## 2025-07-24 NOTE — TELEPHONE ENCOUNTER
Copied from CRM #[id number]. Topic: General Inquiry - Patient Advice  >> Jul 24, 2025 12:53 PM Araceli wrote:  .1MEDICALADVICE     Patient is calling for Medical Advice regarding: Pt is calling in regards to her surgery on tomorrow. Pt stated she needs a letter stating how she hurt her shoulder. Pt stated she needs it from her PCP for [employer] because she is the first physician she went to. Pt did not go to Dr Lezama first. The insurance wants it from the first physician the pt saw for the shoulder pain. Pt was seen in Feb by Mallika Louis NP for the shoulder pain.  Pt stated if possible she needs it today. Pt stated she needs a letter stating she hurt her shoulder lifting her niece or they will deny her claim. Please call and advise. Thank you.     How long has patient had these symptoms:N/A    Pharmacy name and phone#:N/A    Patient wants a call back or thru [facility], provide patient's call back phone number: [phone] Patient    Comments:    Please advise patient replies from provider may take up to 48 hours.

## 2025-07-24 NOTE — TELEPHONE ENCOUNTER
Spoke c pt. Informed pt of 0500 arrival time for 07/25/25 surgery at the [facility]. Reminded pt of NPO status. Pt expressed understanding & was thankful.

## 2025-07-25 ENCOUNTER — ANESTHESIA (OUTPATIENT)
Dept: SURGERY | Facility: HOSPITAL | Age: 65
End: 2025-07-25
Payer: MEDICARE

## 2025-07-25 ENCOUNTER — HOSPITAL ENCOUNTER (OUTPATIENT)
Facility: HOSPITAL | Age: 65
Discharge: HOME | End: 2025-07-25
Attending: ORTHOPAEDIC SURGERY | Admitting: ORTHOPAEDIC SURGERY
Payer: MEDICARE

## 2025-07-25 VITALS
WEIGHT: 208 LBS | HEART RATE: 74 BPM | BODY MASS INDEX: 30.81 KG/M2 | OXYGEN SATURATION: 95 % | TEMPERATURE: 98 F | SYSTOLIC BLOOD PRESSURE: 149 MMHG | RESPIRATION RATE: 11 BRPM | HEIGHT: 69 IN | DIASTOLIC BLOOD PRESSURE: 76 MMHG

## 2025-07-25 DIAGNOSIS — M75.111 NONTRAUMATIC INCOMPLETE TEAR OF RIGHT ROTATOR CUFF: ICD-10-CM

## 2025-07-25 DIAGNOSIS — M25.511 CHRONIC RIGHT SHOULDER PAIN: ICD-10-CM

## 2025-07-25 DIAGNOSIS — G89.29 CHRONIC RIGHT SHOULDER PAIN: ICD-10-CM

## 2025-07-25 DIAGNOSIS — M67.921 BICEPS TENDINOPATHY OF RIGHT UPPER EXTREMITY: Primary | ICD-10-CM

## 2025-07-25 PROCEDURE — 23430 REPAIR BICEPS TENDON: CPT | Mod: RT,,, | Performed by: ORTHOPAEDIC SURGERY

## 2025-07-25 PROCEDURE — 25000003 PHARM REV CODE 250: Performed by: NURSE ANESTHETIST, CERTIFIED REGISTERED

## 2025-07-25 PROCEDURE — 63600175 PHARM REV CODE 636 W HCPCS: Performed by: PHYSICIAN ASSISTANT

## 2025-07-25 PROCEDURE — 94761 N-INVAS EAR/PLS OXIMETRY MLT: CPT

## 2025-07-25 PROCEDURE — 36000710: Performed by: ORTHOPAEDIC SURGERY

## 2025-07-25 PROCEDURE — 27201423 OPTIME MED/SURG SUP & DEVICES STERILE SUPPLY: Performed by: ORTHOPAEDIC SURGERY

## 2025-07-25 PROCEDURE — 29824 SHO ARTHRS SRG DSTL CLAVICLC: CPT | Mod: 51,RT,, | Performed by: ORTHOPAEDIC SURGERY

## 2025-07-25 PROCEDURE — 29823 SHO ARTHRS SRG XTNSV DBRDMT: CPT | Mod: 82,RT,, | Performed by: STUDENT IN AN ORGANIZED HEALTH CARE EDUCATION/TRAINING PROGRAM

## 2025-07-25 PROCEDURE — 63600175 PHARM REV CODE 636 W HCPCS: Performed by: STUDENT IN AN ORGANIZED HEALTH CARE EDUCATION/TRAINING PROGRAM

## 2025-07-25 PROCEDURE — 29823 SHO ARTHRS SRG XTNSV DBRDMT: CPT | Mod: 51,RT,, | Performed by: ORTHOPAEDIC SURGERY

## 2025-07-25 PROCEDURE — C1713 ANCHOR/SCREW BN/BN,TIS/BN: HCPCS | Performed by: ORTHOPAEDIC SURGERY

## 2025-07-25 PROCEDURE — 25000003 PHARM REV CODE 250: Performed by: PHYSICIAN ASSISTANT

## 2025-07-25 PROCEDURE — 99900035 HC TECH TIME PER 15 MIN (STAT)

## 2025-07-25 PROCEDURE — 29826 SHO ARTHRS SRG DECOMPRESSION: CPT | Mod: RT,,, | Performed by: ORTHOPAEDIC SURGERY

## 2025-07-25 PROCEDURE — 37000008 HC ANESTHESIA 1ST 15 MINUTES: Performed by: ORTHOPAEDIC SURGERY

## 2025-07-25 PROCEDURE — 25000003 PHARM REV CODE 250: Performed by: STUDENT IN AN ORGANIZED HEALTH CARE EDUCATION/TRAINING PROGRAM

## 2025-07-25 PROCEDURE — 71000015 HC POSTOP RECOV 1ST HR: Performed by: ORTHOPAEDIC SURGERY

## 2025-07-25 PROCEDURE — 63600175 PHARM REV CODE 636 W HCPCS: Performed by: ORTHOPAEDIC SURGERY

## 2025-07-25 PROCEDURE — 36000711: Performed by: ORTHOPAEDIC SURGERY

## 2025-07-25 PROCEDURE — 71000033 HC RECOVERY, INTIAL HOUR: Performed by: ORTHOPAEDIC SURGERY

## 2025-07-25 PROCEDURE — 64415 NJX AA&/STRD BRCH PLXS IMG: CPT | Performed by: STUDENT IN AN ORGANIZED HEALTH CARE EDUCATION/TRAINING PROGRAM

## 2025-07-25 PROCEDURE — 37000009 HC ANESTHESIA EA ADD 15 MINS: Performed by: ORTHOPAEDIC SURGERY

## 2025-07-25 PROCEDURE — 63600175 PHARM REV CODE 636 W HCPCS: Performed by: NURSE ANESTHETIST, CERTIFIED REGISTERED

## 2025-07-25 DEVICE — PROXIMAL TENODESIS IMPLANT SYSTEM REV: 0
Type: IMPLANTABLE DEVICE | Site: SHOULDER | Status: FUNCTIONAL
Brand: ARTHREX®

## 2025-07-25 RX ORDER — ACETAMINOPHEN 500 MG
1000 TABLET ORAL
Status: COMPLETED | OUTPATIENT
Start: 2025-07-25 | End: 2025-07-25

## 2025-07-25 RX ORDER — PROPOFOL 10 MG/ML
VIAL (ML) INTRAVENOUS
Status: DISCONTINUED | OUTPATIENT
Start: 2025-07-25 | End: 2025-07-25

## 2025-07-25 RX ORDER — SCOPOLAMINE 1 MG/3D
PATCH, EXTENDED RELEASE TRANSDERMAL
Status: COMPLETED
Start: 2025-07-25 | End: 2025-07-25

## 2025-07-25 RX ORDER — MIDAZOLAM HYDROCHLORIDE 1 MG/ML
1 INJECTION, SOLUTION INTRAMUSCULAR; INTRAVENOUS
Status: DISCONTINUED | OUTPATIENT
Start: 2025-07-25 | End: 2025-07-25 | Stop reason: HOSPADM

## 2025-07-25 RX ORDER — FAMOTIDINE 10 MG/ML
INJECTION, SOLUTION INTRAVENOUS
Status: DISCONTINUED | OUTPATIENT
Start: 2025-07-25 | End: 2025-07-25

## 2025-07-25 RX ORDER — FENTANYL CITRATE 50 UG/ML
100 INJECTION, SOLUTION INTRAMUSCULAR; INTRAVENOUS
Status: DISCONTINUED | OUTPATIENT
Start: 2025-07-25 | End: 2025-07-25 | Stop reason: HOSPADM

## 2025-07-25 RX ORDER — CELECOXIB 200 MG/1
400 CAPSULE ORAL
Status: COMPLETED | OUTPATIENT
Start: 2025-07-25 | End: 2025-07-25

## 2025-07-25 RX ORDER — DEXAMETHASONE SODIUM PHOSPHATE 4 MG/ML
INJECTION, SOLUTION INTRA-ARTICULAR; INTRALESIONAL; INTRAMUSCULAR; INTRAVENOUS; SOFT TISSUE
Status: DISCONTINUED | OUTPATIENT
Start: 2025-07-25 | End: 2025-07-25

## 2025-07-25 RX ORDER — MUPIROCIN 20 MG/G
OINTMENT TOPICAL
Status: DISCONTINUED | OUTPATIENT
Start: 2025-07-25 | End: 2025-07-25 | Stop reason: HOSPADM

## 2025-07-25 RX ORDER — BUPIVACAINE HYDROCHLORIDE 5 MG/ML
INJECTION, SOLUTION EPIDURAL; INTRACAUDAL; PERINEURAL
Status: DISCONTINUED | OUTPATIENT
Start: 2025-07-25 | End: 2025-07-25 | Stop reason: HOSPADM

## 2025-07-25 RX ORDER — BUPIVACAINE HYDROCHLORIDE 2.5 MG/ML
INJECTION, SOLUTION EPIDURAL; INFILTRATION; INTRACAUDAL; PERINEURAL
Status: COMPLETED | OUTPATIENT
Start: 2025-07-25 | End: 2025-07-25

## 2025-07-25 RX ORDER — EPINEPHRINE 1 MG/ML
INJECTION, SOLUTION, CONCENTRATE INTRAVENOUS
Status: DISCONTINUED | OUTPATIENT
Start: 2025-07-25 | End: 2025-07-25 | Stop reason: HOSPADM

## 2025-07-25 RX ORDER — ONDANSETRON HYDROCHLORIDE 2 MG/ML
INJECTION, SOLUTION INTRAVENOUS
Status: DISCONTINUED | OUTPATIENT
Start: 2025-07-25 | End: 2025-07-25

## 2025-07-25 RX ORDER — SCOPOLAMINE 1 MG/3D
PATCH, EXTENDED RELEASE TRANSDERMAL
Status: DISCONTINUED | OUTPATIENT
Start: 2025-07-25 | End: 2025-07-25

## 2025-07-25 RX ORDER — CEFAZOLIN 2 G/1
2 INJECTION, POWDER, FOR SOLUTION INTRAMUSCULAR; INTRAVENOUS
Status: COMPLETED | OUTPATIENT
Start: 2025-07-25 | End: 2025-07-25

## 2025-07-25 RX ORDER — CARBOXYMETHYLCELLULOSE SODIUM 10 MG/ML
GEL OPHTHALMIC
Status: DISCONTINUED | OUTPATIENT
Start: 2025-07-25 | End: 2025-07-25

## 2025-07-25 RX ORDER — LIDOCAINE HYDROCHLORIDE 20 MG/ML
INJECTION INTRAVENOUS
Status: DISCONTINUED | OUTPATIENT
Start: 2025-07-25 | End: 2025-07-25

## 2025-07-25 RX ORDER — DIPHENHYDRAMINE HYDROCHLORIDE 50 MG/ML
INJECTION, SOLUTION INTRAMUSCULAR; INTRAVENOUS
Status: DISCONTINUED | OUTPATIENT
Start: 2025-07-25 | End: 2025-07-25

## 2025-07-25 RX ORDER — FENTANYL CITRATE 50 UG/ML
25 INJECTION, SOLUTION INTRAMUSCULAR; INTRAVENOUS EVERY 5 MIN PRN
Status: DISCONTINUED | OUTPATIENT
Start: 2025-07-25 | End: 2025-07-25 | Stop reason: HOSPADM

## 2025-07-25 RX ORDER — HALOPERIDOL LACTATE 5 MG/ML
0.5 INJECTION, SOLUTION INTRAMUSCULAR EVERY 10 MIN PRN
Status: DISCONTINUED | OUTPATIENT
Start: 2025-07-25 | End: 2025-07-25 | Stop reason: HOSPADM

## 2025-07-25 RX ORDER — ROCURONIUM BROMIDE 10 MG/ML
INJECTION, SOLUTION INTRAVENOUS
Status: DISCONTINUED | OUTPATIENT
Start: 2025-07-25 | End: 2025-07-25

## 2025-07-25 RX ORDER — OXYCODONE HYDROCHLORIDE 5 MG/1
5 TABLET ORAL
Status: DISCONTINUED | OUTPATIENT
Start: 2025-07-25 | End: 2025-07-25 | Stop reason: HOSPADM

## 2025-07-25 RX ADMIN — SUGAMMADEX 200 MG: 100 INJECTION, SOLUTION INTRAVENOUS at 08:07

## 2025-07-25 RX ADMIN — ROCURONIUM BROMIDE 50 MG: 10 INJECTION, SOLUTION INTRAVENOUS at 07:07

## 2025-07-25 RX ADMIN — DEXAMETHASONE SODIUM PHOSPHATE 8 MG: 4 INJECTION, SOLUTION INTRAMUSCULAR; INTRAVENOUS at 07:07

## 2025-07-25 RX ADMIN — CARBOXYMETHYLCELLULOSE SODIUM 4 DROP: 10 GEL OPHTHALMIC at 07:07

## 2025-07-25 RX ADMIN — FENTANYL CITRATE 100 MCG: 50 INJECTION INTRAMUSCULAR; INTRAVENOUS at 06:07

## 2025-07-25 RX ADMIN — PROPOFOL 200 MG: 10 INJECTION, EMULSION INTRAVENOUS at 07:07

## 2025-07-25 RX ADMIN — SODIUM CHLORIDE: 0.9 INJECTION, SOLUTION INTRAVENOUS at 06:07

## 2025-07-25 RX ADMIN — MUPIROCIN: 20 OINTMENT TOPICAL at 06:07

## 2025-07-25 RX ADMIN — ONDANSETRON 4 MG: 2 INJECTION INTRAMUSCULAR; INTRAVENOUS at 07:07

## 2025-07-25 RX ADMIN — CEFAZOLIN 2 G: 2 INJECTION, POWDER, FOR SOLUTION INTRAMUSCULAR; INTRAVENOUS at 07:07

## 2025-07-25 RX ADMIN — ACETAMINOPHEN 1000 MG: 500 TABLET ORAL at 06:07

## 2025-07-25 RX ADMIN — MIDAZOLAM 2 MG: 1 INJECTION INTRAMUSCULAR; INTRAVENOUS at 06:07

## 2025-07-25 RX ADMIN — SCOPALAMINE 1 PATCH: 1 PATCH, EXTENDED RELEASE TRANSDERMAL at 06:07

## 2025-07-25 RX ADMIN — DIPHENHYDRAMINE HYDROCHLORIDE 6.25 MG: 50 INJECTION INTRAMUSCULAR; INTRAVENOUS at 07:07

## 2025-07-25 RX ADMIN — BUPIVACAINE HYDROCHLORIDE 20 ML: 2.5 INJECTION, SOLUTION EPIDURAL; INFILTRATION; INTRACAUDAL at 06:07

## 2025-07-25 RX ADMIN — FAMOTIDINE 20 MG: 10 INJECTION, SOLUTION INTRAVENOUS at 07:07

## 2025-07-25 RX ADMIN — FENTANYL CITRATE 100 MCG: 50 INJECTION INTRAMUSCULAR; INTRAVENOUS at 07:07

## 2025-07-25 RX ADMIN — LIDOCAINE HYDROCHLORIDE 100 MG: 20 INJECTION INTRAVENOUS at 07:07

## 2025-07-25 RX ADMIN — CELECOXIB 400 MG: 200 CAPSULE ORAL at 06:07

## 2025-07-25 NOTE — PLAN OF CARE
Pre op complete. Waiting on anesthesia consent, update, surgical consent, and site gabriela. Belongings in locker. Dr. Lezama's team notified by charge HENRY Ellis about pts rash under right arm. Team coming to bs in preop.    Pt resting comfortably with all questions addressed at this time and call light in reach.

## 2025-07-25 NOTE — BRIEF OP NOTE
[place] - Surgery ([facility])  Brief Operative Note    Surgery Date: 7/25/2025     Surgeons and Role:     * ERNIE Lezama MD - Primary    Assisting Surgeon: None    Pre-op Diagnosis:  Biceps tendinopathy of right upper extremity [M67.921]  Nontraumatic incomplete tear of right rotator cuff [M75.111]    Post-op Diagnosis:  Post-Op Diagnosis Codes:     * Biceps tendinopathy of right upper extremity [M67.921]     * Nontraumatic incomplete tear of right rotator cuff [M75.111]    Procedure(s) (LRB):  DEBRIDEMENT, SHOULDER, ARTHROSCOPIC (Right)  ARTHROSCOPY,SHOULDER,WITH BICEPS TENODESIS (Right)  ARTHROSCOPY, SHOULDER, W/ DISTAL CLAVICLE EXCISION (Right)  ARTHROSCOPY, SHOULDER, W/ LYSIS OF ADHESIONS (Right)  ARTHROSCOPY, SHOULDER, W/ SUBACROMIAL DECOMPRESSION (Right)    Anesthesia: General/Regional    Operative Findings: see full op note    Estimated Blood Loss: * No values recorded between 7/25/2025  6:55 AM and 7/25/2025  8:41 AM *         Specimens:   Specimen (24h ago, onward)      None            * No specimens in log *        Discharge Note    OUTCOME: Patient tolerated treatment/procedure well without complication and is now ready for discharge.    DISPOSITION: Home or Self Care    FINAL DIAGNOSIS:  <principal problem not specified>    FOLLOWUP: In clinic    DISCHARGE INSTRUCTIONS:    Discharge Procedure Orders   Notify your health care provider if you experience any of the following:  increased confusion or weakness     Notify your health care provider if you experience any of the following:  persistent dizziness, light-headedness, or visual disturbances     Notify your health care provider if you experience any of the following:  worsening rash     Notify your health care provider if you experience any of the following:  severe persistent headache     Notify your health care provider if you experience any of the following:  difficulty breathing or increased cough     Notify your health care provider if you  experience any of the following:  redness, tenderness, or signs of infection (pain, swelling, redness, odor or green/yellow discharge around incision site)     Notify your health care provider if you experience any of the following:  severe uncontrolled pain     Notify your health care provider if you experience any of the following:  persistent nausea and vomiting or diarrhea     Notify your health care provider if you experience any of the following:  temperature >100.4

## 2025-07-25 NOTE — ANESTHESIA POSTPROCEDURE EVALUATION
Anesthesia Post Evaluation    Patient: Jenifer Burnett    Procedure(s) Performed: Procedure(s) (LRB):  DEBRIDEMENT, SHOULDER, ARTHROSCOPIC (Right)  ARTHROSCOPY,SHOULDER,WITH BICEPS TENODESIS (Right)  ARTHROSCOPY, SHOULDER, W/ DISTAL CLAVICLE EXCISION (Right)  ARTHROSCOPY, SHOULDER, W/ LYSIS OF ADHESIONS (Right)  ARTHROSCOPY, SHOULDER, W/ SUBACROMIAL DECOMPRESSION (Right)    Final Anesthesia Type: general      Patient location during evaluation: PACU  Patient participation: Yes- Able to Participate  Level of consciousness: awake and alert and oriented  Post-procedure vital signs: reviewed and stable  Pain management: adequate  Airway patency: patent    PONV status at discharge: No PONV  Anesthetic complications: no      Cardiovascular status: blood pressure returned to baseline  Respiratory status: unassisted, spontaneous ventilation and room air  Hydration status: euvolemic  Follow-up not needed.              Vitals Value Taken Time   /76 07/25/25 09:32   Temp 36.8 °C (98.2 °F) 07/25/25 09:30   Pulse 73 07/25/25 09:35   Resp 11 07/25/25 09:35   SpO2 95 % 07/25/25 09:35   Vitals shown include unfiled device data.      Event Time   Out of Recovery 09:15:00         Pain/Олег Score: Pain Rating Prior to Med Admin: 0 (7/25/2025  6:11 AM)  Олег Score: 10 (7/25/2025  8:45 AM)

## 2025-07-25 NOTE — OP NOTE
[facility]   OPERATIVE REPORT   ORTHOPAEDIC SURGERY   PROVIDER: DR. LILA IGLESIAS    PATIENT INFORMATION   Jenifer Burnett 65 y.o. female 1960   MRN: [id number]   LOCATION: [facility]     DATE OF PROCEDURE: 7/25/2025     PREOPERATIVE DIAGNOSES:   Right  1. Shoulder biceps tendinopathy  2. Shoulder low-grade partial rotator cuff tear    POSTOPERATIVE DIAGNOSES:   Right  1. Shoulder biceps tendinopathy  2. Shoulder low-grade partial rotator cuff tear  3. Shoulder adhesive capsulitis  4. Shoulder subacromial bursitis  5. Shoulder external impingement with downward protruding subacromial and distal clavicle spurring  6. Shoulder chondromalacia     OPERATION:   Right  1. Shoulder open subpectoral biceps tenodesis (CPT 73526)  2. Shoulder arthroscopic extensive debridement (CPT 85530)    3. Shoulder arthroscopic distal clavicle excision (CPT 19656)  4. Shoulder arthroscopic lysis of adhesions with manipulation (CPT 59758)  5. Shoulder arthroscopic subacromial decompression     SURGEON: LILA Iglesias MD     ASSISTANTS:  Carson Yadav DO - Fellow - First Assist  DAVID Davis     First Assistant Duties: A first assistant was necessary for this procedure. Assistance was provided for surgical wound exposure, manipulation, and retractor placement and positioning. There was no qualified resident available for the procedure.      ANESTHESIA: General with interscalene block and local anesthetic injection (0.5% Marcaine)     ESTIMATED BLOOD LOSS: 25 cc    IMPLANTS:   Implant Name Type Inv. Item Serial No.  Lot No. LRB No. Used Action   SYS IMPL PROXIMAL TENODESIS - [id number]  SYS IMPL PROXIMAL TENODESIS  [employer] [id number] Right 1 Implanted      SPECIMENS: None.    COMPLICATIONS: None.     INTRAOPERATIVE COUNTS: Correct.     PROPHYLACTIC IV ANTIBIOTICS: Given per [facility] Protocol.    INDICATIONS FOR PROCEDURE:   Jenifer Burnett 65 y.o. female  has been seen and evaluated in the office for  continued right shoulder pain and mechanical symptoms.  After a lengthy discussion and failed nonoperative management, the patient wished to proceed with surgical intervention and was fully informed of risks and benefits.    DETAILS OF PROCEDURE:  After the correct operative site was marked by the operating surgeon, an interscalene block was administered by the anesthesia team.  The patient was then taken to the operating room and placed supine on the operating room table, where the patient underwent general anesthesia by the anesthesia team.  The patient was then rolled into the lateral decubitus position with the operative side up.  A well-padded axillary roll, beanbag and pillows were placed. All pressure points were carefully padded and checked. The upper extremities and both lower extremities were placed in comfortable positions and were also well-padded.      A verbal timeout was confirmed to identify the patient, operative site and planned operative procedure. It was also confirmed the patient had received preoperative IV antibiotic per protocol.     Examination under anesthesia demonstrated: Forward elevation 140-150 degrees, external rotation with arm to side 50 degrees.  Gentle manipulation under anesthesia was performed with both audible and tactile capsular release achieved.  Passive forward elevation improved to 180°.    The operative upper extremity was then prepped and draped in the usual sterile fashion.     The Spider arm positioner was implemented with balanced suspension and appropriate landmarks were noted on the skin.  A posterior followed by ramone-superior portals were created and systematic examination of the joint revealed the following:      -Biceps tendinitis at the root attachment with split partial tearing of the intra-articular tendon  -Diffuse synovitis from anterior to posterior with degenerative labral tearing  -Thickened and inflamed capsular tissue over the anterior rotator  interval extending distally through the MGHL and IGHL/inferior capsular.  There was an intrasubstance capsular tear present related to the manipulation under anesthesia previously performed.  -A posterior push-pull maneuver with probing was performed demonstrating an intact subscapularis.  There was mild abrasive wear to the upper border subscapularis.  Otherwise no significant tearing.  -The undersurface of the superior cuff was partially torn with a low-grade flap tear present anteriorly.  The remainder of the footprint was completely intact.  -No loose bodies  -Grade 4 chondral wear over the anterior to central humeral head consistent with a discrete area of significant humeral head chondromalacia from biceps abrasive wear.  Smaller area of grade 2 chondromalacia of the central humeral head and glenoid.    A shaver was again brought in to clear the field of view and to debride torn labrum and undersurface cuff from anterior to posterior. Extensive synovitis was also removed. Cautery was used to resect the interval to the coracoid and to release capsule through the MGHL and inferior capsule.  Inferior capsular release was performed connect through laterally with the torn inferior capsule secondary to the manipulation.  All thickened capsular tissue was released adjacent to the glenoid labrum. Care was taken to protect the axillary nerve during this portion of the procedure.  The arthroscopic shaver was introduced to gently debride the margins of the released capsule afterwards to limit any recurrent scarring.    The biceps was released with curved Alba scissors.     The arthroscopic shaver also was used to debride the torn chondral delaminated articular cartilage back to a stable margin.  The chondroplasty was performed over the humeral head and glenoid.    Attention was then turned to the subacromial space where significant hypertrophic bursa was encountered. Through an anterolateral working portal, shaver and  cautery devices were introduced to clear the subacromial space of bursa and adhesions. Bursal reflections to the deltoid fascia anteriorly and posteriorly were taken down to further expand this space. This created a nice room with a view. The undersurface of the acromion was exposed with cautery to delineate bony anatomy. Systematic examination of this space revealed the following:    -Subacromial spurring with narrowing of the anterolateral subacromial interval  -Degenerative changes with spurring of the distal clavicle, medial acromial facet articulation.  Downward protruding prominence of the distal clavicle which was felt to be contributory towards the low-grade bursal sided rotator cuff fraying present.  -Fraying and degeneration of the CA ligament indicative of chronic outlet impingement  -Small area of low-grade bursal sided rotator cuff fraying of the supraspinatus consistent with some degree of external impingement    Subacromial decompression was completed using posterior cutting block technique in the standard fashion with a 4.5 mm martina without difficulty.  The anterior osteophyte was flattened converting the acromion morphology from a type 3 to a type 1.  Confirmation of adequate resection was confirmed while viewing from the lateral portal and referencing from the posterior acromial undersurface.    Next, attention was then turned to the distal clavicle excision.  A thermal device was used to clear the soft tissue off the end of the AC joint. The anterior portal was used to perform the AC resection with a martina while viewing from the anterolateral portal.  The adequacy of the resection was confirmed while viewing from the anterior portal.  Care was taken to resect enough postero-superiorly.  Approximately 10 mm was resected from the distal clavicle and an anterior to posterior direction to remove the downward protruding prominence of the distal clavicle for outlet impingement.     The shoulder and  subacromial space were then irrigated and fluid was extravasated using suction.     An axillary incision was made overlying the pectoralis inferior border, measuring 3-4 cm in length. The interval between the pectoralis major and medial conjoined and biceps short head was developed bluntly. Dissection was carried down to the humerus and the biceps long head tendon was retrieved from the wound. Tenosynovitis was noted around the tendon. Approximately 10 mm of tendon was whipstitched just proximal to the musculotendinous junction and the remainder of the tendon was then resected. A guidepin was placed, approximately 10 mm proximal to the inferior crossing border of the pectoralis, to create a unicortical hole. The unicortical biceps button was then dunked into the hole, pulling the free sutures to advance the tendon to the hole for reattachment. One suture limb was then tied to the other limb to secure the tenodesis. Excellent fixation was achieved with proper tensioning. The wound was then thoroughly irrigated with normal saline.      All portals were reapproximated using 3-0 Nylon. The axillary wound was closed with 3-0 Vicryl and 3-0 Monocryl. Mastisol and steri-strips were placed over the axillary wound. Local anesthetic was injected as well. Xeroform and absorbant mepilex pads were placed to cover all incisions.  A polar care shoulder sleeve was secured followed by a sling with abduction pillow. The patient was then repositioned supine, extubated and taken to the recovery room where the patient arrived in stable condition with the compartments of the arm and forearm soft and good perfusion in all digits.     POSTOPERATIVE PLAN OF CARE:  -Patient will be discharged home according to protocol.  -Physical Therapy: Follow the biceps tenodesis rehab protocol.  Active and passive range of motion of the elbow and shoulder may begin now. No biceps resistive activity x 8 weeks. Sling and pillow immobilization for 3  weeks.  -DVT prophylaxis: ASA 81 mg twice a day x 2 weeks.

## 2025-07-25 NOTE — PLAN OF CARE
Patient is AAO and VSS.  Tolerating PO and states pain is tolerable.  Dressing CDI.  Patient states they are ready for d/c.  IV removed.  Catheter tip intact.  Grandson at bedside.  Discharge instructions reviewed and copy given to the patient and grandson.  Questions answered.  Both verbalized understanding.  Medication delivered to bedside.  Patient wheeled to car by Frances FIGUEROA

## 2025-07-25 NOTE — TRANSFER OF CARE
"Anesthesia Transfer of Care Note    Patient: Jenifer Burnett    Procedure(s) Performed: Procedure(s) (LRB):  DEBRIDEMENT, SHOULDER, ARTHROSCOPIC (Right)  ARTHROSCOPY,SHOULDER,WITH BICEPS TENODESIS (Right)  ARTHROSCOPY, SHOULDER, W/ DISTAL CLAVICLE EXCISION (Right)  ARTHROSCOPY, SHOULDER, W/ LYSIS OF ADHESIONS (Right)  ARTHROSCOPY, SHOULDER, W/ SUBACROMIAL DECOMPRESSION (Right)    Patient location: PACU    Anesthesia Type: general and regional    Transport from OR: Transported from OR on 6-10 L/min O2 by face mask with adequate spontaneous ventilation    Post pain: adequate analgesia    Post assessment: no apparent anesthetic complications and tolerated procedure well    Post vital signs: stable    Level of consciousness: awake    Complications: none    Transfer of care protocol was followed      Last vitals: Visit Vitals  BP (!) 149/73 (BP Location: Left arm, Patient Position: Lying)   Pulse 86   Temp 36.7 °C (98 °F) (Oral)   Resp 18   Ht 5' 9" (1.753 m)   Wt 94.3 kg (208 lb)   SpO2 (!) 90%   Breastfeeding No   BMI 30.72 kg/m²     "

## 2025-07-25 NOTE — TELEPHONE ENCOUNTER
Patient was in surgery. Spoke w/ patient's grandson and advised him that the letter that the patient needs is in her chart.

## 2025-07-25 NOTE — ANESTHESIA PROCEDURE NOTES
Intubation    Date/Time: 7/25/2025 7:10 AM    Performed by: Shari Adan CRNA  Authorized by: Shari Adan CRNA    Intubation:     Induction:  Intravenous    Intubated:  Postinduction    Mask Ventilation:  Easy mask    Attempts:  1    Attempted By:  CRNA    Method of Intubation:  Video laryngoscopy    Blade:  Arredondo 3    Laryngeal View Grade: Grade I - full view of cords      Difficult Airway Encountered?: No      Complications:  None    Airway Device:  Oral endotracheal tube    Airway Device Size:  7.0    Style/Cuff Inflation:  Cuffed (inflated to minimal occlusive pressure)    Inflation Amount (mL):  6    Tube secured:  22    Secured at:  The lips    Placement Verified By:  Capnometry    Complicating Factors:  None    Findings Post-Intubation:  BS equal bilateral and atraumatic/condition of teeth unchanged

## 2025-07-28 ENCOUNTER — CLINICAL SUPPORT (OUTPATIENT)
Dept: REHABILITATION | Facility: HOSPITAL | Age: 65
End: 2025-07-28
Payer: MEDICARE

## 2025-07-28 DIAGNOSIS — M25.511 CHRONIC RIGHT SHOULDER PAIN: ICD-10-CM

## 2025-07-28 DIAGNOSIS — M75.111 NONTRAUMATIC INCOMPLETE TEAR OF RIGHT ROTATOR CUFF: ICD-10-CM

## 2025-07-28 DIAGNOSIS — G89.29 CHRONIC RIGHT SHOULDER PAIN: ICD-10-CM

## 2025-07-28 DIAGNOSIS — M67.921 BICEPS TENDINOPATHY OF RIGHT UPPER EXTREMITY: ICD-10-CM

## 2025-07-28 PROCEDURE — 97112 NEUROMUSCULAR REEDUCATION: CPT

## 2025-07-28 PROCEDURE — 97161 PT EVAL LOW COMPLEX 20 MIN: CPT

## 2025-07-28 PROCEDURE — 97140 MANUAL THERAPY 1/> REGIONS: CPT

## 2025-07-29 NOTE — PROGRESS NOTES
Outpatient Rehab    Physical Therapy Evaluation    Patient Name: Jenifer Burnett  MRN: [id number]  YOB: 1960  Encounter Date: 7/28/2025    Therapy Diagnosis:   Encounter Diagnoses   Name Primary?    Chronic right shoulder pain     Nontraumatic incomplete tear of right rotator cuff     Biceps tendinopathy of right upper extremity      Physician: [place] PA-C    Physician Orders: Eval and Treat  Medical Diagnosis: Chronic right shoulder pain  Nontraumatic incomplete tear of right rotator cuff  Biceps tendinopathy of right upper extremity  Surgical Diagnosis: R shoulder debridemend and biceps tenodesis   Surgical Date: 7/25/2025  Days Since Last Surgery: 4    Visit # / Visits Authorized:  1 / 1  Insurance Authorization Period: 7/1/2025 to 7/1/2026  Date of Evaluation: 7/28/2025  Plan of Care Certification: 7/28/2025 to 10/3/2025     Time In: 0909   Time Out: 1004  Total Time (in minutes): 55   Total Billable Time (in minutes):  55    Intake Outcome Measure for FOTO Survey    Therapist reviewed FOTO scores for Jenifer Burnett on 7/28/2025.   FOTO report - see Media section or FOTO account episode details.     Intake Score (%): 25    Precautions:  Additional Precaution and Protocol Details: Per Op Note: Follow the biceps tenodesis rehab protocol.  Active and passive range of motion of the elbow and shoulder may begin now. No biceps resistive activity x 8 weeks. Sling and pillow immobilization for 3 weeks.   Additional Precautions and Protocol Details: Biceps tenodesis protocol, standard    Subjective   History of Present Illness  Jenifer is a 65 y.o. female who reports to physical therapy with a chief concern of Right Shoulder Pain.     The patient reports a medical diagnosis of M25.511,G89.29 (ICD-10-CM) - Chronic right shoulder pain  M75.111 (ICD-10-CM) - Nontraumatic incomplete tear of right rotator cuff  M67.921 (ICD-10-CM) - Biceps tendinopathy of right upper extremity.      Patient reports a surgery  of R shoulder debridemend and biceps tenodesis. Surgery occurred on 07/25/25.     Diagnostic tests related to this condition: MRI studies.          Dominant Hand: Right         Activities of Daily Living  Social history was obtained from Patient.                   Pain     Patient reports a current pain level of 7/10. Pain at best is reported as 5/10. Pain at worst is reported as 9/10.   Location: right shoulder  Pain-Relieving Factors: Ice, Medications - over-the-counter, Medications - prescription         Review of Systems  Patient denies: Cancer History, Cardiac History, and Diabetes  Additional Red Flag Details: Hypertension managed with medications; denies seizures and asthma; denies numbness/tingling     Employment  Does the patient's condition impact their ability to work?: Yes  Employment Status: On leave   at [employer]      Past Medical History/Physical Systems Review:   Jenifer Burnett  has a past medical history of Abnormal gait, Arthritis, Depression, Encounter for blood transfusion, GERD (gastroesophageal reflux disease), Hypertension, Migraine, Poor tolerance for ambulation, and Trigeminal neuralgia.    Jenifer Burnett  has a past surgical history that includes Hysterectomy; Esophagogastroduodenoscopy (N/A, 10/14/2019); Cholecystectomy (07/2012); Laparoscopic Toupet fundoplication (N/A, 11/22/2019); Appendectomy; Total knee arthroplasty (Right, 2/26/2024); Arthroscopic debridement of shoulder (Right, 7/25/2025); arthroscopy,shoulder,with biceps tenodesis (Right, 7/25/2025); Arthroscopy of shoulder with removal of distal clavicle (Right, 7/25/2025); lysis, adhesions, shoulder, arthroscopic (Right, 7/25/2025); and Arthroscopy of shoulder with decompression of subacromial space (Right, 7/25/2025).    Jenifer has a current medication list which includes the following prescription(s): acetaminophen, aspirin, bupropion, celecoxib, citalopram, cyanocobalamin (vitamin b-12), cyclobenzaprine,  gabapentin, hydroxyzine hcl, losartan, omeprazole, ondansetron, ondansetron, oxycodone, triamcinolone acetonide 0.1%, and vitamin d, and the following Facility-Administered Medications: ropivacaine hcl/pf.    Review of patient's allergies indicates:  No Known Allergies     Objective   Bracing   The shoulder brace type is Immobilizer with abduction.           Subcranial Range of Motion   Active Restricted? Passive Restricted? Pain   Flexion         Protraction         Retraction           Cervical Range of Motion   Active (deg) Passive (deg) Pain   Flexion 40       Extension 30       Right Lateral Flexion 20       Right Rotation 60       Left Lateral Flexion 15       Left Rotation 60         Mild pain reported on right side of neck throughout active range of motion    Shoulder Range of Motion     Shoulder, Elbow, or Forearm Range of Motion Details: About 50% available range in all planes passively; noted muscle guarding         Shoulder Strength - Planes of Motion   Right Strength Right Pain Left Strength Left  Pain   Flexion           Extension           ABduction           ADduction           Horizontal ABduction           Horizontal ADduction           Internal Rotation 0°           Internal Rotation 90°           External Rotation 0°           External Rotation 90°               Shoulder Strength Details  N/T - will test in future visit.                  Treatment:  Manual Therapy  MT 1: passive range of motion (flex, abd, ER): 8 minutes  Balance/Neuromuscular Re-Education  NMR 1: Education on prognosis, plan of care, home exercise program, exercise justification, involved anatomy, swelling/pain management  NMR 2: HEP: See patient instructions for HEP to be performed daily and progressed as tolerated    Time Entry(in minutes):  PT Evaluation (Low) Time Entry: 25  Manual Therapy Time Entry: 8  Neuromuscular Re-Education Time Entry: 22    Assessment & Plan   Assessment  Jenifer presents with a condition of Low  complexity.   Presentation of Symptoms: Stable  Will Comorbidities Impact Care: No       Functional Limitations: Activity tolerance, Completing self-care activities, Proprioception, Range of motion, Participating in leisure activities, Pain with ADLs/IADLs, Gross motor coordination  Impairments: Pain with functional activity, Impaired physical strength  Personal Factors Affecting Prognosis: Pain    Patient Goal for Therapy (PT): improve shoulder range of motion and strength  Prognosis: Good  Assessment Details: Jenifer is a 65-year-old F with a medical diagnosis of M25.511,G89.29 (ICD-10-CM) - Chronic right shoulder pain M75.111 (ICD-10-CM) - Nontraumatic incomplete tear of right rotator cuff M67.921 (ICD-10-CM) - Biceps tendinopathy of right upper extremity. Patient demonstrates deficits with range of motion, strength, and function that limit ability to participate in work and recreational activities. She would benefit from skilled PT services to normalize kinetic chain mobility, strength, and function to safely return to their prior level of activity.     Plan  From a physical therapy perspective, the patient would benefit from: Skilled Rehab Services    Planned therapy interventions include: Therapeutic exercise, Therapeutic activities, Neuromuscular re-education, Manual therapy, ADLs/IADLs, Other (Comment), and Gait training. Dry Needling (prn)  Planned modalities to include: Biofeedback, Electrical stimulation - attended, Electrical stimulation - passive/unattended, Thermotherapy (hot pack), and Cryotherapy (cold pack).        Visit Frequency: 2 times Per Week for 10 Weeks.       This plan was discussed with Patient.   Discussion participants: Agreed Upon Plan of Care  Plan details: Frequency and duration of treatment to be adjusted as needed          The patient's spiritual, cultural, and educational needs were considered, and the patient is agreeable to the plan of care and goals.           Goals:   Active        Functional outcome       Patient will show a significant change in FOTO patient-reported outcome tool to demonstrate subjective improvement (Ongoing)       Start:  07/29/25    Expected End:  10/03/25            Patient stated goal: improve shoulder range of motion and strength  (Ongoing)       Start:  07/29/25    Expected End:  10/03/25            Patient will demonstrate independence in home program for support of progression (Ongoing)       Start:  07/29/25    Expected End:  09/05/25               Pain       Patient will report a 2 point reduction in pain while performing PT exercises (Ongoing)       Start:  07/29/25    Expected End:  09/05/25               Range of Motion       Patient will achieve right shoulder flexion of 170 degrees (Ongoing)       Start:  07/29/25    Expected End:  09/05/25            Patient will achieve right shoulder abduction of 170 degrees (Ongoing)       Start:  07/29/25    Expected End:  10/03/25            Patient will achieve right shoulder external rotation of 80 degrees in 45 degrees abd (Ongoing)       Start:  07/29/25    Expected End:  10/03/25                Reshma Ellsworth, PT, DPT

## 2025-07-31 DIAGNOSIS — M75.111 NONTRAUMATIC INCOMPLETE TEAR OF RIGHT ROTATOR CUFF: ICD-10-CM

## 2025-07-31 DIAGNOSIS — M25.511 CHRONIC RIGHT SHOULDER PAIN: ICD-10-CM

## 2025-07-31 DIAGNOSIS — M67.921 BICEPS TENDINOPATHY OF RIGHT UPPER EXTREMITY: ICD-10-CM

## 2025-07-31 DIAGNOSIS — G89.29 CHRONIC RIGHT SHOULDER PAIN: ICD-10-CM

## 2025-07-31 RX ORDER — ASPIRIN 81 MG/1
81 TABLET ORAL 2 TIMES DAILY
Qty: 28 TABLET | Refills: 0 | OUTPATIENT
Start: 2025-07-31

## 2025-08-01 ENCOUNTER — CLINICAL SUPPORT (OUTPATIENT)
Dept: REHABILITATION | Facility: HOSPITAL | Age: 65
End: 2025-08-01
Payer: MEDICARE

## 2025-08-01 DIAGNOSIS — M25.511 RIGHT SHOULDER PAIN, UNSPECIFIED CHRONICITY: Primary | ICD-10-CM

## 2025-08-01 PROCEDURE — 97112 NEUROMUSCULAR REEDUCATION: CPT

## 2025-08-01 NOTE — PROGRESS NOTES
Outpatient Rehab    Physical Therapy Visit    Patient Name: Jenifer Burnett  MRN: [id number]  YOB: 1960  Encounter Date: 8/1/2025    Therapy Diagnosis:   Encounter Diagnosis   Name Primary?    Right shoulder pain, unspecified chronicity Yes     Physician: North Pinzon PA-C    Physician Orders: Eval and Treat  Medical Diagnosis: Pain in right shoulder  Other chronic pain  Incomplete rotator cuff tear or rupture of right shoulder, not specified as traumatic  Unspecified disorder of synovium and tendon, right upper arm  Surgical Diagnosis: R shoulder debridemend and biceps tenodesis   Surgical Date: 7/25/2025  Days Since Last Surgery: 7    Visit # / Visits Authorized:  1 / 21  Insurance Authorization Period: 8/1/2025 to 10/3/2025  Date of Evaluation: 7/28/2025  Plan of Care Certification: 7/29/2025 to 10/3/2025      PT/PTA: PT   Number of PTA visits since last PT visit:0  Time In: 0902   Time Out: 1000  Total Time (in minutes): 58   Total Billable Time (in minutes):  28    FOTO:  Intake Score (%): 25  Survey Score 2 (%): Not applicable for this Episode  Survey Score 3 (%): Not applicable for this Episode    Precautions:  Additional Precaution and Protocol Details: Per Op Note: Follow the biceps tenodesis rehab protocol.  Active and passive range of motion of the elbow and shoulder may begin now. No biceps resistive activity x 8 weeks. Sling and pillow immobilization for 3 weeks.   Additional Precautions and Protocol Details: Biceps tenodesis protocol, standard      Subjective   Jenifer reports to PT with no new reports of pain. Mild pain in right-sided neck. She has been compliant with home exercises..         Objective            Treatment:  Manual Therapy  MT 1: passive range of motion (flex, abd)  Balance/Neuromuscular Re-Education  NMR 1: Education on prognosis, plan of care, home exercise program, exercise justification, involved anatomy, swelling/pain management  NMR 2: Table slides for decreased  muscle accessory use (flex, scaption, abduction): 2 x 10 reps  NMR 3: scap squeezes, shoulder shrugs: 3 x 10 reps, 5 second holds  NMR 4: Alternating pronation/supination: 3 x 10 reps    Time Entry(in minutes):  Manual Therapy Time Entry: 8  Neuromuscular Re-Education Time Entry: 50    Assessment & Plan   Assessment: Jenifer is 1 week status post right biceps tenodesis and shoulder debridement. She demonstrates improved passive range of motion and decreased accessory muscle use. She tolerated today's session well and required moderate cues to prevent muscle guarding. Will continue to progress as tolerated.  Evaluation/Treatment Tolerance: Patient tolerated treatment well    The patient will continue to benefit from skilled outpatient physical therapy in order to address the deficits listed in the problem list on the initial evaluation, provide patient and family education, and maximize the patients level of independence in the home and community environments.     The patient's spiritual, cultural, and educational needs were considered, and the patient is agreeable to the plan of care and goals.           Plan: Progress to active range of motion gently    Goals:   Active       Functional outcome       Patient will show a significant change in FOTO patient-reported outcome tool to demonstrate subjective improvement (Ongoing)       Start:  07/29/25    Expected End:  10/03/25            Patient stated goal: improve shoulder range of motion and strength  (Ongoing)       Start:  07/29/25    Expected End:  10/03/25            Patient will demonstrate independence in home program for support of progression (Ongoing)       Start:  07/29/25    Expected End:  09/05/25               Pain       Patient will report a 2 point reduction in pain while performing PT exercises (Ongoing)       Start:  07/29/25    Expected End:  09/05/25               Range of Motion       Patient will achieve right shoulder flexion of 170 degrees  (Ongoing)       Start:  07/29/25    Expected End:  09/05/25            Patient will achieve right shoulder abduction of 170 degrees (Ongoing)       Start:  07/29/25    Expected End:  10/03/25            Patient will achieve right shoulder external rotation of 80 degrees in 45 degrees abd (Ongoing)       Start:  07/29/25    Expected End:  10/03/25                Reshma Ellsworth PT, DPT

## 2025-08-05 ENCOUNTER — CLINICAL SUPPORT (OUTPATIENT)
Dept: REHABILITATION | Facility: HOSPITAL | Age: 65
End: 2025-08-05
Payer: MEDICARE

## 2025-08-05 DIAGNOSIS — M25.511 RIGHT SHOULDER PAIN, UNSPECIFIED CHRONICITY: Primary | ICD-10-CM

## 2025-08-05 PROCEDURE — 97140 MANUAL THERAPY 1/> REGIONS: CPT | Mod: CQ

## 2025-08-05 PROCEDURE — 97112 NEUROMUSCULAR REEDUCATION: CPT | Mod: CQ

## 2025-08-05 NOTE — PROGRESS NOTES
Outpatient Rehab    Physical Therapy Visit    Patient Name: Jenifer Burnett  MRN: [id number]  YOB: 1960  Encounter Date: 8/5/2025    Therapy Diagnosis:   Encounter Diagnosis   Name Primary?    Right shoulder pain, unspecified chronicity Yes     Physician: [place] Pinzon PA-C    Physician Orders: Eval and Treat  Medical Diagnosis: Pain in right shoulder  Other chronic pain  Incomplete rotator cuff tear or rupture of right shoulder, not specified as traumatic  Unspecified disorder of synovium and tendon, right upper arm  Surgical Diagnosis: R shoulder debridemend and biceps tenodesis   Surgical Date: 7/25/2025  Days Since Last Surgery: 11    Visit # / Visits Authorized:  2 / 21  Insurance Authorization Period: 8/1/2025 to 10/3/2025  Date of Evaluation: 7/28/2025  Plan of Care Certification: 7/29/2025 to 10/3/2025      PT/PTA: PTA   Number of PTA visits since last PT visit:1  Time In: 0910   Time Out: 1003  Total Time (in minutes): 53   Total Billable Time (in minutes): 26    FOTO:  Intake Score (%): 25  Survey Score 2 (%): Not applicable for this Episode  Survey Score 3 (%): Not applicable for this Episode    Precautions:  Additional Precaution and Protocol Details: Per Op Note: Follow the biceps tenodesis rehab protocol.  Active and passive range of motion of the elbow and shoulder may begin now. No biceps resistive activity x 8 weeks. Sling and pillow immobilization for 3 weeks.   Additional Precautions and Protocol Details: Biceps tenodesis protocol, standard      Subjective   hurting after the last treatment session but subsided that night.  Pain reported as 7/10.      Objective            Treatment:  Manual Therapy  MT 1: passive range of motion (flex, abd)  Balance/Neuromuscular Re-Education  NMR 1: Education on prognosis, plan of care, home exercise program, exercise justification, involved anatomy, swelling/pain management  NMR 2: Table slides for decreased muscle accessory use (flex, scaption,  abduction): 2 x 10 reps; pulleys flexion: 4 min  NMR 3: scap squeezes, shoulder shrugs: 3 x 10 reps, 5 second holds  NMR 4: Alternating pronation/supination: 3 x 10 reps; wrist PROM flexion/extension: x20 with 5 sec hold/direction    Time Entry(in minutes):  Manual Therapy Time Entry: 8  Neuromuscular Re-Education Time Entry: 45    Assessment & Plan   Assessment: Jenifer is 1 week and 4 days status post right biceps tenodesis and shoulder debridement. She tolerated today's session well and required moderate cues to prevent muscle guarding. Will continue to progress as tolerated.       The patient will continue to benefit from skilled outpatient physical therapy in order to address the deficits listed in the problem list on the initial evaluation, provide patient and family education, and maximize the patients level of independence in the home and community environments.     The patient's spiritual, cultural, and educational needs were considered, and the patient is agreeable to the plan of care and goals.           Plan: Progress to active range of motion gently    Goals:   Active       Functional outcome       Patient will show a significant change in FOTO patient-reported outcome tool to demonstrate subjective improvement (Ongoing)       Start:  07/29/25    Expected End:  10/03/25            Patient stated goal: improve shoulder range of motion and strength  (Ongoing)       Start:  07/29/25    Expected End:  10/03/25            Patient will demonstrate independence in home program for support of progression (Ongoing)       Start:  07/29/25    Expected End:  09/05/25               Pain       Patient will report a 2 point reduction in pain while performing PT exercises (Ongoing)       Start:  07/29/25    Expected End:  09/05/25               Range of Motion       Patient will achieve right shoulder flexion of 170 degrees (Ongoing)       Start:  07/29/25    Expected End:  09/05/25            Patient will achieve right  shoulder abduction of 170 degrees (Ongoing)       Start:  07/29/25    Expected End:  10/03/25            Patient will achieve right shoulder external rotation of 80 degrees in 45 degrees abd (Ongoing)       Start:  07/29/25    Expected End:  10/03/25                Neri Toledo, PTA

## 2025-08-07 ENCOUNTER — OFFICE VISIT (OUTPATIENT)
Dept: SPORTS MEDICINE | Facility: CLINIC | Age: 65
End: 2025-08-07
Payer: MEDICARE

## 2025-08-07 VITALS
BODY MASS INDEX: 30.7 KG/M2 | DIASTOLIC BLOOD PRESSURE: 90 MMHG | HEART RATE: 89 BPM | WEIGHT: 207.88 LBS | SYSTOLIC BLOOD PRESSURE: 143 MMHG

## 2025-08-07 DIAGNOSIS — Z98.890 S/P ARTHROSCOPY OF RIGHT SHOULDER: Primary | ICD-10-CM

## 2025-08-07 PROCEDURE — 99999 PR PBB SHADOW E&M-EST. PATIENT-LVL III: CPT | Mod: PBBFAC,,, | Performed by: PHYSICIAN ASSISTANT

## 2025-08-07 NOTE — PROGRESS NOTES
POST-OPERATIVE EXAMINATION    65 y.o. Female who returns for follow up after surgery. She is 2 weeks s/p:    DATE OF PROCEDURE: 7/25/2025      OPERATION:   Right  1. Shoulder open subpectoral biceps tenodesis (CPT 42046)  2. Shoulder arthroscopic extensive debridement (CPT 54782)    3. Shoulder arthroscopic distal clavicle excision (CPT 40882)  4. Shoulder arthroscopic lysis of adhesions with manipulation (CPT 49967)  5. Shoulder arthroscopic subacromial decompression     She is doing well without any issues.       PHYSICAL EXAMINATION:  BP (!) 143/90   Pulse 89   Wt 94.3 kg (207 lb 14.3 oz)   BMI 30.70 kg/m²   General: Well-developed well-nourished 65 y.o. female in no acute distress   Cardiovascular: Regular rhythm   Lungs: No labored breathing or wheezing appreciated   Neuro: Alert and oriented ×3   Psychiatric: well oriented to person, place and time, demonstrates normal mood and affect   Skin: No rashes, lesions or ulcers, normal temperature, turgor, and texture on involved extremity    ORTHOPEDIC EXAM:  Normal post-operative swelling  Normal post-operative scarring  Strength: WNL  ROM:  Not tested today  Tests:  None today  Incisions are healing well with no outward signs of infection; no erythema or drainage    ASSESSMENT:      ICD-10-CM ICD-9-CM   1. S/P arthroscopy of right shoulder  Z98.890 V45.89           PLAN:       Sutures were removed today  Continue wearing sling at all times for 1 more week  Continue physical therapy  No resisted elbow flexion for 8 weeks  Return to clinic in 4 weeks with Dr. Lezama as scheduled      POSTOPERATIVE PLAN OF CARE:  -Patient will be discharged home according to protocol.  -Physical Therapy: Follow the biceps tenodesis rehab protocol.  Active and passive range of motion of the elbow and shoulder may begin now. No biceps resistive activity x 8 weeks. Sling and pillow immobilization for 3 weeks.  -DVT prophylaxis: ASA 81 mg twice a day x 2 weeks.           North ROSAS  JERICHO Pinzon, [facility]    Physician Assistant    [facility]    [phone]

## 2025-08-12 ENCOUNTER — CLINICAL SUPPORT (OUTPATIENT)
Dept: REHABILITATION | Facility: HOSPITAL | Age: 65
End: 2025-08-12
Payer: MEDICARE

## 2025-08-12 DIAGNOSIS — M25.511 RIGHT SHOULDER PAIN, UNSPECIFIED CHRONICITY: Primary | ICD-10-CM

## 2025-08-12 PROCEDURE — 97112 NEUROMUSCULAR REEDUCATION: CPT

## 2025-08-12 PROCEDURE — 97140 MANUAL THERAPY 1/> REGIONS: CPT

## 2025-08-15 ENCOUNTER — CLINICAL SUPPORT (OUTPATIENT)
Dept: REHABILITATION | Facility: HOSPITAL | Age: 65
End: 2025-08-15
Payer: MEDICARE

## 2025-08-15 DIAGNOSIS — M25.511 RIGHT SHOULDER PAIN, UNSPECIFIED CHRONICITY: Primary | ICD-10-CM

## 2025-08-15 PROCEDURE — 97112 NEUROMUSCULAR REEDUCATION: CPT

## 2025-08-19 ENCOUNTER — CLINICAL SUPPORT (OUTPATIENT)
Dept: REHABILITATION | Facility: HOSPITAL | Age: 65
End: 2025-08-19
Payer: MEDICARE

## 2025-08-19 DIAGNOSIS — M25.511 RIGHT SHOULDER PAIN, UNSPECIFIED CHRONICITY: Primary | ICD-10-CM

## 2025-08-19 PROCEDURE — 97112 NEUROMUSCULAR REEDUCATION: CPT | Mod: CQ

## 2025-08-21 ENCOUNTER — TELEPHONE (OUTPATIENT)
Dept: SPORTS MEDICINE | Facility: CLINIC | Age: 65
End: 2025-08-21
Payer: MEDICARE

## 2025-08-22 ENCOUNTER — CLINICAL SUPPORT (OUTPATIENT)
Dept: REHABILITATION | Facility: HOSPITAL | Age: 65
End: 2025-08-22
Payer: MEDICARE

## 2025-08-22 DIAGNOSIS — M25.511 RIGHT SHOULDER PAIN, UNSPECIFIED CHRONICITY: Primary | ICD-10-CM

## 2025-08-22 PROCEDURE — 97112 NEUROMUSCULAR REEDUCATION: CPT | Mod: CQ

## 2025-09-02 ENCOUNTER — CLINICAL SUPPORT (OUTPATIENT)
Dept: REHABILITATION | Facility: HOSPITAL | Age: 65
End: 2025-09-02
Payer: MEDICARE

## 2025-09-02 DIAGNOSIS — M25.511 RIGHT SHOULDER PAIN, UNSPECIFIED CHRONICITY: Primary | ICD-10-CM

## 2025-09-02 PROCEDURE — 97112 NEUROMUSCULAR REEDUCATION: CPT

## 2025-09-04 ENCOUNTER — HOSPITAL ENCOUNTER (OUTPATIENT)
Dept: RADIOLOGY | Facility: HOSPITAL | Age: 65
Discharge: HOME | End: 2025-09-04
Attending: ORTHOPAEDIC SURGERY
Payer: MEDICARE

## 2025-09-04 ENCOUNTER — OFFICE VISIT (OUTPATIENT)
Dept: SPORTS MEDICINE | Facility: CLINIC | Age: 65
End: 2025-09-04
Payer: MEDICARE

## 2025-09-04 VITALS
SYSTOLIC BLOOD PRESSURE: 139 MMHG | WEIGHT: 208.75 LBS | DIASTOLIC BLOOD PRESSURE: 90 MMHG | HEART RATE: 98 BPM | BODY MASS INDEX: 30.83 KG/M2

## 2025-09-04 DIAGNOSIS — M25.511 CHRONIC RIGHT SHOULDER PAIN: ICD-10-CM

## 2025-09-04 DIAGNOSIS — M67.921 BICEPS TENDINOPATHY OF RIGHT UPPER EXTREMITY: ICD-10-CM

## 2025-09-04 DIAGNOSIS — G89.29 CHRONIC RIGHT SHOULDER PAIN: ICD-10-CM

## 2025-09-04 DIAGNOSIS — M75.111 NONTRAUMATIC INCOMPLETE TEAR OF RIGHT ROTATOR CUFF: ICD-10-CM

## 2025-09-04 PROCEDURE — 99999 PR PBB SHADOW E&M-EST. PATIENT-LVL III: CPT | Mod: PBBFAC,,, | Performed by: ORTHOPAEDIC SURGERY

## 2025-09-04 PROCEDURE — 73030 X-RAY EXAM OF SHOULDER: CPT | Mod: TC,RT

## 2025-09-04 PROCEDURE — 99024 POSTOP FOLLOW-UP VISIT: CPT | Mod: S$GLB,,, | Performed by: ORTHOPAEDIC SURGERY

## 2025-09-04 PROCEDURE — 3044F HG A1C LEVEL LT 7.0%: CPT | Mod: CPTII,S$GLB,, | Performed by: ORTHOPAEDIC SURGERY

## 2025-09-04 PROCEDURE — 3075F SYST BP GE 130 - 139MM HG: CPT | Mod: CPTII,S$GLB,, | Performed by: ORTHOPAEDIC SURGERY

## 2025-09-04 PROCEDURE — 4010F ACE/ARB THERAPY RXD/TAKEN: CPT | Mod: CPTII,S$GLB,, | Performed by: ORTHOPAEDIC SURGERY

## 2025-09-04 PROCEDURE — 1101F PT FALLS ASSESS-DOCD LE1/YR: CPT | Mod: CPTII,S$GLB,, | Performed by: ORTHOPAEDIC SURGERY

## 2025-09-04 PROCEDURE — 1159F MED LIST DOCD IN RCRD: CPT | Mod: CPTII,S$GLB,, | Performed by: ORTHOPAEDIC SURGERY

## 2025-09-04 PROCEDURE — 73030 X-RAY EXAM OF SHOULDER: CPT | Mod: 26,RT,, | Performed by: RADIOLOGY

## 2025-09-04 PROCEDURE — 3080F DIAST BP >= 90 MM HG: CPT | Mod: CPTII,S$GLB,, | Performed by: ORTHOPAEDIC SURGERY

## 2025-09-04 PROCEDURE — 3288F FALL RISK ASSESSMENT DOCD: CPT | Mod: CPTII,S$GLB,, | Performed by: ORTHOPAEDIC SURGERY

## 2025-09-04 RX ORDER — METHYLPREDNISOLONE 4 MG/1
TABLET ORAL
Qty: 21 EACH | Refills: 0 | Status: SHIPPED | OUTPATIENT
Start: 2025-09-04

## 2025-09-04 RX ORDER — CELECOXIB 200 MG/1
200 CAPSULE ORAL 2 TIMES DAILY
Qty: 60 CAPSULE | Refills: 1 | Status: SHIPPED | OUTPATIENT
Start: 2025-09-04

## (undated) DEVICE — WRAP KNEE ACCU THERM GEL PACK

## (undated) DEVICE — WRAP SHLDR HIP ACCU THRM PACK

## (undated) DEVICE — KIT IRR SUCTION HND PIECE

## (undated) DEVICE — DRESSING ABSRBNT ISLAND 3.6X8

## (undated) DEVICE — LOOP VESSEL BLUE MAXI

## (undated) DEVICE — SCISSOR 5MMX35CM DIRECT DRIVE

## (undated) DEVICE — TRAY MINOR GEN SURG

## (undated) DEVICE — PIN FIX TEMP 1/8X2.5IN LF STER
Type: IMPLANTABLE DEVICE | Site: KNEE | Status: NON-FUNCTIONAL
Removed: 2024-02-26

## (undated) DEVICE — GLOVE BIOGEL SKINSENSE PI 8.0

## (undated) DEVICE — STAPLER SKIN REGULAR

## (undated) DEVICE — GLOVE BIOGEL PI MICRO INDIC 7

## (undated) DEVICE — NDL MAYO CAT 1/2 CIR #5

## (undated) DEVICE — KIT TOTAL KNEE TKOFG OMC

## (undated) DEVICE — SEE MEDLINE ITEM 152622

## (undated) DEVICE — MIXER BONE CEMENT

## (undated) DEVICE — SEE MEDLINE ITEM 157131

## (undated) DEVICE — MARKER SKIN RULER STERILE

## (undated) DEVICE — COVER CAMERA OPERATING ROOM

## (undated) DEVICE — BLADE SAGITTAL 18 X 1.27 X 90M

## (undated) DEVICE — LUBRICANT SURGILUBE 2 OZ

## (undated) DEVICE — SEE MEDLINE ITEM 157148

## (undated) DEVICE — BANDAGE ESMARK 6X12

## (undated) DEVICE — GOWN SURGICAL X-LARGE

## (undated) DEVICE — SUT PDS II 0 CT-1 VIL MONO

## (undated) DEVICE — MANIFOLD 4 PORT

## (undated) DEVICE — BLADE SAW STERN .076MM 6.1MM L

## (undated) DEVICE — DRESSING AQUACEL AG SILVER 4X4

## (undated) DEVICE — WRAP PROTECTIVE LEG POS STRL

## (undated) DEVICE — SUT VICRYL BR 1 GEN 27 CT-1

## (undated) DEVICE — TROCAR ENDOPATH XCEL 5X100MM

## (undated) DEVICE — BANDAGE ADHESIVE

## (undated) DEVICE — SHEARS HARMONIC 5CM 36CM

## (undated) DEVICE — NDL HYPO REG 25G X 1 1/2

## (undated) DEVICE — SUT COATED VICRYL 4/0 27IN

## (undated) DEVICE — BLADE SHAVER LANZA 4.2X13CM

## (undated) DEVICE — SUT GUT PL. 4-0 27 FS-2

## (undated) DEVICE — KIT SHOULDER POSITIONER SPIDER

## (undated) DEVICE — DRAPE STERI INSTRUMENT 1018

## (undated) DEVICE — HOOD T7 W/ PEEL AWAY LENS

## (undated) DEVICE — DRAPE STERI U-SHAPED 47X51IN

## (undated) DEVICE — ADHESIVE MASTISOL VIAL 48/BX

## (undated) DEVICE — NDL HYPO STD REG BVL 18GX1.5IN

## (undated) DEVICE — SYR 30CC LUER LOCK

## (undated) DEVICE — SUT VICRYL 3-0 27 SH

## (undated) DEVICE — COVER LIGHT HANDLE 80/CA

## (undated) DEVICE — SOL NACL IRR 3000ML

## (undated) DEVICE — SUT ENDOLOOP PDSII 18 LIGA

## (undated) DEVICE — SUT 0 VICRYL / CT-1

## (undated) DEVICE — TUBING SUC UNIV W/CONN 12FT

## (undated) DEVICE — DRAPE T EXTRM SURG 121X128X90

## (undated) DEVICE — SUT PROLENE 0 CT1 30IN BLUE

## (undated) DEVICE — ADHESIVE DERMABOND ADVANCED

## (undated) DEVICE — GLOVE BIOGEL PI MICRO SZ 7

## (undated) DEVICE — SUT BLU BR 2 TAPERD NDL 1/2

## (undated) DEVICE — UNDERGLOVES BIOGEL PI SIZE 8.5

## (undated) DEVICE — GLOVE SENSICARE PI SURG 7

## (undated) DEVICE — DRESSING AQUACEL AG ADV 3.5X12

## (undated) DEVICE — SOL NACL IRR 1000ML BTL

## (undated) DEVICE — WARMER DRAPE STERILE LF

## (undated) DEVICE — CANNULA ENDOPATH XCEL 5X100MM

## (undated) DEVICE — TUBE PENROSE DRAIN 12IN X 5/8I

## (undated) DEVICE — GOWN ECLIPSE REINF LV4 XLNG XL

## (undated) DEVICE — APPLICATOR CHLORAPREP ORN 26ML

## (undated) DEVICE — PACK LAPSCP/PELVSCPY III TIBRN

## (undated) DEVICE — SUT TICRON BLUE 2-0 48 SK

## (undated) DEVICE — CLOSURE SKIN STERI STRIP 1/2X4

## (undated) DEVICE — SOL NS 1000CC

## (undated) DEVICE — ALCOHOL 70% ISO RUBBING 16OZ

## (undated) DEVICE — GLOVE SENSICARE PI GRN 7.5

## (undated) DEVICE — BLADE SURG CARBON STEEL SZ11

## (undated) DEVICE — Device

## (undated) DEVICE — SOL BETADINE 5%

## (undated) DEVICE — TROCAR ENDOPATH XCEL 12MM 10CM

## (undated) DEVICE — DRAPE STERI-DRAPE 1000 17X11IN

## (undated) DEVICE — BNDG COFLEX FOAM LF2 ST 4X5YD

## (undated) DEVICE — SEE MEDLINE ITEM 157117

## (undated) DEVICE — SPONGE GAUZE 16PLY 4X4

## (undated) DEVICE — TOURNIQUET SB QC DP 34X4IN

## (undated) DEVICE — ELECTRODE REM PLYHSV RETURN 9

## (undated) DEVICE — SUPPORT SLING SHOT II MEDIUM

## (undated) DEVICE — PROBE ARTHO ENERGY 90 DEG

## (undated) DEVICE — SUT TICRON BLUE O SK

## (undated) DEVICE — BNDG COFLEX FOAM LF2 ST 6X5YD